# Patient Record
Sex: MALE | Race: WHITE | Employment: OTHER | URBAN - METROPOLITAN AREA
[De-identification: names, ages, dates, MRNs, and addresses within clinical notes are randomized per-mention and may not be internally consistent; named-entity substitution may affect disease eponyms.]

---

## 2018-08-21 RX ORDER — TRAMADOL HYDROCHLORIDE 50 MG/1
50 TABLET ORAL EVERY 6 HOURS PRN
COMMUNITY

## 2018-08-21 RX ORDER — LANOLIN ALCOHOL/MO/W.PET/CERES
3 CREAM (GRAM) TOPICAL NIGHTLY PRN
COMMUNITY
End: 2018-09-17 | Stop reason: DRUGHIGH

## 2018-08-21 RX ORDER — GABAPENTIN 300 MG/1
300 CAPSULE ORAL 3 TIMES DAILY
COMMUNITY
End: 2019-02-25

## 2018-08-21 RX ORDER — TAMSULOSIN HYDROCHLORIDE 0.4 MG/1
0.4 CAPSULE ORAL DAILY
COMMUNITY
End: 2019-03-20 | Stop reason: SDUPTHER

## 2018-08-21 RX ORDER — FAMOTIDINE 40 MG/1
40 TABLET, FILM COATED ORAL DAILY
COMMUNITY
End: 2018-09-05

## 2018-08-21 RX ORDER — ATORVASTATIN CALCIUM 10 MG/1
10 TABLET, FILM COATED ORAL DAILY
COMMUNITY
End: 2018-09-05 | Stop reason: SDUPTHER

## 2018-09-05 ENCOUNTER — OFFICE VISIT (OUTPATIENT)
Dept: PRIMARY CARE CLINIC | Age: 73
End: 2018-09-05
Payer: MEDICARE

## 2018-09-05 VITALS
WEIGHT: 221.6 LBS | TEMPERATURE: 98.7 F | DIASTOLIC BLOOD PRESSURE: 63 MMHG | SYSTOLIC BLOOD PRESSURE: 135 MMHG | HEART RATE: 70 BPM | RESPIRATION RATE: 20 BRPM

## 2018-09-05 DIAGNOSIS — E11.9 TYPE 2 DIABETES MELLITUS WITHOUT COMPLICATION, WITHOUT LONG-TERM CURRENT USE OF INSULIN (HCC): Primary | ICD-10-CM

## 2018-09-05 DIAGNOSIS — G89.29 OTHER CHRONIC PAIN: ICD-10-CM

## 2018-09-05 DIAGNOSIS — G47.33 OSA (OBSTRUCTIVE SLEEP APNEA): ICD-10-CM

## 2018-09-05 DIAGNOSIS — R39.15 URGENCY OF URINATION: ICD-10-CM

## 2018-09-05 DIAGNOSIS — Z13.220 LIPID SCREENING: ICD-10-CM

## 2018-09-05 DIAGNOSIS — I10 ESSENTIAL HYPERTENSION: ICD-10-CM

## 2018-09-05 DIAGNOSIS — I25.2 HISTORY OF MI (MYOCARDIAL INFARCTION): ICD-10-CM

## 2018-09-05 DIAGNOSIS — H91.93 BILATERAL HEARING LOSS, UNSPECIFIED HEARING LOSS TYPE: ICD-10-CM

## 2018-09-05 PROCEDURE — 2022F DILAT RTA XM EVC RTNOPTHY: CPT | Performed by: NURSE PRACTITIONER

## 2018-09-05 PROCEDURE — 3046F HEMOGLOBIN A1C LEVEL >9.0%: CPT | Performed by: NURSE PRACTITIONER

## 2018-09-05 PROCEDURE — G8421 BMI NOT CALCULATED: HCPCS | Performed by: NURSE PRACTITIONER

## 2018-09-05 PROCEDURE — 4040F PNEUMOC VAC/ADMIN/RCVD: CPT | Performed by: NURSE PRACTITIONER

## 2018-09-05 PROCEDURE — 1036F TOBACCO NON-USER: CPT | Performed by: NURSE PRACTITIONER

## 2018-09-05 PROCEDURE — 1101F PT FALLS ASSESS-DOCD LE1/YR: CPT | Performed by: NURSE PRACTITIONER

## 2018-09-05 PROCEDURE — G8427 DOCREV CUR MEDS BY ELIG CLIN: HCPCS | Performed by: NURSE PRACTITIONER

## 2018-09-05 PROCEDURE — 99203 OFFICE O/P NEW LOW 30 MIN: CPT | Performed by: NURSE PRACTITIONER

## 2018-09-05 PROCEDURE — 3017F COLORECTAL CA SCREEN DOC REV: CPT | Performed by: NURSE PRACTITIONER

## 2018-09-05 PROCEDURE — 1123F ACP DISCUSS/DSCN MKR DOCD: CPT | Performed by: NURSE PRACTITIONER

## 2018-09-05 RX ORDER — ATORVASTATIN CALCIUM 10 MG/1
10 TABLET, FILM COATED ORAL DAILY
Qty: 30 TABLET | Refills: 3 | Status: SHIPPED | OUTPATIENT
Start: 2018-09-05 | End: 2018-09-17 | Stop reason: DRUGHIGH

## 2018-09-05 ASSESSMENT — PATIENT HEALTH QUESTIONNAIRE - PHQ9
1. LITTLE INTEREST OR PLEASURE IN DOING THINGS: 0
SUM OF ALL RESPONSES TO PHQ9 QUESTIONS 1 & 2: 0
SUM OF ALL RESPONSES TO PHQ QUESTIONS 1-9: 0
2. FEELING DOWN, DEPRESSED OR HOPELESS: 0
SUM OF ALL RESPONSES TO PHQ QUESTIONS 1-9: 0

## 2018-09-05 ASSESSMENT — ENCOUNTER SYMPTOMS
TROUBLE SWALLOWING: 0
BLURRED VISION: 0
SHORTNESS OF BREATH: 0
COUGH: 0
EYES NEGATIVE: 1
WHEEZING: 0
RESPIRATORY NEGATIVE: 1
GASTROINTESTINAL NEGATIVE: 1

## 2018-09-05 NOTE — PROGRESS NOTES
[facility]   [address] [facility]    Name: Lashaun West  : 1945         Chief Complaint:     Chief Complaint   Patient presents with   [address]     moved here from [facility] Referral - General     to cardiologist, pulmonologist    Diabetes       History of Present Illness:      Lashaun West is a 74 y.o.  male who presents with Establish Care (moved here from [place]); Referral - General (to cardiologist, pulmonologist); and Diabetes    Diabetic visit information    BP Readings from Last 3 Encounters:   18 135/63       No results found for: LABA1C, [address], [address] [facility], [address] [place] [address]            Have you changed or started any medications since your last visit including any over-the-counter medicines, vitamins, or herbal medicines? New to practice   Have you stopped taking any of your medications? Is so, why? -  no  Are you having any side effects from any of your medications? - no    Have you seen any other physician or provider since your last visit? New to practice   Have you had any other diagnostic tests since your last visit?  no   Have you been seen in the emergency room and/or had an admission in a hospital since we last saw you?  no     Have you had your annual diabetic retinal (eye) exam? No   (ensure copy of exam is in the chart)    Have you had your routine dental cleaning in the past 6 months? no    Do you have an active MyChart account? If not, what are your barriers? No:     Patient Care Team:  HALEY Thakkar CNP as PCP - General (Certified Nurse Practitioner)    Medical history Review  Past Medical, Family, and Social History reviewed and does contribute to the patient presenting condition.     Health Maintenance   Topic Date Due    Potassium monitoring  1945    Creatinine monitoring  1945    DTaP/Tdap/Td vaccine (1 - Tdap) 1962    Lipid screen  1983    Shingles Vaccine (1 Allergies:      Patient has no known allergies. Social History:     Tobacco:    reports that he has never smoked. He has never used smokeless tobacco.  Alcohol:      reports that he drinks about 4.2 oz of alcohol per week . Drug Use:  reports that he does not use drugs. Family History:     Family History   Problem Relation Age of Onset    Adopted: Yes         Review of Systems:     Positive and Negative as described in HPI    Review of Systems   Constitutional: Negative. Negative for activity change, appetite change, chills and unexpected weight change. HENT: Positive for hearing loss. Negative for trouble swallowing. Eyes: Negative. Negative for blurred vision and visual disturbance. Respiratory: Negative. Negative for cough, shortness of breath and wheezing. Cardiovascular: Negative. Negative for chest pain, palpitations and leg swelling. Gastrointestinal: Negative. Genitourinary: Negative. Musculoskeletal: Positive for arthralgias. Chronic bilateral shoulder pain    Skin: Negative. Negative for rash. Neurological: Negative. Negative for dizziness, syncope, light-headedness and headaches. Physical Exam:   Vitals:  /63 (Site: Left Arm, Position: Sitting, Cuff Size: Medium Adult)   Pulse 70   Temp 98.7 °F (37.1 °C) (Temporal)   Resp 20   Wt 221 lb 9.6 oz (100.5 kg)     Physical Exam   Constitutional: He is oriented to person, place, and time. Vital signs are normal. He appears well-developed and well-nourished. He is cooperative. Non-toxic appearance. He does not appear ill. No distress. Appears well hydrated and non toxic. Sitting upright in chair without distress. Respirations are regular, non labored and quiet. HENT:   Head: Normocephalic and atraumatic. Right Ear: Tympanic membrane, external ear and ear canal normal. Decreased hearing is noted.    Left Ear: Tympanic membrane, external ear and ear canal normal. Decreased hearing is noted.   Nose: Nose normal.   Mouth/Throat: Uvula is midline, oropharynx is clear and moist and mucous membranes are normal.   Eyes: Pupils are equal, round, and reactive to light. EOM are normal.   Neck: Normal range of motion. Neck supple. Cardiovascular: Normal rate, regular rhythm, normal heart sounds and intact distal pulses. Exam reveals no gallop and no friction rub. No murmur heard. Pulses:       Radial pulses are 2+ on the left side. Dorsalis pedis pulses are 2+ on the right side, and 2+ on the left side. No peripheral edema    Pulmonary/Chest: Effort normal and breath sounds normal. No respiratory distress. He has no wheezes. He has no rales. Abdominal: Soft. Bowel sounds are normal. He exhibits no distension. There is no tenderness. Musculoskeletal: Normal range of motion. He exhibits no edema or tenderness. Lymphadenopathy:     He has no cervical adenopathy. Neurological: He is alert and oriented to person, place, and time. He exhibits normal muscle tone. Skin: Skin is warm and dry. No rash noted. No erythema. Psychiatric: He has a normal mood and affect. His speech is normal and behavior is normal. Judgment and thought content normal.   Nursing note and vitals reviewed. Date:     No results found for: NA, K, CL, CO2, BUN, CREATININE, GLUCOSE, PROT, LABALBU, BILITOT, ALKPHOS, AST, ALT  No results found for: WBC, RBC, HGB, HCT, MCV, MCH, MCHC, RDW, PLT, MPV  No results found for: TSH  No results found for: CHOL, HDL, PSA, LABA1C    Assessment/Plan:      Diagnosis Orders   1. Type 2 diabetes mellitus without complication, without long-term current use of insulin ([facility])  CBC Auto Differential    Comprehensive Metabolic Panel    Hemoglobin A1C    Microalbumin, Ur    Urinalysis    metFORMIN (GLUCOPHAGE) 500 MG tablet    atorvastatin (LIPITOR) 10 MG tablet   2.  History of MI (myocardial infarction)  Kristine Bernal MD, Cardiology [place]    metoprolol tartrate (LOPRESSOR) 25 MG tablet   3. FABIAN (obstructive sleep apnea)  Gerardo Moncada MD, Pulmonology [place]   4. Essential hypertension  TSH With Reflex Ft4   5. Lipid screening  Lipid Panel   6. Urgency of urination  Isabel Estrada MD, Urology [place]   7. Other chronic pain  External Referral To Pain Clinic   8. Bilateral hearing loss, unspecified hearing loss type     1. Type 2 diabetes: Will obtain labs including A1c. We'll continue metformin 500 mg twice daily. Refill sent. 2.  History of MI: Referral placed to Dr. Karla Mantilla cardiology for evaluation and treatment. We'll continue metoprolol. Denies needing refills of Eliquis. 3.  Obstructive sleep apnea: Patient requesting referral to pulmonary for evaluation. Referral placed. Instructed patient to continue CPAP as advised. Contact information given to patient and family member on local DME. 4.  Essential hypertension: Blood pressure controlled in office. Patient will continue metoprolol. Will obtain TSH. 5.  Lipid screening: Patient will continue Lipitor 10 mg daily. Will obtain lipid panel today. 6 urgency of urination: Referral placed to urology for evaluation and treatment. Patient currently taking Flomax. Will continue. Denies refill of medication today. 7.  Chronic pain bilateral shoulders: Patient currently taking gabapentin and Ultram.  Will place referral to pain management. Will need to obtain previous records for review. She'll verbalizes he has had recent x-rays patient denies needing refills of EpiPen or Ultram.    8.  Bilateral hearing loss: Patient declines referral to audiologist today. 1.  Cy Gusman received counseling on the following healthy behaviors: nutrition, exercise and medication adherence  2. Patient given educational materials - see patient instructions  3. Was a self-tracking handout given in paper form or via [employer]? No  If yes, see orders or list here.   4.  Discussed use, benefit, and side effects of

## 2018-09-06 ENCOUNTER — TELEPHONE (OUTPATIENT)
Dept: PRIMARY CARE CLINIC | Age: 73
End: 2018-09-06

## 2018-09-06 ENCOUNTER — HOSPITAL ENCOUNTER (OUTPATIENT)
Age: 73
Discharge: HOME | End: 2018-09-06
Payer: MEDICARE

## 2018-09-06 DIAGNOSIS — E11.9 TYPE 2 DIABETES MELLITUS WITHOUT COMPLICATION, WITHOUT LONG-TERM CURRENT USE OF INSULIN (HCC): ICD-10-CM

## 2018-09-06 DIAGNOSIS — Z13.220 LIPID SCREENING: ICD-10-CM

## 2018-09-06 DIAGNOSIS — I10 ESSENTIAL HYPERTENSION: ICD-10-CM

## 2018-09-06 LAB
ABSOLUTE EOS #: 0.28 K/UL (ref 0–0.44)
ABSOLUTE IMMATURE GRANULOCYTE: <0.03 K/UL (ref 0–0.3)
ABSOLUTE LYMPH #: 2.12 K/UL (ref 1.1–3.7)
ABSOLUTE MONO #: 0.6 K/UL (ref 0.1–1.2)
ALBUMIN SERPL-MCNC: 4.8 G/DL (ref 3.5–5.2)
ALBUMIN/GLOBULIN RATIO: 1.5 (ref 1–2.5)
ALP BLD-CCNC: 64 U/L (ref 40–129)
ALT SERPL-CCNC: 15 U/L (ref 5–41)
ANION GAP SERPL CALCULATED.3IONS-SCNC: 9 MMOL/L (ref 9–17)
AST SERPL-CCNC: 19 U/L
BASOPHILS # BLD: 1 % (ref 0–2)
BASOPHILS ABSOLUTE: 0.04 K/UL (ref 0–0.2)
BILIRUB SERPL-MCNC: 0.7 MG/DL (ref 0.3–1.2)
BILIRUBIN URINE: NEGATIVE
BUN BLDV-MCNC: 13 MG/DL (ref 8–23)
BUN/CREAT BLD: 15 (ref 9–20)
CALCIUM SERPL-MCNC: 10 MG/DL (ref 8.6–10.4)
CHLORIDE BLD-SCNC: 98 MMOL/L (ref 98–107)
CHOLESTEROL/HDL RATIO: 2.4
CHOLESTEROL: 122 MG/DL
CO2: 29 MMOL/L (ref 20–31)
COLOR: YELLOW
COMMENT UA: NORMAL
CREAT SERPL-MCNC: 0.84 MG/DL (ref 0.7–1.2)
CREATININE URINE: 57.2 MG/DL (ref 39–259)
DIFFERENTIAL TYPE: NORMAL
EOSINOPHILS RELATIVE PERCENT: 4 % (ref 1–4)
ESTIMATED AVERAGE GLUCOSE: 117 MG/DL
GFR AFRICAN AMERICAN: >60 ML/MIN
GFR NON-AFRICAN AMERICAN: >60 ML/MIN
GFR SERPL CREATININE-BSD FRML MDRD: ABNORMAL ML/MIN/{1.73_M2}
GFR SERPL CREATININE-BSD FRML MDRD: ABNORMAL ML/MIN/{1.73_M2}
GLUCOSE BLD-MCNC: 118 MG/DL (ref 70–99)
GLUCOSE URINE: NEGATIVE
HBA1C MFR BLD: 5.7 % (ref 4.8–5.9)
HCT VFR BLD CALC: 43 % (ref 40.7–50.3)
HDLC SERPL-MCNC: 50 MG/DL
HEMOGLOBIN: 14.1 G/DL (ref 13–17)
IMMATURE GRANULOCYTES: 0 %
KETONES, URINE: NEGATIVE
LDL CHOLESTEROL: 59 MG/DL (ref 0–130)
LEUKOCYTE ESTERASE, URINE: NEGATIVE
LYMPHOCYTES # BLD: 30 % (ref 24–43)
MCH RBC QN AUTO: 33.5 PG (ref 25.2–33.5)
MCHC RBC AUTO-ENTMCNC: 32.8 G/DL (ref 28.4–34.8)
MCV RBC AUTO: 102.1 FL (ref 82.6–102.9)
MICROALBUMIN/CREAT 24H UR: <12 MG/L
MICROALBUMIN/CREAT UR-RTO: NORMAL MCG/MG CREAT
MONOCYTES # BLD: 9 % (ref 3–12)
NITRITE, URINE: NEGATIVE
NRBC AUTOMATED: 0 PER 100 WBC
PDW BLD-RTO: 13.4 % (ref 11.8–14.4)
PH UA: 7.5 (ref 5–9)
PLATELET # BLD: 169 K/UL (ref 138–453)
PLATELET ESTIMATE: NORMAL
PMV BLD AUTO: 9.6 FL (ref 8.1–13.5)
POTASSIUM SERPL-SCNC: 4.7 MMOL/L (ref 3.7–5.3)
PROTEIN UA: NEGATIVE
RBC # BLD: 4.21 M/UL (ref 4.21–5.77)
RBC # BLD: NORMAL 10*6/UL
SEG NEUTROPHILS: 56 % (ref 36–65)
SEGMENTED NEUTROPHILS ABSOLUTE COUNT: 3.92 K/UL (ref 1.5–8.1)
SODIUM BLD-SCNC: 136 MMOL/L (ref 135–144)
SPECIFIC GRAVITY UA: 1.01 (ref 1.01–1.02)
TOTAL PROTEIN: 7.9 G/DL (ref 6.4–8.3)
TRIGL SERPL-MCNC: 66 MG/DL
TSH SERPL DL<=0.05 MIU/L-ACNC: 1.65 MIU/L (ref 0.3–5)
TURBIDITY: CLEAR
URINE HGB: NEGATIVE
UROBILINOGEN, URINE: NORMAL
VLDLC SERPL CALC-MCNC: NORMAL MG/DL (ref 1–30)
WBC # BLD: 7 K/UL (ref 3.5–11.3)
WBC # BLD: NORMAL 10*3/UL

## 2018-09-06 PROCEDURE — 36415 COLL VENOUS BLD VENIPUNCTURE: CPT

## 2018-09-06 PROCEDURE — 85025 COMPLETE CBC W/AUTO DIFF WBC: CPT

## 2018-09-06 PROCEDURE — 82570 ASSAY OF URINE CREATININE: CPT

## 2018-09-06 PROCEDURE — 81003 URINALYSIS AUTO W/O SCOPE: CPT

## 2018-09-06 PROCEDURE — 80061 LIPID PANEL: CPT

## 2018-09-06 PROCEDURE — 82043 UR ALBUMIN QUANTITATIVE: CPT

## 2018-09-06 PROCEDURE — 84443 ASSAY THYROID STIM HORMONE: CPT

## 2018-09-06 PROCEDURE — 83036 HEMOGLOBIN GLYCOSYLATED A1C: CPT

## 2018-09-06 PROCEDURE — 80053 COMPREHEN METABOLIC PANEL: CPT

## 2018-09-06 NOTE — TELEPHONE ENCOUNTER
----- Message from HALEY Cho CNP sent at 9/6/2018 11:16 AM EDT -----  Results of testing relatively normal. Keep scheduled appointment. Please relate to patient/parent. Thank you.    Po Pena

## 2018-09-17 ENCOUNTER — OFFICE VISIT (OUTPATIENT)
Dept: CARDIOLOGY | Age: 73
End: 2018-09-17
Payer: MEDICARE

## 2018-09-17 VITALS
BODY MASS INDEX: 29.88 KG/M2 | SYSTOLIC BLOOD PRESSURE: 131 MMHG | OXYGEN SATURATION: 96 % | HEART RATE: 79 BPM | HEIGHT: 72 IN | WEIGHT: 220.6 LBS | DIASTOLIC BLOOD PRESSURE: 70 MMHG | RESPIRATION RATE: 16 BRPM

## 2018-09-17 DIAGNOSIS — I50.32 CHRONIC DIASTOLIC CHF (CONGESTIVE HEART FAILURE), NYHA CLASS 3 (HCC): ICD-10-CM

## 2018-09-17 DIAGNOSIS — I25.810 CORONARY ARTERY DISEASE INVOLVING CORONARY BYPASS GRAFT OF NATIVE HEART WITHOUT ANGINA PECTORIS: Primary | ICD-10-CM

## 2018-09-17 DIAGNOSIS — I10 ESSENTIAL HYPERTENSION: ICD-10-CM

## 2018-09-17 DIAGNOSIS — E78.2 MIXED HYPERLIPIDEMIA: ICD-10-CM

## 2018-09-17 DIAGNOSIS — I25.2 HISTORY OF MI (MYOCARDIAL INFARCTION): ICD-10-CM

## 2018-09-17 DIAGNOSIS — I97.89 POSTOPERATIVE ATRIAL FIBRILLATION (HCC): ICD-10-CM

## 2018-09-17 DIAGNOSIS — I48.91 POSTOPERATIVE ATRIAL FIBRILLATION (HCC): ICD-10-CM

## 2018-09-17 DIAGNOSIS — I25.3 ANEURYSM OF LEFT VENTRICLE OF HEART: ICD-10-CM

## 2018-09-17 DIAGNOSIS — Z86.79 HISTORY OF AORTIC STENOSIS: ICD-10-CM

## 2018-09-17 DIAGNOSIS — Z95.2 H/O AORTIC VALVE REPLACEMENT: ICD-10-CM

## 2018-09-17 PROCEDURE — 1123F ACP DISCUSS/DSCN MKR DOCD: CPT | Performed by: FAMILY MEDICINE

## 2018-09-17 PROCEDURE — G8427 DOCREV CUR MEDS BY ELIG CLIN: HCPCS | Performed by: FAMILY MEDICINE

## 2018-09-17 PROCEDURE — 1101F PT FALLS ASSESS-DOCD LE1/YR: CPT | Performed by: FAMILY MEDICINE

## 2018-09-17 PROCEDURE — G8419 CALC BMI OUT NRM PARAM NOF/U: HCPCS | Performed by: FAMILY MEDICINE

## 2018-09-17 PROCEDURE — 93000 ELECTROCARDIOGRAM COMPLETE: CPT | Performed by: FAMILY MEDICINE

## 2018-09-17 PROCEDURE — 99205 OFFICE O/P NEW HI 60 MIN: CPT | Performed by: FAMILY MEDICINE

## 2018-09-17 PROCEDURE — 3017F COLORECTAL CA SCREEN DOC REV: CPT | Performed by: FAMILY MEDICINE

## 2018-09-17 PROCEDURE — 1036F TOBACCO NON-USER: CPT | Performed by: FAMILY MEDICINE

## 2018-09-17 PROCEDURE — 4040F PNEUMOC VAC/ADMIN/RCVD: CPT | Performed by: FAMILY MEDICINE

## 2018-09-17 PROCEDURE — G8598 ASA/ANTIPLAT THER USED: HCPCS | Performed by: FAMILY MEDICINE

## 2018-09-17 RX ORDER — METOPROLOL SUCCINATE 25 MG/1
25 TABLET, EXTENDED RELEASE ORAL DAILY
Qty: 90 TABLET | Refills: 3 | Status: SHIPPED | OUTPATIENT
Start: 2018-09-17 | End: 2019-03-11 | Stop reason: SDUPTHER

## 2018-09-17 RX ORDER — FAMOTIDINE 40 MG/1
40 TABLET, FILM COATED ORAL DAILY
COMMUNITY
End: 2020-01-06 | Stop reason: SDUPTHER

## 2018-09-17 RX ORDER — ATORVASTATIN CALCIUM 40 MG/1
40 TABLET, FILM COATED ORAL DAILY
Qty: 90 TABLET | Refills: 3 | Status: SHIPPED | OUTPATIENT
Start: 2018-09-17 | End: 2019-03-11 | Stop reason: SDUPTHER

## 2018-09-17 RX ORDER — CHOLECALCIFEROL (VITAMIN D3) 125 MCG
5 CAPSULE ORAL PRN
COMMUNITY

## 2018-09-17 NOTE — PROGRESS NOTES
valvular heart disease. Cardiac Rehab Referral: Also, given his diagnosis coronary bypass, I recommended that he consider going through Phase III cardiac rehabilitation program. After hearing the potential benefits, he was agreeable and therefore I wrote him a prescription for this. Chronic Diastolic Heart Failure:  Beta Blocker: Start metoprolol succinate (Toprol XL) 25 mg  once daily. I also discussed the potential side effects of this medication including lightheadedness and dizziness and told him to stop the medication of this occurs and call our office if this occurs. ACE Inibitor/ARB: Not indicated      Diuretics: Not indicated      Nonpharmacologic management of Heart Failure: I advised him to try and keep his legs up whenever possible and to limit salt in his diet. Laboratory testing: None   Additional Testing: I ordered an Echocardiogram to assess Mr. Rhina Reeves ejection fraction and to look for significant valvular heart disease as a source of Mr. Fara Hedrick symptoms      History of Severe Aortic Stenosis: Asymptomatic-S/P: Bioprosthetic Aortic Valve replacement in 12/2017  · Beta Blocker: Continue metoprolol tartrate (Lopressor)        Essential Hypertension: Controlled  · Beta Blocker: Stop lopressor and Start metoprolol succinate (Toprol XL) 25 mg once daily. Finally, I recommended that he continue his current medications and follow up with you as previously scheduled. FOLLOW UP:   I told Mr. Fara Hedrick to call my office if he had any problems, but otherwise I asked him to Return in about 3 months (around 12/17/2018). However, I would be happy to see him sooner should the need arise. Sincerely,  Antonieta Valenzuela. Car ZEE, MS, F.A.C.C. [facility] Specialist     Place  [address], [facility], [address] [place] [address]  Phone: [phone], Fax: [phone]     I believe that the risk of significant morbidity and mortality related to the patient's current medical conditions are: intermediate-high.

## 2018-09-17 NOTE — PATIENT INSTRUCTIONS
SURVEY:    You may be receiving a survey from [employer] regarding your visit today. Please complete the survey to enable us to provide the highest quality of care to you and your family. If you cannot score us a very good on any question, please call the office to discuss how we could have made your experience a very good one. Thank you.

## 2018-09-19 ENCOUNTER — HOSPITAL ENCOUNTER (OUTPATIENT)
Dept: NON INVASIVE DIAGNOSTICS | Age: 73
Discharge: HOME | End: 2018-09-19
Payer: MEDICARE

## 2018-09-19 DIAGNOSIS — E78.2 MIXED HYPERLIPIDEMIA: ICD-10-CM

## 2018-09-19 DIAGNOSIS — I25.2 HISTORY OF MI (MYOCARDIAL INFARCTION): ICD-10-CM

## 2018-09-19 DIAGNOSIS — Z95.2 H/O AORTIC VALVE REPLACEMENT: ICD-10-CM

## 2018-09-19 DIAGNOSIS — I48.91 POSTOPERATIVE ATRIAL FIBRILLATION (HCC): ICD-10-CM

## 2018-09-19 DIAGNOSIS — I25.810 CORONARY ARTERY DISEASE INVOLVING CORONARY BYPASS GRAFT OF NATIVE HEART WITHOUT ANGINA PECTORIS: ICD-10-CM

## 2018-09-19 DIAGNOSIS — I10 ESSENTIAL HYPERTENSION: ICD-10-CM

## 2018-09-19 DIAGNOSIS — Z86.79 HISTORY OF AORTIC STENOSIS: ICD-10-CM

## 2018-09-19 DIAGNOSIS — I97.89 POSTOPERATIVE ATRIAL FIBRILLATION (HCC): ICD-10-CM

## 2018-09-19 PROCEDURE — 93225 XTRNL ECG REC<48 HRS REC: CPT

## 2018-09-27 ENCOUNTER — TELEPHONE (OUTPATIENT)
Dept: CARDIOLOGY | Age: 73
End: 2018-09-27

## 2018-10-04 ENCOUNTER — TELEPHONE (OUTPATIENT)
Dept: CARDIOLOGY | Age: 73
End: 2018-10-04

## 2018-10-04 NOTE — TELEPHONE ENCOUNTER
Pt's wife called to inquire if blood thinner should be stopped since heart monitor result was ok. Please advise. Thank you!

## 2018-10-05 ENCOUNTER — TELEPHONE (OUTPATIENT)
Dept: CARDIOLOGY | Age: 73
End: 2018-10-05

## 2018-10-08 ENCOUNTER — TELEPHONE (OUTPATIENT)
Dept: CARDIOLOGY | Age: 73
End: 2018-10-08

## 2018-10-17 ENCOUNTER — TELEPHONE (OUTPATIENT)
Dept: PRIMARY CARE CLINIC | Age: 73
End: 2018-10-17

## 2018-10-17 NOTE — TELEPHONE ENCOUNTER
According to pain management notes patient complained of chest pain and blood in stool. Informed patient he needs to follow-up with cardiology. If he is having chest pain he needs go to the ED for evaluation. He is asked patient if he has had a colonoscopy and not referral will be placed for colonoscopy to gastroenterology for complaints of blood in stool.

## 2018-10-17 NOTE — TELEPHONE ENCOUNTER
Called and spoke with wife Nazario Hendricks and she states that patient is not having any chest pain and that the blood in his stool was in January when he had an ulcer but things are good now. Wife thinks he had a colonoscopy in January when he was having problems.

## 2018-10-25 ENCOUNTER — OFFICE VISIT (OUTPATIENT)
Dept: PULMONOLOGY | Age: 73
End: 2018-10-25
Payer: MEDICARE

## 2018-10-25 VITALS
HEART RATE: 78 BPM | RESPIRATION RATE: 16 BRPM | WEIGHT: 222 LBS | SYSTOLIC BLOOD PRESSURE: 134 MMHG | OXYGEN SATURATION: 96 % | BODY MASS INDEX: 30.07 KG/M2 | DIASTOLIC BLOOD PRESSURE: 66 MMHG | HEIGHT: 72 IN | TEMPERATURE: 99.2 F

## 2018-10-25 DIAGNOSIS — G47.33 OBSTRUCTIVE SLEEP APNEA: Primary | ICD-10-CM

## 2018-10-25 DIAGNOSIS — J98.6 ACQUIRED ELEVATED DIAPHRAGM: ICD-10-CM

## 2018-10-25 PROCEDURE — G8484 FLU IMMUNIZE NO ADMIN: HCPCS | Performed by: INTERNAL MEDICINE

## 2018-10-25 PROCEDURE — 1101F PT FALLS ASSESS-DOCD LE1/YR: CPT | Performed by: INTERNAL MEDICINE

## 2018-10-25 PROCEDURE — G8417 CALC BMI ABV UP PARAM F/U: HCPCS | Performed by: INTERNAL MEDICINE

## 2018-10-25 PROCEDURE — 1123F ACP DISCUSS/DSCN MKR DOCD: CPT | Performed by: INTERNAL MEDICINE

## 2018-10-25 PROCEDURE — G8427 DOCREV CUR MEDS BY ELIG CLIN: HCPCS | Performed by: INTERNAL MEDICINE

## 2018-10-25 PROCEDURE — 1036F TOBACCO NON-USER: CPT | Performed by: INTERNAL MEDICINE

## 2018-10-25 PROCEDURE — 99204 OFFICE O/P NEW MOD 45 MIN: CPT | Performed by: INTERNAL MEDICINE

## 2018-10-25 PROCEDURE — G8598 ASA/ANTIPLAT THER USED: HCPCS | Performed by: INTERNAL MEDICINE

## 2018-10-25 PROCEDURE — 3017F COLORECTAL CA SCREEN DOC REV: CPT | Performed by: INTERNAL MEDICINE

## 2018-10-25 PROCEDURE — 4040F PNEUMOC VAC/ADMIN/RCVD: CPT | Performed by: INTERNAL MEDICINE

## 2018-10-25 NOTE — PROGRESS NOTES
OUTPATIENT PULMONARY CONSULT NOTE      Patient:  Joel Costello  MRN: [id number]    Consulting Physician: HALEY Jade CNP  Reason for Consult: Obstructive sleep apnea/chronic left hemidiaphragm elevation. Primacy Care Physician: HALEY Jade CNP    HISTORY OF PRESENT ILLNESS:   The patient is a 74 y.o. male   He is referred here for evaluation and management of sleep apnea and history of chronic left hemidiaphragm elevation. According to patient and wife he had previous surgeries done for his knees in the past and after the surgery was noted to have low oxygen but had not had workup done but he was never started on oxygen as he improved. No history of COPD and asthma pulmonary embolism or DVT. In December 2017 he was in the hospital for left shoulder surgery it was complicated post surgery he had MI. Workup done and needed CABG. He has slightly prolonged course after the CABG he was requiring oxygenation after the procedure rehab was prolonged and he was on oxygen until March 2018 and at that time he had a sleep study done and he was started on CPAP and home oxygen was discontinued that time. He was seen and followed by pulmonologist in [facility] for sleep apnea, he was also told many years ago that he has left hemidiaphragm elevation and he has smaller lung on the left side because of the hemidiaphragm elevation, since he was started on CPAP he was doing better.   He was less sleepy during the daytime he is less tired and fatigued more energetic during the daytime his sleep is better and more refreshing and according to wife he did not have jerky movements and movement of his legs and upper extremities which she use to have before he was started on CPAP he still is still get tired during the daytime and he still takes a nap sometime is doing much better, when he was followed up by pulmonologist in [place] he was told that he will need a re-titration study in lab and he might need BiPAP his place of CPAP since then he moved here today for an and now establish care here  Post CABG he was on Eliquis while he was seen recently by cardiologist here his Eliquis was stopped. He denies shortness of breath, he denies chronic cough, he denies waking up at night with cough wheezing chest pain shortness of breath orthopnea or PND and denies pedal edema. He is not in any aerosol or inhalers. Sleep questionnaire On CPAP  Snores at night, Does not wakes himself snoring. Has no witnessed apnea. Does not wakes up with choking and gasping sensation. No dry mouth upon awakening. Occasional fatigue and tiredness during the day. Goes to sleep at 10 pm, wakes up 7 am. It takes 10 to 15 minutes to fall asleep. Wakes up 2 times at night to go to bathroom. Takes 1 nap during the day ( 60 minutes). No headache in am. No car wrecks or near wrecks because of the sleepiness. No nodding off while driving. No weight gain. No forgetfulness or decreased concentration. No nasal congestion or obstruction at night. No significant caffienated drinks or alcohol. Using CPAP 7-8 hr/night. No leg jerks during sleep. No restless feelings in legs at night. No numbness or burning in leg or feet. No leg aches or cramps . No loss of muscle strength when angry or laugh. No hallucination when dozing off or waking up from sleep. No paralysis upon awakening from sleep or going to sleep. No teeth grinding, nightmares, sleep walking. No night time panic attacks. No flowsheet data found.     ESS: 5  Sitting and reading 1  Watching TV 0  Sitting inactive in a public place (e.g a theater or a meeting)  0  As a passenger in a car for an hour without a break 0  Lying down to rest in the afternoon when circumstances permit 3  Sitting and talking to someone 0  Sitting quietly after a lunch without alcohol 1  In a car, while stopped for a few minutes in traffic 0    Past Medical History:        Diagnosis Date    Chronic back pain     index is 30.11 kg/m².     Physical Examination:   General appearance - alert, well appearing, and in no distress, overweight and acyanotic, in no respiratory distress  Mental status - alert, oriented to person, place, and time  Eyes - pupils equal and reactive, extraocular eye movements intact, sclera anicteric  Ears - right ear normal, left ear normal  Nose - normal and patent, no erythema, discharge or polyps  Mouth - mucous membranes moist, pharynx normal without lesions and large tongue, small oropharynx, Mallampati 1  Neck - supple, no significant adenopathy, short and thick neck  Chest - , no tachypnea, retractions or cyanosis, chest movement is symmetrical, there is dullness on percussion at the left base with diminished breath sound/absent breath sound at the left base, no crackles no wheezing good air entry on the right side  Heart - normal rate, regular rhythm, normal S1, S2, no murmurs, rubs, clicks or gallops  Abdomen - soft, nontender, nondistended, no masses or organomegaly  Neurological - alert, oriented, normal speech, no focal findings or movement disorder noted}  Extremities - peripheral pulses normal, no pedal edema, no clubbing or cyanosis  Skin - normal coloration and turgor, no rashes, no suspicious skin lesions noted       LABS:    CBC:   WBC   Date Value Ref Range Status   09/06/2018 7.0 3.5 - 11.3 k/uL Final     Hemoglobin   Date Value Ref Range Status   09/06/2018 14.1 13.0 - 17.0 g/dL Final     Platelets   Date Value Ref Range Status   09/06/2018 169 138 - 453 k/uL Final     BMP:   Sodium   Date Value Ref Range Status   09/06/2018 136 135 - 144 mmol/L Final     Potassium   Date Value Ref Range Status   09/06/2018 4.7 3.7 - 5.3 mmol/L Final     Chloride   Date Value Ref Range Status   09/06/2018 98 98 - 107 mmol/L Final     CO2   Date Value Ref Range Status   09/06/2018 29 20 - 31 mmol/L Final     BUN   Date Value Ref Range Status   09/06/2018 13 8 - 23 mg/dL Final     CREATININE   Date

## 2018-11-01 ENCOUNTER — HOSPITAL ENCOUNTER (OUTPATIENT)
Age: 73
Discharge: HOME | End: 2018-11-03
Payer: MEDICARE

## 2018-11-01 ENCOUNTER — HOSPITAL ENCOUNTER (OUTPATIENT)
Dept: GENERAL RADIOLOGY | Age: 73
Discharge: HOME | End: 2018-11-03
Payer: MEDICARE

## 2018-11-01 DIAGNOSIS — J98.6 ACQUIRED ELEVATED DIAPHRAGM: ICD-10-CM

## 2018-11-01 PROCEDURE — 71046 X-RAY EXAM CHEST 2 VIEWS: CPT

## 2018-11-12 ENCOUNTER — TELEPHONE (OUTPATIENT)
Dept: PULMONOLOGY | Age: 73
End: 2018-11-12

## 2018-11-12 DIAGNOSIS — Z99.89 OSA ON CPAP: Primary | ICD-10-CM

## 2018-11-12 DIAGNOSIS — G47.33 OSA ON CPAP: Primary | ICD-10-CM

## 2018-11-12 RX ORDER — LANCETS 30 GAUGE
1 EACH MISCELLANEOUS DAILY
Qty: 100 EACH | Refills: 3 | Status: SHIPPED | OUTPATIENT
Start: 2018-11-12 | End: 2019-06-12 | Stop reason: SDUPTHER

## 2018-11-12 RX ORDER — GLUCOSAMINE HCL/CHONDROITIN SU 500-400 MG
1 CAPSULE ORAL DAILY
Qty: 100 STRIP | Refills: 3 | Status: SHIPPED | OUTPATIENT
Start: 2018-11-12 | End: 2019-06-12 | Stop reason: SDUPTHER

## 2018-12-04 ENCOUNTER — APPOINTMENT (OUTPATIENT)
Dept: CT IMAGING | Age: 73
DRG: 287 | End: 2018-12-04
Payer: MEDICARE

## 2018-12-04 ENCOUNTER — HOSPITAL ENCOUNTER (INPATIENT)
Age: 73
LOS: 2 days | Discharge: ANOTHER HOSPITAL | DRG: 287 | End: 2018-12-06
Attending: EMERGENCY MEDICINE | Admitting: INTERNAL MEDICINE
Payer: MEDICARE

## 2018-12-04 ENCOUNTER — APPOINTMENT (OUTPATIENT)
Dept: GENERAL RADIOLOGY | Age: 73
DRG: 287 | End: 2018-12-04
Payer: MEDICARE

## 2018-12-04 DIAGNOSIS — Z86.79 HISTORY OF ATRIAL FIBRILLATION: ICD-10-CM

## 2018-12-04 DIAGNOSIS — R07.9 CHEST PAIN, UNSPECIFIED TYPE: Primary | ICD-10-CM

## 2018-12-04 DIAGNOSIS — Z95.1 H/O THREE VESSEL CORONARY ARTERY BYPASS: ICD-10-CM

## 2018-12-04 DIAGNOSIS — Z95.2 H/O AORTIC VALVE REPLACEMENT: ICD-10-CM

## 2018-12-04 PROBLEM — I50.32 CHRONIC DIASTOLIC CHF (CONGESTIVE HEART FAILURE), NYHA CLASS 3 (HCC): Chronic | Status: ACTIVE | Noted: 2018-09-17

## 2018-12-04 PROBLEM — I10 ESSENTIAL HYPERTENSION: Chronic | Status: ACTIVE | Noted: 2018-09-05

## 2018-12-04 PROBLEM — G47.33 OSA (OBSTRUCTIVE SLEEP APNEA): Chronic | Status: ACTIVE | Noted: 2018-09-05

## 2018-12-04 PROBLEM — E11.9 TYPE 2 DIABETES MELLITUS WITHOUT COMPLICATION, WITHOUT LONG-TERM CURRENT USE OF INSULIN (HCC): Chronic | Status: ACTIVE | Noted: 2018-09-05

## 2018-12-04 PROBLEM — I25.2 HISTORY OF MI (MYOCARDIAL INFARCTION): Chronic | Status: ACTIVE | Noted: 2018-09-05

## 2018-12-04 PROBLEM — I20.0 UNSTABLE ANGINA (HCC): Status: ACTIVE | Noted: 2018-12-04

## 2018-12-04 LAB
ABSOLUTE EOS #: 0.13 K/UL (ref 0–0.44)
ABSOLUTE IMMATURE GRANULOCYTE: <0.03 K/UL (ref 0–0.3)
ABSOLUTE LYMPH #: 1.58 K/UL (ref 1.1–3.7)
ABSOLUTE MONO #: 0.7 K/UL (ref 0.1–1.2)
ANION GAP SERPL CALCULATED.3IONS-SCNC: 12 MMOL/L (ref 9–17)
BASOPHILS # BLD: 1 % (ref 0–2)
BASOPHILS ABSOLUTE: 0.04 K/UL (ref 0–0.2)
BNP INTERPRETATION: NORMAL
BUN BLDV-MCNC: 18 MG/DL (ref 8–23)
BUN/CREAT BLD: 26 (ref 9–20)
CALCIUM SERPL-MCNC: 9.6 MG/DL (ref 8.6–10.4)
CHLORIDE BLD-SCNC: 99 MMOL/L (ref 98–107)
CO2: 27 MMOL/L (ref 20–31)
CREAT SERPL-MCNC: 0.68 MG/DL (ref 0.7–1.2)
DIFFERENTIAL TYPE: ABNORMAL
EOSINOPHILS RELATIVE PERCENT: 2 % (ref 1–4)
GFR AFRICAN AMERICAN: >60 ML/MIN
GFR NON-AFRICAN AMERICAN: >60 ML/MIN
GFR SERPL CREATININE-BSD FRML MDRD: ABNORMAL ML/MIN/{1.73_M2}
GFR SERPL CREATININE-BSD FRML MDRD: ABNORMAL ML/MIN/{1.73_M2}
GLUCOSE BLD-MCNC: 190 MG/DL (ref 70–99)
HCT VFR BLD CALC: 43.9 % (ref 40.7–50.3)
HEMOGLOBIN: 13.9 G/DL (ref 13–17)
IMMATURE GRANULOCYTES: 0 %
INR BLD: 1.1 (ref 0.9–1.2)
LYMPHOCYTES # BLD: 19 % (ref 24–43)
MCH RBC QN AUTO: 33.7 PG (ref 25.2–33.5)
MCHC RBC AUTO-ENTMCNC: 31.7 G/DL (ref 28.4–34.8)
MCV RBC AUTO: 106.3 FL (ref 82.6–102.9)
MONOCYTES # BLD: 8 % (ref 3–12)
NRBC AUTOMATED: 0 PER 100 WBC
PARTIAL THROMBOPLASTIN TIME: 25.7 SEC (ref 23.2–34.4)
PDW BLD-RTO: 13.4 % (ref 11.8–14.4)
PLATELET # BLD: 179 K/UL (ref 138–453)
PLATELET ESTIMATE: ABNORMAL
PMV BLD AUTO: 9.8 FL (ref 8.1–13.5)
POTASSIUM SERPL-SCNC: 4.4 MMOL/L (ref 3.7–5.3)
PRO-BNP: 79 PG/ML
PROTHROMBIN TIME: 11.5 SEC (ref 9.7–12.2)
RBC # BLD: 4.13 M/UL (ref 4.21–5.77)
RBC # BLD: ABNORMAL 10*6/UL
SEG NEUTROPHILS: 70 % (ref 36–65)
SEGMENTED NEUTROPHILS ABSOLUTE COUNT: 5.9 K/UL (ref 1.5–8.1)
SODIUM BLD-SCNC: 138 MMOL/L (ref 135–144)
TROPONIN INTERP: NORMAL
TROPONIN T: <0.03 NG/ML
WBC # BLD: 8.4 K/UL (ref 3.5–11.3)
WBC # BLD: ABNORMAL 10*3/UL

## 2018-12-04 PROCEDURE — 83880 ASSAY OF NATRIURETIC PEPTIDE: CPT

## 2018-12-04 PROCEDURE — 1200000000 HC SEMI PRIVATE

## 2018-12-04 PROCEDURE — 36415 COLL VENOUS BLD VENIPUNCTURE: CPT

## 2018-12-04 PROCEDURE — 71275 CT ANGIOGRAPHY CHEST: CPT

## 2018-12-04 PROCEDURE — 85610 PROTHROMBIN TIME: CPT

## 2018-12-04 PROCEDURE — 85025 COMPLETE CBC W/AUTO DIFF WBC: CPT

## 2018-12-04 PROCEDURE — 6370000000 HC RX 637 (ALT 250 FOR IP): Performed by: EMERGENCY MEDICINE

## 2018-12-04 PROCEDURE — 2580000003 HC RX 258: Performed by: INTERNAL MEDICINE

## 2018-12-04 PROCEDURE — 80048 BASIC METABOLIC PNL TOTAL CA: CPT

## 2018-12-04 PROCEDURE — 93005 ELECTROCARDIOGRAM TRACING: CPT

## 2018-12-04 PROCEDURE — 99285 EMERGENCY DEPT VISIT HI MDM: CPT

## 2018-12-04 PROCEDURE — 6360000004 HC RX CONTRAST MEDICATION: Performed by: EMERGENCY MEDICINE

## 2018-12-04 PROCEDURE — 71045 X-RAY EXAM CHEST 1 VIEW: CPT

## 2018-12-04 PROCEDURE — 84484 ASSAY OF TROPONIN QUANT: CPT

## 2018-12-04 PROCEDURE — 85730 THROMBOPLASTIN TIME PARTIAL: CPT

## 2018-12-04 RX ORDER — TAMSULOSIN HYDROCHLORIDE 0.4 MG/1
0.4 CAPSULE ORAL DAILY
Status: DISCONTINUED | OUTPATIENT
Start: 2018-12-05 | End: 2018-12-06 | Stop reason: HOSPADM

## 2018-12-04 RX ORDER — FAMOTIDINE 20 MG/1
20 TABLET, FILM COATED ORAL 2 TIMES DAILY
Status: DISCONTINUED | OUTPATIENT
Start: 2018-12-04 | End: 2018-12-05

## 2018-12-04 RX ORDER — ASPIRIN 81 MG/1
81 TABLET, CHEWABLE ORAL DAILY
Status: DISCONTINUED | OUTPATIENT
Start: 2018-12-05 | End: 2018-12-06 | Stop reason: HOSPADM

## 2018-12-04 RX ORDER — NITROGLYCERIN 0.4 MG/1
0.4 TABLET SUBLINGUAL EVERY 5 MIN PRN
Status: DISCONTINUED | OUTPATIENT
Start: 2018-12-04 | End: 2018-12-06

## 2018-12-04 RX ORDER — ASPIRIN 81 MG/1
324 TABLET, CHEWABLE ORAL ONCE
Status: COMPLETED | OUTPATIENT
Start: 2018-12-04 | End: 2018-12-04

## 2018-12-04 RX ORDER — FAMOTIDINE 20 MG/1
40 TABLET, FILM COATED ORAL DAILY
Status: DISCONTINUED | OUTPATIENT
Start: 2018-12-05 | End: 2018-12-06 | Stop reason: HOSPADM

## 2018-12-04 RX ORDER — SODIUM CHLORIDE 0.9 % (FLUSH) 0.9 %
10 SYRINGE (ML) INJECTION PRN
Status: DISCONTINUED | OUTPATIENT
Start: 2018-12-04 | End: 2018-12-06 | Stop reason: HOSPADM

## 2018-12-04 RX ORDER — SODIUM CHLORIDE 9 MG/ML
INJECTION, SOLUTION INTRAVENOUS CONTINUOUS
Status: DISCONTINUED | OUTPATIENT
Start: 2018-12-04 | End: 2018-12-06 | Stop reason: HOSPADM

## 2018-12-04 RX ORDER — SODIUM CHLORIDE 0.9 % (FLUSH) 0.9 %
10 SYRINGE (ML) INJECTION EVERY 12 HOURS SCHEDULED
Status: DISCONTINUED | OUTPATIENT
Start: 2018-12-04 | End: 2018-12-06 | Stop reason: HOSPADM

## 2018-12-04 RX ORDER — ATORVASTATIN CALCIUM 40 MG/1
40 TABLET, FILM COATED ORAL DAILY
Status: DISCONTINUED | OUTPATIENT
Start: 2018-12-05 | End: 2018-12-06 | Stop reason: HOSPADM

## 2018-12-04 RX ORDER — GABAPENTIN 300 MG/1
300 CAPSULE ORAL 3 TIMES DAILY
Status: DISCONTINUED | OUTPATIENT
Start: 2018-12-04 | End: 2018-12-06 | Stop reason: HOSPADM

## 2018-12-04 RX ORDER — ONDANSETRON 2 MG/ML
4 INJECTION INTRAMUSCULAR; INTRAVENOUS EVERY 6 HOURS PRN
Status: DISCONTINUED | OUTPATIENT
Start: 2018-12-04 | End: 2018-12-06 | Stop reason: HOSPADM

## 2018-12-04 RX ORDER — TRAMADOL HYDROCHLORIDE 50 MG/1
50 TABLET ORAL EVERY 6 HOURS PRN
Status: DISCONTINUED | OUTPATIENT
Start: 2018-12-04 | End: 2018-12-06 | Stop reason: HOSPADM

## 2018-12-04 RX ORDER — METOPROLOL SUCCINATE 25 MG/1
25 TABLET, EXTENDED RELEASE ORAL DAILY
Status: DISCONTINUED | OUTPATIENT
Start: 2018-12-05 | End: 2018-12-06 | Stop reason: HOSPADM

## 2018-12-04 RX ORDER — UREA 10 %
5 LOTION (ML) TOPICAL DAILY
Status: DISCONTINUED | OUTPATIENT
Start: 2018-12-05 | End: 2018-12-05

## 2018-12-04 RX ADMIN — Medication 10 ML: at 23:10

## 2018-12-04 RX ADMIN — ASPIRIN 81 MG CHEWABLE TABLET 324 MG: 81 TABLET CHEWABLE at 19:21

## 2018-12-04 RX ADMIN — SODIUM CHLORIDE: 9 INJECTION, SOLUTION INTRAVENOUS at 23:10

## 2018-12-04 RX ADMIN — IOPAMIDOL 100 ML: 755 INJECTION, SOLUTION INTRAVENOUS at 18:17

## 2018-12-04 ASSESSMENT — ENCOUNTER SYMPTOMS
BACK PAIN: 1
SHORTNESS OF BREATH: 1
VOMITING: 0
NAUSEA: 1
RHINORRHEA: 0
COUGH: 0
SORE THROAT: 0
CONSTIPATION: 0
WHEEZING: 0
DIARRHEA: 0
ABDOMINAL DISTENTION: 0

## 2018-12-05 ENCOUNTER — APPOINTMENT (OUTPATIENT)
Dept: NON INVASIVE DIAGNOSTICS | Age: 73
DRG: 287 | End: 2018-12-05
Payer: MEDICARE

## 2018-12-05 LAB
ALBUMIN SERPL-MCNC: 4.4 G/DL (ref 3.5–5.2)
ALBUMIN/GLOBULIN RATIO: 1.7 (ref 1–2.5)
ALP BLD-CCNC: 71 U/L (ref 40–129)
ALT SERPL-CCNC: 212 U/L (ref 5–41)
ANION GAP SERPL CALCULATED.3IONS-SCNC: 11 MMOL/L (ref 9–17)
AST SERPL-CCNC: 110 U/L
BILIRUB SERPL-MCNC: 0.66 MG/DL (ref 0.3–1.2)
BUN BLDV-MCNC: 13 MG/DL (ref 8–23)
BUN/CREAT BLD: 20 (ref 9–20)
CALCIUM SERPL-MCNC: 9.2 MG/DL (ref 8.6–10.4)
CHLORIDE BLD-SCNC: 106 MMOL/L (ref 98–107)
CHOLESTEROL/HDL RATIO: 2.1
CHOLESTEROL: 97 MG/DL
CO2: 24 MMOL/L (ref 20–31)
CREAT SERPL-MCNC: 0.66 MG/DL (ref 0.7–1.2)
EKG ATRIAL RATE: 69 BPM
EKG ATRIAL RATE: 69 BPM
EKG ATRIAL RATE: 80 BPM
EKG P AXIS: -19 DEGREES
EKG P AXIS: 1 DEGREES
EKG P AXIS: 4 DEGREES
EKG P-R INTERVAL: 226 MS
EKG P-R INTERVAL: 232 MS
EKG P-R INTERVAL: 244 MS
EKG Q-T INTERVAL: 392 MS
EKG Q-T INTERVAL: 400 MS
EKG Q-T INTERVAL: 416 MS
EKG QRS DURATION: 122 MS
EKG QRS DURATION: 134 MS
EKG QRS DURATION: 138 MS
EKG QTC CALCULATION (BAZETT): 428 MS
EKG QTC CALCULATION (BAZETT): 445 MS
EKG QTC CALCULATION (BAZETT): 452 MS
EKG R AXIS: -46 DEGREES
EKG R AXIS: -6 DEGREES
EKG R AXIS: 0 DEGREES
EKG T AXIS: 40 DEGREES
EKG T AXIS: 42 DEGREES
EKG T AXIS: 63 DEGREES
EKG VENTRICULAR RATE: 69 BPM
EKG VENTRICULAR RATE: 69 BPM
EKG VENTRICULAR RATE: 80 BPM
GFR AFRICAN AMERICAN: >60 ML/MIN
GFR NON-AFRICAN AMERICAN: >60 ML/MIN
GFR SERPL CREATININE-BSD FRML MDRD: ABNORMAL ML/MIN/{1.73_M2}
GFR SERPL CREATININE-BSD FRML MDRD: ABNORMAL ML/MIN/{1.73_M2}
GLUCOSE BLD-MCNC: 110 MG/DL (ref 70–99)
GLUCOSE BLD-MCNC: 111 MG/DL (ref 74–100)
GLUCOSE BLD-MCNC: 119 MG/DL (ref 74–100)
GLUCOSE BLD-MCNC: 124 MG/DL (ref 74–100)
GLUCOSE BLD-MCNC: 160 MG/DL (ref 74–100)
HCT VFR BLD CALC: 40.5 % (ref 40.7–50.3)
HDLC SERPL-MCNC: 46 MG/DL
HEMOGLOBIN: 13.3 G/DL (ref 13–17)
LDL CHOLESTEROL: 42 MG/DL (ref 0–130)
LV EF: 53 %
LVEF MODALITY: NORMAL
MCH RBC QN AUTO: 33.3 PG (ref 25.2–33.5)
MCHC RBC AUTO-ENTMCNC: 32.8 G/DL (ref 28.4–34.8)
MCV RBC AUTO: 101.5 FL (ref 82.6–102.9)
NRBC AUTOMATED: 0 PER 100 WBC
PDW BLD-RTO: 13.3 % (ref 11.8–14.4)
PLATELET # BLD: 176 K/UL (ref 138–453)
PMV BLD AUTO: 10 FL (ref 8.1–13.5)
POTASSIUM SERPL-SCNC: 4.2 MMOL/L (ref 3.7–5.3)
RBC # BLD: 3.99 M/UL (ref 4.21–5.77)
SODIUM BLD-SCNC: 141 MMOL/L (ref 135–144)
TOTAL PROTEIN: 7 G/DL (ref 6.4–8.3)
TRIGL SERPL-MCNC: 45 MG/DL
TROPONIN INTERP: NORMAL
TROPONIN T: <0.03 NG/ML
VLDLC SERPL CALC-MCNC: NORMAL MG/DL (ref 1–30)
WBC # BLD: 8 K/UL (ref 3.5–11.3)

## 2018-12-05 PROCEDURE — 93306 TTE W/DOPPLER COMPLETE: CPT

## 2018-12-05 PROCEDURE — 80053 COMPREHEN METABOLIC PANEL: CPT

## 2018-12-05 PROCEDURE — 84484 ASSAY OF TROPONIN QUANT: CPT

## 2018-12-05 PROCEDURE — 80061 LIPID PANEL: CPT

## 2018-12-05 PROCEDURE — 82947 ASSAY GLUCOSE BLOOD QUANT: CPT

## 2018-12-05 PROCEDURE — 85027 COMPLETE CBC AUTOMATED: CPT

## 2018-12-05 PROCEDURE — 36415 COLL VENOUS BLD VENIPUNCTURE: CPT

## 2018-12-05 PROCEDURE — 93005 ELECTROCARDIOGRAM TRACING: CPT

## 2018-12-05 PROCEDURE — 2580000003 HC RX 258: Performed by: INTERNAL MEDICINE

## 2018-12-05 PROCEDURE — 93017 CV STRESS TEST TRACING ONLY: CPT

## 2018-12-05 PROCEDURE — 78452 HT MUSCLE IMAGE SPECT MULT: CPT

## 2018-12-05 PROCEDURE — 6370000000 HC RX 637 (ALT 250 FOR IP): Performed by: INTERNAL MEDICINE

## 2018-12-05 PROCEDURE — 6370000000 HC RX 637 (ALT 250 FOR IP): Performed by: PHYSICIAN ASSISTANT

## 2018-12-05 PROCEDURE — 1200000000 HC SEMI PRIVATE

## 2018-12-05 PROCEDURE — 3430000000 HC RX DIAGNOSTIC RADIOPHARMACEUTICAL: Performed by: FAMILY MEDICINE

## 2018-12-05 PROCEDURE — 6360000002 HC RX W HCPCS: Performed by: INTERNAL MEDICINE

## 2018-12-05 PROCEDURE — A9500 TC99M SESTAMIBI: HCPCS | Performed by: FAMILY MEDICINE

## 2018-12-05 RX ORDER — DEXTROSE MONOHYDRATE 50 MG/ML
100 INJECTION, SOLUTION INTRAVENOUS PRN
Status: DISCONTINUED | OUTPATIENT
Start: 2018-12-05 | End: 2018-12-06 | Stop reason: HOSPADM

## 2018-12-05 RX ORDER — DEXTROSE MONOHYDRATE 25 G/50ML
12.5 INJECTION, SOLUTION INTRAVENOUS PRN
Status: DISCONTINUED | OUTPATIENT
Start: 2018-12-05 | End: 2018-12-06 | Stop reason: HOSPADM

## 2018-12-05 RX ORDER — NICOTINE POLACRILEX 4 MG
15 LOZENGE BUCCAL PRN
Status: DISCONTINUED | OUTPATIENT
Start: 2018-12-05 | End: 2018-12-06 | Stop reason: HOSPADM

## 2018-12-05 RX ORDER — UREA 10 %
5 LOTION (ML) TOPICAL DAILY
Status: DISCONTINUED | OUTPATIENT
Start: 2018-12-05 | End: 2018-12-06 | Stop reason: HOSPADM

## 2018-12-05 RX ADMIN — SODIUM CHLORIDE: 9 INJECTION, SOLUTION INTRAVENOUS at 13:41

## 2018-12-05 RX ADMIN — Medication 5 MG: at 20:04

## 2018-12-05 RX ADMIN — METOPROLOL SUCCINATE 25 MG: 25 TABLET, EXTENDED RELEASE ORAL at 13:42

## 2018-12-05 RX ADMIN — INSULIN LISPRO 1 UNITS: 100 INJECTION, SOLUTION INTRAVENOUS; SUBCUTANEOUS at 20:10

## 2018-12-05 RX ADMIN — ASPIRIN 81 MG CHEWABLE TABLET 81 MG: 81 TABLET CHEWABLE at 13:41

## 2018-12-05 RX ADMIN — Medication 30 MILLICURIE: at 12:10

## 2018-12-05 RX ADMIN — ENOXAPARIN SODIUM 40 MG: 40 INJECTION SUBCUTANEOUS at 13:42

## 2018-12-05 RX ADMIN — FAMOTIDINE 40 MG: 20 TABLET ORAL at 13:41

## 2018-12-05 RX ADMIN — Medication 10 MILLICURIE: at 10:10

## 2018-12-05 RX ADMIN — TAMSULOSIN HYDROCHLORIDE 0.4 MG: 0.4 CAPSULE ORAL at 13:42

## 2018-12-05 RX ADMIN — GABAPENTIN 300 MG: 300 CAPSULE ORAL at 20:05

## 2018-12-05 RX ADMIN — GABAPENTIN 300 MG: 300 CAPSULE ORAL at 13:42

## 2018-12-06 ENCOUNTER — HOSPITAL ENCOUNTER (INPATIENT)
Age: 73
LOS: 1 days | Discharge: HOME | DRG: 287 | End: 2018-12-07
Attending: INTERNAL MEDICINE | Admitting: INTERNAL MEDICINE
Payer: MEDICARE

## 2018-12-06 ENCOUNTER — APPOINTMENT (OUTPATIENT)
Dept: CARDIAC CATH/INVASIVE PROCEDURES | Age: 73
DRG: 287 | End: 2018-12-06
Attending: INTERNAL MEDICINE
Payer: MEDICARE

## 2018-12-06 VITALS
HEIGHT: 72 IN | TEMPERATURE: 98.7 F | HEART RATE: 82 BPM | OXYGEN SATURATION: 94 % | DIASTOLIC BLOOD PRESSURE: 76 MMHG | BODY MASS INDEX: 30.2 KG/M2 | WEIGHT: 223 LBS | SYSTOLIC BLOOD PRESSURE: 148 MMHG | RESPIRATION RATE: 16 BRPM

## 2018-12-06 DIAGNOSIS — E11.9 TYPE 2 DIABETES MELLITUS WITHOUT COMPLICATION, WITHOUT LONG-TERM CURRENT USE OF INSULIN (HCC): ICD-10-CM

## 2018-12-06 PROBLEM — Z03.89 CORONARY ARTERY DISEASE (CAD) EXCLUDED: Status: ACTIVE | Noted: 2018-12-06

## 2018-12-06 LAB
ABO: NORMAL
ACTIVATED CLOTTING TIME: 230 SECONDS (ref 1–150)
ACTIVATED CLOTTING TIME: 246 SECONDS (ref 1–150)
ALBUMIN SERPL-MCNC: 4.1 G/DL (ref 3.5–5.2)
ALBUMIN/GLOBULIN RATIO: 1.5 (ref 1–2.5)
ALP BLD-CCNC: 71 U/L (ref 40–129)
ALT SERPL-CCNC: 130 U/L (ref 5–41)
ANION GAP SERPL CALCULATED.3IONS-SCNC: 9 MMOL/L (ref 9–17)
ANTIBODY SCREEN: NORMAL
AST SERPL-CCNC: 43 U/L
BILIRUB SERPL-MCNC: 0.64 MG/DL (ref 0.3–1.2)
BUN BLDV-MCNC: 10 MG/DL (ref 8–23)
BUN/CREAT BLD: 14 (ref 9–20)
CALCIUM SERPL-MCNC: 9.1 MG/DL (ref 8.6–10.4)
CHLORIDE BLD-SCNC: 109 MMOL/L (ref 98–107)
CO2: 24 MMOL/L (ref 20–31)
CREAT SERPL-MCNC: 0.7 MG/DL (ref 0.7–1.2)
GFR AFRICAN AMERICAN: >60 ML/MIN
GFR NON-AFRICAN AMERICAN: >60 ML/MIN
GFR SERPL CREATININE-BSD FRML MDRD: ABNORMAL ML/MIN/{1.73_M2}
GFR SERPL CREATININE-BSD FRML MDRD: ABNORMAL ML/MIN/{1.73_M2}
GLUCOSE BLD-MCNC: 111 MG/DL (ref 70–108)
GLUCOSE BLD-MCNC: 132 MG/DL (ref 74–100)
GLUCOSE BLD-MCNC: 137 MG/DL (ref 70–99)
GLUCOSE BLD-MCNC: 159 MG/DL (ref 74–100)
HCT VFR BLD CALC: 40.9 % (ref 40.7–50.3)
HEMOGLOBIN: 13.5 G/DL (ref 13–17)
MCH RBC QN AUTO: 33.5 PG (ref 25.2–33.5)
MCHC RBC AUTO-ENTMCNC: 33 G/DL (ref 28.4–34.8)
MCV RBC AUTO: 101.5 FL (ref 82.6–102.9)
NRBC AUTOMATED: 0 PER 100 WBC
PDW BLD-RTO: 13.6 % (ref 11.8–14.4)
PLATELET # BLD: 165 K/UL (ref 138–453)
PMV BLD AUTO: 9.8 FL (ref 8.1–13.5)
POC ACTIVATED CLOTTING TIME KAOLIN: 169 SECONDS (ref 1–150)
POTASSIUM SERPL-SCNC: 4.2 MMOL/L (ref 3.7–5.3)
RBC # BLD: 4.03 M/UL (ref 4.21–5.77)
RH FACTOR: NORMAL
SODIUM BLD-SCNC: 142 MMOL/L (ref 135–144)
TOTAL PROTEIN: 6.8 G/DL (ref 6.4–8.3)
WBC # BLD: 6.6 K/UL (ref 3.5–11.3)

## 2018-12-06 PROCEDURE — 2500000003 HC RX 250 WO HCPCS

## 2018-12-06 PROCEDURE — 2580000003 HC RX 258: Performed by: FAMILY MEDICINE

## 2018-12-06 PROCEDURE — B2111ZZ FLUOROSCOPY OF MULTIPLE CORONARY ARTERIES USING LOW OSMOLAR CONTRAST: ICD-10-PCS | Performed by: INTERNAL MEDICINE

## 2018-12-06 PROCEDURE — 36415 COLL VENOUS BLD VENIPUNCTURE: CPT

## 2018-12-06 PROCEDURE — 2709999900 HC NON-CHARGEABLE SUPPLY

## 2018-12-06 PROCEDURE — 93455 CORONARY ART/GRFT ANGIO S&I: CPT | Performed by: FAMILY MEDICINE

## 2018-12-06 PROCEDURE — 99223 1ST HOSP IP/OBS HIGH 75: CPT | Performed by: INTERNAL MEDICINE

## 2018-12-06 PROCEDURE — 86900 BLOOD TYPING SEROLOGIC ABO: CPT

## 2018-12-06 PROCEDURE — 86850 RBC ANTIBODY SCREEN: CPT

## 2018-12-06 PROCEDURE — 6360000004 HC RX CONTRAST MEDICATION: Performed by: INTERNAL MEDICINE

## 2018-12-06 PROCEDURE — C1894 INTRO/SHEATH, NON-LASER: HCPCS

## 2018-12-06 PROCEDURE — 6360000002 HC RX W HCPCS

## 2018-12-06 PROCEDURE — B2101ZZ FLUOROSCOPY OF SINGLE CORONARY ARTERY USING LOW OSMOLAR CONTRAST: ICD-10-PCS | Performed by: INTERNAL MEDICINE

## 2018-12-06 PROCEDURE — B41F1ZZ FLUOROSCOPY OF RIGHT LOWER EXTREMITY ARTERIES USING LOW OSMOLAR CONTRAST: ICD-10-PCS | Performed by: INTERNAL MEDICINE

## 2018-12-06 PROCEDURE — 2140000000 HC CCU INTERMEDIATE R&B

## 2018-12-06 PROCEDURE — 93571 IV DOP VEL&/PRESS C FLO 1ST: CPT | Performed by: INTERNAL MEDICINE

## 2018-12-06 PROCEDURE — 85347 COAGULATION TIME ACTIVATED: CPT

## 2018-12-06 PROCEDURE — 85027 COMPLETE CBC AUTOMATED: CPT

## 2018-12-06 PROCEDURE — 4A023N7 MEASUREMENT OF CARDIAC SAMPLING AND PRESSURE, LEFT HEART, PERCUTANEOUS APPROACH: ICD-10-PCS | Performed by: INTERNAL MEDICINE

## 2018-12-06 PROCEDURE — 80053 COMPREHEN METABOLIC PANEL: CPT

## 2018-12-06 PROCEDURE — C1769 GUIDE WIRE: HCPCS

## 2018-12-06 PROCEDURE — C1887 CATHETER, GUIDING: HCPCS

## 2018-12-06 PROCEDURE — B2151ZZ FLUOROSCOPY OF LEFT HEART USING LOW OSMOLAR CONTRAST: ICD-10-PCS | Performed by: INTERNAL MEDICINE

## 2018-12-06 PROCEDURE — 6370000000 HC RX 637 (ALT 250 FOR IP)

## 2018-12-06 PROCEDURE — 86901 BLOOD TYPING SEROLOGIC RH(D): CPT

## 2018-12-06 PROCEDURE — 93454 CORONARY ARTERY ANGIO S&I: CPT | Performed by: INTERNAL MEDICINE

## 2018-12-06 PROCEDURE — 6370000000 HC RX 637 (ALT 250 FOR IP): Performed by: INTERNAL MEDICINE

## 2018-12-06 PROCEDURE — 4A033BC MEASUREMENT OF ARTERIAL PRESSURE, CORONARY, PERCUTANEOUS APPROACH: ICD-10-PCS | Performed by: INTERNAL MEDICINE

## 2018-12-06 PROCEDURE — 93459 L HRT ART/GRFT ANGIO: CPT | Performed by: FAMILY MEDICINE

## 2018-12-06 PROCEDURE — 2580000003 HC RX 258: Performed by: INTERNAL MEDICINE

## 2018-12-06 PROCEDURE — 82947 ASSAY GLUCOSE BLOOD QUANT: CPT

## 2018-12-06 PROCEDURE — 82948 REAGENT STRIP/BLOOD GLUCOSE: CPT

## 2018-12-06 RX ORDER — ATROPINE SULFATE 0.4 MG/ML
0.5 AMPUL (ML) INJECTION
Status: ACTIVE | OUTPATIENT
Start: 2018-12-06 | End: 2018-12-06

## 2018-12-06 RX ORDER — ONDANSETRON 2 MG/ML
4 INJECTION INTRAMUSCULAR; INTRAVENOUS EVERY 6 HOURS PRN
Status: DISCONTINUED | OUTPATIENT
Start: 2018-12-06 | End: 2018-12-07 | Stop reason: SDUPTHER

## 2018-12-06 RX ORDER — ONDANSETRON 2 MG/ML
4 INJECTION INTRAMUSCULAR; INTRAVENOUS EVERY 6 HOURS PRN
Status: DISCONTINUED | OUTPATIENT
Start: 2018-12-06 | End: 2018-12-07 | Stop reason: HOSPADM

## 2018-12-06 RX ORDER — ATROPINE SULFATE 0.1 MG/ML
INJECTION INTRAVENOUS
Status: DISPENSED
Start: 2018-12-06 | End: 2018-12-07

## 2018-12-06 RX ORDER — FAMOTIDINE 20 MG/1
40 TABLET, FILM COATED ORAL DAILY
Status: DISCONTINUED | OUTPATIENT
Start: 2018-12-06 | End: 2018-12-07 | Stop reason: HOSPADM

## 2018-12-06 RX ORDER — SODIUM CHLORIDE 0.9 % (FLUSH) 0.9 %
10 SYRINGE (ML) INJECTION PRN
Status: DISCONTINUED | OUTPATIENT
Start: 2018-12-06 | End: 2018-12-07 | Stop reason: HOSPADM

## 2018-12-06 RX ORDER — NITROGLYCERIN 0.4 MG/1
0.4 TABLET SUBLINGUAL EVERY 5 MIN PRN
Status: DISCONTINUED | OUTPATIENT
Start: 2018-12-06 | End: 2018-12-07 | Stop reason: HOSPADM

## 2018-12-06 RX ORDER — LANOLIN ALCOHOL/MO/W.PET/CERES
5 CREAM (GRAM) TOPICAL NIGHTLY PRN
Status: DISCONTINUED | OUTPATIENT
Start: 2018-12-06 | End: 2018-12-07 | Stop reason: HOSPADM

## 2018-12-06 RX ORDER — SODIUM CHLORIDE 0.9 % (FLUSH) 0.9 %
10 SYRINGE (ML) INJECTION EVERY 12 HOURS SCHEDULED
Status: DISCONTINUED | OUTPATIENT
Start: 2018-12-06 | End: 2018-12-06 | Stop reason: HOSPADM

## 2018-12-06 RX ORDER — TRAMADOL HYDROCHLORIDE 50 MG/1
50 TABLET ORAL EVERY 6 HOURS PRN
Status: DISCONTINUED | OUTPATIENT
Start: 2018-12-06 | End: 2018-12-07 | Stop reason: HOSPADM

## 2018-12-06 RX ORDER — SODIUM CHLORIDE 0.9 % (FLUSH) 0.9 %
10 SYRINGE (ML) INJECTION EVERY 12 HOURS SCHEDULED
Status: DISCONTINUED | OUTPATIENT
Start: 2018-12-06 | End: 2018-12-07 | Stop reason: HOSPADM

## 2018-12-06 RX ORDER — ATORVASTATIN CALCIUM 40 MG/1
40 TABLET, FILM COATED ORAL NIGHTLY
Status: DISCONTINUED | OUTPATIENT
Start: 2018-12-06 | End: 2018-12-07 | Stop reason: HOSPADM

## 2018-12-06 RX ORDER — DIPHENHYDRAMINE HCL 25 MG
50 CAPSULE ORAL ONCE
Status: DISCONTINUED | OUTPATIENT
Start: 2018-12-06 | End: 2018-12-06 | Stop reason: HOSPADM

## 2018-12-06 RX ORDER — SODIUM CHLORIDE 0.9 % (FLUSH) 0.9 %
10 SYRINGE (ML) INJECTION PRN
Status: DISCONTINUED | OUTPATIENT
Start: 2018-12-06 | End: 2018-12-06 | Stop reason: HOSPADM

## 2018-12-06 RX ORDER — METOPROLOL SUCCINATE 25 MG/1
25 TABLET, EXTENDED RELEASE ORAL DAILY
Status: DISCONTINUED | OUTPATIENT
Start: 2018-12-06 | End: 2018-12-07 | Stop reason: HOSPADM

## 2018-12-06 RX ORDER — MORPHINE SULFATE 2 MG/ML
2 INJECTION, SOLUTION INTRAMUSCULAR; INTRAVENOUS
Status: ACTIVE | OUTPATIENT
Start: 2018-12-06 | End: 2018-12-06

## 2018-12-06 RX ORDER — ACETAMINOPHEN 325 MG/1
650 TABLET ORAL EVERY 4 HOURS PRN
Status: DISCONTINUED | OUTPATIENT
Start: 2018-12-06 | End: 2018-12-06 | Stop reason: HOSPADM

## 2018-12-06 RX ORDER — HYDRALAZINE HYDROCHLORIDE 20 MG/ML
10 INJECTION INTRAMUSCULAR; INTRAVENOUS ONCE
Status: DISCONTINUED | OUTPATIENT
Start: 2018-12-06 | End: 2018-12-07 | Stop reason: HOSPADM

## 2018-12-06 RX ORDER — ACETAMINOPHEN 325 MG/1
650 TABLET ORAL EVERY 4 HOURS PRN
Status: DISCONTINUED | OUTPATIENT
Start: 2018-12-06 | End: 2018-12-07 | Stop reason: SDUPTHER

## 2018-12-06 RX ORDER — SODIUM CHLORIDE 9 MG/ML
75 INJECTION, SOLUTION INTRAVENOUS CONTINUOUS
Status: DISCONTINUED | OUTPATIENT
Start: 2018-12-06 | End: 2018-12-07 | Stop reason: HOSPADM

## 2018-12-06 RX ORDER — ACETAMINOPHEN 325 MG/1
650 TABLET ORAL EVERY 4 HOURS PRN
Status: DISCONTINUED | OUTPATIENT
Start: 2018-12-06 | End: 2018-12-07 | Stop reason: HOSPADM

## 2018-12-06 RX ORDER — GABAPENTIN 300 MG/1
300 CAPSULE ORAL 3 TIMES DAILY
Status: DISCONTINUED | OUTPATIENT
Start: 2018-12-06 | End: 2018-12-07 | Stop reason: HOSPADM

## 2018-12-06 RX ORDER — SODIUM CHLORIDE 9 MG/ML
INJECTION, SOLUTION INTRAVENOUS CONTINUOUS
Status: DISCONTINUED | OUTPATIENT
Start: 2018-12-06 | End: 2018-12-07 | Stop reason: HOSPADM

## 2018-12-06 RX ORDER — ASPIRIN 81 MG/1
81 TABLET, CHEWABLE ORAL DAILY
Status: DISCONTINUED | OUTPATIENT
Start: 2018-12-06 | End: 2018-12-07 | Stop reason: HOSPADM

## 2018-12-06 RX ORDER — TAMSULOSIN HYDROCHLORIDE 0.4 MG/1
0.4 CAPSULE ORAL DAILY
Status: DISCONTINUED | OUTPATIENT
Start: 2018-12-06 | End: 2018-12-07 | Stop reason: HOSPADM

## 2018-12-06 RX ORDER — SODIUM CHLORIDE 9 MG/ML
INJECTION, SOLUTION INTRAVENOUS CONTINUOUS
Status: DISCONTINUED | OUTPATIENT
Start: 2018-12-06 | End: 2018-12-06 | Stop reason: HOSPADM

## 2018-12-06 RX ORDER — NITROGLYCERIN 0.4 MG/1
0.4 TABLET SUBLINGUAL EVERY 5 MIN PRN
Status: DISCONTINUED | OUTPATIENT
Start: 2018-12-06 | End: 2018-12-06 | Stop reason: HOSPADM

## 2018-12-06 RX ADMIN — TAMSULOSIN HYDROCHLORIDE 0.4 MG: 0.4 CAPSULE ORAL at 09:21

## 2018-12-06 RX ADMIN — ATORVASTATIN CALCIUM 40 MG: 40 TABLET, FILM COATED ORAL at 22:46

## 2018-12-06 RX ADMIN — ASPIRIN 81 MG CHEWABLE TABLET 81 MG: 81 TABLET CHEWABLE at 09:21

## 2018-12-06 RX ADMIN — SODIUM CHLORIDE: 9 INJECTION, SOLUTION INTRAVENOUS at 15:02

## 2018-12-06 RX ADMIN — IOPAMIDOL 80 ML: 755 INJECTION, SOLUTION INTRAVENOUS at 20:02

## 2018-12-06 RX ADMIN — GABAPENTIN 300 MG: 300 CAPSULE ORAL at 22:47

## 2018-12-06 RX ADMIN — METOPROLOL SUCCINATE 25 MG: 25 TABLET, EXTENDED RELEASE ORAL at 09:21

## 2018-12-06 RX ADMIN — FAMOTIDINE 40 MG: 20 TABLET ORAL at 09:21

## 2018-12-06 RX ADMIN — GABAPENTIN 300 MG: 300 CAPSULE ORAL at 09:21

## 2018-12-06 RX ADMIN — ATORVASTATIN CALCIUM 40 MG: 40 TABLET, FILM COATED ORAL at 09:21

## 2018-12-06 RX ADMIN — SODIUM CHLORIDE: 9 INJECTION, SOLUTION INTRAVENOUS at 02:50

## 2018-12-06 NOTE — H&P
Tung Metrix test strip. E11.9 11/12/18   Jadyn Elvie Might, HALEY Kee CNP   Lancets MISC 1 each by Other route daily Tung Metrix lancets E11.9 11/12/18   Jadyn ChaseHALEY CNP   famotidine (PEPCID) 40 MG tablet Take 40 mg by mouth daily    Historical Provider, MD   melatonin 5 MG TABS tablet Take 5 mg by mouth daily    Historical Provider, MD   Multiple Vitamins-Minerals (CENTRUM SILVER PO) Take by mouth    Historical Provider, MD   atorvastatin (LIPITOR) 40 MG tablet Take 1 tablet by mouth daily 9/17/18   Raheem Mitchell MD   metoprolol succinate (TOPROL XL) 25 MG extended release tablet Take 1 tablet by mouth daily 9/17/18   Raheem Mitchell MD   metFORMIN (GLUCOPHAGE) 500 MG tablet Take 1 tablet by mouth 2 times daily (with meals) 9/5/18   HALEY Cunha CNP   tamsulosin (FLOMAX) 0.4 MG capsule Take 0.4 mg by mouth daily    Historical Provider, MD   traMADol (ULTRAM) 50 MG tablet Take 50 mg by mouth every 6 hours as needed for Pain. Delia Adkins Historical Provider, MD   gabapentin (NEURONTIN) 300 MG capsule Take 300 mg by mouth 3 times daily. Delia Adkins Historical Provider, MD   apixaban (ELIQUIS) 5 MG TABS tablet Take by mouth 2 times daily    Historical Provider, MD   NITROGLYCERIN PO Take by mouth    Historical Provider, MD       No current facility-administered medications for this encounter. Allergies:  Patient has no known allergies. Social History:    TOBACCO:   reports that he has never smoked. He has never used smokeless tobacco.  ETOH:   reports that he drinks about 4.2 oz of alcohol per week . Family History:    Family History   Problem Relation Age of Onset    Adopted: Yes         Review of Systems -   General ROS: negative  Psychological ROS: negative  Hematological and Lymphatic ROS: No history of blood clots or bleeding disorder.    Respiratory ROS: no cough, shortness of breath, or wheezing  Cardiovascular ROS:+chest pain or dyspnea on exertion  Gastrointestinal ROS: negative  Genito-Urinary ROS: 12/06/2018     Magnesium:  No results found for: MG  Warfarin PT/INR:  No components found for: PTPATWAR, PTINRWAR  HgBA1c:    Lab Results   Component Value Date    LABA1C 5.7 09/06/2018     FLP:    Lab Results   Component Value Date    TRIG 45 12/05/2018    HDL 46 12/05/2018     TSH:    Lab Results   Component Value Date    TSH 1.65 09/06/2018     BNP: No results found for: BNP    EKG: SR 1st degree AVB, RBBB, septal infarct    Echo:  12/5/2018:  Summary  Poor image quality, likely due to patient body habitus and/or lung disease. Global left ventricular function is difficult to assess but appears mildly  reduced with an estimated EF of 50-55%. Mild left ventricular hypertrophy and with normal left ventricular cavity  size. Unable to assess specific wall motion abnormalities due to image quality. No significant valvular abnormalities. Evidence of moderate (grade II) diastolic dysfunction is seen. Assessment/Plan:  Unstable Angina  Hx MI s/p CABG  S/p AVR  DM  HTN  HLD  FABIAN    Plan: PCI of RCA    The indication, risks and benefits of the procedure and possible therapeutic consequences and alternatives were discussed with the patient. The patient was given the opportunity to ask questions and to have them answered to his/her satisfaction. Risks of the procedure include but are not limited to the following: Bleeding, hematoma including retroperitoneal hematoma, infection, pain and discomfort, injury to the aorta and other blood vessels, rhythm disturbance, low blood pressure, myocardial infarction, stroke, kidney damage/failure, myocardial perforation, allergic reactions to sedatives/contrast material, loss of pulse/vascular compromise requiring surgery, aneurysm/pseudoaneurysm formation, possible loss of a limb/hand/leg, death. Alternatives to and omission of the suggested procedure were discussed. The patient had no further questions and wished to proceed; the consent form was signed.         Please do note

## 2018-12-06 NOTE — PROGRESS NOTES
Sedation/Analgesia History & Physical      Pt Name: Liliane Dee  MRN: [id number]  YOB: 1945  Provider Performing Procedure: Polo Cortes MD, [facility]  Primary Care Physician: HALEY Adams - MG      PRE-PROCEDURE   DNR-CCA/DNR-CC []Yes [x]No      PLANNED PROCEDURE     []Cath  [x]PCI                []Pacemaker/AICD  []JUNG             []Cardioversion []Peripheral angiography/PTA  []Other:        Consent:   The indication, risks and benefits of the procedure and possible therapeutic consequences and alternatives were discussed with the patient. The patient was given the opportunity to ask questions and to have them answered to his/her satisfaction. Risks of the procedure include but are not limited to the following: Bleeding, hematoma including retroperitoneal hematoma, infection, pain and discomfort, injury to the aorta and other blood vessels, rhythm disturbance, low blood pressure, myocardial infarction, stroke, kidney damage/failure, myocardial perforation, allergic reactions to sedatives/contrast material, loss of pulse/vascular compromise requiring surgery, aneurysm/pseudoaneurysm formation, possible loss of a limb/hand/leg, death. Alternatives to and omission of the suggested procedure were discussed. The patient had no further questions and wished to proceed; the consent form was signed.       Indications for the Procedure:     CAD Presentation:  New Onset Angina <= 2 months    Anginal Classification within 2 weeks:  CCS III - Symptoms with everyday living activities, i.e., moderate limitation    NYHA Heart Failure Class within 2 weeks: No symptoms      Anti- Anginal Meds within 2 weeks:   ANTI-ANGINAL MEDS: Yes: Beta Blockers, Aspirin and Statin (Any)    Stress or Imaging Studies Performed:  Abnormal MPI; done at Outside hosptial    CABG:yes    Transferred from [facility] for PCI of RCA            MEDICAL HISTORY        Past Medical History:   Diagnosis Date    Chronic back pain

## 2018-12-07 VITALS
OXYGEN SATURATION: 92 % | SYSTOLIC BLOOD PRESSURE: 116 MMHG | HEIGHT: 72 IN | WEIGHT: 223.38 LBS | DIASTOLIC BLOOD PRESSURE: 53 MMHG | TEMPERATURE: 97.9 F | BODY MASS INDEX: 30.25 KG/M2 | HEART RATE: 80 BPM | RESPIRATION RATE: 18 BRPM

## 2018-12-07 LAB
GLUCOSE BLD-MCNC: 124 MG/DL (ref 70–108)
GLUCOSE BLD-MCNC: 126 MG/DL (ref 70–108)

## 2018-12-07 PROCEDURE — 2709999900 HC NON-CHARGEABLE SUPPLY

## 2018-12-07 PROCEDURE — 2580000003 HC RX 258: Performed by: INTERNAL MEDICINE

## 2018-12-07 PROCEDURE — 82948 REAGENT STRIP/BLOOD GLUCOSE: CPT

## 2018-12-07 PROCEDURE — 6370000000 HC RX 637 (ALT 250 FOR IP): Performed by: INTERNAL MEDICINE

## 2018-12-07 RX ORDER — TAMSULOSIN HYDROCHLORIDE 0.4 MG/1
0.4 CAPSULE ORAL DAILY
Status: DISCONTINUED | OUTPATIENT
Start: 2018-12-07 | End: 2018-12-07 | Stop reason: SDUPTHER

## 2018-12-07 RX ORDER — FAMOTIDINE 20 MG/1
40 TABLET, FILM COATED ORAL DAILY
Status: DISCONTINUED | OUTPATIENT
Start: 2018-12-07 | End: 2018-12-07 | Stop reason: SDUPTHER

## 2018-12-07 RX ORDER — GABAPENTIN 300 MG/1
300 CAPSULE ORAL 3 TIMES DAILY
Status: DISCONTINUED | OUTPATIENT
Start: 2018-12-07 | End: 2018-12-07 | Stop reason: SDUPTHER

## 2018-12-07 RX ORDER — ATORVASTATIN CALCIUM 40 MG/1
40 TABLET, FILM COATED ORAL DAILY
Status: DISCONTINUED | OUTPATIENT
Start: 2018-12-07 | End: 2018-12-07 | Stop reason: SDUPTHER

## 2018-12-07 RX ORDER — ISOSORBIDE MONONITRATE 30 MG/1
30 TABLET, EXTENDED RELEASE ORAL DAILY
Qty: 30 TABLET | Refills: 3 | Status: SHIPPED | OUTPATIENT
Start: 2018-12-08 | End: 2019-02-20 | Stop reason: SDUPTHER

## 2018-12-07 RX ORDER — ISOSORBIDE MONONITRATE 30 MG/1
30 TABLET, EXTENDED RELEASE ORAL DAILY
Qty: 14 TABLET | Refills: 0 | Status: SHIPPED | OUTPATIENT
Start: 2018-12-07 | End: 2019-01-10 | Stop reason: ALTCHOICE

## 2018-12-07 RX ORDER — TRAMADOL HYDROCHLORIDE 50 MG/1
50 TABLET ORAL EVERY 6 HOURS PRN
Status: DISCONTINUED | OUTPATIENT
Start: 2018-12-07 | End: 2018-12-07 | Stop reason: SDUPTHER

## 2018-12-07 RX ORDER — LANOLIN ALCOHOL/MO/W.PET/CERES
4.5 CREAM (GRAM) TOPICAL DAILY
Status: DISCONTINUED | OUTPATIENT
Start: 2018-12-07 | End: 2018-12-07 | Stop reason: HOSPADM

## 2018-12-07 RX ORDER — NITROGLYCERIN 0.4 MG/1
0.4 TABLET SUBLINGUAL EVERY 5 MIN PRN
Qty: 25 TABLET | Refills: 3 | Status: SHIPPED | OUTPATIENT
Start: 2018-12-07 | End: 2019-12-11 | Stop reason: SDUPTHER

## 2018-12-07 RX ORDER — ISOSORBIDE MONONITRATE 30 MG/1
30 TABLET, EXTENDED RELEASE ORAL DAILY
Status: DISCONTINUED | OUTPATIENT
Start: 2018-12-07 | End: 2018-12-07 | Stop reason: HOSPADM

## 2018-12-07 RX ADMIN — TAMSULOSIN HYDROCHLORIDE 0.4 MG: 0.4 CAPSULE ORAL at 08:30

## 2018-12-07 RX ADMIN — METOPROLOL SUCCINATE 25 MG: 25 TABLET, FILM COATED, EXTENDED RELEASE ORAL at 08:30

## 2018-12-07 RX ADMIN — SODIUM CHLORIDE, PRESERVATIVE FREE 10 ML: 5 INJECTION INTRAVENOUS at 08:30

## 2018-12-07 RX ADMIN — FAMOTIDINE 40 MG: 20 TABLET ORAL at 08:25

## 2018-12-07 RX ADMIN — ISOSORBIDE MONONITRATE 30 MG: 30 TABLET ORAL at 08:30

## 2018-12-07 RX ADMIN — ASPIRIN 81 MG 81 MG: 81 TABLET ORAL at 08:30

## 2018-12-07 RX ADMIN — Medication 4.5 MG: at 00:37

## 2018-12-07 RX ADMIN — GABAPENTIN 300 MG: 300 CAPSULE ORAL at 15:31

## 2018-12-07 RX ADMIN — GABAPENTIN 300 MG: 300 CAPSULE ORAL at 08:30

## 2018-12-07 NOTE — PROGRESS NOTES
Patient discharge instructions discussed with spouse and patient. No concerns noted at this time. Patient discharged by wheelchair to front doors. Patient discharged in stable condition at 78.

## 2018-12-07 NOTE — OP NOTE
[facility]  Sedation/Analgesia Post Sedation Record        Pt Name: Antonio Del Real  MRN: [id number]  YOB: 1945  Procedure Performed By: Elsa Liu MD, [facility]  Primary Care Physician: HALEY Anne - MG        POST-PROCEDURE    Physicians/Assistants: Elsa Liu MD, [facility]    Procedure Performed:  Left heart cath                                  Sedation/Anesthesia:  Local Anesthesia and IV Conscious Sedation with continuous O2 monitoring    Estimated Blood Loss: 10 cc     Specimens Removed:  [x]None []Other:      Disposition of Specimen:  []Pathology []Other        Complications:   [x]None Immediate []Other:       Post Procedure Diagnosis/Findings:  Coronary Artery Disease   FFR of RCA was 0.94 (Non-obstructive)         Recommendations:  Medical treatment and review films.    Optimize medical therapy  Monitor groin access site  Follow up in clinic   Resume Eliquis  Follow up with Dr. Roney Givens within 2 weeks to evaluate symptoms           Elsa Liu MD, [facility]  Electronically signed 12/6/2018 at 7:57 PM

## 2018-12-10 ENCOUNTER — TELEPHONE (OUTPATIENT)
Dept: CARDIOLOGY | Age: 73
End: 2018-12-10

## 2018-12-10 NOTE — TELEPHONE ENCOUNTER
Received call from pt wife asking if pt should be seen sooner than 12/31/2018 and also stated that [facility] restarted anticoagulation but did not know why other than maybe because it was on his prior med list but wanted to ask Dr Sarah Almonte if they should stop this since it was stopped prior to this admission. Spoke with Dr Sarah Almonte and Dr Sarah Almonte stated from his standpoint the patient does not need to be on the anticoagulant and also would like patient to be seen tomm. Let pt wife know this above information and also gave appt for him to be seen tomorrow. Pt wife verbalized understanding.

## 2018-12-11 ENCOUNTER — HOSPITAL ENCOUNTER (OUTPATIENT)
Dept: SLEEP CENTER | Age: 73
Discharge: HOME | End: 2018-12-11
Payer: MEDICARE

## 2018-12-11 ENCOUNTER — OFFICE VISIT (OUTPATIENT)
Dept: CARDIOLOGY | Age: 73
End: 2018-12-11
Payer: COMMERCIAL

## 2018-12-11 VITALS
WEIGHT: 226 LBS | OXYGEN SATURATION: 93 % | HEART RATE: 81 BPM | BODY MASS INDEX: 30.61 KG/M2 | HEIGHT: 72 IN | SYSTOLIC BLOOD PRESSURE: 114 MMHG | DIASTOLIC BLOOD PRESSURE: 73 MMHG | RESPIRATION RATE: 16 BRPM

## 2018-12-11 DIAGNOSIS — I20.8 STABLE ANGINA PECTORIS (HCC): ICD-10-CM

## 2018-12-11 DIAGNOSIS — I25.10 ASHD (ARTERIOSCLEROTIC HEART DISEASE): Primary | ICD-10-CM

## 2018-12-11 DIAGNOSIS — G47.33 OBSTRUCTIVE SLEEP APNEA: ICD-10-CM

## 2018-12-11 DIAGNOSIS — I10 ESSENTIAL HYPERTENSION: ICD-10-CM

## 2018-12-11 DIAGNOSIS — Z98.890 HISTORY OF AORTIC VALVE REPAIR: ICD-10-CM

## 2018-12-11 DIAGNOSIS — Z86.79 HISTORY OF AORTIC VALVE REPAIR: ICD-10-CM

## 2018-12-11 DIAGNOSIS — I50.32 CHRONIC DIASTOLIC HEART FAILURE (HCC): ICD-10-CM

## 2018-12-11 PROBLEM — I20.89 STABLE ANGINA PECTORIS: Status: ACTIVE | Noted: 2018-12-11

## 2018-12-11 PROCEDURE — 95811 POLYSOM 6/>YRS CPAP 4/> PARM: CPT

## 2018-12-11 PROCEDURE — G8417 CALC BMI ABV UP PARAM F/U: HCPCS | Performed by: FAMILY MEDICINE

## 2018-12-11 PROCEDURE — 4040F PNEUMOC VAC/ADMIN/RCVD: CPT | Performed by: FAMILY MEDICINE

## 2018-12-11 PROCEDURE — 1123F ACP DISCUSS/DSCN MKR DOCD: CPT | Performed by: FAMILY MEDICINE

## 2018-12-11 PROCEDURE — 3017F COLORECTAL CA SCREEN DOC REV: CPT | Performed by: FAMILY MEDICINE

## 2018-12-11 PROCEDURE — 1101F PT FALLS ASSESS-DOCD LE1/YR: CPT | Performed by: FAMILY MEDICINE

## 2018-12-11 PROCEDURE — 95810 POLYSOM 6/> YRS 4/> PARAM: CPT

## 2018-12-11 PROCEDURE — 99214 OFFICE O/P EST MOD 30 MIN: CPT | Performed by: FAMILY MEDICINE

## 2018-12-11 PROCEDURE — G8484 FLU IMMUNIZE NO ADMIN: HCPCS | Performed by: FAMILY MEDICINE

## 2018-12-11 PROCEDURE — 1111F DSCHRG MED/CURRENT MED MERGE: CPT | Performed by: FAMILY MEDICINE

## 2018-12-11 PROCEDURE — G8598 ASA/ANTIPLAT THER USED: HCPCS | Performed by: FAMILY MEDICINE

## 2018-12-11 PROCEDURE — G8427 DOCREV CUR MEDS BY ELIG CLIN: HCPCS | Performed by: FAMILY MEDICINE

## 2018-12-11 PROCEDURE — 1036F TOBACCO NON-USER: CPT | Performed by: FAMILY MEDICINE

## 2018-12-11 ASSESSMENT — SLEEP AND FATIGUE QUESTIONNAIRES
HOW LIKELY ARE YOU TO NOD OFF OR FALL ASLEEP WHILE SITTING INACTIVE IN A PUBLIC PLACE: 2
NECK CIRCUMFERENCE (INCHES): 17.75
HOW LIKELY ARE YOU TO NOD OFF OR FALL ASLEEP WHILE SITTING AND TALKING TO SOMEONE: 0
HOW LIKELY ARE YOU TO NOD OFF OR FALL ASLEEP WHILE SITTING AND READING: 2
HOW LIKELY ARE YOU TO NOD OFF OR FALL ASLEEP WHILE LYING DOWN TO REST IN THE AFTERNOON WHEN CIRCUMSTANCES PERMIT: 3
HOW LIKELY ARE YOU TO NOD OFF OR FALL ASLEEP WHEN YOU ARE A PASSENGER IN A CAR FOR AN HOUR WITHOUT A BREAK: 3
HOW LIKELY ARE YOU TO NOD OFF OR FALL ASLEEP WHILE SITTING QUIETLY AFTER LUNCH WITHOUT ALCOHOL: 3
ESS TOTAL SCORE: 14
HOW LIKELY ARE YOU TO NOD OFF OR FALL ASLEEP WHILE WATCHING TV: 1
HOW LIKELY ARE YOU TO NOD OFF OR FALL ASLEEP IN A CAR, WHILE STOPPED FOR A FEW MINUTES IN TRAFFIC: 0

## 2018-12-11 NOTE — PROGRESS NOTES
I, Shayne Patterson am scribing for and in the presence of Maggie Monique MD.    Patient: Genesis Simon  : 1945  Date of Visit: 2018    REASON FOR VISIT / CONSULTATION: Follow Up After Procedure (Hx: AF, HTN, CAD. Heart cath done on 12/6/2018 x 2. (wrist and groin). Pt states that he does have chronic back pain and had alot more lately and kind of feels foggy. SOB but has stayed the same. Denies CP, )      Dear Anjana Posadas, APRN - CNP,      I had the pleasure of seeing Genesis Simon in my office today. Mr. Tip Mata is a 74 y.o. male with a history of atherosclerotic heart disease. In 2017 Mr. Tip Mata had went into surgery for his shoulder and postoperatively had a myocardial infarction which led to a triple vessel coronary artery bypass as well as a aortic valve replacement  due to severe aortic stenosis. He was found to have postoperative atrial fibrillation which led to him starting on Amiodarone but had been stopped six weeks after due to no reoccurrence of atrial fibrillation. He had a ulcer in his small intenstine which had caused internal bleeding but has since been resolved and is taking pepcid for this. He was continued on Eliquis but had recently been stopped since there has been no evidence of atrial fibrillation including on his recent holter monitor. His cardiac catheterization on 2018 showed a 75% stenosis in RCA as well as a 70% stenosis in his Circumflex, but when he was sent down to [facility] for possible stenting treatment, however Dr. Jadyn Salter performed an FFR of the lesion which showed that the stenosis was about 60 percent blockage and therefore not likely to benefit from stenting. Although there was also a severe stenosis in the junction of the LIMA-LAD, this was felt to be high risk stenting and therefore deferred for a trial of guideline directed trial of maximal medical management including cardiac rehab.  His echocardiogram on 2018 with an ejection fraction of discussed the potential benefits of phase II cardiac rehab to improve both his cardiac condition as well as improve his exercise tolerance and overall quality of life. He was very agreeable with this and therefore I made the referral.     Finally, I recommended that he continue his current medications and follow up with you as previously scheduled. FOLLOW UP:   I told Mr. Grant Nice to call my office if he had any problems, but otherwise I asked him to Return in about 6 months (around 6/11/2019). However, I would be happy to see him sooner should the need arise. Sincerely,  Gilford Russian. Bruhl MD, MS, F.A.C.C. [facility] Cardiology Specialist    [address] [place] [address], [address] [place] [address]  Phone: [phone], Fax: [phone]     I believe that the risk of significant morbidity and mortality related to the patient's current medical conditions are: Intermediate. The documentation recorded by the scribe, accurately and completely reflects the services I personally performed and the decisions made by me. David Yoon MD, MS, F.A.C.C.  December 11, 2018

## 2018-12-13 ENCOUNTER — HOSPITAL ENCOUNTER (OUTPATIENT)
Dept: CARDIAC REHAB | Age: 73
Setting detail: THERAPIES SERIES
Discharge: HOME | End: 2018-12-13
Payer: MEDICARE

## 2018-12-13 VITALS
RESPIRATION RATE: 16 BRPM | BODY MASS INDEX: 30.48 KG/M2 | SYSTOLIC BLOOD PRESSURE: 124 MMHG | DIASTOLIC BLOOD PRESSURE: 70 MMHG | HEIGHT: 72 IN | WEIGHT: 225 LBS | OXYGEN SATURATION: 93 % | HEART RATE: 85 BPM

## 2018-12-13 ASSESSMENT — PATIENT HEALTH QUESTIONNAIRE - PHQ9
SUM OF ALL RESPONSES TO PHQ QUESTIONS 1-9: 0
SUM OF ALL RESPONSES TO PHQ QUESTIONS 1-9: 0

## 2018-12-17 ENCOUNTER — HOSPITAL ENCOUNTER (OUTPATIENT)
Dept: CARDIAC REHAB | Age: 73
Setting detail: THERAPIES SERIES
Discharge: HOME | End: 2018-12-17
Payer: MEDICARE

## 2018-12-17 ENCOUNTER — TELEPHONE (OUTPATIENT)
Dept: PULMONOLOGY | Age: 73
End: 2018-12-17

## 2018-12-17 PROCEDURE — 93798 PHYS/QHP OP CAR RHAB W/ECG: CPT

## 2018-12-17 NOTE — TELEPHONE ENCOUNTER
Patients significant other came and stated he needs his mask and supplies due to recent heart episodes and hospitalized for it. Requestiong machine and supplies and posssible o2 also . Please advise.

## 2018-12-17 NOTE — PROGRESS NOTES
MEDICAL NUTRITION THERAPY - CARDIOPULMONARY: RATE YOUR PLATE / FOOD DIARY EVALUATION  Client data    Ht: 72\" Wt: 225# BMI: 30.5  (obese I)   Weight changes: unk CBW: 102.2 kg   BMR: 2005 calories Est. total calorie needs: 6426-9932 cals. Rate Your Plate Score (RYP): 51 - There are some ways that you can make your eating habits healthier    Food diary:  Reveals 3 meals consumed on day of record. There is a variety of foods consumed throughout the day, but lower use of whole grains (1 recorded). Fair use of fruits and vegetables, with some fruit being of a canned variety. Most food portions were not recorded. No water recorded during day. No activity or exercise recorded. Recommendations include: Increase whole grains (choose instead of refined wheat products)      Increase whole fruit and vegetable use (at least 5 a day combined)      Eat, instead of drinking fruit items. Incorporate daily activity (>30 minutes)      Read food labels for Saturated (<14 grams daily) and Trans fats (zero daily)      Read food labels for Sodium content (goal is <2000 mg daily)      Measure food portions and use MyPlate as guide for meal variety      Maintain consistent carbohydrate intakes at meals and snacks (60 grams at meals and 30 grams for a night snack)      Drink water throughout the day for hydration       Review nutrition information from restaurants      Work your way down to 1% or skim milk       Review highlighted food label handout for areas to focus on. Provided literature on Heart Healthy Eating with commentary from food diary reviewed (including recommendations to improve nutrition intakes). (attached below).     Electronically signed by Marybeth Marquez RD, GRISEL on 12/17/2018 at 1:19 PM    Cardiopulmonary [address] [place] [address] Road Diary Evaluation    Patient Name: Polly Wise  Registered Dietitian:  Marybeth Marquez RD, GRISEL  Date:  12/17/2018    Dear Sheila Jones,    Thank you for sharing your

## 2018-12-17 NOTE — PROGRESS NOTES
sleep apnea that responded well to  the use of BiPAP at a final pressure of 16 cm during inspiration and 12  during expiration. It is recommended that the patient should continue  the BiPAP at the present setting. BiPAP by relieving the apnea should  improve his daytime symptoms and also protect the patient from the  morbidity associated with the apnea. 2.  The patient should be encouraged to lose weight. 3.  The patient should be instructed on proper sleep hygiene techniques. 4.  The patient advised to use a heated humidifier along with the BiPAP  machine to prevent upper airway dryness. 5.  The patient was initially treated with a CPAP, but his apnea could  not be relieved, so the patient was changed to BiPAP. 6.  Treatment of apnea will improve the outcome of hypertension,  coronary artery disease, and diabetes.       Lopez Goldsmith    D:12/15/2018 10:13:33               T: 12/15/2018 21:04:21      [email or web address]  Job#: [id number]     Doc#: [id number]

## 2018-12-17 NOTE — PROGRESS NOTES
Cardiac Rehabilitation   Physician Order Form    Wallace Yadav  1945    [x] Phase 2 ECG Monitored Cardiac Rehabilitation    [] MI   [] PTCA with/without stents  [] CABG  [] Heart Valve Repair/Replaced   [] Stable Angina [x] Other:  CAD    Onset Date: 12/5/18                    Cardiac Education Goals: (see individualized treatment plan for specific goals, progression & compliance)    [x] Hypertension [x] Physical Inactivity  [x] A & P  [x] Smoking  [x] Coping/Depression  [x] Medications  [x] Diabetes  [x] Obesity   [x] Angina  [x] Hyperlipidemia [x] Stress   [x] Home Exercise                     Prescribed Exercise Plan:    Target HR:       Duration: 31-60 minutes  Frequency: 3 x week  Initial Met Level: 2.1-2.2 mets  Limitations: [place]    Modalities:  [x]Treadmill   [x] [place]. Stepper/bike  [] Elliptical  [x] Air Dyne  [x] Weights/therabands    · Aerobic exercise to total 31-60 minutes. Progressing by 1-2 minutes per week and/or 1-2 levels per week per patient tolerance using various modalities; according to Gisselle Scale 11-13 and THR  · Strength training starting with weights 1-3 # / 5-8 reps progressing to 5-10 # / 15-20 reps; therabands red-gray 5-20 reps. Per patient symptoms use:  · Appropriate ACLS Algorhythm for Cardiac Events. · Nitroglycerine 0.4mg SLq 5mins X 3 for angina pain. · 12 lead EKG for c/o chest pain or change in rhythm. · Nasal O2 for SaO2 <90% or symptoms warranted. · Blood sugar monitoring for Hyper/Hypoglycemia symptoms.

## 2018-12-19 ENCOUNTER — HOSPITAL ENCOUNTER (OUTPATIENT)
Dept: CARDIAC REHAB | Age: 73
Setting detail: THERAPIES SERIES
Discharge: HOME | End: 2018-12-19
Payer: MEDICARE

## 2018-12-19 PROCEDURE — 93798 PHYS/QHP OP CAR RHAB W/ECG: CPT

## 2018-12-20 ENCOUNTER — HOSPITAL ENCOUNTER (OUTPATIENT)
Dept: CARDIAC REHAB | Age: 73
Setting detail: THERAPIES SERIES
Discharge: HOME | End: 2018-12-20
Payer: MEDICARE

## 2018-12-20 PROCEDURE — 93798 PHYS/QHP OP CAR RHAB W/ECG: CPT

## 2018-12-26 ENCOUNTER — HOSPITAL ENCOUNTER (OUTPATIENT)
Dept: CARDIAC REHAB | Age: 73
Setting detail: THERAPIES SERIES
Discharge: HOME | End: 2018-12-26
Payer: MEDICARE

## 2018-12-26 PROCEDURE — 93798 PHYS/QHP OP CAR RHAB W/ECG: CPT

## 2018-12-27 ENCOUNTER — HOSPITAL ENCOUNTER (OUTPATIENT)
Dept: CARDIAC REHAB | Age: 73
Setting detail: THERAPIES SERIES
Discharge: HOME | End: 2018-12-27
Payer: MEDICARE

## 2018-12-27 PROCEDURE — 93798 PHYS/QHP OP CAR RHAB W/ECG: CPT

## 2018-12-31 ENCOUNTER — HOSPITAL ENCOUNTER (OUTPATIENT)
Dept: CARDIAC REHAB | Age: 73
Setting detail: THERAPIES SERIES
Discharge: HOME | End: 2018-12-31
Payer: MEDICARE

## 2018-12-31 PROCEDURE — 93798 PHYS/QHP OP CAR RHAB W/ECG: CPT

## 2019-01-02 ENCOUNTER — HOSPITAL ENCOUNTER (OUTPATIENT)
Dept: CARDIAC REHAB | Age: 74
Setting detail: THERAPIES SERIES
Discharge: HOME | End: 2019-01-02
Payer: MEDICARE

## 2019-01-02 PROCEDURE — 93798 PHYS/QHP OP CAR RHAB W/ECG: CPT

## 2019-01-09 ENCOUNTER — HOSPITAL ENCOUNTER (OUTPATIENT)
Dept: CARDIAC REHAB | Age: 74
Setting detail: THERAPIES SERIES
Discharge: HOME | End: 2019-01-09
Payer: MEDICARE

## 2019-01-09 PROCEDURE — 93798 PHYS/QHP OP CAR RHAB W/ECG: CPT

## 2019-01-10 ENCOUNTER — HOSPITAL ENCOUNTER (OUTPATIENT)
Dept: CARDIAC REHAB | Age: 74
Setting detail: THERAPIES SERIES
Discharge: HOME | End: 2019-01-10
Payer: MEDICARE

## 2019-01-10 ENCOUNTER — OFFICE VISIT (OUTPATIENT)
Dept: PRIMARY CARE CLINIC | Age: 74
End: 2019-01-10
Payer: COMMERCIAL

## 2019-01-10 VITALS
RESPIRATION RATE: 14 BRPM | SYSTOLIC BLOOD PRESSURE: 130 MMHG | HEIGHT: 72 IN | TEMPERATURE: 98.2 F | WEIGHT: 228.8 LBS | BODY MASS INDEX: 30.99 KG/M2 | DIASTOLIC BLOOD PRESSURE: 62 MMHG | HEART RATE: 73 BPM

## 2019-01-10 DIAGNOSIS — I20.8 STABLE ANGINA PECTORIS (HCC): ICD-10-CM

## 2019-01-10 DIAGNOSIS — Z23 NEED FOR PNEUMOCOCCAL VACCINATION: ICD-10-CM

## 2019-01-10 DIAGNOSIS — I50.32 CHRONIC DIASTOLIC CHF (CONGESTIVE HEART FAILURE), NYHA CLASS 3 (HCC): ICD-10-CM

## 2019-01-10 DIAGNOSIS — E11.9 TYPE 2 DIABETES MELLITUS WITHOUT COMPLICATION, WITHOUT LONG-TERM CURRENT USE OF INSULIN (HCC): Primary | Chronic | ICD-10-CM

## 2019-01-10 PROCEDURE — 90471 IMMUNIZATION ADMIN: CPT | Performed by: NURSE PRACTITIONER

## 2019-01-10 PROCEDURE — 93798 PHYS/QHP OP CAR RHAB W/ECG: CPT

## 2019-01-10 PROCEDURE — 90670 PCV13 VACCINE IM: CPT | Performed by: NURSE PRACTITIONER

## 2019-01-10 PROCEDURE — 99214 OFFICE O/P EST MOD 30 MIN: CPT | Performed by: NURSE PRACTITIONER

## 2019-01-10 ASSESSMENT — ENCOUNTER SYMPTOMS
WHEEZING: 0
BACK PAIN: 1
DIARRHEA: 0
SORE THROAT: 0
VOMITING: 0
COUGH: 0
SHORTNESS OF BREATH: 0
RHINORRHEA: 0
CONSTIPATION: 0
ABDOMINAL PAIN: 0
BLURRED VISION: 0
NAUSEA: 0
VISUAL CHANGE: 0

## 2019-01-14 ENCOUNTER — HOSPITAL ENCOUNTER (OUTPATIENT)
Dept: CARDIAC REHAB | Age: 74
Setting detail: THERAPIES SERIES
Discharge: HOME | End: 2019-01-14
Payer: MEDICARE

## 2019-01-14 PROCEDURE — 93798 PHYS/QHP OP CAR RHAB W/ECG: CPT

## 2019-01-14 NOTE — DISCHARGE SUMMARY
Patient was transferred from [facility] after diagnostic cath showing single vessel CAD. Patient underwent FFR of the RCA which showed to be non-obstructive. Patient is a to follow up with primary cardiologist Dr. Ron Deluca in clinic to evaluate symptoms further.   Continue aggressive risk factor modification   Lipid lowering therapy

## 2019-01-17 ENCOUNTER — HOSPITAL ENCOUNTER (OUTPATIENT)
Dept: CARDIAC REHAB | Age: 74
Setting detail: THERAPIES SERIES
Discharge: HOME | End: 2019-01-17
Payer: MEDICARE

## 2019-01-17 PROCEDURE — 93798 PHYS/QHP OP CAR RHAB W/ECG: CPT

## 2019-01-23 ENCOUNTER — HOSPITAL ENCOUNTER (OUTPATIENT)
Dept: CARDIAC REHAB | Age: 74
Setting detail: THERAPIES SERIES
Discharge: HOME | End: 2019-01-23
Payer: MEDICARE

## 2019-01-23 PROCEDURE — 93798 PHYS/QHP OP CAR RHAB W/ECG: CPT

## 2019-01-24 ENCOUNTER — HOSPITAL ENCOUNTER (OUTPATIENT)
Dept: CARDIAC REHAB | Age: 74
Setting detail: THERAPIES SERIES
Discharge: HOME | End: 2019-01-24
Payer: MEDICARE

## 2019-01-24 PROCEDURE — 93798 PHYS/QHP OP CAR RHAB W/ECG: CPT

## 2019-01-28 ENCOUNTER — HOSPITAL ENCOUNTER (OUTPATIENT)
Dept: CARDIAC REHAB | Age: 74
Setting detail: THERAPIES SERIES
Discharge: HOME | End: 2019-01-28
Payer: MEDICARE

## 2019-01-28 PROCEDURE — 93798 PHYS/QHP OP CAR RHAB W/ECG: CPT

## 2019-02-04 ENCOUNTER — HOSPITAL ENCOUNTER (OUTPATIENT)
Dept: CARDIAC REHAB | Age: 74
Setting detail: THERAPIES SERIES
Discharge: HOME | End: 2019-02-04
Payer: MEDICARE

## 2019-02-04 PROCEDURE — 93798 PHYS/QHP OP CAR RHAB W/ECG: CPT

## 2019-02-06 ENCOUNTER — HOSPITAL ENCOUNTER (OUTPATIENT)
Dept: CARDIAC REHAB | Age: 74
Setting detail: THERAPIES SERIES
Discharge: HOME | End: 2019-02-06
Payer: MEDICARE

## 2019-02-06 PROCEDURE — 93798 PHYS/QHP OP CAR RHAB W/ECG: CPT

## 2019-02-07 ENCOUNTER — HOSPITAL ENCOUNTER (OUTPATIENT)
Dept: CARDIAC REHAB | Age: 74
Setting detail: THERAPIES SERIES
Discharge: HOME | End: 2019-02-07
Payer: MEDICARE

## 2019-02-07 ENCOUNTER — OFFICE VISIT (OUTPATIENT)
Dept: PULMONOLOGY | Age: 74
End: 2019-02-07
Payer: MEDICARE

## 2019-02-07 VITALS
TEMPERATURE: 99.1 F | WEIGHT: 231 LBS | BODY MASS INDEX: 31.29 KG/M2 | SYSTOLIC BLOOD PRESSURE: 112 MMHG | RESPIRATION RATE: 16 BRPM | HEIGHT: 72 IN | HEART RATE: 76 BPM | DIASTOLIC BLOOD PRESSURE: 62 MMHG | OXYGEN SATURATION: 91 %

## 2019-02-07 DIAGNOSIS — E66.9 OBESITY (BMI 30.0-34.9): ICD-10-CM

## 2019-02-07 DIAGNOSIS — J98.6 ELEVATED DIAPHRAGM: ICD-10-CM

## 2019-02-07 DIAGNOSIS — G47.33 OBSTRUCTIVE SLEEP APNEA: Primary | ICD-10-CM

## 2019-02-07 PROCEDURE — 99214 OFFICE O/P EST MOD 30 MIN: CPT | Performed by: INTERNAL MEDICINE

## 2019-02-07 PROCEDURE — 93798 PHYS/QHP OP CAR RHAB W/ECG: CPT

## 2019-02-11 ENCOUNTER — HOSPITAL ENCOUNTER (OUTPATIENT)
Dept: CARDIAC REHAB | Age: 74
Setting detail: THERAPIES SERIES
Discharge: HOME | End: 2019-02-11
Payer: MEDICARE

## 2019-02-11 PROCEDURE — 93798 PHYS/QHP OP CAR RHAB W/ECG: CPT

## 2019-02-13 ENCOUNTER — HOSPITAL ENCOUNTER (OUTPATIENT)
Dept: CARDIAC REHAB | Age: 74
Setting detail: THERAPIES SERIES
Discharge: HOME | End: 2019-02-13
Payer: MEDICARE

## 2019-02-13 PROCEDURE — 93798 PHYS/QHP OP CAR RHAB W/ECG: CPT

## 2019-02-14 ENCOUNTER — HOSPITAL ENCOUNTER (OUTPATIENT)
Dept: CARDIAC REHAB | Age: 74
Setting detail: THERAPIES SERIES
Discharge: HOME | End: 2019-02-14
Payer: MEDICARE

## 2019-02-14 PROCEDURE — 93798 PHYS/QHP OP CAR RHAB W/ECG: CPT

## 2019-02-18 ENCOUNTER — HOSPITAL ENCOUNTER (OUTPATIENT)
Dept: CARDIAC REHAB | Age: 74
Setting detail: THERAPIES SERIES
Discharge: HOME | End: 2019-02-18
Payer: MEDICARE

## 2019-02-18 PROCEDURE — 93798 PHYS/QHP OP CAR RHAB W/ECG: CPT

## 2019-02-19 ENCOUNTER — TELEPHONE (OUTPATIENT)
Dept: PULMONOLOGY | Age: 74
End: 2019-02-19

## 2019-02-19 DIAGNOSIS — G47.33 OSA TREATED WITH BIPAP: Primary | ICD-10-CM

## 2019-02-20 ENCOUNTER — HOSPITAL ENCOUNTER (OUTPATIENT)
Dept: CARDIAC REHAB | Age: 74
Setting detail: THERAPIES SERIES
Discharge: HOME | End: 2019-02-20
Payer: MEDICARE

## 2019-02-20 PROCEDURE — 93798 PHYS/QHP OP CAR RHAB W/ECG: CPT

## 2019-02-20 RX ORDER — ISOSORBIDE MONONITRATE 30 MG/1
30 TABLET, EXTENDED RELEASE ORAL DAILY
Qty: 5 TABLET | Refills: 0 | Status: SHIPPED | OUTPATIENT
Start: 2019-02-20 | End: 2019-03-11 | Stop reason: SDUPTHER

## 2019-02-21 ENCOUNTER — HOSPITAL ENCOUNTER (OUTPATIENT)
Dept: CARDIAC REHAB | Age: 74
Setting detail: THERAPIES SERIES
Discharge: HOME | End: 2019-02-21
Payer: MEDICARE

## 2019-02-21 PROCEDURE — 93798 PHYS/QHP OP CAR RHAB W/ECG: CPT

## 2019-02-25 ENCOUNTER — HOSPITAL ENCOUNTER (OUTPATIENT)
Dept: CARDIAC REHAB | Age: 74
Setting detail: THERAPIES SERIES
Discharge: HOME | End: 2019-02-25
Payer: MEDICARE

## 2019-02-25 ENCOUNTER — TELEPHONE (OUTPATIENT)
Dept: PRIMARY CARE CLINIC | Age: 74
End: 2019-02-25

## 2019-02-25 PROCEDURE — 93798 PHYS/QHP OP CAR RHAB W/ECG: CPT

## 2019-02-25 RX ORDER — GABAPENTIN 400 MG/1
400 CAPSULE ORAL 3 TIMES DAILY
COMMUNITY
Start: 2019-02-14

## 2019-02-27 ENCOUNTER — HOSPITAL ENCOUNTER (OUTPATIENT)
Dept: CARDIAC REHAB | Age: 74
Setting detail: THERAPIES SERIES
Discharge: HOME | End: 2019-02-27
Payer: MEDICARE

## 2019-02-27 PROCEDURE — 93798 PHYS/QHP OP CAR RHAB W/ECG: CPT

## 2019-02-28 ENCOUNTER — HOSPITAL ENCOUNTER (OUTPATIENT)
Dept: CARDIAC REHAB | Age: 74
Setting detail: THERAPIES SERIES
Discharge: HOME | End: 2019-02-28
Payer: MEDICARE

## 2019-02-28 PROCEDURE — 93798 PHYS/QHP OP CAR RHAB W/ECG: CPT

## 2019-03-04 ENCOUNTER — HOSPITAL ENCOUNTER (OUTPATIENT)
Dept: CARDIAC REHAB | Age: 74
Setting detail: THERAPIES SERIES
Discharge: HOME | End: 2019-03-04
Payer: MEDICARE

## 2019-03-04 PROCEDURE — 93798 PHYS/QHP OP CAR RHAB W/ECG: CPT

## 2019-03-06 ENCOUNTER — HOSPITAL ENCOUNTER (OUTPATIENT)
Dept: CARDIAC REHAB | Age: 74
Setting detail: THERAPIES SERIES
Discharge: HOME | End: 2019-03-06
Payer: MEDICARE

## 2019-03-06 PROCEDURE — 93798 PHYS/QHP OP CAR RHAB W/ECG: CPT

## 2019-03-11 ENCOUNTER — OFFICE VISIT (OUTPATIENT)
Dept: CARDIOLOGY | Age: 74
End: 2019-03-11
Payer: MEDICARE

## 2019-03-11 ENCOUNTER — HOSPITAL ENCOUNTER (OUTPATIENT)
Dept: CARDIAC REHAB | Age: 74
Setting detail: THERAPIES SERIES
Discharge: HOME | End: 2019-03-11
Payer: MEDICARE

## 2019-03-11 VITALS
BODY MASS INDEX: 31.56 KG/M2 | HEART RATE: 72 BPM | OXYGEN SATURATION: 95 % | DIASTOLIC BLOOD PRESSURE: 68 MMHG | RESPIRATION RATE: 16 BRPM | HEIGHT: 72 IN | WEIGHT: 233 LBS | SYSTOLIC BLOOD PRESSURE: 127 MMHG

## 2019-03-11 DIAGNOSIS — Z95.3 HISTORY OF AORTIC VALVE REPLACEMENT WITH BIOPROSTHETIC VALVE: ICD-10-CM

## 2019-03-11 DIAGNOSIS — I25.708 CORONARY ARTERY DISEASE OF BYPASS GRAFT OF NATIVE HEART WITH STABLE ANGINA PECTORIS (HCC): Primary | ICD-10-CM

## 2019-03-11 DIAGNOSIS — I50.32 CHRONIC DIASTOLIC HEART FAILURE (HCC): ICD-10-CM

## 2019-03-11 DIAGNOSIS — I20.8 CHRONIC STABLE ANGINA (HCC): ICD-10-CM

## 2019-03-11 DIAGNOSIS — I10 ESSENTIAL HYPERTENSION: ICD-10-CM

## 2019-03-11 PROCEDURE — 93798 PHYS/QHP OP CAR RHAB W/ECG: CPT

## 2019-03-11 PROCEDURE — 99214 OFFICE O/P EST MOD 30 MIN: CPT | Performed by: FAMILY MEDICINE

## 2019-03-11 RX ORDER — METOPROLOL SUCCINATE 25 MG/1
25 TABLET, EXTENDED RELEASE ORAL DAILY
Qty: 90 TABLET | Refills: 3 | Status: ON HOLD | OUTPATIENT
Start: 2019-03-11 | End: 2019-12-16 | Stop reason: HOSPADM

## 2019-03-11 RX ORDER — ATORVASTATIN CALCIUM 40 MG/1
40 TABLET, FILM COATED ORAL DAILY
Qty: 90 TABLET | Refills: 3 | Status: SHIPPED | OUTPATIENT
Start: 2019-03-11 | End: 2020-01-06 | Stop reason: SDUPTHER

## 2019-03-11 RX ORDER — ISOSORBIDE MONONITRATE 30 MG/1
30 TABLET, EXTENDED RELEASE ORAL DAILY
Qty: 90 TABLET | Refills: 3 | Status: SHIPPED | OUTPATIENT
Start: 2019-03-11 | End: 2020-01-06 | Stop reason: SDUPTHER

## 2019-03-13 ENCOUNTER — HOSPITAL ENCOUNTER (OUTPATIENT)
Dept: CARDIAC REHAB | Age: 74
Setting detail: THERAPIES SERIES
Discharge: HOME | End: 2019-03-13
Payer: MEDICARE

## 2019-03-13 PROCEDURE — 93798 PHYS/QHP OP CAR RHAB W/ECG: CPT

## 2019-03-14 ENCOUNTER — HOSPITAL ENCOUNTER (OUTPATIENT)
Dept: CARDIAC REHAB | Age: 74
Setting detail: THERAPIES SERIES
Discharge: HOME | End: 2019-03-14
Payer: MEDICARE

## 2019-03-14 PROCEDURE — 93798 PHYS/QHP OP CAR RHAB W/ECG: CPT

## 2019-03-18 ENCOUNTER — HOSPITAL ENCOUNTER (OUTPATIENT)
Dept: CARDIAC REHAB | Age: 74
Setting detail: THERAPIES SERIES
Discharge: HOME | End: 2019-03-18
Payer: MEDICARE

## 2019-03-18 PROCEDURE — 93798 PHYS/QHP OP CAR RHAB W/ECG: CPT

## 2019-03-20 ENCOUNTER — HOSPITAL ENCOUNTER (OUTPATIENT)
Dept: CARDIAC REHAB | Age: 74
Setting detail: THERAPIES SERIES
Discharge: HOME | End: 2019-03-20
Payer: MEDICARE

## 2019-03-20 ENCOUNTER — NURSE ONLY (OUTPATIENT)
Dept: PRIMARY CARE CLINIC | Age: 74
End: 2019-03-20
Payer: MEDICARE

## 2019-03-20 ENCOUNTER — TELEPHONE (OUTPATIENT)
Dept: PRIMARY CARE CLINIC | Age: 74
End: 2019-03-20

## 2019-03-20 VITALS
HEART RATE: 67 BPM | DIASTOLIC BLOOD PRESSURE: 61 MMHG | WEIGHT: 233 LBS | BODY MASS INDEX: 31.6 KG/M2 | SYSTOLIC BLOOD PRESSURE: 113 MMHG | RESPIRATION RATE: 16 BRPM | TEMPERATURE: 98.8 F

## 2019-03-20 DIAGNOSIS — E11.9 TYPE 2 DIABETES MELLITUS WITHOUT COMPLICATION, WITHOUT LONG-TERM CURRENT USE OF INSULIN (HCC): Primary | ICD-10-CM

## 2019-03-20 LAB — HBA1C MFR BLD: 6.9 %

## 2019-03-20 PROCEDURE — 93798 PHYS/QHP OP CAR RHAB W/ECG: CPT

## 2019-03-20 PROCEDURE — 83036 HEMOGLOBIN GLYCOSYLATED A1C: CPT | Performed by: NURSE PRACTITIONER

## 2019-03-20 RX ORDER — TAMSULOSIN HYDROCHLORIDE 0.4 MG/1
0.4 CAPSULE ORAL DAILY
Qty: 90 CAPSULE | Refills: 1 | Status: SHIPPED | OUTPATIENT
Start: 2019-03-20 | End: 2019-08-29 | Stop reason: SDUPTHER

## 2019-03-21 ENCOUNTER — HOSPITAL ENCOUNTER (OUTPATIENT)
Dept: CARDIAC REHAB | Age: 74
Setting detail: THERAPIES SERIES
Discharge: HOME | End: 2019-03-21
Payer: MEDICARE

## 2019-03-21 PROCEDURE — 93798 PHYS/QHP OP CAR RHAB W/ECG: CPT

## 2019-03-25 ENCOUNTER — HOSPITAL ENCOUNTER (OUTPATIENT)
Dept: CARDIAC REHAB | Age: 74
Setting detail: THERAPIES SERIES
Discharge: HOME | End: 2019-03-25
Payer: MEDICARE

## 2019-03-25 PROCEDURE — 93798 PHYS/QHP OP CAR RHAB W/ECG: CPT

## 2019-03-27 ENCOUNTER — HOSPITAL ENCOUNTER (OUTPATIENT)
Dept: CARDIAC REHAB | Age: 74
Setting detail: THERAPIES SERIES
Discharge: HOME | End: 2019-03-27
Payer: MEDICARE

## 2019-03-27 ENCOUNTER — HOSPITAL ENCOUNTER (OUTPATIENT)
Age: 74
Discharge: HOME | End: 2019-03-27

## 2019-03-27 PROCEDURE — 93798 PHYS/QHP OP CAR RHAB W/ECG: CPT

## 2019-03-28 ENCOUNTER — HOSPITAL ENCOUNTER (OUTPATIENT)
Dept: CARDIAC REHAB | Age: 74
Setting detail: THERAPIES SERIES
End: 2019-03-28
Payer: MEDICARE

## 2019-04-02 ENCOUNTER — HOSPITAL ENCOUNTER (OUTPATIENT)
Dept: CARDIAC REHAB | Age: 74
Setting detail: THERAPIES SERIES
Discharge: HOME | End: 2019-04-02
Payer: MEDICARE

## 2019-04-02 PROCEDURE — 9900000065 HC CARDIAC REHAB PHASE 3 - 1 VISIT

## 2019-04-05 ENCOUNTER — HOSPITAL ENCOUNTER (OUTPATIENT)
Dept: CARDIAC REHAB | Age: 74
Setting detail: THERAPIES SERIES
Discharge: HOME | End: 2019-04-05
Payer: MEDICARE

## 2019-04-09 ENCOUNTER — HOSPITAL ENCOUNTER (OUTPATIENT)
Dept: CARDIAC REHAB | Age: 74
Setting detail: THERAPIES SERIES
Discharge: HOME | End: 2019-04-09
Payer: MEDICARE

## 2019-04-16 ENCOUNTER — HOSPITAL ENCOUNTER (OUTPATIENT)
Dept: CARDIAC REHAB | Age: 74
Setting detail: THERAPIES SERIES
Discharge: HOME | End: 2019-04-16
Payer: MEDICARE

## 2019-04-23 ENCOUNTER — HOSPITAL ENCOUNTER (OUTPATIENT)
Dept: CARDIAC REHAB | Age: 74
Setting detail: THERAPIES SERIES
Discharge: HOME | End: 2019-04-23
Payer: MEDICARE

## 2019-04-23 PROCEDURE — 9900000065 HC CARDIAC REHAB PHASE 3 - 1 VISIT

## 2019-04-24 ENCOUNTER — OFFICE VISIT (OUTPATIENT)
Dept: PRIMARY CARE CLINIC | Age: 74
End: 2019-04-24
Payer: MEDICARE

## 2019-04-24 VITALS
TEMPERATURE: 99 F | BODY MASS INDEX: 32.51 KG/M2 | SYSTOLIC BLOOD PRESSURE: 115 MMHG | HEIGHT: 72 IN | RESPIRATION RATE: 16 BRPM | WEIGHT: 240 LBS | HEART RATE: 67 BPM | DIASTOLIC BLOOD PRESSURE: 50 MMHG

## 2019-04-24 DIAGNOSIS — E11.9 TYPE 2 DIABETES MELLITUS WITHOUT COMPLICATION, WITHOUT LONG-TERM CURRENT USE OF INSULIN (HCC): Primary | Chronic | ICD-10-CM

## 2019-04-24 DIAGNOSIS — I50.32 CHRONIC DIASTOLIC CHF (CONGESTIVE HEART FAILURE), NYHA CLASS 3 (HCC): ICD-10-CM

## 2019-04-24 LAB — HBA1C MFR BLD: 7.3 %

## 2019-04-24 PROCEDURE — 99214 OFFICE O/P EST MOD 30 MIN: CPT | Performed by: NURSE PRACTITIONER

## 2019-04-24 PROCEDURE — 83036 HEMOGLOBIN GLYCOSYLATED A1C: CPT | Performed by: NURSE PRACTITIONER

## 2019-04-24 ASSESSMENT — ENCOUNTER SYMPTOMS
SORE THROAT: 0
VOMITING: 0
DIARRHEA: 0
BACK PAIN: 1
NAUSEA: 0
CONSTIPATION: 0
SHORTNESS OF BREATH: 0
COUGH: 0
RHINORRHEA: 0
WHEEZING: 0
ABDOMINAL PAIN: 0

## 2019-04-24 ASSESSMENT — PATIENT HEALTH QUESTIONNAIRE - PHQ9
1. LITTLE INTEREST OR PLEASURE IN DOING THINGS: 0
SUM OF ALL RESPONSES TO PHQ QUESTIONS 1-9: 0
2. FEELING DOWN, DEPRESSED OR HOPELESS: 0
SUM OF ALL RESPONSES TO PHQ9 QUESTIONS 1 & 2: 0
SUM OF ALL RESPONSES TO PHQ QUESTIONS 1-9: 0

## 2019-04-24 NOTE — PROGRESS NOTES
Name: Ivet Renee  : 1945         Chief Complaint:     Chief Complaint   Patient presents with    Hyperglycemia     States \"My blood sugars are running high 130's-170's. \"        History of Present Illness:      Ivet Renee is a 66 y.o.  male who presents with Hyperglycemia (States \"My blood sugars are running high 130's-170's. \" )      Yinka Edouard is here today for a routine office visit. CHF-stable, patient follows with cardiology, patient currently in cardiac rehabilitation. Diabetes-patient feels that his sugars are less controlled than when he was taking metformin twice daily. He states his sugars have been running about 1:30 with his fasting's. Patient's A1c is 7.2 which I explained to him is completely normal and a person his age. His kidney function is very good and he does have a history of congestive heart failure. I explained to him that his diabetes is well controlled but he thinks otherwise. Patient would like to begin taking metformin 2 times daily. I do think this is reasonable for him to do. We will recheck labs in 3 months. Diabetes   He presents for his follow-up diabetic visit. He has type 2 diabetes mellitus. His disease course has been stable. There are no hypoglycemic associated symptoms. Pertinent negatives for hypoglycemia include no dizziness, headaches, nervousness/anxiousness, seizures, sleepiness or tremors. There are no diabetic associated symptoms. Pertinent negatives for diabetes include no chest pain, no fatigue, no foot ulcerations, no polydipsia, no polyphagia and no polyuria. There are no hypoglycemic complications. Symptoms are stable. Diabetic complications include heart disease. Pertinent negatives for diabetic complications include no nephropathy or peripheral neuropathy. Risk factors for coronary artery disease include diabetes mellitus, dyslipidemia, male sex, obesity and sedentary lifestyle. Current diabetic treatment includes oral agent (monotherapy). medications    Medication Sig Start Date End Date Taking? Authorizing Provider   metFORMIN (GLUCOPHAGE) 500 MG tablet Take 1 tablet by mouth 2 times daily (with meals) 4/24/19  Yes HALEY Redman CNP   tamsulosin (FLOMAX) 0.4 MG capsule Take 1 capsule by mouth daily 3/20/19  Yes HALEY Redman CNP   atorvastatin (LIPITOR) 40 MG tablet Take 1 tablet by mouth daily 3/11/19  Yes Wan Charlton MD   isosorbide mononitrate (IMDUR) 30 MG extended release tablet Take 1 tablet by mouth daily 3/11/19  Yes Wan Charlton MD   metoprolol succinate (TOPROL XL) 25 MG extended release tablet Take 1 tablet by mouth daily 3/11/19  Yes Wan Charlton MD   gabapentin (NEURONTIN) 400 MG capsule Take 400 mg by mouth 3 times daily. 2/14/19  Yes Toi Yap MD   nitroGLYCERIN (NITROSTAT) 0.4 MG SL tablet Place 1 tablet under the tongue every 5 minutes as needed for Chest pain up to max of 3 total doses. If no relief after 1 dose, call 911. 12/7/18  Yes HALEY Charles CNP   aspirin 81 MG tablet Take 81 mg by mouth daily   Yes Historical Provider, MD   blood glucose monitor strips 1 strip by Other route daily Tung Metrix test strip. E11.9 11/12/18  Yes HALEY Redman CNP   Lancets MISC 1 each by Other route daily Tung Metrix lancets E11.9 11/12/18  Yes HALEY Redman CNP   famotidine (PEPCID) 40 MG tablet Take 40 mg by mouth daily   Yes Historical Provider, MD   melatonin 5 MG TABS tablet Take 5 mg by mouth daily   Yes Historical Provider, MD   Multiple Vitamins-Minerals (CENTRUM SILVER PO) Take 1 tablet by mouth daily    Yes Historical Provider, MD   traMADol (ULTRAM) 50 MG tablet Take 50 mg by mouth every 6 hours as needed for Pain. Taina Davis Historical Provider, MD        Allergies:       Patient has no known allergies. Social History:     Tobacco:    reports that he has never smoked. He has never used smokeless tobacco.  Alcohol:      reports that he drinks about 4.2 oz of alcohol per week.   Drug Use: reports that he does not use drugs. Family History:     Family History   Adopted: Yes       Review of Systems:     Positive and Negative as described in HPI    Review of Systems   Constitutional: Negative for chills, fatigue and fever. HENT: Negative for congestion, rhinorrhea and sore throat. Eyes: Negative for visual disturbance. Respiratory: Negative for cough, shortness of breath and wheezing. Cardiovascular: Positive for leg swelling (mild). Negative for chest pain and palpitations. Gastrointestinal: Negative for abdominal pain, constipation, diarrhea, nausea and vomiting. Endocrine: Negative for polydipsia, polyphagia and polyuria. Genitourinary: Negative for difficulty urinating and dysuria. Musculoskeletal: Positive for back pain (chronic). Negative for neck pain and neck stiffness. Skin: Negative for rash. Neurological: Negative for dizziness, tremors, seizures, syncope and headaches. Psychiatric/Behavioral: The patient is not nervous/anxious. Physical Exam:   Vitals:  BP (!) 115/50 (Site: Left Upper Arm, Position: Sitting, Cuff Size: Large Adult)   Pulse 67   Temp 99 °F (37.2 °C) (Temporal)   Resp 16   Ht 6' (1.829 m)   Wt 240 lb (108.9 kg)   BMI 32.55 kg/m²     Physical Exam   Constitutional: He is oriented to person, place, and time. He appears well-developed and well-nourished. No distress. HENT:   Mouth/Throat: Oropharynx is clear and moist.   Eyes: No scleral icterus. Neck: Normal range of motion. Neck supple. Cardiovascular: Normal rate and regular rhythm. Murmur heard. Pulmonary/Chest: Effort normal and breath sounds normal. He has no wheezes. He has no rales. Abdominal: Soft. Bowel sounds are normal. He exhibits no distension. There is no tenderness. Musculoskeletal: He exhibits edema (trace pitting bilat ankles). Neurological: He is alert and oriented to person, place, and time. Skin: Skin is warm and dry. No rash noted.    Nursing note and vitals reviewed. Data:     Lab Results   Component Value Date     12/06/2018    K 4.2 12/06/2018     12/06/2018    CO2 24 12/06/2018    BUN 10 12/06/2018    CREATININE 0.70 12/06/2018    GLUCOSE 137 12/06/2018    PROT 6.8 12/06/2018    LABALBU 4.1 12/06/2018    BILITOT 0.64 12/06/2018    ALKPHOS 71 12/06/2018    AST 43 12/06/2018     12/06/2018     Lab Results   Component Value Date    WBC 6.6 12/06/2018    RBC 4.03 12/06/2018    HGB 13.5 12/06/2018    HCT 40.9 12/06/2018    .5 12/06/2018    MCH 33.5 12/06/2018    MCHC 33.0 12/06/2018    RDW 13.6 12/06/2018     12/06/2018    MPV 9.8 12/06/2018     Lab Results   Component Value Date    TSH 1.65 09/06/2018     Lab Results   Component Value Date    CHOL 97 12/05/2018    HDL 46 12/05/2018    LABA1C 7.3 04/24/2019       Assessment/Plan:      Diagnosis Orders   1. Type 2 diabetes mellitus without complication, without long-term current use of insulin (HCC)  POCT glycosylated hemoglobin (Hb A1C)    metFORMIN (GLUCOPHAGE) 500 MG tablet    CBC Auto Differential    Comprehensive Metabolic Panel    Hemoglobin A1C    Microalbumin, Ur   2. Chronic diastolic CHF (congestive heart failure), NYHA class 3 ([facility] [address]         1. [facility] received counseling on the following healthy behaviors: nutrition, exercise and medication adherence  2. Patient given educational materials - see patient instructions  3. Was a self-tracking handout given in paper form or via [employer]? No  If yes, see orders or list here. 4.  Discussed use, benefit, and side effects of prescribed medications. Barriers to medication compliance addressed. All patient questions answered. Pt voiced understanding. 5.  Reviewed prior labs and health maintenance  6. Continue current medications, diet and exercise.     Completed Refills   Requested Prescriptions     Signed Prescriptions Disp Refills    metFORMIN (GLUCOPHAGE) 500 MG tablet 180 tablet 3     Sig: Take 1 tablet by mouth 2 times daily (with meals)         Return in about 3 months (around 7/24/2019) for check up-A1c.

## 2019-04-24 NOTE — PATIENT INSTRUCTIONS
SURVEY:    You may be receiving a survey from [employer] regarding your visit today. Please complete the survey to enable us to provide the highest quality of care to you and your family. If you cannot score us a very good on any question, please call the office to discuss how we could have made your experience a very good one. Thank you. Patient Education        Learning About High Blood Sugar  What is high blood sugar? Your body turns the food you eat into glucose (sugar), which it uses for energy. But if your body isn't able to use the sugar right away, it can build up in your blood and lead to high blood sugar. When the amount of sugar in your blood stays too high for too much of the time, you may have diabetes. Diabetes is a disease that can cause serious health problems. The good news is that lifestyle changes may help you get your blood sugar back to normal and avoid or delay diabetes. What causes high blood sugar? Sugar (glucose) can build up in your blood if you:  · Are overweight. · Have a family history of diabetes. · Take certain medicines, such as steroids. What are the symptoms? Having high blood sugar may not cause any symptoms at all. Or it may make you feel very thirsty or very hungry. You may also urinate more often than usual, have blurry vision, or lose weight without trying. How is high blood sugar treated? You can take steps to lower your blood sugar level if you understand what makes it get higher. Your doctor may want you to learn how to test your blood sugar level at home. Then you can see how illness, stress, or different kinds of food or medicine raise or lower your blood sugar level. Other tests may be needed to see if you have diabetes. How can you prevent high blood sugar? · Watch your weight. If you're overweight, losing just a small amount of weight may help. Reducing fat around your waist is most important.   · Limit the amount of calories, sweets, and unhealthy fat you eat. Ask your doctor if a dietitian can help you. A registered dietitian can help you create meal plans that fit your lifestyle. · Get at least 30 minutes of exercise on most days of the week. Exercise helps control your blood sugar. It also helps you maintain a healthy weight. Walking is a good choice. You also may want to do other activities, such as running, swimming, cycling, or playing tennis or team sports. · If your doctor prescribed medicines, take them exactly as prescribed. Call your doctor if you think you are having a problem with your medicine. You will get more details on the specific medicines your doctor prescribes. Follow-up care is a key part of your treatment and safety. Be sure to make and go to all appointments, and call your doctor if you are having problems. It's also a good idea to know your test results and keep a list of the medicines you take. Where can you learn more? Go to [email or web address] org and sign in to your [employer] account. Enter O108 in the mAPPn box to learn more about \"Learning About High Blood Sugar. \"     If you do not have an account, please click on the \"Sign Up Now\" link. Current as of: July 25, 2018  Content Version: 11.9  © 5933-0871 [employer]. Care instructions adapted under license by [facility] ([facility]). If you have questions about a medical condition or this instruction, always ask your healthcare professional. [place] [address] any warranty or liability for your use of this information.

## 2019-04-26 ENCOUNTER — HOSPITAL ENCOUNTER (OUTPATIENT)
Dept: CARDIAC REHAB | Age: 74
Setting detail: THERAPIES SERIES
Discharge: HOME | End: 2019-04-26
Payer: MEDICARE

## 2019-04-30 ENCOUNTER — HOSPITAL ENCOUNTER (OUTPATIENT)
Dept: CARDIAC REHAB | Age: 74
Setting detail: THERAPIES SERIES
Discharge: HOME | End: 2019-04-30
Payer: MEDICARE

## 2019-05-03 ENCOUNTER — HOSPITAL ENCOUNTER (OUTPATIENT)
Dept: CARDIAC REHAB | Age: 74
Discharge: HOME | End: 2019-05-03

## 2019-05-10 ENCOUNTER — HOSPITAL ENCOUNTER (OUTPATIENT)
Dept: CARDIAC REHAB | Age: 74
Setting detail: THERAPIES SERIES
Discharge: HOME | End: 2019-05-10

## 2019-05-17 ENCOUNTER — HOSPITAL ENCOUNTER (OUTPATIENT)
Dept: CARDIAC REHAB | Age: 74
Setting detail: THERAPIES SERIES
Discharge: HOME | End: 2019-05-17

## 2019-05-24 ENCOUNTER — HOSPITAL ENCOUNTER (OUTPATIENT)
Dept: CARDIAC REHAB | Age: 74
Setting detail: THERAPIES SERIES
Discharge: HOME | End: 2019-05-24

## 2019-05-28 ENCOUNTER — HOSPITAL ENCOUNTER (OUTPATIENT)
Dept: CARDIAC REHAB | Age: 74
Setting detail: THERAPIES SERIES
Discharge: HOME | End: 2019-05-28

## 2019-06-04 ENCOUNTER — HOSPITAL ENCOUNTER (OUTPATIENT)
Dept: CARDIAC REHAB | Age: 74
Setting detail: THERAPIES SERIES
Discharge: HOME | End: 2019-06-04

## 2019-06-07 ENCOUNTER — HOSPITAL ENCOUNTER (OUTPATIENT)
Dept: CARDIAC REHAB | Age: 74
Setting detail: THERAPIES SERIES
Discharge: HOME | End: 2019-06-07

## 2019-06-12 RX ORDER — GLUCOSAMINE HCL/CHONDROITIN SU 500-400 MG
1 CAPSULE ORAL DAILY
Qty: 100 STRIP | Refills: 3 | Status: SHIPPED | OUTPATIENT
Start: 2019-06-12

## 2019-06-12 RX ORDER — LANCETS 30 GAUGE
1 EACH MISCELLANEOUS DAILY
Qty: 100 EACH | Refills: 3 | Status: SHIPPED | OUTPATIENT
Start: 2019-06-12 | End: 2020-03-12 | Stop reason: SDUPTHER

## 2019-06-18 ENCOUNTER — HOSPITAL ENCOUNTER (OUTPATIENT)
Dept: CARDIAC REHAB | Age: 74
Setting detail: THERAPIES SERIES
Discharge: HOME | End: 2019-06-18

## 2019-06-25 ENCOUNTER — HOSPITAL ENCOUNTER (OUTPATIENT)
Dept: CARDIAC REHAB | Age: 74
Setting detail: THERAPIES SERIES
Discharge: HOME | End: 2019-06-25

## 2019-07-02 ENCOUNTER — HOSPITAL ENCOUNTER (OUTPATIENT)
Dept: CARDIAC REHAB | Age: 74
Discharge: HOME | End: 2019-07-02

## 2019-07-05 ENCOUNTER — HOSPITAL ENCOUNTER (OUTPATIENT)
Dept: CARDIAC REHAB | Age: 74
Setting detail: THERAPIES SERIES
Discharge: HOME | End: 2019-07-05

## 2019-07-05 PROCEDURE — 9900000065 HC CARDIAC REHAB PHASE 3 - 1 VISIT

## 2019-07-09 ENCOUNTER — HOSPITAL ENCOUNTER (OUTPATIENT)
Dept: CARDIAC REHAB | Age: 74
Setting detail: THERAPIES SERIES
Discharge: HOME | End: 2019-07-09

## 2019-07-15 ENCOUNTER — TELEPHONE (OUTPATIENT)
Dept: PRIMARY CARE CLINIC | Age: 74
End: 2019-07-15

## 2019-07-16 ENCOUNTER — HOSPITAL ENCOUNTER (OUTPATIENT)
Dept: CARDIAC REHAB | Age: 74
Setting detail: THERAPIES SERIES
Discharge: HOME | End: 2019-07-16

## 2019-07-16 ENCOUNTER — HOSPITAL ENCOUNTER (OUTPATIENT)
Age: 74
Discharge: HOME | End: 2019-07-16
Payer: MEDICARE

## 2019-07-16 DIAGNOSIS — E11.9 TYPE 2 DIABETES MELLITUS WITHOUT COMPLICATION, WITHOUT LONG-TERM CURRENT USE OF INSULIN (HCC): Chronic | ICD-10-CM

## 2019-07-16 LAB
ABSOLUTE EOS #: 0.21 K/UL (ref 0–0.44)
ABSOLUTE IMMATURE GRANULOCYTE: <0.03 K/UL (ref 0–0.3)
ABSOLUTE LYMPH #: 2.42 K/UL (ref 1.1–3.7)
ABSOLUTE MONO #: 0.56 K/UL (ref 0.1–1.2)
ALBUMIN SERPL-MCNC: 4.9 G/DL (ref 3.5–5.2)
ALBUMIN/GLOBULIN RATIO: 1.8 (ref 1–2.5)
ALP BLD-CCNC: 66 U/L (ref 40–129)
ALT SERPL-CCNC: 27 U/L (ref 5–41)
ANION GAP SERPL CALCULATED.3IONS-SCNC: 9 MMOL/L (ref 9–17)
AST SERPL-CCNC: 22 U/L
BASOPHILS # BLD: 1 % (ref 0–2)
BASOPHILS ABSOLUTE: 0.04 K/UL (ref 0–0.2)
BILIRUB SERPL-MCNC: 0.72 MG/DL (ref 0.3–1.2)
BUN BLDV-MCNC: 16 MG/DL (ref 8–23)
BUN/CREAT BLD: 20 (ref 9–20)
CALCIUM SERPL-MCNC: 9.9 MG/DL (ref 8.6–10.4)
CHLORIDE BLD-SCNC: 103 MMOL/L (ref 98–107)
CO2: 29 MMOL/L (ref 20–31)
CREAT SERPL-MCNC: 0.82 MG/DL (ref 0.7–1.2)
DIFFERENTIAL TYPE: ABNORMAL
EOSINOPHILS RELATIVE PERCENT: 4 % (ref 1–4)
ESTIMATED AVERAGE GLUCOSE: 160 MG/DL
GFR AFRICAN AMERICAN: >60 ML/MIN
GFR NON-AFRICAN AMERICAN: >60 ML/MIN
GFR SERPL CREATININE-BSD FRML MDRD: ABNORMAL ML/MIN/{1.73_M2}
GFR SERPL CREATININE-BSD FRML MDRD: ABNORMAL ML/MIN/{1.73_M2}
GLUCOSE BLD-MCNC: 122 MG/DL (ref 70–99)
HBA1C MFR BLD: 7.2 % (ref 4.8–5.9)
HCT VFR BLD CALC: 41.7 % (ref 40.7–50.3)
HEMOGLOBIN: 13.8 G/DL (ref 13–17)
IMMATURE GRANULOCYTES: 0 %
LYMPHOCYTES # BLD: 41 % (ref 24–43)
MCH RBC QN AUTO: 34.2 PG (ref 25.2–33.5)
MCHC RBC AUTO-ENTMCNC: 33.1 G/DL (ref 28.4–34.8)
MCV RBC AUTO: 103.2 FL (ref 82.6–102.9)
MONOCYTES # BLD: 10 % (ref 3–12)
NRBC AUTOMATED: 0 PER 100 WBC
PDW BLD-RTO: 13.4 % (ref 11.8–14.4)
PLATELET # BLD: 175 K/UL (ref 138–453)
PLATELET ESTIMATE: ABNORMAL
PMV BLD AUTO: 9.7 FL (ref 8.1–13.5)
POTASSIUM SERPL-SCNC: 4.4 MMOL/L (ref 3.7–5.3)
RBC # BLD: 4.04 M/UL (ref 4.21–5.77)
RBC # BLD: ABNORMAL 10*6/UL
SEG NEUTROPHILS: 44 % (ref 36–65)
SEGMENTED NEUTROPHILS ABSOLUTE COUNT: 2.68 K/UL (ref 1.5–8.1)
SODIUM BLD-SCNC: 141 MMOL/L (ref 135–144)
TOTAL PROTEIN: 7.7 G/DL (ref 6.4–8.3)
WBC # BLD: 5.9 K/UL (ref 3.5–11.3)
WBC # BLD: ABNORMAL 10*3/UL

## 2019-07-16 PROCEDURE — 83036 HEMOGLOBIN GLYCOSYLATED A1C: CPT

## 2019-07-16 PROCEDURE — 36415 COLL VENOUS BLD VENIPUNCTURE: CPT

## 2019-07-16 PROCEDURE — 85025 COMPLETE CBC W/AUTO DIFF WBC: CPT

## 2019-07-16 PROCEDURE — 80053 COMPREHEN METABOLIC PANEL: CPT

## 2019-07-19 ENCOUNTER — HOSPITAL ENCOUNTER (OUTPATIENT)
Dept: CARDIAC REHAB | Age: 74
Setting detail: THERAPIES SERIES
Discharge: HOME | End: 2019-07-19

## 2019-07-25 ENCOUNTER — OFFICE VISIT (OUTPATIENT)
Dept: PRIMARY CARE CLINIC | Age: 74
End: 2019-07-25
Payer: MEDICARE

## 2019-07-25 VITALS
SYSTOLIC BLOOD PRESSURE: 115 MMHG | TEMPERATURE: 99 F | HEART RATE: 66 BPM | RESPIRATION RATE: 18 BRPM | HEIGHT: 72 IN | WEIGHT: 238.4 LBS | BODY MASS INDEX: 32.29 KG/M2 | DIASTOLIC BLOOD PRESSURE: 53 MMHG

## 2019-07-25 DIAGNOSIS — E11.9 TYPE 2 DIABETES MELLITUS WITHOUT COMPLICATION, WITHOUT LONG-TERM CURRENT USE OF INSULIN (HCC): Primary | ICD-10-CM

## 2019-07-25 DIAGNOSIS — I50.32 CHRONIC DIASTOLIC CHF (CONGESTIVE HEART FAILURE), NYHA CLASS 3 (HCC): ICD-10-CM

## 2019-07-25 PROCEDURE — 99214 OFFICE O/P EST MOD 30 MIN: CPT | Performed by: NURSE PRACTITIONER

## 2019-07-25 ASSESSMENT — ENCOUNTER SYMPTOMS
SHORTNESS OF BREATH: 0
VOMITING: 0
ABDOMINAL PAIN: 0
WHEEZING: 0
DIARRHEA: 0
RHINORRHEA: 0
SORE THROAT: 0
NAUSEA: 0
COUGH: 0
CONSTIPATION: 0

## 2019-07-25 NOTE — PROGRESS NOTES
breath. The symptoms have been stable. Compliance with total regimen is 51-75%. Compliance problems include adherence to diet. Compliance with diet is 51-75%. Compliance with exercise is 51-75%. Compliance with medications is %. Past Medical History:     Past Medical History:   Diagnosis Date    Chronic back pain     Coronary artery disease     s/p MI and CABG in 12/2017    H/O aortic valve replacement 12/16/2017    Bioprosthetic valve     H/O cardiac catheterization 12/06/2018    Severe three vessel disease involving the LAD, circumflex and right coronary arteries  2 of 3 bypass grafts patent Normal LVEDP. Consult to interventional cardiology for likely angioplasty and/or  stenting of the patients severe stenosis with likely stenting of the unrevascularized RCA stenosis with plans to intervene on the higher risk LIMA-LAD stenosis only at a later date if the patient fails gu    H/O three vessel coronary artery bypass 12/2017    History of atrial fibrillation 12/2017    following CABG 12/2017    History of myocardial infarction 12/2017    Hyperlipidemia     Hypertension     Osteoarthritis     Sleep apnea     Type 2 diabetes mellitus without complication ([facility] [address]       Reviewed all health maintenance requirements and ordered appropriate tests  Health Maintenance Due   Topic Date Due    Annual Wellness Visit (AWV)  02/05/2006       Past Surgical History:     Past Surgical History:   Procedure Laterality Date    CARDIAC CATHETERIZATION Left 12/06/2018    Dr Kendy Deutsch radial-    CORONARY ARTERY BYPASS GRAFT      INTRACAPSULAR CATARACT EXTRACTION      KNEE ARTHROPLASTY      SHOULDER ARTHROPLASTY          Medications:       Prior to Admission medications    Medication Sig Start Date End Date Taking? Authorizing Provider   blood glucose monitor strips 1 strip by Other route daily Tung Metrix test strip.   E11.9 6/12/19  Yes Kristan Chase, APRN - CNP   Lancets MISC 1 each by

## 2019-07-30 ENCOUNTER — HOSPITAL ENCOUNTER (OUTPATIENT)
Dept: CARDIAC REHAB | Age: 74
Setting detail: THERAPIES SERIES
Discharge: HOME | End: 2019-07-30

## 2019-07-31 ENCOUNTER — TELEPHONE (OUTPATIENT)
Dept: PRIMARY CARE CLINIC | Age: 74
End: 2019-07-31

## 2019-08-06 ENCOUNTER — HOSPITAL ENCOUNTER (OUTPATIENT)
Dept: CARDIAC REHAB | Age: 74
Discharge: HOME | End: 2019-08-06

## 2019-08-06 ENCOUNTER — HOSPITAL ENCOUNTER (OUTPATIENT)
Age: 74
Discharge: HOME | End: 2019-08-06
Payer: MEDICARE

## 2019-08-06 DIAGNOSIS — E11.9 TYPE 2 DIABETES MELLITUS WITHOUT COMPLICATION, WITHOUT LONG-TERM CURRENT USE OF INSULIN (HCC): Chronic | ICD-10-CM

## 2019-08-06 LAB
CREATININE URINE: 94.9 MG/DL (ref 39–259)
MICROALBUMIN/CREAT 24H UR: <12 MG/L
MICROALBUMIN/CREAT UR-RTO: NORMAL MCG/MG CREAT

## 2019-08-06 PROCEDURE — 82043 UR ALBUMIN QUANTITATIVE: CPT

## 2019-08-06 PROCEDURE — 82570 ASSAY OF URINE CREATININE: CPT

## 2019-08-07 ENCOUNTER — TELEPHONE (OUTPATIENT)
Dept: PRIMARY CARE CLINIC | Age: 74
End: 2019-08-07

## 2019-08-07 NOTE — TELEPHONE ENCOUNTER
----- Message from [address] [place] [address], HALEY - CNP sent at 8/7/2019  7:52 AM EDT -----  Results are normal, please call patient and make them aware.

## 2019-08-08 ENCOUNTER — OFFICE VISIT (OUTPATIENT)
Dept: PULMONOLOGY | Age: 74
End: 2019-08-08
Payer: MEDICARE

## 2019-08-08 VITALS
RESPIRATION RATE: 16 BRPM | SYSTOLIC BLOOD PRESSURE: 114 MMHG | HEART RATE: 68 BPM | WEIGHT: 238 LBS | DIASTOLIC BLOOD PRESSURE: 63 MMHG | BODY MASS INDEX: 32.23 KG/M2 | OXYGEN SATURATION: 97 % | TEMPERATURE: 98.4 F | HEIGHT: 72 IN

## 2019-08-08 DIAGNOSIS — J98.6 ACQUIRED ELEVATED DIAPHRAGM: ICD-10-CM

## 2019-08-08 DIAGNOSIS — G47.33 OSA TREATED WITH BIPAP: Primary | ICD-10-CM

## 2019-08-08 PROCEDURE — 99214 OFFICE O/P EST MOD 30 MIN: CPT | Performed by: INTERNAL MEDICINE

## 2019-08-08 NOTE — PROGRESS NOTES
Using CPAP 8 hr/night. No leg jerks during sleep. No restless feelings in legs at night. No numbness or burning in leg or feet. No leg aches or cramps . No loss of muscle strength when angry or laugh. No hallucination when dozing off or waking up from sleep. No paralysis upon awakening from sleep or going to sleep. No teeth grinding, nightmares, sleep walking. No night time panic attacks. ESS: 5  Sitting and reading 0  Watching TV 0  Sitting inactive in a public place (e.g a theater or a meeting)  0  As a passenger in a car for an hour without a break 1  Lying down to rest in the afternoon when circumstances permit 3  Sitting and talking to someone 0  Sitting quietly after a lunch without alcohol 1  In a car, while stopped for a few minutes in traffic 0    Initial history and evaluation  He is referred here for evaluation and management of sleep apnea and history of chronic left hemidiaphragm elevation. According to patient and wife he had previous surgeries done for his knees in the past and after the surgery was noted to have low oxygen but had not had workup done but he was never started on oxygen as he improved. No history of COPD and asthma pulmonary embolism or DVT. In December 2017 he was in the hospital for left shoulder surgery it was complicated post surgery he had MI. Workup done and needed CABG. He has slightly prolonged course after the CABG he was requiring oxygenation after the procedure rehab was prolonged and he was on oxygen until March 2018 and at that time he had a sleep study done and he was started on CPAP and home oxygen was discontinued that time.   He was seen and followed by pulmonologist in [facility] for sleep apnea, he was also told many years ago that he has left hemidiaphragm elevation and he has smaller lung on the left side because of the hemidiaphragm elevation, since he was started on CPAP he was doing better, when he was followed up by pulmonologist in [place] he was told that he will need a re-titration study in lab and he might need BiPAP his place of CPAP since then he moved here. Past Medical History:        Diagnosis Date    Chronic back pain     Coronary artery disease     s/p MI and CABG in 12/2017    H/O aortic valve replacement 12/16/2017    Bioprosthetic valve     H/O cardiac catheterization 12/06/2018    Severe three vessel disease involving the LAD, circumflex and right coronary arteries  2 of 3 bypass grafts patent Normal LVEDP. Consult to interventional cardiology for likely angioplasty and/or  stenting of the patients severe stenosis with likely stenting of the unrevascularized RCA stenosis with plans to intervene on the higher risk LIMA-LAD stenosis only at a later date if the patient fails gu    H/O three vessel coronary artery bypass 12/2017    History of atrial fibrillation 12/2017    following CABG 12/2017    History of myocardial infarction 12/2017    Hyperlipidemia     Hypertension     Osteoarthritis     Sleep apnea     Type 2 diabetes mellitus without complication [facility])        Past Surgical History:        Procedure Laterality Date    CARDIAC CATHETERIZATION Left 12/06/2018    Dr Rebekah Morgan radial-    CORONARY ARTERY BYPASS GRAFT      INTRACAPSULAR CATARACT EXTRACTION      KNEE ARTHROPLASTY      SHOULDER ARTHROPLASTY         Allergies:    No Known Allergies      Home Meds:   Outpatient Encounter Medications as of 8/8/2019   Medication Sig Dispense Refill    blood glucose monitor strips 1 strip by Other route daily Tung Metrix test strip.   E11.9 100 strip 3    Lancets MISC 1 each by Other route daily Tung Metrix lancets E11.9 100 each 3    metFORMIN (GLUCOPHAGE) 500 MG tablet Take 1 tablet by mouth 2 times daily (with meals) 180 tablet 3    tamsulosin (FLOMAX) 0.4 MG capsule Take 1 capsule by mouth daily 90 capsule 1    atorvastatin (LIPITOR) 40 MG tablet Take 1 tablet by mouth daily 90 tablet 3    Left Diaphragm Elevation  3.      L basal atelactasis    Plan and recommendation    Continue Bipap at 15/9 cm pressure  BIPAP at least 4 hrs qhs  Wt loss is recommended and discussed  Follow good sleep hygeine instructions  Use humidifier   Questions answered pertaining to diagnosis and management explained importance of compliance with therapy   Need compliance data from bipap before next visit    CTA chest and CXR PA and Lateral view from December 2018 seen  Annual flu Vaccinations recommended   Up to date with vaccinations from pulm perspective, had both Pneumovax and prevnar 13  Maintain an active lifestyle     Questions answered to pt's/family's satisfaction    RTC 6 months  It was my pleasure to evaluate Bennett Hatch today. Please call with questions. Please note that this chart was generated using voice recognition Dragon dictation software. Although every effort was made to ensure the accuracy of this automated transcription, some errors in transcription may have occurred.     Stacey Christiansen MD             8/8/2019, 10:36 AM

## 2019-08-09 ENCOUNTER — HOSPITAL ENCOUNTER (OUTPATIENT)
Dept: CARDIAC REHAB | Age: 74
Discharge: HOME | End: 2019-08-09

## 2019-08-16 ENCOUNTER — HOSPITAL ENCOUNTER (OUTPATIENT)
Dept: CARDIAC REHAB | Age: 74
Discharge: HOME | End: 2019-08-16

## 2019-08-20 ENCOUNTER — HOSPITAL ENCOUNTER (OUTPATIENT)
Dept: CARDIAC REHAB | Age: 74
Discharge: HOME | End: 2019-08-20

## 2019-08-23 ENCOUNTER — HOSPITAL ENCOUNTER (OUTPATIENT)
Dept: CARDIAC REHAB | Age: 74
Discharge: HOME | End: 2019-08-23

## 2019-08-23 PROCEDURE — 9900000065 HC CARDIAC REHAB PHASE 3 - 1 VISIT

## 2019-08-27 ENCOUNTER — HOSPITAL ENCOUNTER (OUTPATIENT)
Dept: CARDIAC REHAB | Age: 74
Discharge: HOME | End: 2019-08-27

## 2019-08-29 RX ORDER — TAMSULOSIN HYDROCHLORIDE 0.4 MG/1
CAPSULE ORAL
Qty: 90 CAPSULE | Refills: 3 | Status: SHIPPED | OUTPATIENT
Start: 2019-08-29 | End: 2020-08-31

## 2019-08-30 ENCOUNTER — HOSPITAL ENCOUNTER (OUTPATIENT)
Dept: CARDIAC REHAB | Age: 74
Discharge: HOME | End: 2019-08-30

## 2019-09-03 ENCOUNTER — HOSPITAL ENCOUNTER (OUTPATIENT)
Dept: CARDIAC REHAB | Age: 74
Discharge: HOME | End: 2019-09-03

## 2019-09-06 ENCOUNTER — HOSPITAL ENCOUNTER (OUTPATIENT)
Dept: CARDIAC REHAB | Age: 74
Discharge: HOME | End: 2019-09-06

## 2019-09-10 ENCOUNTER — HOSPITAL ENCOUNTER (OUTPATIENT)
Dept: CARDIAC REHAB | Age: 74
Discharge: HOME | End: 2019-09-10

## 2019-09-11 ENCOUNTER — OFFICE VISIT (OUTPATIENT)
Dept: PRIMARY CARE CLINIC | Age: 74
End: 2019-09-11
Payer: MEDICARE

## 2019-09-11 VITALS
DIASTOLIC BLOOD PRESSURE: 55 MMHG | HEART RATE: 73 BPM | BODY MASS INDEX: 32.2 KG/M2 | RESPIRATION RATE: 18 BRPM | TEMPERATURE: 98.7 F | WEIGHT: 237.7 LBS | HEIGHT: 72 IN | SYSTOLIC BLOOD PRESSURE: 123 MMHG

## 2019-09-11 DIAGNOSIS — Z00.00 ROUTINE GENERAL MEDICAL EXAMINATION AT A HEALTH CARE FACILITY: Primary | ICD-10-CM

## 2019-09-11 DIAGNOSIS — E11.9 TYPE 2 DIABETES MELLITUS WITHOUT COMPLICATION, WITHOUT LONG-TERM CURRENT USE OF INSULIN (HCC): Chronic | ICD-10-CM

## 2019-09-11 DIAGNOSIS — Z23 NEED FOR INFLUENZA VACCINATION: ICD-10-CM

## 2019-09-11 PROCEDURE — 90686 IIV4 VACC NO PRSV 0.5 ML IM: CPT | Performed by: NURSE PRACTITIONER

## 2019-09-11 PROCEDURE — G0438 PPPS, INITIAL VISIT: HCPCS | Performed by: NURSE PRACTITIONER

## 2019-09-11 PROCEDURE — G0008 ADMIN INFLUENZA VIRUS VAC: HCPCS | Performed by: NURSE PRACTITIONER

## 2019-09-11 ASSESSMENT — LIFESTYLE VARIABLES
HOW OFTEN DURING THE LAST YEAR HAVE YOU BEEN UNABLE TO REMEMBER WHAT HAPPENED THE NIGHT BEFORE BECAUSE YOU HAD BEEN DRINKING: 0
HOW OFTEN DURING THE LAST YEAR HAVE YOU HAD A FEELING OF GUILT OR REMORSE AFTER DRINKING: 0
AUDIT TOTAL SCORE: 4
HOW MANY STANDARD DRINKS CONTAINING ALCOHOL DO YOU HAVE ON A TYPICAL DAY: 0
HOW OFTEN DURING THE LAST YEAR HAVE YOU NEEDED AN ALCOHOLIC DRINK FIRST THING IN THE MORNING TO GET YOURSELF GOING AFTER A NIGHT OF HEAVY DRINKING: 0
HOW OFTEN DURING THE LAST YEAR HAVE YOU FOUND THAT YOU WERE NOT ABLE TO STOP DRINKING ONCE YOU HAD STARTED: 0
HOW OFTEN DURING THE LAST YEAR HAVE YOU FAILED TO DO WHAT WAS NORMALLY EXPECTED FROM YOU BECAUSE OF DRINKING: 0
AUDIT-C TOTAL SCORE: 4
HOW OFTEN DO YOU HAVE SIX OR MORE DRINKS ON ONE OCCASION: 0
HAS A RELATIVE, FRIEND, DOCTOR, OR ANOTHER HEALTH PROFESSIONAL EXPRESSED CONCERN ABOUT YOUR DRINKING OR SUGGESTED YOU CUT DOWN: 0
HAVE YOU OR SOMEONE ELSE BEEN INJURED AS A RESULT OF YOUR DRINKING: 0
HOW OFTEN DO YOU HAVE A DRINK CONTAINING ALCOHOL: 4

## 2019-09-11 ASSESSMENT — PATIENT HEALTH QUESTIONNAIRE - PHQ9
SUM OF ALL RESPONSES TO PHQ QUESTIONS 1-9: 0
SUM OF ALL RESPONSES TO PHQ QUESTIONS 1-9: 0

## 2019-09-11 NOTE — PROGRESS NOTES
cardiac catheterization 12/06/2018    Severe three vessel disease involving the LAD, circumflex and right coronary arteries  2 of 3 bypass grafts patent Normal LVEDP. Consult to interventional cardiology for likely angioplasty and/or  stenting of the patients severe stenosis with likely stenting of the unrevascularized RCA stenosis with plans to intervene on the higher risk LIMA-LAD stenosis only at a later date if the patient fails gu    H/O three vessel coronary artery bypass 12/2017    History of atrial fibrillation 12/2017    following CABG 12/2017    History of myocardial infarction 12/2017    Hyperlipidemia     Hypertension     Osteoarthritis     Sleep apnea     Type 2 diabetes mellitus without complication ([facility] [address]      Past Surgical History:   Procedure Laterality Date    CARDIAC CATHETERIZATION Left 12/06/2018    Dr Benny Vallejo radial-    CORONARY ARTERY BYPASS GRAFT      INTRACAPSULAR CATARACT EXTRACTION      KNEE ARTHROPLASTY      SHOULDER ARTHROPLASTY         Family History   Adopted: Yes       CareTeam (Including outside providers/suppliers regularly involved in providing care):   Patient Care Team:  HALEY Ochoa CNP as PCP - General (Family Nurse Practitioner)  HALEY Munson CNP as PCP - [facility] Empaneled Provider    Wt Readings from Last 3 Encounters:   09/11/19 237 lb 11.2 oz (107.8 kg)   08/08/19 238 lb (108 kg)   07/25/19 238 lb 6.4 oz (108.1 kg)     Vitals:    09/11/19 1106   BP: (!) 123/55   Site: Right Upper Arm   Position: Sitting   Cuff Size: Medium Adult   Pulse: 73   Resp: 18   Temp: 98.7 °F (37.1 °C)   TempSrc: Temporal   Weight: 237 lb 11.2 oz (107.8 kg)   Height: 6' (1.829 m)     Body mass index is 32.24 kg/m². Based upon direct observation of the patient, evaluation of cognition reveals remote memory intact, recent memory impaired.     General Appearance: alert and oriented to person, place and time, well-developed and well-nourished, in no acute distress  Pulmonary/Chest: clear to auscultation bilaterally- no wheezes, rales or rhonchi, normal air movement, no respiratory distress  Cardiovascular: normal rate and regular rhythm  Extremities: no edema    Patient's complete Health Risk Assessment and screening values have been reviewed and are found in Flowsheets. The following problems were reviewed today and where indicated follow up appointments were made and/or referrals ordered. Positive Risk Factor Screenings with Interventions:     Cognitive: Words recalled: 1 Word Recalled  Clock Drawing Test (CDT) Score: (!) Abnormal  Total Score Interpretation: Positive Mini-Cog  Cognitive Impairment Interventions:  · Patient declines any further evaluation/treatment for cognitive impairment    General Health:  General  In general, how would you say your health is?: Very Good  In the past 7 days, have you experienced any of the following? New or Increased Pain, New or Increased Fatigue, Loneliness, Social Isolation, Stress or Anger?: None of These  Do you get the social and emotional support that you need?: (!) No  Do you have a Living Will?: (!) No  General Health Risk Interventions:  · Social isolation: referred to [place] on Aging  · No Living Will: patient declined    Health Habits/Nutrition:  Health Habits/Nutrition  Do you exercise for at least 20 minutes 2-3 times per week?: Yes  Have you lost any weight without trying in the past 3 months?: No  Do you eat fewer than 2 meals per day?: (!) Yes  Have you seen a dentist within the past year?: (!) No  Body mass index is 32.24 kg/m².   Health Habits/Nutrition Interventions:  · Nutritional issues:  educational materials for proper nutrition provided  · Dental exam overdue:  patient declines dental evaluation    Hearing/Vision:  No exam data present  Hearing/Vision  Do you or your family notice any trouble with your hearing?: (!) Yes  Do you have difficulty driving, watching TV, or doing any of your daily

## 2019-09-17 ENCOUNTER — HOSPITAL ENCOUNTER (OUTPATIENT)
Dept: CARDIAC REHAB | Age: 74
Discharge: HOME | End: 2019-09-17

## 2019-09-20 ENCOUNTER — HOSPITAL ENCOUNTER (OUTPATIENT)
Dept: CARDIAC REHAB | Age: 74
Discharge: HOME | End: 2019-09-20

## 2019-09-27 ENCOUNTER — HOSPITAL ENCOUNTER (OUTPATIENT)
Dept: CARDIAC REHAB | Age: 74
Discharge: HOME | End: 2019-09-27

## 2019-10-01 ENCOUNTER — HOSPITAL ENCOUNTER (OUTPATIENT)
Dept: CARDIAC REHAB | Age: 74
Discharge: HOME | End: 2019-10-01

## 2019-10-08 ENCOUNTER — HOSPITAL ENCOUNTER (OUTPATIENT)
Dept: CARDIAC REHAB | Age: 74
Discharge: HOME | End: 2019-10-08

## 2019-10-08 PROCEDURE — 9900000065 HC CARDIAC REHAB PHASE 3 - 1 VISIT

## 2019-10-11 ENCOUNTER — HOSPITAL ENCOUNTER (OUTPATIENT)
Dept: CARDIAC REHAB | Age: 74
Discharge: HOME | End: 2019-10-11

## 2019-10-18 ENCOUNTER — HOSPITAL ENCOUNTER (OUTPATIENT)
Dept: CARDIAC REHAB | Age: 74
Discharge: HOME | End: 2019-10-18

## 2019-10-22 ENCOUNTER — HOSPITAL ENCOUNTER (OUTPATIENT)
Dept: CARDIAC REHAB | Age: 74
Discharge: HOME | End: 2019-10-22

## 2019-10-25 ENCOUNTER — HOSPITAL ENCOUNTER (OUTPATIENT)
Dept: CARDIAC REHAB | Age: 74
Discharge: HOME | End: 2019-10-25

## 2019-10-29 ENCOUNTER — HOSPITAL ENCOUNTER (OUTPATIENT)
Dept: CARDIAC REHAB | Age: 74
Discharge: HOME | End: 2019-10-29

## 2019-11-01 ENCOUNTER — HOSPITAL ENCOUNTER (OUTPATIENT)
Dept: CARDIAC REHAB | Age: 74
Discharge: HOME | End: 2019-11-01

## 2019-11-05 ENCOUNTER — HOSPITAL ENCOUNTER (OUTPATIENT)
Dept: CARDIAC REHAB | Age: 74
Discharge: HOME | End: 2019-11-05

## 2019-11-08 ENCOUNTER — HOSPITAL ENCOUNTER (OUTPATIENT)
Dept: CARDIAC REHAB | Age: 74
Discharge: HOME | End: 2019-11-08

## 2019-11-15 ENCOUNTER — HOSPITAL ENCOUNTER (OUTPATIENT)
Dept: CARDIAC REHAB | Age: 74
Discharge: HOME | End: 2019-11-15

## 2019-11-19 ENCOUNTER — HOSPITAL ENCOUNTER (OUTPATIENT)
Dept: CARDIAC REHAB | Age: 74
Discharge: HOME | End: 2019-11-19

## 2019-11-22 DIAGNOSIS — E11.9 TYPE 2 DIABETES MELLITUS WITHOUT COMPLICATION, WITHOUT LONG-TERM CURRENT USE OF INSULIN (HCC): Chronic | ICD-10-CM

## 2019-11-26 ENCOUNTER — HOSPITAL ENCOUNTER (OUTPATIENT)
Dept: CARDIAC REHAB | Age: 74
Discharge: HOME | End: 2019-11-26

## 2019-11-26 PROCEDURE — 9900000065 HC CARDIAC REHAB PHASE 3 - 1 VISIT

## 2019-12-03 ENCOUNTER — HOSPITAL ENCOUNTER (OUTPATIENT)
Dept: CARDIAC REHAB | Age: 74
Discharge: HOME | End: 2019-12-03

## 2019-12-06 ENCOUNTER — HOSPITAL ENCOUNTER (OUTPATIENT)
Dept: CARDIAC REHAB | Age: 74
Discharge: HOME | End: 2019-12-06

## 2019-12-11 RX ORDER — NITROGLYCERIN 0.4 MG/1
0.4 TABLET SUBLINGUAL EVERY 5 MIN PRN
Qty: 25 TABLET | Refills: 3 | Status: SHIPPED | OUTPATIENT
Start: 2019-12-11 | End: 2020-07-10

## 2019-12-14 ENCOUNTER — APPOINTMENT (OUTPATIENT)
Dept: CT IMAGING | Age: 74
DRG: 190 | End: 2019-12-14
Payer: MEDICARE

## 2019-12-14 ENCOUNTER — HOSPITAL ENCOUNTER (INPATIENT)
Age: 74
LOS: 2 days | Discharge: HOME | DRG: 190 | End: 2019-12-16
Attending: INTERNAL MEDICINE | Admitting: INTERNAL MEDICINE
Payer: MEDICARE

## 2019-12-14 ENCOUNTER — APPOINTMENT (OUTPATIENT)
Dept: GENERAL RADIOLOGY | Age: 74
DRG: 190 | End: 2019-12-14
Payer: MEDICARE

## 2019-12-14 PROBLEM — J44.1 COPD EXACERBATION (HCC): Status: ACTIVE | Noted: 2019-12-14

## 2019-12-14 LAB
ANION GAP SERPL CALCULATED.3IONS-SCNC: 13 MMOL/L (ref 9–17)
BNP INTERPRETATION: NORMAL
BUN BLDV-MCNC: 13 MG/DL (ref 8–23)
BUN/CREAT BLD: 17 (ref 9–20)
CALCIUM SERPL-MCNC: 9.8 MG/DL (ref 8.6–10.4)
CHLORIDE BLD-SCNC: 98 MMOL/L (ref 98–107)
CO2: 24 MMOL/L (ref 20–31)
CREAT SERPL-MCNC: 0.75 MG/DL (ref 0.7–1.2)
EKG ATRIAL RATE: 116 BPM
EKG P-R INTERVAL: 192 MS
EKG Q-T INTERVAL: 346 MS
EKG QRS DURATION: 128 MS
EKG QTC CALCULATION (BAZETT): 480 MS
EKG R AXIS: -122 DEGREES
EKG T AXIS: 16 DEGREES
EKG VENTRICULAR RATE: 116 BPM
ESTIMATED AVERAGE GLUCOSE: 157 MG/DL
GFR AFRICAN AMERICAN: >60 ML/MIN
GFR NON-AFRICAN AMERICAN: >60 ML/MIN
GFR SERPL CREATININE-BSD FRML MDRD: ABNORMAL ML/MIN/{1.73_M2}
GFR SERPL CREATININE-BSD FRML MDRD: ABNORMAL ML/MIN/{1.73_M2}
GLUCOSE BLD-MCNC: 143 MG/DL (ref 70–99)
GLUCOSE BLD-MCNC: 278 MG/DL (ref 74–100)
HBA1C MFR BLD: 7.1 % (ref 4.8–5.9)
HCT VFR BLD CALC: 40.7 % (ref 40.7–50.3)
HEMOGLOBIN: 13.3 G/DL (ref 13–17)
LACTIC ACID, WHOLE BLOOD: NORMAL MMOL/L (ref 0.7–2.1)
LACTIC ACID: 1.8 MMOL/L (ref 0.5–2.2)
MCH RBC QN AUTO: 33.6 PG (ref 25.2–33.5)
MCHC RBC AUTO-ENTMCNC: 32.7 G/DL (ref 28.4–34.8)
MCV RBC AUTO: 102.8 FL (ref 82.6–102.9)
NRBC AUTOMATED: 0 PER 100 WBC
PDW BLD-RTO: 13.3 % (ref 11.8–14.4)
PLATELET # BLD: 192 K/UL (ref 138–453)
PMV BLD AUTO: 10.1 FL (ref 8.1–13.5)
POTASSIUM SERPL-SCNC: 4.4 MMOL/L (ref 3.7–5.3)
PRO-BNP: 105 PG/ML
RBC # BLD: 3.96 M/UL (ref 4.21–5.77)
SODIUM BLD-SCNC: 135 MMOL/L (ref 135–144)
WBC # BLD: 7.8 K/UL (ref 3.5–11.3)

## 2019-12-14 PROCEDURE — 83036 HEMOGLOBIN GLYCOSYLATED A1C: CPT

## 2019-12-14 PROCEDURE — 6370000000 HC RX 637 (ALT 250 FOR IP)

## 2019-12-14 PROCEDURE — 6360000002 HC RX W HCPCS: Performed by: PHYSICIAN ASSISTANT

## 2019-12-14 PROCEDURE — 85027 COMPLETE CBC AUTOMATED: CPT

## 2019-12-14 PROCEDURE — 36415 COLL VENOUS BLD VENIPUNCTURE: CPT

## 2019-12-14 PROCEDURE — 82947 ASSAY GLUCOSE BLOOD QUANT: CPT

## 2019-12-14 PROCEDURE — 83605 ASSAY OF LACTIC ACID: CPT

## 2019-12-14 PROCEDURE — 6360000002 HC RX W HCPCS: Performed by: INTERNAL MEDICINE

## 2019-12-14 PROCEDURE — 6360000004 HC RX CONTRAST MEDICATION: Performed by: PHYSICIAN ASSISTANT

## 2019-12-14 PROCEDURE — 6370000000 HC RX 637 (ALT 250 FOR IP): Performed by: INTERNAL MEDICINE

## 2019-12-14 PROCEDURE — 2580000003 HC RX 258: Performed by: PHYSICIAN ASSISTANT

## 2019-12-14 PROCEDURE — 94640 AIRWAY INHALATION TREATMENT: CPT

## 2019-12-14 PROCEDURE — 71046 X-RAY EXAM CHEST 2 VIEWS: CPT

## 2019-12-14 PROCEDURE — 6370000000 HC RX 637 (ALT 250 FOR IP): Performed by: PHYSICIAN ASSISTANT

## 2019-12-14 PROCEDURE — 2000000000 HC ICU R&B

## 2019-12-14 PROCEDURE — 80048 BASIC METABOLIC PNL TOTAL CA: CPT

## 2019-12-14 PROCEDURE — 87040 BLOOD CULTURE FOR BACTERIA: CPT

## 2019-12-14 PROCEDURE — 71260 CT THORAX DX C+: CPT

## 2019-12-14 PROCEDURE — 99285 EMERGENCY DEPT VISIT HI MDM: CPT

## 2019-12-14 PROCEDURE — 94664 DEMO&/EVAL PT USE INHALER: CPT

## 2019-12-14 PROCEDURE — 2580000003 HC RX 258: Performed by: INTERNAL MEDICINE

## 2019-12-14 PROCEDURE — 83880 ASSAY OF NATRIURETIC PEPTIDE: CPT

## 2019-12-14 PROCEDURE — 93010 ELECTROCARDIOGRAM REPORT: CPT | Performed by: INTERNAL MEDICINE

## 2019-12-14 PROCEDURE — 93005 ELECTROCARDIOGRAM TRACING: CPT | Performed by: PHYSICIAN ASSISTANT

## 2019-12-14 RX ORDER — FAMOTIDINE 20 MG/1
40 TABLET, FILM COATED ORAL DAILY
Status: DISCONTINUED | OUTPATIENT
Start: 2019-12-14 | End: 2019-12-16 | Stop reason: HOSPADM

## 2019-12-14 RX ORDER — SODIUM CHLORIDE 9 MG/ML
INJECTION, SOLUTION INTRAVENOUS CONTINUOUS
Status: DISCONTINUED | OUTPATIENT
Start: 2019-12-14 | End: 2019-12-16

## 2019-12-14 RX ORDER — BUDESONIDE 0.5 MG/2ML
0.5 INHALANT ORAL ONCE
Status: COMPLETED | OUTPATIENT
Start: 2019-12-14 | End: 2019-12-14

## 2019-12-14 RX ORDER — 0.9 % SODIUM CHLORIDE 0.9 %
500 INTRAVENOUS SOLUTION INTRAVENOUS ONCE
Status: COMPLETED | OUTPATIENT
Start: 2019-12-14 | End: 2019-12-14

## 2019-12-14 RX ORDER — ASPIRIN 81 MG/1
TABLET, CHEWABLE ORAL
Status: COMPLETED
Start: 2019-12-14 | End: 2019-12-14

## 2019-12-14 RX ORDER — SODIUM CHLORIDE 0.9 % (FLUSH) 0.9 %
10 SYRINGE (ML) INJECTION EVERY 12 HOURS SCHEDULED
Status: DISCONTINUED | OUTPATIENT
Start: 2019-12-14 | End: 2019-12-16 | Stop reason: HOSPADM

## 2019-12-14 RX ORDER — ASPIRIN 81 MG/1
81 TABLET, CHEWABLE ORAL DAILY
Status: DISCONTINUED | OUTPATIENT
Start: 2019-12-14 | End: 2019-12-16 | Stop reason: HOSPADM

## 2019-12-14 RX ORDER — DEXTROSE MONOHYDRATE 50 MG/ML
100 INJECTION, SOLUTION INTRAVENOUS PRN
Status: DISCONTINUED | OUTPATIENT
Start: 2019-12-14 | End: 2019-12-16 | Stop reason: HOSPADM

## 2019-12-14 RX ORDER — DEXTROSE MONOHYDRATE 25 G/50ML
12.5 INJECTION, SOLUTION INTRAVENOUS PRN
Status: DISCONTINUED | OUTPATIENT
Start: 2019-12-14 | End: 2019-12-16 | Stop reason: HOSPADM

## 2019-12-14 RX ORDER — UREA 10 %
5 LOTION (ML) TOPICAL NIGHTLY
Status: DISCONTINUED | OUTPATIENT
Start: 2019-12-14 | End: 2019-12-16 | Stop reason: HOSPADM

## 2019-12-14 RX ORDER — ONDANSETRON 2 MG/ML
4 INJECTION INTRAMUSCULAR; INTRAVENOUS EVERY 6 HOURS PRN
Status: DISCONTINUED | OUTPATIENT
Start: 2019-12-14 | End: 2019-12-16 | Stop reason: HOSPADM

## 2019-12-14 RX ORDER — NITROGLYCERIN 0.4 MG/1
0.4 TABLET SUBLINGUAL EVERY 5 MIN PRN
Status: DISCONTINUED | OUTPATIENT
Start: 2019-12-14 | End: 2019-12-16 | Stop reason: HOSPADM

## 2019-12-14 RX ORDER — TAMSULOSIN HYDROCHLORIDE 0.4 MG/1
0.4 CAPSULE ORAL DAILY
Status: DISCONTINUED | OUTPATIENT
Start: 2019-12-15 | End: 2019-12-16 | Stop reason: HOSPADM

## 2019-12-14 RX ORDER — IPRATROPIUM BROMIDE AND ALBUTEROL SULFATE 2.5; .5 MG/3ML; MG/3ML
1 SOLUTION RESPIRATORY (INHALATION) ONCE
Status: COMPLETED | OUTPATIENT
Start: 2019-12-14 | End: 2019-12-14

## 2019-12-14 RX ORDER — GABAPENTIN 400 MG/1
400 CAPSULE ORAL 3 TIMES DAILY
Status: DISCONTINUED | OUTPATIENT
Start: 2019-12-14 | End: 2019-12-16 | Stop reason: HOSPADM

## 2019-12-14 RX ORDER — METOPROLOL SUCCINATE 25 MG/1
25 TABLET, EXTENDED RELEASE ORAL DAILY
Status: DISCONTINUED | OUTPATIENT
Start: 2019-12-15 | End: 2019-12-15

## 2019-12-14 RX ORDER — NICOTINE POLACRILEX 4 MG
15 LOZENGE BUCCAL PRN
Status: DISCONTINUED | OUTPATIENT
Start: 2019-12-14 | End: 2019-12-16 | Stop reason: HOSPADM

## 2019-12-14 RX ORDER — INSULIN GLARGINE 100 [IU]/ML
0.25 INJECTION, SOLUTION SUBCUTANEOUS NIGHTLY
Status: DISCONTINUED | OUTPATIENT
Start: 2019-12-14 | End: 2019-12-16 | Stop reason: HOSPADM

## 2019-12-14 RX ORDER — ISOSORBIDE MONONITRATE 30 MG/1
30 TABLET, EXTENDED RELEASE ORAL DAILY
Status: DISCONTINUED | OUTPATIENT
Start: 2019-12-15 | End: 2019-12-16 | Stop reason: HOSPADM

## 2019-12-14 RX ORDER — TRAMADOL HYDROCHLORIDE 50 MG/1
50 TABLET ORAL EVERY 6 HOURS PRN
Status: DISCONTINUED | OUTPATIENT
Start: 2019-12-14 | End: 2019-12-16 | Stop reason: HOSPADM

## 2019-12-14 RX ORDER — ATORVASTATIN CALCIUM 40 MG/1
40 TABLET, FILM COATED ORAL DAILY
Status: DISCONTINUED | OUTPATIENT
Start: 2019-12-14 | End: 2019-12-16 | Stop reason: HOSPADM

## 2019-12-14 RX ORDER — SODIUM CHLORIDE 0.9 % (FLUSH) 0.9 %
10 SYRINGE (ML) INJECTION PRN
Status: DISCONTINUED | OUTPATIENT
Start: 2019-12-14 | End: 2019-12-16 | Stop reason: HOSPADM

## 2019-12-14 RX ADMIN — IOPAMIDOL 75 ML: 755 INJECTION, SOLUTION INTRAVENOUS at 16:37

## 2019-12-14 RX ADMIN — ASPIRIN 81 MG 81 MG: 81 TABLET ORAL at 20:51

## 2019-12-14 RX ADMIN — BUDESONIDE 500 MCG: 0.5 SUSPENSION RESPIRATORY (INHALATION) at 15:30

## 2019-12-14 RX ADMIN — IPRATROPIUM BROMIDE AND ALBUTEROL SULFATE 1 AMPULE: .5; 3 SOLUTION RESPIRATORY (INHALATION) at 15:30

## 2019-12-14 RX ADMIN — FAMOTIDINE 40 MG: 20 TABLET ORAL at 20:49

## 2019-12-14 RX ADMIN — AZITHROMYCIN MONOHYDRATE 500 MG: 500 INJECTION, POWDER, LYOPHILIZED, FOR SOLUTION INTRAVENOUS at 20:04

## 2019-12-14 RX ADMIN — ENOXAPARIN SODIUM 40 MG: 40 INJECTION SUBCUTANEOUS at 20:49

## 2019-12-14 RX ADMIN — SODIUM CHLORIDE: 9 INJECTION, SOLUTION INTRAVENOUS at 19:42

## 2019-12-14 RX ADMIN — Medication 5 MG: at 20:49

## 2019-12-14 RX ADMIN — TRAMADOL HYDROCHLORIDE 50 MG: 50 TABLET, FILM COATED ORAL at 20:49

## 2019-12-14 RX ADMIN — SODIUM CHLORIDE 500 ML: 9 INJECTION, SOLUTION INTRAVENOUS at 16:54

## 2019-12-14 RX ADMIN — CEFTRIAXONE 1 G: 1 INJECTION, POWDER, FOR SOLUTION INTRAMUSCULAR; INTRAVENOUS at 20:04

## 2019-12-14 RX ADMIN — INSULIN LISPRO 3 UNITS: 100 INJECTION, SOLUTION INTRAVENOUS; SUBCUTANEOUS at 21:16

## 2019-12-14 RX ADMIN — INSULIN GLARGINE 27 UNITS: 100 INJECTION, SOLUTION SUBCUTANEOUS at 21:17

## 2019-12-14 RX ADMIN — GABAPENTIN 400 MG: 400 CAPSULE ORAL at 20:49

## 2019-12-14 ASSESSMENT — ENCOUNTER SYMPTOMS
EYE DISCHARGE: 0
VOMITING: 0
EYE REDNESS: 0
BACK PAIN: 0
WHEEZING: 0
COUGH: 1
SORE THROAT: 0
NAUSEA: 0
SHORTNESS OF BREATH: 0
DIARRHEA: 0
CHEST TIGHTNESS: 0
RHINORRHEA: 0
BLOOD IN STOOL: 0
CONSTIPATION: 0
ABDOMINAL PAIN: 0

## 2019-12-14 ASSESSMENT — PAIN SCALES - GENERAL: PAINLEVEL_OUTOF10: 4

## 2019-12-14 NOTE — ED PROVIDER NOTES
[facility] AT THE [facility] MED SURG  eMERGENCY dEPARTMENT eNCOUnter      Pt Name: Dandy Lorenz  MRN: [id number]  [place] 1945  Date of evaluation: 12/14/2019  Provider: Nicholas Renteria Dr     Chief Complaint   Patient presents with    Nasal Congestion     Onset of congestion and productive cough Thursday evening.  Cough         HISTORY OF PRESENT ILLNESS   (Location/Symptom, Timing/Onset, Context/Setting,Quality, Duration, Modifying Factors, Severity)  Note limiting factors. Dandy Lorenz is a70 y.o. male who presents to the emergency department with complaints of productive cough over the past 3 days. Associated complaints include nasal congestion. Patient reports that he is tried some over-the-counter medication without relief of symptoms. States that he has a history of heart disease and does have a history of lung disease they were to make sure he did not have a pneumonia so they came to the ER for antibiotics. They deny any fever or chills at home he denies any chest pain or shortness of breath. No other complaints at this time. HPI    Nursing Notes werereviewed. REVIEW OF SYSTEMS    (2-9 systems for level 4, 10 or more for level 5)     Review of Systems   Constitutional: Negative for chills, diaphoresis and fever. HENT: Positive for congestion. Negative for ear pain, rhinorrhea and sore throat. Eyes: Negative for discharge, redness and visual disturbance. Respiratory: Positive for cough. Negative for chest tightness, shortness of breath and wheezing. Cardiovascular: Negative for chest pain and palpitations. Gastrointestinal: Negative for abdominal pain, blood in stool, constipation, diarrhea, nausea and vomiting. Endocrine: Negative for polydipsia, polyphagia and polyuria. Genitourinary: Negative for decreased urine volume, difficulty urinating, dysuria, frequency and hematuria. Musculoskeletal: Negative for arthralgias, back pain and myalgias.    Skin: Negative for pallor and rash. Allergic/Immunologic: Negative for food allergies and immunocompromised state. Neurological: Negative for dizziness, syncope, weakness and light-headedness. Hematological: Negative for adenopathy. Does not bruise/bleed easily. Psychiatric/Behavioral: Negative for behavioral problems and suicidal ideas. The patient is not nervous/anxious. Except as noted above the remainder of the review of systems was reviewed and negative. PAST MEDICAL HISTORY     Past Medical History:   Diagnosis Date    Chronic back pain     Coronary artery disease     s/p MI and CABG in 12/2017    H/O aortic valve replacement 12/16/2017    Bioprosthetic valve     H/O cardiac catheterization 12/06/2018    Severe three vessel disease involving the LAD, circumflex and right coronary arteries  2 of 3 bypass grafts patent Normal LVEDP. Consult to interventional cardiology for likely angioplasty and/or  stenting of the patients severe stenosis with likely stenting of the unrevascularized RCA stenosis with plans to intervene on the higher risk LIMA-LAD stenosis only at a later date if the patient fails gu    H/O three vessel coronary artery bypass 12/2017    History of atrial fibrillation 12/2017    following CABG 12/2017    History of myocardial infarction 12/2017    Hyperlipidemia     Hypertension     Osteoarthritis     Sleep apnea     Type 2 diabetes mellitus without complication ([facility] [address]          SURGICALHISTORY       Past Surgical History:   Procedure Laterality Date    CARDIAC CATHETERIZATION Left 12/06/2018    Dr Valeria Ribera radial-    CORONARY ARTERY BYPASS GRAFT      INTRACAPSULAR CATARACT EXTRACTION      KNEE ARTHROPLASTY      SHOULDER ARTHROPLASTY           CURRENT MEDICATIONS       Discharge Medication List as of 12/16/2019  2:07 PM      CONTINUE these medications which have NOT CHANGED    Details   metFORMIN (GLUCOPHAGE) 1000 MG tablet TAKE 1 TABLET TWICE A DAY WITH MEALS, Disp-180 tablet, R-4Normal      tamsulosin (FLOMAX) 0.4 MG capsule TAKE 1 CAPSULE DAILY, Disp-90 capsule, R-3Normal      Lancets MISC DAILY Starting Wed 6/12/2019, Disp-100 each, R-3, NormalTru Metrix lancets E11.9      atorvastatin (LIPITOR) 40 MG tablet Take 1 tablet by mouth daily, Disp-90 tablet, R-3Normal      isosorbide mononitrate (IMDUR) 30 MG extended release tablet Take 1 tablet by mouth daily, Disp-90 tablet, R-3Normal      gabapentin (NEURONTIN) 400 MG capsule Take 400 mg by mouth 3 times daily. Historical Med      aspirin 81 MG tablet Take 81 mg by mouth dailyHistorical Med      famotidine (PEPCID) 40 MG tablet Take 40 mg by mouth dailyHistorical Med      Multiple Vitamins-Minerals (CENTRUM SILVER PO) Take 1 tablet by mouth daily Historical Med      traMADol (ULTRAM) 50 MG tablet Take 50 mg by mouth every 6 hours as needed for Pain. Kim Gilliam Historical Med      nitroGLYCERIN (NITROSTAT) 0.4 MG SL tablet Place 1 tablet under the tongue every 5 minutes as needed for Chest pain up to max of 3 total doses. If no relief after 1 dose, call 911., Disp-25 tablet, R-3Normal      blood glucose monitor strips 1 strip by Other route daily Tung Metrix test strip. E11.9, Other, DAILY Starting Wed 6/12/2019, Disp-100 strip, R-3, Normal      melatonin 5 MG TABS tablet Take 5 mg by mouth dailyHistorical Med             ALLERGIES     Patient has no known allergies.     FAMILY HISTORY       Family History   Adopted: Yes          SOCIAL HISTORY       Social History     Socioeconomic History    Marital status:      Spouse name: Not on file    Number of children: Not on file    Years of education: Not on file    Highest education level: Not on file   Occupational History    Not on file   Social Needs    Financial resource strain: Not on file    Food insecurity:     Worry: Not on file     Inability: Not on file    Transportation needs:     Medical: Not on file     Non-medical: Not on file   Tobacco Use    Smoking discharge. Left eye: No discharge. Conjunctiva/sclera: Conjunctivae normal.      Pupils: Pupils are equal, round, and reactive to light. Neck:      Musculoskeletal: Normal range of motion and neck supple. Thyroid: No thyromegaly. Trachea: No tracheal deviation. Cardiovascular:      Rate and Rhythm: Regular rhythm. Tachycardia present. Heart sounds: No murmur. No friction rub. No gallop. Pulmonary:      Effort: Pulmonary effort is normal. No respiratory distress. Breath sounds: No stridor. Wheezing and rhonchi present. Abdominal:      General: Bowel sounds are normal. There is no distension. Palpations: Abdomen is soft. There is no mass. Tenderness: There is no tenderness. There is no guarding or rebound. Hernia: No hernia is present. Musculoskeletal: Normal range of motion. General: No tenderness or deformity. Lymphadenopathy:      Cervical: No cervical adenopathy. Skin:     General: Skin is warm and dry. Findings: No erythema or rash. Neurological:      Mental Status: He is alert and oriented to person, place, and time. Cranial Nerves: No cranial nerve deficit. Motor: No abnormal muscle tone. Deep Tendon Reflexes: Reflexes are normal and symmetric. Reflexes normal.   Psychiatric:         Behavior: Behavior normal.         DIAGNOSTIC RESULTS     EKG: All EKG's are interpreted by the Emergency Department Physician who either signs orCo-signs this chart in the absence of a cardiologist.      RADIOLOGY:   Non-plainfilm images such as CT, Ultrasound and MRI are read by the radiologist. Plain radiographic images are visualized and preliminarily interpreted by the emergency physician with the below findings:      Interpretationper the Radiologist below, if available at the time of this note:    CT CHEST PULMONARY EMBOLISM W CONTRAST   Final Result   1. No evidence for acute pulmonary embolism.   Note that streak artifact and Abnormal; Notable for the following components:    Glucose 119 (*)     All other components within normal limits   CBC WITH AUTO DIFFERENTIAL - Abnormal; Notable for the following components:    RBC 3.85 (*)     Hemoglobin 12.8 (*)     Hematocrit 40.1 (*)     .2 (*)     Monocytes 13 (*)     All other components within normal limits   GLUCOSE, WHOLE BLOOD - Abnormal; Notable for the following components:    POC Glucose 180 (*)     All other components within normal limits   GLUCOSE, WHOLE BLOOD - Abnormal; Notable for the following components:    POC Glucose 110 (*)     All other components within normal limits   GLUCOSE, WHOLE BLOOD - Abnormal; Notable for the following components:    POC Glucose 141 (*)     All other components within normal limits   CULTURE BLOOD #1   CULTURE BLOOD #2   GRAM STAIN   BRAIN NATRIURETIC PEPTIDE   LACTIC ACID, PLASMA   PROCALCITONIN   POCT GLUCOSE   POCT GLUCOSE   POCT GLUCOSE   POCT GLUCOSE   POCT GLUCOSE   POCT GLUCOSE   POCT GLUCOSE       All other labs were within normal range or not returned as of this dictation. EMERGENCY DEPARTMENT COURSE and DIFFERENTIAL DIAGNOSIS/MDM:   Vitals:    Vitals:    12/16/19 0200 12/16/19 0400 12/16/19 0500 12/16/19 0747   BP: 116/63 123/71 (!) 111/59 129/79   Pulse: 76 78 72 100   Resp: 16 16 16 20   Temp:  98.2 °F (36.8 °C)  98.1 °F (36.7 °C)   TempSrc:  Temporal  Temporal   SpO2:    94%   Weight:  235 lb 14.4 oz (107 kg)     Height:             MDM  49-year-old male with history of cardiac disease who presents secondary to productive cough for the past 4 days. Is on BiPAP at night but no oxygen during the day. Is found to be slightly tachycardic. Rhonchi noted on exam with wheezing. Will get chest x-ray and work-up to include relative acute pneumonia bronchitis infection nutritionally tight and balance. Given patient's hypoxia I am going to CT chest discussed with family who agrees.     Called and spoke with hospitalist on-call who

## 2019-12-15 LAB
ABSOLUTE EOS #: 0.16 K/UL (ref 0–0.44)
ABSOLUTE IMMATURE GRANULOCYTE: 0 K/UL (ref 0–0.3)
ABSOLUTE LYMPH #: 2.48 K/UL (ref 1.1–3.7)
ABSOLUTE MONO #: 0.88 K/UL (ref 0.1–1.2)
ALBUMIN SERPL-MCNC: 4 G/DL (ref 3.5–5.2)
ALBUMIN/GLOBULIN RATIO: 1.3 (ref 1–2.5)
ALP BLD-CCNC: 75 U/L (ref 40–129)
ALT SERPL-CCNC: 18 U/L (ref 5–41)
ANION GAP SERPL CALCULATED.3IONS-SCNC: 14 MMOL/L (ref 9–17)
AST SERPL-CCNC: 14 U/L
BASOPHILS # BLD: 0 % (ref 0–2)
BASOPHILS ABSOLUTE: 0 K/UL (ref 0–0.2)
BILIRUB SERPL-MCNC: 0.59 MG/DL (ref 0.3–1.2)
BUN BLDV-MCNC: 12 MG/DL (ref 8–23)
BUN/CREAT BLD: 14 (ref 9–20)
CALCIUM SERPL-MCNC: 9.3 MG/DL (ref 8.6–10.4)
CHLORIDE BLD-SCNC: 102 MMOL/L (ref 98–107)
CO2: 23 MMOL/L (ref 20–31)
CREAT SERPL-MCNC: 0.83 MG/DL (ref 0.7–1.2)
DIFFERENTIAL TYPE: ABNORMAL
EOSINOPHILS RELATIVE PERCENT: 2 % (ref 1–4)
GFR AFRICAN AMERICAN: >60 ML/MIN
GFR NON-AFRICAN AMERICAN: >60 ML/MIN
GFR SERPL CREATININE-BSD FRML MDRD: ABNORMAL ML/MIN/{1.73_M2}
GFR SERPL CREATININE-BSD FRML MDRD: ABNORMAL ML/MIN/{1.73_M2}
GLUCOSE BLD-MCNC: 119 MG/DL (ref 74–100)
GLUCOSE BLD-MCNC: 132 MG/DL (ref 70–99)
GLUCOSE BLD-MCNC: 137 MG/DL (ref 74–100)
GLUCOSE BLD-MCNC: 180 MG/DL (ref 74–100)
GLUCOSE BLD-MCNC: 190 MG/DL (ref 74–100)
HCT VFR BLD CALC: 40.6 % (ref 40.7–50.3)
HEMOGLOBIN: 12.9 G/DL (ref 13–17)
IMMATURE GRANULOCYTES: 0 %
LYMPHOCYTES # BLD: 31 % (ref 24–43)
MCH RBC QN AUTO: 33.5 PG (ref 25.2–33.5)
MCHC RBC AUTO-ENTMCNC: 31.8 G/DL (ref 28.4–34.8)
MCV RBC AUTO: 105.5 FL (ref 82.6–102.9)
MONOCYTES # BLD: 11 % (ref 3–12)
MORPHOLOGY: NORMAL
NRBC AUTOMATED: 0 PER 100 WBC
PDW BLD-RTO: 13.5 % (ref 11.8–14.4)
PLATELET # BLD: 194 K/UL (ref 138–453)
PLATELET ESTIMATE: ABNORMAL
PMV BLD AUTO: 10.2 FL (ref 8.1–13.5)
POTASSIUM SERPL-SCNC: 4 MMOL/L (ref 3.7–5.3)
PROCALCITONIN: 0.03 NG/ML
RBC # BLD: 3.85 M/UL (ref 4.21–5.77)
RBC # BLD: ABNORMAL 10*6/UL
SEG NEUTROPHILS: 56 % (ref 36–65)
SEGMENTED NEUTROPHILS ABSOLUTE COUNT: 4.48 K/UL (ref 1.5–8.1)
SODIUM BLD-SCNC: 139 MMOL/L (ref 135–144)
TOTAL PROTEIN: 7 G/DL (ref 6.4–8.3)
WBC # BLD: 8 K/UL (ref 3.5–11.3)
WBC # BLD: ABNORMAL 10*3/UL

## 2019-12-15 PROCEDURE — 85025 COMPLETE CBC W/AUTO DIFF WBC: CPT

## 2019-12-15 PROCEDURE — 2000000000 HC ICU R&B

## 2019-12-15 PROCEDURE — 6370000000 HC RX 637 (ALT 250 FOR IP): Performed by: INTERNAL MEDICINE

## 2019-12-15 PROCEDURE — 93005 ELECTROCARDIOGRAM TRACING: CPT | Performed by: INTERNAL MEDICINE

## 2019-12-15 PROCEDURE — 80053 COMPREHEN METABOLIC PANEL: CPT

## 2019-12-15 PROCEDURE — 82947 ASSAY GLUCOSE BLOOD QUANT: CPT

## 2019-12-15 PROCEDURE — 2580000003 HC RX 258: Performed by: INTERNAL MEDICINE

## 2019-12-15 PROCEDURE — 6360000002 HC RX W HCPCS: Performed by: INTERNAL MEDICINE

## 2019-12-15 PROCEDURE — 84145 PROCALCITONIN (PCT): CPT

## 2019-12-15 PROCEDURE — 36415 COLL VENOUS BLD VENIPUNCTURE: CPT

## 2019-12-15 RX ORDER — METOPROLOL SUCCINATE 25 MG/1
25 TABLET, EXTENDED RELEASE ORAL DAILY
Status: DISCONTINUED | OUTPATIENT
Start: 2019-12-16 | End: 2019-12-15

## 2019-12-15 RX ORDER — METOPROLOL SUCCINATE 50 MG/1
50 TABLET, EXTENDED RELEASE ORAL DAILY
Status: DISCONTINUED | OUTPATIENT
Start: 2019-12-16 | End: 2019-12-15

## 2019-12-15 RX ORDER — METOPROLOL SUCCINATE 25 MG/1
25 TABLET, EXTENDED RELEASE ORAL 2 TIMES DAILY
Status: DISCONTINUED | OUTPATIENT
Start: 2019-12-15 | End: 2019-12-16 | Stop reason: HOSPADM

## 2019-12-15 RX ADMIN — TRAMADOL HYDROCHLORIDE 50 MG: 50 TABLET, FILM COATED ORAL at 20:25

## 2019-12-15 RX ADMIN — Medication 10 ML: at 19:53

## 2019-12-15 RX ADMIN — ASPIRIN 81 MG 81 MG: 81 TABLET ORAL at 20:26

## 2019-12-15 RX ADMIN — INSULIN GLARGINE 27 UNITS: 100 INJECTION, SOLUTION SUBCUTANEOUS at 20:24

## 2019-12-15 RX ADMIN — CEFTRIAXONE 1 G: 1 INJECTION, POWDER, FOR SOLUTION INTRAMUSCULAR; INTRAVENOUS at 19:52

## 2019-12-15 RX ADMIN — METOPROLOL SUCCINATE 25 MG: 25 TABLET, EXTENDED RELEASE ORAL at 08:44

## 2019-12-15 RX ADMIN — GABAPENTIN 400 MG: 400 CAPSULE ORAL at 14:49

## 2019-12-15 RX ADMIN — INSULIN LISPRO 1 UNITS: 100 INJECTION, SOLUTION INTRAVENOUS; SUBCUTANEOUS at 20:23

## 2019-12-15 RX ADMIN — TAMSULOSIN HYDROCHLORIDE 0.4 MG: 0.4 CAPSULE ORAL at 08:44

## 2019-12-15 RX ADMIN — Medication 5 MG: at 20:26

## 2019-12-15 RX ADMIN — METOPROLOL SUCCINATE 25 MG: 25 TABLET, EXTENDED RELEASE ORAL at 20:25

## 2019-12-15 RX ADMIN — INSULIN LISPRO 2 UNITS: 100 INJECTION, SOLUTION INTRAVENOUS; SUBCUTANEOUS at 12:20

## 2019-12-15 RX ADMIN — GABAPENTIN 400 MG: 400 CAPSULE ORAL at 20:25

## 2019-12-15 RX ADMIN — GABAPENTIN 400 MG: 400 CAPSULE ORAL at 08:44

## 2019-12-15 RX ADMIN — AZITHROMYCIN MONOHYDRATE 500 MG: 500 INJECTION, POWDER, LYOPHILIZED, FOR SOLUTION INTRAVENOUS at 19:53

## 2019-12-15 RX ADMIN — FAMOTIDINE 40 MG: 20 TABLET ORAL at 08:44

## 2019-12-15 RX ADMIN — ISOSORBIDE MONONITRATE 30 MG: 30 TABLET, EXTENDED RELEASE ORAL at 08:44

## 2019-12-15 RX ADMIN — ENOXAPARIN SODIUM 40 MG: 40 INJECTION SUBCUTANEOUS at 08:43

## 2019-12-15 RX ADMIN — ATORVASTATIN CALCIUM 40 MG: 40 TABLET, FILM COATED ORAL at 08:44

## 2019-12-15 ASSESSMENT — PAIN SCALES - GENERAL
PAINLEVEL_OUTOF10: 0
PAINLEVEL_OUTOF10: 0
PAINLEVEL_OUTOF10: 2

## 2019-12-15 NOTE — H&P
Hospital Medicine  History and Physical    Patient:  Frandy Arechiga  MRN: [id number]    Chief Complaint:  Cough, SOB    History Obtained From:  patient, spouse  PCP: HALEY Nazario CNP    History of Present Illness: The patient is a 66 y.o. male who presents with a 3 day history of cough, yellow sputum production, SOB. He has also had URI Sx. He apparently has had on and off chronic respiratory failure and is on bipap at home for that and FABIAN. He presented to the ER and is admitted with acute exacerbation of COPD, acute on chronic respiratory failure with hypoxemia. Has a known paralyzed  Left hemidiaphragm contributing to his problem. Past Medical History:        Diagnosis Date    Chronic back pain     Coronary artery disease     s/p MI and CABG in 12/2017    H/O aortic valve replacement 12/16/2017    Bioprosthetic valve     H/O cardiac catheterization 12/06/2018    Severe three vessel disease involving the LAD, circumflex and right coronary arteries  2 of 3 bypass grafts patent Normal LVEDP. Consult to interventional cardiology for likely angioplasty and/or  stenting of the patients severe stenosis with likely stenting of the unrevascularized RCA stenosis with plans to intervene on the higher risk LIMA-LAD stenosis only at a later date if the patient fails gu    H/O three vessel coronary artery bypass 12/2017    History of atrial fibrillation 12/2017    following CABG 12/2017    History of myocardial infarction 12/2017    Hyperlipidemia     Hypertension     Osteoarthritis     Sleep apnea     Type 2 diabetes mellitus without complication [facility])        Past Surgical History:        Procedure Laterality Date    CARDIAC CATHETERIZATION Left 12/06/2018    Dr Alfred Ayers radial-    CORONARY ARTERY BYPASS GRAFT      INTRACAPSULAR CATARACT EXTRACTION      KNEE ARTHROPLASTY      SHOULDER ARTHROPLASTY         Medications Prior to Admission:    Prior to Admission medications (myocardial infarction)    FABIAN (obstructive sleep apnea)    Essential hypertension    Other chronic pain    Bilateral hearing loss    Aneurysm of left ventricle of heart    Chronic diastolic CHF (congestive heart failure), NYHA class 3 (HCC)    Unstable angina (HCC)    History of atrial fibrillation    Hyperlipidemia    Hypertension    Sleep apnea    H/O aortic valve replacement    Hx of CABG    Coronary artery disease    Abnormal cardiovascular stress test    Chest pain    Coronary artery disease (CAD) excluded    Stable angina pectoris (HCC)    COPD exacerbation ([facility])           Plan:       · This patient requires inpatient admission because of copd exacerbation, acute resp failure with hypoxemia   · Factors affecting the medical complexity of this patient include type 2 DM, CAD, dementia, chronic diastolic CHF, hypertension, FABIAN  · Estimated length of stay is 3 days  · IV antibiotics, BIpap  ·   High risk medications: none    Lu Rey MD  CORE MEASURES  · DVT prophylaxis: Lovenox  · Decubitus ulcer present on admission: No  · CODE STATUS: FULL CODE  · Nutrition Status: good   · Physical therapy: No   · Old Charts reviewed: Yes  · EKG Reviewed:  Yes  · Advance Directive Addressed: Yes    Lu Rey M.D.

## 2019-12-15 NOTE — PROGRESS NOTES
at patients bedside. Patient on nasal cannula 2.5L/min oxygen with SPO2 in the high 90's. Patient wore his home bipap through the night. Patient was on room air for a few moments and had his SPO2 drop down to 88% but then quickly came back up once oxygen was placed on. No distress noted. Patient does have a moist/productive cough occasionally.  Will continue to monitor

## 2019-12-15 NOTE — PROGRESS NOTES
Progress Note    SUBJECTIVE:  Patient seen for exacerbation of COPD. No complaints. Is still tachycardic. ROS:   Constitutional: negative  for fevers, and negative for chills. Respiratory: negative for shortness of breath, negative for cough, and negative for wheezing  Cardiovascular: negative for chest pain, and negative for palpitations  Gastrointestinal: negative for abdominal pain, negative for nausea,negative for vomiting, negative for diarrhea, and negative for constipation     All other systems were reviewed with the patient and are negative unless otherwise stated in HPI    OBJECTIVE:    EXAM:  Vitals:    12/15/19 0900   BP: 105/66   Pulse: 115   Resp: 16   Temp:    SpO2: 93%      Weight: 239 lb (108.4 kg)    Height: 6' (182.9 cm)     GEN:   A & O x3, no apparent distress  EYES: No gross abnormalities. NECK: normal, supple, no lymphadenopathy,   PULM: decreased breath sounds noted- , worse on left. Otherwise clear. COR: tachycardia (was present on adm,incorrectly recorded in H and P by me.)  ABD:  soft, non-tender, non-distended, normal bowel sounds, no masses or organomegaly  EXT:   no cyanosis, clubbing or edema present    NEURO: negative  SKIN:  no rashes or significant lesions                Xr Chest Standard (2 Vw)    Result Date: 12/14/2019  EXAMINATION: TWO XRAY VIEWS OF THE CHEST 12/14/2019 3:03 pm COMPARISON: December 4, 2018 HISTORY: ORDERING SYSTEM PROVIDED HISTORY: productive cough TECHNOLOGIST PROVIDED HISTORY: productive cough FINDINGS: Sternotomy wires noted. Mediastinal clips noted. Elevated left hemidiaphragm. Lungs are clear. Bony thorax intact. No acute disease     Ct Chest Pulmonary Embolism W Contrast    Result Date: 12/14/2019  EXAMINATION: CTA OF THE CHEST 12/14/2019 4:28 pm TECHNIQUE: CTA of the chest was performed after the administration of intravenous contrast.  Multiplanar reformatted images are provided for review. MIP images are provided for review.  Dose modulation, iterative reconstruction, and/or weight based adjustment of the mA/kV was utilized to reduce the radiation dose to as low as reasonably achievable. COMPARISON: None. HISTORY: ORDERING SYSTEM PROVIDED HISTORY: hypoxia, cough TECHNOLOGIST PROVIDED HISTORY: hypoxia, cough FINDINGS: Pulmonary Arteries: Streak artifacts and image noise decrease sensitivity for small emboli particularly in segmental and subsegmental branches. No convincing evidence for acute pulmonary embolism. Normal caliber pulmonary trunk however right and left pulmonary arteries are dilated. This can be seen in pulmonary hypertension. Mediastinum: Aortic valve repair. Coronary artery calcifications. Some mediastinal shift to the right. No significant lymphadenopathy. Lungs/pleura: There is marked left hemidiaphragm elevation with atelectatic lung at the lung bases and paramediastinal lingula. Minor subsegmental atelectasis posterior lung base. No change from prior. No significant new infiltrates. No pleural effusion or pneumothorax. Central airways unremarkable. Upper Abdomen: Hepatic cyst.  Left renal cyst.  2.9 cm right adrenal nodule with macroscopic fat favoring benign myelolipoma. Soft Tissues/Bones: Median sternotomy. Right shoulder prosthesis. No acute abnormality of the bones. The superficial soft tissues show no significant abnormalities. 1. No evidence for acute pulmonary embolism. Note that streak artifact and image noise decreases sensitivity for smaller emboli particularly in segmental and subsegmental branches. 2. Enlargement of right and left pulmonary arteries which can be seen in pulmonary hypertension. 3. Marked elevation left hemidiaphragm with some subsegmental atelectasis around the elevated diaphragm at the left lung base. Hospital Problems:  Active Problems:    COPD exacerbation ([address]  Resolved Problems:    * No resolved hospital problems.  *      All Problems:  Patient Active Problem List Diagnosis    Type 2 diabetes mellitus without complication, without long-term current use of insulin ([facility])    History of MI (myocardial infarction)    FABIAN (obstructive sleep apnea)    Essential hypertension    Other chronic pain    Bilateral hearing loss    Aneurysm of left ventricle of heart    Chronic diastolic CHF (congestive heart failure), NYHA class 3 ([facility])    Unstable angina ([facility])    History of atrial fibrillation    Hyperlipidemia    Hypertension    Sleep apnea    H/O aortic valve replacement    Hx of CABG    Coronary artery disease    Abnormal cardiovascular stress test    Chest pain    Coronary artery disease (CAD) excluded    Stable angina pectoris ([facility])    COPD exacerbation ([facility])       ASSESSMENT/PLAN:  ·  copd exacerbation---resolving  · Tachycardia---cardio consult,    HIGH RISK MEDS: none    Kee Martin M.D.

## 2019-12-15 NOTE — PLAN OF CARE
Problem: Falls - Risk of:  Goal: Will remain free from falls  Description  Will remain free from falls  12/15/2019 1110 by Nolan Giron RN  Outcome: Ongoing  Note:   Fall risk assessment done and patient is a high risk for falls. Alarms on as needed for patient safety. Patient being monitored on a regular basis. No falls noted at this time. 12/14/2019 2210 by Lois Justice RN  Note:   PT is alert and oriented, and is aware of his physical limitations. PT knows how to use the call system to call for assist. PT able to ambulate with just stand by assist.     Problem: Breathing Pattern - Ineffective:  Goal: Ability to achieve and maintain a regular respiratory rate will improve  Description  Ability to achieve and maintain a regular respiratory rate will improve  12/15/2019 1110 by Nolan Giron RN  Outcome: Ongoing  Note:   RR is WNL, patients breathing is unlabored and no KIM noted when ambulating to the bathroom. Will continue to monitor      Problem: Activity Intolerance:  Goal: Ability to tolerate increased activity will improve  Description  Ability to tolerate increased activity will improve  12/15/2019 1110 by Nolan Giron RN  Outcome: Ongoing  Note:   Patient tolerating ambulation so far without KIM, Will continue to monitor      Problem: Airway Clearance - Ineffective:  Goal: Ability to maintain a clear airway will improve  Description  Ability to maintain a clear airway will improve  12/15/2019 1110 by Nolan Giron RN  Outcome: Ongoing  Note:   Clear airway is maintained. Will continue to monitor      Problem: Gas Exchange - Impaired:  Goal: Levels of oxygenation will improve  Description  Levels of oxygenation will improve  12/15/2019 1110 by Nolan Giron RN  Outcome: Ongoing  Note:   Patient is on nasal cannula oxygen of 2L/min. SPO2 95% with this. Wears his home bipap machine at night. Will try to wean oxygen off.

## 2019-12-15 NOTE — PROGRESS NOTES
Speech Language Pathology  Facility/Department: [facility] AT THE [facility]   CLINICAL BEDSIDE SWALLOW EVALUATION    NAME: Madelyn Montgomery  : 1945  MRN: [id number]    ADMISSION DATE: 2019  ADMITTING DIAGNOSIS: has Type 2 diabetes mellitus without complication, without long-term current use of insulin ([address] History of MI (myocardial infarction); FABIAN (obstructive sleep apnea); Essential hypertension; Other chronic pain; Bilateral hearing loss; Aneurysm of left ventricle of heart; Chronic diastolic CHF (congestive heart failure), NYHA class 3 ([address] Unstable angina ([address] History of atrial fibrillation; Hyperlipidemia; Hypertension; Sleep apnea; H/O aortic valve replacement; Hx of CABG; Coronary artery disease; Abnormal cardiovascular stress test; Chest pain; Coronary artery disease (CAD) excluded; Stable angina pectoris ([address] and COPD exacerbation ([address] on their problem list.  ONSET DATE:  Pt with onset of productive cough beginning 19. Recent Chest Xray/CT of Chest: (19  Impression   No acute disease         Date of Eval: 12/15/2019  Evaluating Therapist: Nam Correa    Current Diet level:  Current Diet : Regular  Current Liquid Diet : Thin    Primary Complaint  Patient Complaint: Pt with no complaints regarding eating, drinking. Pt stating his throat has been sore and he has been coughing the last few days. Pain:  Pain Assessment  Pain Level: 0    Reason for Referral  Madelyn Montgomery was referred for a bedside swallow evaluation to assess the efficiency of his swallow function, identify signs and symptoms of aspiration and make recommendations regarding safe dietary consistencies, effective compensatory strategies, and safe eating environment.     Impression  Dysphagia Diagnosis: Swallow function appears grossly intact  Dysphagia Outcome Severity Scale: Level 6: Within functional limits/Modified independence     Treatment Plan  Requires SLP Intervention: No  Duration/Frequency of Treatment: NO SLP

## 2019-12-15 NOTE — PLAN OF CARE
Problem: Falls - Risk of:  Goal: Will remain free from falls  Description  Will remain free from falls  Note:   PT is alert and oriented, and is aware of his physical limitations. PT knows how to use the call system to call for assist. PT able to ambulate with just stand by assist.     Problem: Breathing Pattern - Ineffective:  Goal: Ability to achieve and maintain a regular respiratory rate will improve  Description  Ability to achieve and maintain a regular respiratory rate will improve  Note:   Base respiratory rate is 18 increasing with exertion. Problem: Activity Intolerance:  Goal: Ability to tolerate increased activity will improve  Description  Ability to tolerate increased activity will improve  Note:   Currently becomes SOB with exertion. Problem: Airway Clearance - Ineffective:  Goal: Ability to maintain a clear airway will improve  Description  Ability to maintain a clear airway will improve  Note:   Productive cough with yellow sputum. Problem: Gas Exchange - Impaired:  Goal: Levels of oxygenation will improve  Description  Levels of oxygenation will improve  Note:   Low o2 sat on room air in the 80's. Improving into the low 90's with o2 at 3lpnc.      Problem: Serum Glucose Level - Abnormal:  Goal: Ability to maintain appropriate glucose levels will improve  Description  Ability to maintain appropriate glucose levels will improve  Note:   FSBS this PM was 278

## 2019-12-16 VITALS
TEMPERATURE: 98.1 F | OXYGEN SATURATION: 94 % | WEIGHT: 235.9 LBS | SYSTOLIC BLOOD PRESSURE: 129 MMHG | HEART RATE: 100 BPM | HEIGHT: 72 IN | BODY MASS INDEX: 31.95 KG/M2 | RESPIRATION RATE: 20 BRPM | DIASTOLIC BLOOD PRESSURE: 79 MMHG

## 2019-12-16 LAB
ABSOLUTE EOS #: 0.32 K/UL (ref 0–0.44)
ABSOLUTE IMMATURE GRANULOCYTE: <0.03 K/UL (ref 0–0.3)
ABSOLUTE LYMPH #: 1.94 K/UL (ref 1.1–3.7)
ABSOLUTE MONO #: 1 K/UL (ref 0.1–1.2)
ALBUMIN SERPL-MCNC: 3.8 G/DL (ref 3.5–5.2)
ALBUMIN/GLOBULIN RATIO: 1.1 (ref 1–2.5)
ALP BLD-CCNC: 72 U/L (ref 40–129)
ALT SERPL-CCNC: 20 U/L (ref 5–41)
ANION GAP SERPL CALCULATED.3IONS-SCNC: 14 MMOL/L (ref 9–17)
AST SERPL-CCNC: 15 U/L
BASOPHILS # BLD: 0 % (ref 0–2)
BASOPHILS ABSOLUTE: 0.03 K/UL (ref 0–0.2)
BILIRUB SERPL-MCNC: 0.62 MG/DL (ref 0.3–1.2)
BUN BLDV-MCNC: 12 MG/DL (ref 8–23)
BUN/CREAT BLD: 15 (ref 9–20)
CALCIUM SERPL-MCNC: 9.5 MG/DL (ref 8.6–10.4)
CHLORIDE BLD-SCNC: 102 MMOL/L (ref 98–107)
CO2: 22 MMOL/L (ref 20–31)
CREAT SERPL-MCNC: 0.78 MG/DL (ref 0.7–1.2)
DIFFERENTIAL TYPE: ABNORMAL
EKG ATRIAL RATE: 100 BPM
EKG P AXIS: 0 DEGREES
EKG P-R INTERVAL: 200 MS
EKG Q-T INTERVAL: 352 MS
EKG QRS DURATION: 132 MS
EKG QTC CALCULATION (BAZETT): 454 MS
EKG R AXIS: -131 DEGREES
EKG T AXIS: 8 DEGREES
EKG VENTRICULAR RATE: 100 BPM
EOSINOPHILS RELATIVE PERCENT: 4 % (ref 1–4)
GFR AFRICAN AMERICAN: >60 ML/MIN
GFR NON-AFRICAN AMERICAN: >60 ML/MIN
GFR SERPL CREATININE-BSD FRML MDRD: ABNORMAL ML/MIN/{1.73_M2}
GFR SERPL CREATININE-BSD FRML MDRD: ABNORMAL ML/MIN/{1.73_M2}
GLUCOSE BLD-MCNC: 110 MG/DL (ref 74–100)
GLUCOSE BLD-MCNC: 119 MG/DL (ref 70–99)
GLUCOSE BLD-MCNC: 141 MG/DL (ref 74–100)
HCT VFR BLD CALC: 40.1 % (ref 40.7–50.3)
HEMOGLOBIN: 12.8 G/DL (ref 13–17)
IMMATURE GRANULOCYTES: 0 %
LYMPHOCYTES # BLD: 25 % (ref 24–43)
MCH RBC QN AUTO: 33.2 PG (ref 25.2–33.5)
MCHC RBC AUTO-ENTMCNC: 31.9 G/DL (ref 28.4–34.8)
MCV RBC AUTO: 104.2 FL (ref 82.6–102.9)
MONOCYTES # BLD: 13 % (ref 3–12)
NRBC AUTOMATED: 0 PER 100 WBC
PDW BLD-RTO: 13.4 % (ref 11.8–14.4)
PLATELET # BLD: 166 K/UL (ref 138–453)
PLATELET ESTIMATE: ABNORMAL
PMV BLD AUTO: 10 FL (ref 8.1–13.5)
POTASSIUM SERPL-SCNC: 4.4 MMOL/L (ref 3.7–5.3)
RBC # BLD: 3.85 M/UL (ref 4.21–5.77)
RBC # BLD: ABNORMAL 10*6/UL
SEG NEUTROPHILS: 58 % (ref 36–65)
SEGMENTED NEUTROPHILS ABSOLUTE COUNT: 4.33 K/UL (ref 1.5–8.1)
SODIUM BLD-SCNC: 138 MMOL/L (ref 135–144)
TOTAL PROTEIN: 7.2 G/DL (ref 6.4–8.3)
WBC # BLD: 7.6 K/UL (ref 3.5–11.3)
WBC # BLD: ABNORMAL 10*3/UL

## 2019-12-16 PROCEDURE — 2580000003 HC RX 258: Performed by: INTERNAL MEDICINE

## 2019-12-16 PROCEDURE — 6370000000 HC RX 637 (ALT 250 FOR IP): Performed by: INTERNAL MEDICINE

## 2019-12-16 PROCEDURE — 85025 COMPLETE CBC W/AUTO DIFF WBC: CPT

## 2019-12-16 PROCEDURE — 80053 COMPREHEN METABOLIC PANEL: CPT

## 2019-12-16 PROCEDURE — 6360000002 HC RX W HCPCS: Performed by: INTERNAL MEDICINE

## 2019-12-16 PROCEDURE — 82947 ASSAY GLUCOSE BLOOD QUANT: CPT

## 2019-12-16 PROCEDURE — 93010 ELECTROCARDIOGRAM REPORT: CPT | Performed by: INTERNAL MEDICINE

## 2019-12-16 PROCEDURE — 36415 COLL VENOUS BLD VENIPUNCTURE: CPT

## 2019-12-16 RX ORDER — METOPROLOL SUCCINATE 25 MG/1
25 TABLET, EXTENDED RELEASE ORAL 2 TIMES DAILY
Qty: 30 TABLET | Refills: 3 | Status: SHIPPED | OUTPATIENT
Start: 2019-12-16 | End: 2020-01-06 | Stop reason: DRUGHIGH

## 2019-12-16 RX ADMIN — METOPROLOL SUCCINATE 25 MG: 25 TABLET, EXTENDED RELEASE ORAL at 09:06

## 2019-12-16 RX ADMIN — ISOSORBIDE MONONITRATE 30 MG: 30 TABLET, EXTENDED RELEASE ORAL at 09:06

## 2019-12-16 RX ADMIN — FAMOTIDINE 40 MG: 20 TABLET ORAL at 09:05

## 2019-12-16 RX ADMIN — TAMSULOSIN HYDROCHLORIDE 0.4 MG: 0.4 CAPSULE ORAL at 09:06

## 2019-12-16 RX ADMIN — ENOXAPARIN SODIUM 40 MG: 40 INJECTION SUBCUTANEOUS at 09:05

## 2019-12-16 RX ADMIN — GABAPENTIN 400 MG: 400 CAPSULE ORAL at 09:06

## 2019-12-16 RX ADMIN — ATORVASTATIN CALCIUM 40 MG: 40 TABLET, FILM COATED ORAL at 09:05

## 2019-12-16 RX ADMIN — Medication 10 ML: at 09:06

## 2019-12-16 RX ADMIN — GABAPENTIN 400 MG: 400 CAPSULE ORAL at 14:22

## 2019-12-16 NOTE — PROGRESS NOTES
Met with Patient this a.m. He is a 66year old , white male, admitted with COPD exacerbation. Patient is alert and oriented, pleasant and cooperative with this assessment. Patient responding to questions asked of him appropriately but has memory issues that Patient relates to when he had his heart surgery. Patient wishes to return home with his wife upon discharge. Patient resides with his wife in [facility] but they will be moving to [place] in the very near future. Patient has a walker and a cane at home to use as needed. He has used home health care in the past.  Patient drives himself and can provide for his own transportation needs. Patient is a retired [employer] employee. He is an [employer] . PCP is Debora Mohs Might, CNP. Patient has insurance to assist with covering the cost of his medications. Denies having any difficulty with affording his prescriptions. Discharge plans discussed with Patient at this time. Patient states that he plans to return home with his wife and identifies no unmet needs or concerns related to his discharge. Patient is a 'Full Code.'  Patient does not have any healthcare directives currently on file. Patient's wife to provide transportation home when he is discharged. Medicare IM form completed at this visit.     Natalia. Janice [address]  12/16/2019

## 2019-12-16 NOTE — PROGRESS NOTES
Nutrition Assessment    Type and Reason for Visit: Initial    Nutrition Recommendations:   1. Modify diet to CC 4 carbs per meal.     Nutrition Assessment: Altered nutrition related lab r/t endocrine dysfunction aeb A1c 7.1 (5.7-1 year ago). Glucose has improved. SLP saw Pt, recommended regular with thin liquids. Pt denied any diet education needs. Discussed increase in A1c. States they have not been eating out as much. Good PO. FSBS 120-150 for the most part, > 150 before coming into the hospital.     Malnutrition Assessment:  · Malnutrition Status: No malnutrition  · Context: Acute illness or injury  · Findings of the 6 clinical characteristics of malnutrition (Minimum of 2 out of 6 clinical characteristics is required to make the diagnosis of moderate or severe Protein Calorie Malnutrition based on AND/ASPEN Guidelines):  1. Energy Intake-Greater than 75% of estimated energy requirement,      2. Weight Loss-No significant weight loss,    3. Fat Loss-No significant subcutaneous fat loss,    4. Muscle Loss-No significant muscle mass loss,    5. Fluid Accumulation-No significant fluid accumulation,    6.   Strength-Not measured    Nutrition Risk Level: Low, Moderate    Nutrition Diagnosis:   · Problem: Altered nutrition-related lab values  · Etiology: related to Endocrine dysfunction     Signs and symptoms:  as evidenced by Lab values(A1c 7.1)    Objective Information:  · Nutrition-Focused Physical Findings: Appears well nourished  · Wound Type: None  · Current Nutrition Therapies:  · Oral Diet Orders: Carb Control 4 Carbs/Meal   · Oral Diet intake: %  · Oral Nutrition Supplement (ONS) Orders: None  · Anthropometric Measures:  · Ht: 6' (182.9 cm)   · Current Body Wt: 235 lb 14.4 oz (107 kg)  · Admission Body Wt: 235 lb (106.6 kg)  · Usual Body Wt: 238 lb (108 kg)  · % Weight Change:  ,  stable  · Ideal Body Wt: 178 lb (80.7 kg), % Ideal Body 133%  · BMI Classification: BMI 30.0 - 34.9 Obese Class

## 2019-12-16 NOTE — PROGRESS NOTES
Pt discharged to private car via wheel chair, discharge instructions had been reviewed with pt and wife. Verbalize understanding of instructions.

## 2019-12-16 NOTE — PROGRESS NOTES
GLUCOSE 132 12/15/2019    PROT 7.0 12/15/2019    LABALBU 4.0 12/15/2019    CALCIUM 9.3 12/15/2019    BILITOT 0.59 12/15/2019    ALKPHOS 75 12/15/2019    AST 14 12/15/2019    ALT 18 12/15/2019     procalcitonin  normal        Xr Chest Standard (2 Vw)    Result Date: 12/14/2019  EXAMINATION: TWO XRAY VIEWS OF THE CHEST 12/14/2019 3:03 pm COMPARISON: December 4, 2018 HISTORY: ORDERING SYSTEM PROVIDED HISTORY: productive cough TECHNOLOGIST PROVIDED HISTORY: productive cough FINDINGS: Sternotomy wires noted. Mediastinal clips noted. Elevated left hemidiaphragm. Lungs are clear. Bony thorax intact. No acute disease     Ct Chest Pulmonary Embolism W Contrast    Result Date: 12/14/2019  EXAMINATION: CTA OF THE CHEST 12/14/2019 4:28 pm TECHNIQUE: CTA of the chest was performed after the administration of intravenous contrast.  Multiplanar reformatted images are provided for review. MIP images are provided for review. Dose modulation, iterative reconstruction, and/or weight based adjustment of the mA/kV was utilized to reduce the radiation dose to as low as reasonably achievable. COMPARISON: None. HISTORY: ORDERING SYSTEM PROVIDED HISTORY: hypoxia, cough TECHNOLOGIST PROVIDED HISTORY: hypoxia, cough FINDINGS: Pulmonary Arteries: Streak artifacts and image noise decrease sensitivity for small emboli particularly in segmental and subsegmental branches. No convincing evidence for acute pulmonary embolism. Normal caliber pulmonary trunk however right and left pulmonary arteries are dilated. This can be seen in pulmonary hypertension. Mediastinum: Aortic valve repair. Coronary artery calcifications. Some mediastinal shift to the right. No significant lymphadenopathy. Lungs/pleura: There is marked left hemidiaphragm elevation with atelectatic lung at the lung bases and paramediastinal lingula. Minor subsegmental atelectasis posterior lung base. No change from prior. No significant new infiltrates.   No pleural

## 2019-12-16 NOTE — DISCHARGE SUMMARY
Physician Discharge Summary       Patient ID:  Luis Carlos Quick  [id number]  1945    Admission date: 12/14/2019    Discharge date: 12/16/2019     Admitting Physician: Luz Maria Johnson MD     Primary Care Physician: HALEY Anders - MG     Primary Discharge Diagnoses:   Patient Active Problem List    Diagnosis Date Noted    Stable angina pectoris ([facility] [address] 12/11/2018     Priority: High    Abnormal cardiovascular stress test      Priority: High    Chest pain      Priority: High    Aneurysm of left ventricle of heart 09/17/2018     Priority: High    Chronic diastolic CHF (congestive heart failure), NYHA class 3 ([facility] [address] 09/17/2018     Priority: High    COPD exacerbation ([facility] [address] 12/14/2019    Coronary artery disease (CAD) excluded 12/06/2018    Unstable angina ([facility] [address] 12/04/2018    Hyperlipidemia     Hypertension     Sleep apnea     Coronary artery disease     Type 2 diabetes mellitus without complication, without long-term current use of insulin ([facility] [address] 09/05/2018    History of MI (myocardial infarction) 09/05/2018    FABIAN (obstructive sleep apnea) 09/05/2018    Essential hypertension 09/05/2018    Other chronic pain 09/05/2018    Bilateral hearing loss 09/05/2018    H/O aortic valve replacement 12/16/2017    History of atrial fibrillation 12/01/2017    Hx of CABG 12/01/2017       Additional Diagnoses:       Diagnosis Date    Chronic back pain     Coronary artery disease     s/p MI and CABG in 12/2017    H/O aortic valve replacement 12/16/2017    Bioprosthetic valve     H/O cardiac catheterization 12/06/2018    Severe three vessel disease involving the LAD, circumflex and right coronary arteries  2 of 3 bypass grafts patent Normal LVEDP. Consult to interventional cardiology for likely angioplasty and/or  stenting of the patients severe stenosis with likely stenting of the unrevascularized RCA stenosis with plans to intervene on the higher risk LIMA-LAD stenosis only at a later date if the patient fails gu performed after the administration of intravenous contrast.  Multiplanar reformatted images are provided for review. MIP images are provided for review. Dose modulation, iterative reconstruction, and/or weight based adjustment of the mA/kV was utilized to reduce the radiation dose to as low as reasonably achievable. COMPARISON: None. HISTORY: ORDERING SYSTEM PROVIDED HISTORY: hypoxia, cough TECHNOLOGIST PROVIDED HISTORY: hypoxia, cough FINDINGS: Pulmonary Arteries: Streak artifacts and image noise decrease sensitivity for small emboli particularly in segmental and subsegmental branches. No convincing evidence for acute pulmonary embolism. Normal caliber pulmonary trunk however right and left pulmonary arteries are dilated. This can be seen in pulmonary hypertension. Mediastinum: Aortic valve repair. Coronary artery calcifications. Some mediastinal shift to the right. No significant lymphadenopathy. Lungs/pleura: There is marked left hemidiaphragm elevation with atelectatic lung at the lung bases and paramediastinal lingula. Minor subsegmental atelectasis posterior lung base. No change from prior. No significant new infiltrates. No pleural effusion or pneumothorax. Central airways unremarkable. Upper Abdomen: Hepatic cyst.  Left renal cyst.  2.9 cm right adrenal nodule with macroscopic fat favoring benign myelolipoma. Soft Tissues/Bones: Median sternotomy. Right shoulder prosthesis. No acute abnormality of the bones. The superficial soft tissues show no significant abnormalities. 1. No evidence for acute pulmonary embolism. Note that streak artifact and image noise decreases sensitivity for smaller emboli particularly in segmental and subsegmental branches. 2. Enlargement of right and left pulmonary arteries which can be seen in pulmonary hypertension. 3. Marked elevation left hemidiaphragm with some subsegmental atelectasis around the elevated diaphragm at the left lung base.      Recent Results (from the past 96 hour(s))   CBC    Collection Time: 12/14/19  3:12 PM   Result Value Ref Range    WBC 7.8 3.5 - 11.3 k/uL    RBC 3.96 (L) 4.21 - 5.77 m/uL    Hemoglobin 13.3 13.0 - 17.0 g/dL    Hematocrit 40.7 40.7 - 50.3 %    .8 82.6 - 102.9 fL    MCH 33.6 (H) 25.2 - 33.5 pg    MCHC 32.7 28.4 - 34.8 g/dL    RDW 13.3 11.8 - 14.4 %    Platelets 625 687 - 204 k/uL    MPV 10.1 8.1 - 13.5 fL    NRBC Automated 0.0 0.0 per 100 WBC   Basic Metabolic Panel    Collection Time: 12/14/19  3:12 PM   Result Value Ref Range    Glucose 143 (H) 70 - 99 mg/dL    BUN 13 8 - 23 mg/dL    CREATININE 0.75 0.70 - 1.20 mg/dL    Bun/Cre Ratio 17 9 - 20    Calcium 9.8 8.6 - 10.4 mg/dL    Sodium 135 135 - 144 mmol/L    Potassium 4.4 3.7 - 5.3 mmol/L    Chloride 98 98 - 107 mmol/L    CO2 24 20 - 31 mmol/L    Anion Gap 13 9 - 17 mmol/L    GFR Non-African American >60 >60 mL/min    GFR African American >60 >60 mL/min    GFR Comment          GFR Staging         Brain Natriuretic Peptide    Collection Time: 12/14/19  3:12 PM   Result Value Ref Range    Pro- <300 pg/mL    BNP Interpretation Pro-BNP Reference Range:    Lactic Acid, Plasma    Collection Time: 12/14/19  3:12 PM   Result Value Ref Range    Lactic Acid 1.8 0.5 - 2.2 mmol/L    Lactic Acid, Whole Blood NOT REPORTED 0.7 - 2.1 mmol/L   Culture Blood #1    Collection Time: 12/14/19  3:12 PM   Result Value Ref Range    Specimen Description . BLOOD     Special Requests R [id number]     Culture NO GROWTH 2 DAYS    Hemoglobin A1c    Collection Time: 12/14/19  3:12 PM   Result Value Ref Range    Hemoglobin A1C 7.1 (H) 4.8 - 5.9 %    Estimated Avg Glucose 157 mg/dL   Culture Blood #2    Collection Time: 12/14/19  3:28 PM   Result Value Ref Range    Specimen Description . BLOOD     Special Requests l [id number]     Culture NO GROWTH 2 DAYS    EKG 12 Lead    Collection Time: 12/14/19  4:23 PM   Result Value Ref Range    Ventricular Rate 116 BPM    Atrial Rate 116 BPM    P-R Interval 192 ms QRS Duration 128 ms    Q-T Interval 346 ms    QTc Calculation (Bazett) 480 ms    R Axis -122 degrees    T Axis 16 degrees   Glucose, Whole Blood    Collection Time: 12/14/19  9:04 PM   Result Value Ref Range    POC Glucose 278 (H) 74 - 100 mg/dL   Comprehensive Metabolic Panel w/ Reflex to MG    Collection Time: 12/15/19  5:20 AM   Result Value Ref Range    Glucose 132 (H) 70 - 99 mg/dL    BUN 12 8 - 23 mg/dL    CREATININE 0.83 0.70 - 1.20 mg/dL    Bun/Cre Ratio 14 9 - 20    Calcium 9.3 8.6 - 10.4 mg/dL    Sodium 139 135 - 144 mmol/L    Potassium 4.0 3.7 - 5.3 mmol/L    Chloride 102 98 - 107 mmol/L    CO2 23 20 - 31 mmol/L    Anion Gap 14 9 - 17 mmol/L    Alkaline Phosphatase 75 40 - 129 U/L    ALT 18 5 - 41 U/L    AST 14 <40 U/L    Total Bilirubin 0.59 0.3 - 1.2 mg/dL    Total Protein 7.0 6.4 - 8.3 g/dL    Alb 4.0 3.5 - 5.2 g/dL    Albumin/Globulin Ratio 1.3 1.0 - 2.5    GFR Non-African American >60 >60 mL/min    GFR African American >60 >60 mL/min    GFR Comment          GFR Staging         CBC auto differential    Collection Time: 12/15/19  5:20 AM   Result Value Ref Range    WBC 8.0 3.5 - 11.3 k/uL    RBC 3.85 (L) 4.21 - 5.77 m/uL    Hemoglobin 12.9 (L) 13.0 - 17.0 g/dL    Hematocrit 40.6 (L) 40.7 - 50.3 %    .5 (H) 82.6 - 102.9 fL    MCH 33.5 25.2 - 33.5 pg    MCHC 31.8 28.4 - 34.8 g/dL    RDW 13.5 11.8 - 14.4 %    Platelets 767 412 - 752 k/uL    MPV 10.2 8.1 - 13.5 fL    NRBC Automated 0.0 0.0 per 100 WBC    Differential Type NOT REPORTED     WBC Morphology NOT REPORTED     RBC Morphology NOT REPORTED     Platelet Estimate NOT REPORTED     Seg Neutrophils 56 36 - 65 %    Lymphocytes 31 24 - 43 %    Monocytes 11 3 - 12 %    Eosinophils % 2 1 - 4 %    Immature Granulocytes 0 0 %    Basophils 0 0 - 2 %    Segs Absolute 4.48 1.50 - 8.10 k/uL    Absolute Lymph # 2.48 1.10 - 3.70 k/uL    Absolute Mono # 0.88 0.10 - 1.20 k/uL    Absolute Eos # 0.16 0.00 - 0.44 k/uL    Absolute Immature Granulocyte 0.00 0.00 - 40.7 - 50.3 %    .2 (H) 82.6 - 102.9 fL    MCH 33.2 25.2 - 33.5 pg    MCHC 31.9 28.4 - 34.8 g/dL    RDW 13.4 11.8 - 14.4 %    Platelets 897 949 - 512 k/uL    MPV 10.0 8.1 - 13.5 fL    NRBC Automated 0.0 0.0 per 100 WBC    Differential Type NOT REPORTED     Seg Neutrophils 58 36 - 65 %    Lymphocytes 25 24 - 43 %    Monocytes 13 (H) 3 - 12 %    Eosinophils % 4 1 - 4 %    Basophils 0 0 - 2 %    Immature Granulocytes 0 0 %    Segs Absolute 4.33 1.50 - 8.10 k/uL    Absolute Lymph # 1.94 1.10 - 3.70 k/uL    Absolute Mono # 1.00 0.10 - 1.20 k/uL    Absolute Eos # 0.32 0.00 - 0.44 k/uL    Basophils Absolute 0.03 0.00 - 0.20 k/uL    Absolute Immature Granulocyte <0.03 0.00 - 0.30 k/uL    WBC Morphology NOT REPORTED     RBC Morphology NOT REPORTED     Platelet Estimate NOT REPORTED    Glucose, Whole Blood    Collection Time: 12/16/19  7:17 AM   Result Value Ref Range    POC Glucose 110 (H) 74 - 100 mg/dL   Glucose, Whole Blood    Collection Time: 12/16/19 11:51 AM   Result Value Ref Range    POC Glucose 141 (H) 74 - 100 mg/dL     · none    Discharge Condition:   · good    Disposition:   · home    Discharge Medications:     Flaquita Ebbs   Home Medication Instructions UQA:[id number]    Printed on:12/16/19 3241   Medication Information                      aspirin 81 MG tablet  Take 81 mg by mouth daily             atorvastatin (LIPITOR) 40 MG tablet  Take 1 tablet by mouth daily             blood glucose monitor strips  1 strip by Other route daily Tung Metrix test strip. E11.9             famotidine (PEPCID) 40 MG tablet  Take 40 mg by mouth daily             gabapentin (NEURONTIN) 400 MG capsule  Take 400 mg by mouth 3 times daily.               isosorbide mononitrate (IMDUR) 30 MG extended release tablet  Take 1 tablet by mouth daily             Lancets MISC  1 each by Other route daily Tung Metrix lancets E11.9             melatonin 5 MG TABS tablet  Take 5 mg by mouth daily             metFORMIN (GLUCOPHAGE) 1000 MG tablet  TAKE 1 TABLET TWICE A DAY WITH MEALS             metoprolol succinate (TOPROL XL) 25 MG extended release tablet  Take 1 tablet by mouth 2 times daily             Multiple Vitamins-Minerals (CENTRUM SILVER PO)  Take 1 tablet by mouth daily              nitroGLYCERIN (NITROSTAT) 0.4 MG SL tablet  Place 1 tablet under the tongue every 5 minutes as needed for Chest pain up to max of 3 total doses. If no relief after 1 dose, call 911.             tamsulosin (FLOMAX) 0.4 MG capsule  TAKE 1 CAPSULE DAILY             traMADol (ULTRAM) 50 MG tablet  Take 50 mg by mouth every 6 hours as needed for Pain. .                 · Resume all home medications unless otherwise directed  · Add increase Toprol to 50mg Qd  · Stop taking     Patient Instructions:   · Activity: activity as tolerated  · Diet: cardiac diet and diabetic diet  · Wound Care: none needed  · Other:   · Follow up with Dr Colby Cavanaugh in 1 day as directed and Radhaonia Might in one week          Total time spent on discharge services: 35 minutes  Including the following activities:  · Evaluation and Management of patient  · Discussion with patient and/or surrogate about current care plan  · Coordination with Case Management and/or   · Coordination of care with Consultants (if applicable)   · Coordination of care with Receiving Facility Physician (if applicable)  · Completion of DME forms (if applicable)  · Preparation of Discharge Summary  · Preparation of Medication Reconciliation  · Preparation of Discharge Prescriptions        Signed:  Lu Rey M.D.  12/16/2019  12:07 PM

## 2019-12-16 NOTE — PROGRESS NOTES
Pt's wife called in for updated. Wanted to know about pt being discharged today, that they were under the impression that he would get to leave today. Explained to her that patients with COPD exacerbation are usually admitted for 3 days and that the cardiologist has not seen the pt yet today. Wife is concerned about pt being discharged in time for them to move tomorrow. Explained to wife that Jennifer Solorio will monitor pt today and see if the doctor may consider discharging him this afternoon. Wife verbalizes understanding and states that she will be in later to see pt.

## 2019-12-16 NOTE — CONSULTS
with current plan of care  Code status clarified: Full Code  Palliative Care Goals:  improve or maintain function/quality of life, remain at home and preserve independence/autonomy/control  Visit focus:  Routine meeting  Discuss goals of care  Listen to patient/family concerns  Interdiscplinary collaboration  Build trust  Elicit patient's goals and values, and use these to establish or modify goals of care     Amanda Rowland RN, [facility] Amaury Freeman Nurse Coordinator  12/16/2019 11:12 AM

## 2019-12-17 ENCOUNTER — CARE COORDINATION (OUTPATIENT)
Dept: CASE MANAGEMENT | Age: 74
End: 2019-12-17

## 2019-12-17 ENCOUNTER — TELEPHONE (OUTPATIENT)
Dept: PHARMACY | Facility: CLINIC | Age: 74
End: 2019-12-17

## 2019-12-17 DIAGNOSIS — J44.1 COPD EXACERBATION (HCC): Primary | ICD-10-CM

## 2019-12-17 PROCEDURE — 1111F DSCHRG MED/CURRENT MED MERGE: CPT | Performed by: PHARMACIST

## 2019-12-17 RX ORDER — CALCIUM CITRATE/VITAMIN D3 200MG-6.25
TABLET ORAL
Qty: 100 STRIP | Refills: 3 | Status: SHIPPED | OUTPATIENT
Start: 2019-12-17 | End: 2020-03-24 | Stop reason: SDUPTHER

## 2019-12-18 ENCOUNTER — CARE COORDINATION (OUTPATIENT)
Dept: CASE MANAGEMENT | Age: 74
End: 2019-12-18

## 2019-12-19 LAB
CULTURE: NORMAL
CULTURE: NORMAL
Lab: NORMAL
Lab: NORMAL
SPECIMEN DESCRIPTION: NORMAL
SPECIMEN DESCRIPTION: NORMAL

## 2019-12-23 ENCOUNTER — OFFICE VISIT (OUTPATIENT)
Dept: PRIMARY CARE CLINIC | Age: 74
End: 2019-12-23
Payer: MEDICARE

## 2019-12-23 VITALS
SYSTOLIC BLOOD PRESSURE: 104 MMHG | BODY MASS INDEX: 32.14 KG/M2 | RESPIRATION RATE: 16 BRPM | TEMPERATURE: 97.8 F | HEART RATE: 77 BPM | HEIGHT: 72 IN | DIASTOLIC BLOOD PRESSURE: 43 MMHG | WEIGHT: 237.3 LBS

## 2019-12-23 DIAGNOSIS — J44.1 COPD EXACERBATION (HCC): Primary | ICD-10-CM

## 2019-12-23 DIAGNOSIS — R00.0 TACHYCARDIA: ICD-10-CM

## 2019-12-23 PROCEDURE — 1111F DSCHRG MED/CURRENT MED MERGE: CPT | Performed by: NURSE PRACTITIONER

## 2019-12-23 PROCEDURE — 99495 TRANSJ CARE MGMT MOD F2F 14D: CPT | Performed by: NURSE PRACTITIONER

## 2019-12-23 RX ORDER — TAMSULOSIN HYDROCHLORIDE 0.4 MG/1
0.4 CAPSULE ORAL DAILY
Qty: 30 CAPSULE | Refills: 0 | Status: SHIPPED | OUTPATIENT
Start: 2019-12-23 | End: 2020-07-07 | Stop reason: SDUPTHER

## 2019-12-24 ENCOUNTER — CARE COORDINATION (OUTPATIENT)
Dept: CASE MANAGEMENT | Age: 74
End: 2019-12-24

## 2019-12-27 ENCOUNTER — CARE COORDINATION (OUTPATIENT)
Dept: CASE MANAGEMENT | Age: 74
End: 2019-12-27

## 2020-01-06 ENCOUNTER — OFFICE VISIT (OUTPATIENT)
Dept: CARDIOLOGY | Age: 75
End: 2020-01-06
Payer: MEDICARE

## 2020-01-06 VITALS
BODY MASS INDEX: 31.51 KG/M2 | HEIGHT: 72 IN | SYSTOLIC BLOOD PRESSURE: 127 MMHG | RESPIRATION RATE: 18 BRPM | HEART RATE: 71 BPM | DIASTOLIC BLOOD PRESSURE: 66 MMHG | WEIGHT: 232.6 LBS | OXYGEN SATURATION: 96 %

## 2020-01-06 PROCEDURE — 99214 OFFICE O/P EST MOD 30 MIN: CPT | Performed by: FAMILY MEDICINE

## 2020-01-06 RX ORDER — METOPROLOL SUCCINATE 25 MG/1
25 TABLET, EXTENDED RELEASE ORAL 2 TIMES DAILY
Qty: 180 TABLET | Refills: 3 | Status: CANCELLED | OUTPATIENT
Start: 2020-01-06

## 2020-01-06 RX ORDER — ISOSORBIDE MONONITRATE 30 MG/1
30 TABLET, EXTENDED RELEASE ORAL DAILY
Qty: 90 TABLET | Refills: 3 | Status: SHIPPED | OUTPATIENT
Start: 2020-01-06 | End: 2021-01-08 | Stop reason: SDUPTHER

## 2020-01-06 RX ORDER — FAMOTIDINE 40 MG/1
40 TABLET, FILM COATED ORAL DAILY
Qty: 90 TABLET | Refills: 3 | Status: SHIPPED | OUTPATIENT
Start: 2020-01-06 | End: 2020-12-28 | Stop reason: SDUPTHER

## 2020-01-06 RX ORDER — ATORVASTATIN CALCIUM 40 MG/1
40 TABLET, FILM COATED ORAL DAILY
Qty: 90 TABLET | Refills: 3 | Status: SHIPPED | OUTPATIENT
Start: 2020-01-06 | End: 2021-01-08 | Stop reason: SDUPTHER

## 2020-01-06 RX ORDER — METOPROLOL SUCCINATE 50 MG/1
50 TABLET, EXTENDED RELEASE ORAL DAILY
Qty: 90 TABLET | Refills: 3 | Status: SHIPPED | OUTPATIENT
Start: 2020-01-06 | End: 2020-12-23

## 2020-01-06 NOTE — PROGRESS NOTES
not on file. He was adopted. PHYSICAL EXAM:   /66 (Site: Right Upper Arm, Position: Sitting, Cuff Size: Medium Adult)   Pulse 71   Resp 18   Ht 6' 0.01\" (1.829 m)   Wt 232 lb 9.6 oz (105.5 kg)   SpO2 96%   BMI 31.54 kg/m²  Body mass index is 31.54 kg/m². Constitutional: He is oriented to person, place, and time. He appears well-developed and well-nourished. In no acute distress. HEENT: Normocephalic and atraumatic. No JVD present. Carotid bruit is not present. No mass and no thyromegaly present. No lymphadenopathy present. Cardiovascular: Normal rate, regular rhythm, normal heart sounds. Exam reveals no gallop and no friction rubs. 3/6 systolic murmur, 2nd intercostal space on the RIGHT just lateral to the sternum. Pulmonary/Chest: Effort normal and breath sounds normal. No respiratory distress. He has no wheezes, rhonchi or rales. Abdominal: Soft, non-tender. Bowel sounds and aorta are normal. He exhibits no organomegaly, mass or bruit. Extremities: Trace. No cyanosis or clubbing. 2+ radial and carotid pulses. Distal extremity pulses: 2+ bilaterally. Neurological: He is alert and oriented to person, place, and time. No evidence of gross cranial nerve deficit. Coordination appeared normal.   Skin: Skin is warm and dry. There is no rash or diaphoresis. Psychiatric: He has a normal mood and affect. His speech is normal and behavior is normal.      MOST RECENT LABS ON RECORD:   Lab Results   Component Value Date    WBC 7.6 12/16/2019    HGB 12.8 (L) 12/16/2019    HCT 40.1 (L) 12/16/2019     12/16/2019    CHOL 97 12/05/2018    TRIG 45 12/05/2018    HDL 46 12/05/2018    ALT 20 12/16/2019    AST 15 12/16/2019     12/16/2019    K 4.4 12/16/2019     12/16/2019    CREATININE 0.78 12/16/2019    BUN 12 12/16/2019    CO2 22 12/16/2019    TSH 1.65 09/06/2018    INR 1.1 12/04/2018    LABA1C 7.1 (H) 12/14/2019    LABMICR CANNOT BE CALCULATED 08/06/2019     ASSESSMENT:  1.  Coronary

## 2020-01-16 ENCOUNTER — HOSPITAL ENCOUNTER (OUTPATIENT)
Dept: NON INVASIVE DIAGNOSTICS | Age: 75
Discharge: HOME | End: 2020-01-16
Payer: MEDICARE

## 2020-01-16 LAB
LV EF: 55 %
LVEF MODALITY: NORMAL

## 2020-01-16 PROCEDURE — 93306 TTE W/DOPPLER COMPLETE: CPT

## 2020-02-10 ENCOUNTER — TELEPHONE (OUTPATIENT)
Dept: PRIMARY CARE CLINIC | Age: 75
End: 2020-02-10

## 2020-02-20 ENCOUNTER — OFFICE VISIT (OUTPATIENT)
Dept: PRIMARY CARE CLINIC | Age: 75
End: 2020-02-20
Payer: MEDICARE

## 2020-02-20 VITALS
DIASTOLIC BLOOD PRESSURE: 49 MMHG | BODY MASS INDEX: 31.64 KG/M2 | WEIGHT: 233.6 LBS | HEIGHT: 72 IN | SYSTOLIC BLOOD PRESSURE: 114 MMHG | TEMPERATURE: 98.2 F | RESPIRATION RATE: 18 BRPM | HEART RATE: 69 BPM

## 2020-02-20 LAB
BILIRUBIN, POC: NEGATIVE
BLOOD URINE, POC: NEGATIVE
CLARITY, POC: CLEAR
COLOR, POC: YELLOW
GLUCOSE URINE, POC: NEGATIVE
KETONES, POC: NEGATIVE
LEUKOCYTE EST, POC: NEGATIVE
NITRITE, POC: NEGATIVE
PH, POC: 6
PROTEIN, POC: NEGATIVE
SPECIFIC GRAVITY, POC: 1.02
UROBILINOGEN, POC: 0.2

## 2020-02-20 PROCEDURE — 99495 TRANSJ CARE MGMT MOD F2F 14D: CPT | Performed by: NURSE PRACTITIONER

## 2020-02-20 PROCEDURE — G0009 ADMIN PNEUMOCOCCAL VACCINE: HCPCS | Performed by: NURSE PRACTITIONER

## 2020-02-20 PROCEDURE — 1111F DSCHRG MED/CURRENT MED MERGE: CPT | Performed by: NURSE PRACTITIONER

## 2020-02-20 PROCEDURE — 81002 URINALYSIS NONAUTO W/O SCOPE: CPT | Performed by: NURSE PRACTITIONER

## 2020-02-20 PROCEDURE — 90732 PPSV23 VACC 2 YRS+ SUBQ/IM: CPT | Performed by: NURSE PRACTITIONER

## 2020-02-20 ASSESSMENT — PATIENT HEALTH QUESTIONNAIRE - PHQ9
SUM OF ALL RESPONSES TO PHQ9 QUESTIONS 1 & 2: 0
SUM OF ALL RESPONSES TO PHQ QUESTIONS 1-9: 0
2. FEELING DOWN, DEPRESSED OR HOPELESS: 0
SUM OF ALL RESPONSES TO PHQ QUESTIONS 1-9: 0
1. LITTLE INTEREST OR PLEASURE IN DOING THINGS: 0

## 2020-02-20 NOTE — PROGRESS NOTES
After obtaining consent, and per orders of Dr. Fern Chase, injection of Pneumovax 23 given in Left deltoid by Leonel Escobedo. Patient instructed to remain in clinic for 20 minutes afterwards, and to report any adverse reaction to me immediately.

## 2020-02-20 NOTE — PROGRESS NOTES
Post-Discharge Transitional Care Management Services or Hospital Follow Up      Dandy Lorenz   YOB: 1945    Date of Office Visit:  2/20/2020  Date of Hospital Admission: 12/14/19  Date of Hospital Discharge: 12/16/19  Risk of hospital readmission (high >=14%. Medium >=10%) :Readmission Risk Score: 13      Care management risk score Rising risk (score 2-5) and Complex Care (Scores >=6): 4     Non face to face  following discharge, date last encounter closed (first attempt may have been earlier): 2/10/2020 11:38 AM    Call initiated 2 business days of discharge: Yes    Patient Active Problem List   Diagnosis    Type 2 diabetes mellitus without complication, without long-term current use of insulin ([facility] [address]    History of MI (myocardial infarction)    FABIAN (obstructive sleep apnea)    Essential hypertension    Other chronic pain    Bilateral hearing loss    Aneurysm of left ventricle of heart    Chronic diastolic CHF (congestive heart failure), NYHA class 3 (HCC)    Unstable angina (HCC)    History of atrial fibrillation    Hyperlipidemia    Hypertension    Sleep apnea    H/O aortic valve replacement    Hx of CABG    Coronary artery disease    Abnormal cardiovascular stress test    Chest pain    Coronary artery disease (CAD) excluded    Stable angina pectoris ([address]    COPD exacerbation ([facility] [address]       No Known Allergies    Medications listed as ordered at the time of discharge from hospital   Akiko ELLIOTT   [place] Medication Instructions WIN:    Printed on:02/20/20 1812   Medication Information                      aspirin 81 MG tablet  Take 81 mg by mouth daily             atorvastatin (LIPITOR) 40 MG tablet  Take 1 tablet by mouth daily             blood glucose monitor strips  1 strip by Other route daily Tung Metrix test strip.   E11.9             famotidine (PEPCID) 40 MG tablet  Take 1 tablet by mouth daily             gabapentin (NEURONTIN) 400 MG capsule  Take 400 mg by mouth 3 times call 911. 25 tablet 3    metFORMIN (GLUCOPHAGE) 1000 MG tablet TAKE 1 TABLET TWICE A DAY WITH MEALS 180 tablet 4    tamsulosin (FLOMAX) 0.4 MG capsule TAKE 1 CAPSULE DAILY 90 capsule 3    blood glucose monitor strips 1 strip by Other route daily Tung Metrix test strip. E11.9 100 strip 3    Lancets MISC 1 each by Other route daily Tung Metrix lancets E11.9 100 each 3    gabapentin (NEURONTIN) 400 MG capsule Take 400 mg by mouth 3 times daily.  aspirin 81 MG tablet Take 81 mg by mouth daily      melatonin 5 MG TABS tablet Take 5 mg by mouth daily      Multiple Vitamins-Minerals (CENTRUM SILVER PO) Take 1 tablet by mouth daily       traMADol (ULTRAM) 50 MG tablet Take 50 mg by mouth every 6 hours as needed for Pain. .          Medications patient taking as of now reconciled against medications ordered at time of hospital discharge: Yes    Chief Complaint   Patient presents with    Follow-Up from Hospital     Transitional care visit, UTI. History of Present illness - Follow up of Hospital diagnosis(es):     Patient was taken by ambulance from his home to [facility] due to weakness and confusion. Patient was found to have UTI and was admitted for fluids and IV antibiotics. Patient recovered nicely throughout his stay but was weak. Patient had trouble with ambulation and ADLs. Inpatient course: Discharge summary reviewed- see chart. Interval history/Current status:    Patient is feeling much better. Still complains of some mild weakness but has no confusion or change in mentation. Patient is completing most of his ADLs on his own but needs some help from his wife. Patient also has somewhat of an unsteady gait. UA in the office is negative. A comprehensive review of systems was negative except for what was noted in the HPI.     Vitals:    02/20/20 1028   BP: (!) 114/49   Site: Left Upper Arm   Position: Sitting   Cuff Size: Large Adult   Pulse: 69   Resp: 18   Temp: 98.2 °F

## 2020-02-20 NOTE — PATIENT INSTRUCTIONS
This is called flank pain.     · There is new blood or pus in your urine.     · You are not able to take or keep down your antibiotics.    Watch closely for changes in your health, and be sure to contact your doctor if:    · You are not getting better after taking an antibiotic for 2 days.     · Your symptoms go away but then come back. Where can you learn more? Go to [email or web address] and sign in to your [employer] account. Enter O255 in the [facility] box to learn more about \"Urinary Tract Infections in Men: Care Instructions. \"     If you do not have an account, please click on the \"Sign Up Now\" link. Current as of: August 21, 2019  Content Version: 12.3  © 8787-4640 [employer]. Care instructions adapted under license by [facility] ([facility]). If you have questions about a medical condition or this instruction, always ask your healthcare professional. [place] [address] any warranty or liability for your use of this information. Patient Education        Pneumococcal Polysaccharide Vaccine: What You Need to Know  Why get vaccinated? Vaccination can protect older adults (and some children and younger adults) from pneumococcal disease. Pneumococcal disease is caused by bacteria that can spread from person to person through close contact. It can cause ear infections, and it can also lead to more serious infections of the:  · Lungs (pneumonia),  · Blood (bacteremia), and  · Covering of the brain and spinal cord (meningitis). Meningitis can cause deafness and brain damage, and it can be fatal.  Anyone can get pneumococcal disease, but children under 3years of age, people with certain medical conditions, adults over 72years of age, and cigarette smokers are at the highest risk. About 18,000 older adults die each year from pneumococcal disease in the [place].   Treatment of pneumococcal infections with penicillin and other drugs used to be more effective. But some strains of the disease have become resistant to these drugs. This makes prevention of the disease, through vaccination, even more important. Pneumococcal polysaccharide vaccine (PPSV23)  Pneumococcal polysaccharide vaccine (PPSV23) protects against 23 types of pneumococcal bacteria. It will not prevent all pneumococcal disease. PPSV23 is recommended for:  · All adults 72years of age and older,  · Anyone 2 through 59years of age with certain long-term health problems,  · Anyone 2 through 59years of age with a weakened immune system,  · Adults 23 through 59years of age who smoke cigarettes or have asthma. Most people need only one dose of PPSV. A second dose is recommended for certain high-risk groups. People 72 and older should get a dose even if they have gotten one or more doses of the vaccine before they turned 65. Your healthcare provider can give you more information about these recommendations. Most healthy adults develop protection within 2 to 3 weeks of getting the shot. Some people should not get this vaccine  · Anyone who has had a life-threatening allergic reaction to PPSV should not get another dose. · Anyone who has a severe allergy to any component of PPSV should not receive it. Tell your provider if you have any severe allergies. · Anyone who is moderately or severely ill when the shot is scheduled may be asked to wait until they recover before getting the vaccine. Someone with a mild illness can usually be vaccinated. · Children less than 3years of age should not receive this vaccine. · There is no evidence that PPSV is harmful to either a pregnant woman or to her fetus. However, as a precaution, women who need the vaccine should be vaccinated before becoming pregnant, if possible. Risks of a vaccine reaction  With any medicine, including vaccines, there is a chance of side effects.  These are usually mild and go away on their own, but serious reactions are also possible. About half of people who get PPSV have mild side effects, such as redness or pain where the shot is given, which go away within about two days. Less than 1 out of 100 people develop a fever, muscle aches, or more severe local reactions. Problems that could happen after any vaccine:  · People sometimes faint after a medical procedure, including vaccination. Sitting or lying down for about 15 minutes can help prevent fainting, and injuries caused by a fall. Tell your doctor if you feel dizzy, or have vision changes or ringing in the ears. · Some people get severe pain in the shoulder and have difficulty moving the arm where a shot was given. This happens very rarely. · Any medication can cause a severe allergic reaction. Such reactions from a vaccine are very rare, estimated at about 1 in a million doses, and would happen within a few minutes to a few hours after the vaccination. As with any medicine, there is a very remote chance of a vaccine causing a serious injury or death. The safety of vaccines is always being monitored. For more information, visit: www.cdc.gov/vaccinesafety/  What if there is a serious reaction? What should I look for? Look for anything that concerns you, such as signs of a severe allergic reaction, very high fever, or unusual behavior. Signs of a severe allergic reaction can include hives, swelling of the face and throat, difficulty breathing, a fast heartbeat, dizziness, and weakness. These would usually start a few minutes to a few hours after the vaccination. What should I do? If you think it is a severe allergic reaction or other emergency that can't wait, call 9-1-1 or get to the nearest hospital. Otherwise, call your doctor. Afterward, the reaction should be reported to the Vaccine Adverse Event Reporting System (VAERS). Your doctor might file this report, or you can do it yourself through the VAERS web site at www.vaers. hhs.gov, or by calling

## 2020-03-11 ENCOUNTER — TELEPHONE (OUTPATIENT)
Dept: PRIMARY CARE CLINIC | Age: 75
End: 2020-03-11

## 2020-03-11 NOTE — TELEPHONE ENCOUNTER
Attempted to call patient and message left regarding need to get fasting labs done and to schedule a regular check up. Instructed to call this office.

## 2020-03-12 RX ORDER — LANCETS 30 GAUGE
1 EACH MISCELLANEOUS DAILY
Qty: 100 EACH | Refills: 3 | Status: SHIPPED | OUTPATIENT
Start: 2020-03-12 | End: 2021-03-31

## 2020-03-12 NOTE — TELEPHONE ENCOUNTER
Health Maintenance   Topic Date Due    Lipid screen  12/05/2019    Hepatitis B vaccine (1 of 3 - Risk 3-dose series) 02/20/2021 (Originally 2/5/1964)    DTaP/Tdap/Td vaccine (1 - Tdap) 02/20/2021 (Originally 2/5/1964)    Shingles Vaccine (1 of 2) 02/20/2021 (Originally 2/5/1993)    Annual Wellness Visit (AWV)  09/11/2020    Potassium monitoring  12/16/2020    Creatinine monitoring  12/16/2020    Flu vaccine  Completed    Pneumococcal 65+ years Vaccine  Completed    Hepatitis A vaccine  Aged Out    Hib vaccine  Aged Out    Meningococcal (ACWY) vaccine  Aged Out             (applicable per patient's age: Cancer Screenings, Depression Screening, Fall Risk Screening, Immunizations)    Hemoglobin A1C (%)   Date Value   12/14/2019 7.1 (H)   07/16/2019 7.2 (H)   04/24/2019 7.3     Microalb/Crt.  Ratio (mcg/mg creat)   Date Value   08/06/2019 CANNOT BE CALCULATED     LDL Cholesterol (mg/dL)   Date Value   12/05/2018 42     AST (U/L)   Date Value   12/16/2019 15     ALT (U/L)   Date Value   12/16/2019 20     BUN (mg/dL)   Date Value   12/16/2019 12      (goal A1C is < 7)   (goal LDL is <100) need 30-50% reduction from baseline     BP Readings from Last 3 Encounters:   02/20/20 (!) 114/49   01/06/20 127/66   12/23/19 (!) 104/43    (goal /80)      All Future Testing planned in [facility]:  Lab Frequency Next Occurrence   Basic Metabolic Panel Once 18/99/5486   Hemoglobin A1C Once 03/18/2020   ALT Once 03/18/2020   AST Once 03/18/2020   Lipid Panel Once 03/18/2020       Next Visit Date:  Future Appointments   Date Time Provider Jerrod Meier   3/19/2020  4:00 PM Bal[facility]inder Chowdh[facility]   7/7/2020 11:00 AM Isamar Nicholas MD [facility]   9/16/2020 11:00 AM Ray Chase APRN - CNP [facility]            Patient Active Problem List:     Type 2 diabetes mellitus without complication, without long-term current use of insulin ([address]     History of MI (myocardial infarction)     FABIAN (obstructive

## 2020-03-24 RX ORDER — CALCIUM CITRATE/VITAMIN D3 200MG-6.25
TABLET ORAL
Qty: 100 STRIP | Refills: 3 | Status: SHIPPED | OUTPATIENT
Start: 2020-03-24 | End: 2021-01-18

## 2020-03-26 ENCOUNTER — TELEPHONE (OUTPATIENT)
Dept: PRIMARY CARE CLINIC | Age: 75
End: 2020-03-26

## 2020-04-17 ENCOUNTER — TELEPHONE (OUTPATIENT)
Dept: PRIMARY CARE CLINIC | Age: 75
End: 2020-04-17

## 2020-04-20 ENCOUNTER — TELEPHONE (OUTPATIENT)
Dept: PULMONOLOGY | Age: 75
End: 2020-04-20

## 2020-05-04 ENCOUNTER — TELEPHONE (OUTPATIENT)
Dept: PRIMARY CARE CLINIC | Age: 75
End: 2020-05-04

## 2020-05-15 ENCOUNTER — VIRTUAL VISIT (OUTPATIENT)
Dept: PULMONOLOGY | Age: 75
End: 2020-05-15
Payer: MEDICARE

## 2020-05-15 PROCEDURE — 99214 OFFICE O/P EST MOD 30 MIN: CPT | Performed by: INTERNAL MEDICINE

## 2020-05-15 NOTE — PROGRESS NOTES
sleep at 10 pm, wakes up 7 to 8 am. It takes 15 to 20 minutes to fall asleep. Wakes up 1 time at night to go to bathroom. Takes 1 nap during the day for 60 minutes. No  headache in am. No car wrecks or near wrecks because of the sleepiness. No nodding off while driving. No weight gain. No forgetfulness or decreased concentration. No nasal congestion or obstruction at night. No significant caffienated drinks or alcohol. Using CPAP 8 to 9 hr/night. No leg jerks during sleep. No restless feelings in legs at night. No numbness or burning in leg or feet. No leg aches or cramps . No loss of muscle strength when angry or laugh. No hallucination when dozing off or waking up from sleep. No paralysis upon awakening from sleep or going to sleep. No teeth grinding, nightmares, sleep walking. No night time panic attacks. ESS: 4  Sitting and reading 0  Watching TV 0  Sitting inactive in a public place (e.g a theater or a meeting)  0  As a passenger in a car for an hour without a break 1  Lying down to rest in the afternoon when circumstances permit 3  Sitting and talking to someone 0  Sitting quietly after a lunch without alcohol 0  In a car, while stopped for a few minutes in traffic 0    Initial history and evaluation  He is referred here for evaluation and management of sleep apnea and history of chronic left hemidiaphragm elevation. According to patient and wife he had previous surgeries done for his knees in the past and after the surgery was noted to have low oxygen but had not had workup done but he was never started on oxygen as he improved. No history of COPD and asthma pulmonary embolism or DVT. In December 2017 he was in the hospital for left shoulder surgery it was complicated post surgery he had MI. Workup done and needed CABG.   He has slightly prolonged course after the CABG he was requiring oxygenation after the procedure rehab was prolonged and he was on oxygen until March 2018 and at that time he had a vitals taken for this visit. Last 3 weights: Wt Readings from Last 3 Encounters:   02/20/20 233 lb 9.6 oz (106 kg)   01/06/20 232 lb 9.6 oz (105.5 kg)   12/23/19 237 lb 4.8 oz (107.6 kg)     There is no height or weight on file to calculate BMI. Physical Examination:   General appearance - alert, well appearing, and in no distress, overweight and acyanotic, in no respiratory distress  Mental status - alert, oriented to person, place, and time  Eyes - pupils equal and reactive, extraocular eye movements intact. Ears - right ear normal, left ear normal  Nose - normal and patent, no erythema, discharge or polyps  Mouth - mucous membranes moist, pharynx normal without lesions and large tongue, small oropharynx. Neck -Short and thick neck  Chest -  No tachypnea, retractions or cyanosis.   Heart -not examined  Abdomen -examined  Neurological - alert, oriented, normal speech, no focal findings or movement disorder noted  Extremities -not examined  Skin -not examined      LABS:    CBC:   WBC   Date Value Ref Range Status   12/16/2019 7.6 3.5 - 11.3 k/uL Final   12/15/2019 8.0 3.5 - 11.3 k/uL Final   12/14/2019 7.8 3.5 - 11.3 k/uL Final     Hemoglobin   Date Value Ref Range Status   12/16/2019 12.8 (L) 13.0 - 17.0 g/dL Final   12/15/2019 12.9 (L) 13.0 - 17.0 g/dL Final   12/14/2019 13.3 13.0 - 17.0 g/dL Final     Platelets   Date Value Ref Range Status   12/16/2019 166 138 - 453 k/uL Final   12/15/2019 194 138 - 453 k/uL Final   12/14/2019 192 138 - 453 k/uL Final     BMP:   Sodium   Date Value Ref Range Status   12/16/2019 138 135 - 144 mmol/L Final   12/15/2019 139 135 - 144 mmol/L Final   12/14/2019 135 135 - 144 mmol/L Final     Potassium   Date Value Ref Range Status   12/16/2019 4.4 3.7 - 5.3 mmol/L Final   12/15/2019 4.0 3.7 - 5.3 mmol/L Final   12/14/2019 4.4 3.7 - 5.3 mmol/L Final     Chloride   Date Value Ref Range Status   12/16/2019 102 98 - 107 mmol/L Final   12/15/2019 102 98 - 107 mmol/L Final

## 2020-06-04 ENCOUNTER — TELEPHONE (OUTPATIENT)
Dept: CARDIOLOGY | Age: 75
End: 2020-06-04

## 2020-07-07 ENCOUNTER — OFFICE VISIT (OUTPATIENT)
Dept: CARDIOLOGY | Age: 75
End: 2020-07-07
Payer: MEDICARE

## 2020-07-07 VITALS
WEIGHT: 235 LBS | BODY MASS INDEX: 31.83 KG/M2 | DIASTOLIC BLOOD PRESSURE: 69 MMHG | OXYGEN SATURATION: 94 % | HEART RATE: 64 BPM | SYSTOLIC BLOOD PRESSURE: 135 MMHG | HEIGHT: 72 IN | RESPIRATION RATE: 18 BRPM

## 2020-07-07 PROCEDURE — 99214 OFFICE O/P EST MOD 30 MIN: CPT | Performed by: FAMILY MEDICINE

## 2020-07-07 NOTE — PATIENT INSTRUCTIONS
SURVEY:    You may be receiving a survey from [employer] regarding your visit today. Please complete the survey to enable us to provide the highest quality of care to you and your family. If you cannot score us a very good on any question, please call the office to discuss how we could have made your experience a very good one. Thank you.

## 2020-07-07 NOTE — PROGRESS NOTES
Patient: Ita Paniagua  : 1945  Date of Visit: 2020    REASON FOR VISIT / CONSULTATION: 6 Month Follow-Up (HX: CAD S/p CABG, CHF, Aortic Vavle Replacment, HTN. Pt states he is doing ok. C/o: Has taken two nitro since last visit. CP, sharp, sec. Denies: Palpitaiotns, Lighateded/dizziness, SOB.)    Dear Renato Mayen, APRN - CNP,    I had the pleasure of seeing Ita Paniagua in my office today. Mr. Daniel Thornton is a 66 y.o. male with a history of atherosclerotic heart disease. In 2017 Mr. Daniel Thornton had went into surgery for his shoulder and postoperatively had a myocardial infarction which led to a triple vessel coronary artery bypass as well as a aortic valve replacement  due to severe aortic stenosis. He was found to have postoperative atrial fibrillation which led to him starting on Amiodarone but had been stopped six weeks after due to no reoccurrence of atrial fibrillation. He had a ulcer in his small intenstine which had caused internal bleeding but has since been resolved and is taking pepcid for this. He was continued on Eliquis but had recently been stopped since there has been no evidence of atrial fibrillation including on his recent holter monitor. His cardiac catheterization on 2018 showed a 75% stenosis in RCA as well as a 70% stenosis in his Circumflex, but when he was sent down to [facility] for possible stenting treatment, however Dr. Adilia Packer performed an FFR of the lesion which showed that the stenosis was about 60 percent blockage and therefore not likely to benefit from stenting. Although there was also a severe stenosis in the junction of the LIMA-LAD, this was felt to be high risk stenting and therefore deferred for a trial of guideline directed trial of maximal medical management including cardiac rehab. His echocardiogram on 2018 with an ejection fraction of 50-55% with a bioprosthetic aortic valve.       Since the last time I saw Mr. Daniel Thornton he has been doing fairly well since last visit with not any issues. They moved recently so he is not doing cardiac rehab anymore but did join a gym. Mr. Yousif Ozuna denies any chest pain now or in the recent past, increased shortness of breath, lightheaded/dizziness, or palpitations. He denies any abdominal pain, bleeding problems, problems with his medications or any other concerns at this time. Exercise Tolerance: He reports having a good exercise tolerance. Mr. Yousif Ozuna says that he can walk less than 1/2 a mile without developing shortness of breath and/or chest discomfort. Past Medical History:   Diagnosis Date    Chronic back pain     Coronary artery disease     s/p MI and CABG in 12/2017    H/O aortic valve replacement 12/16/2017    Bioprosthetic valve     H/O cardiac catheterization 12/06/2018    Severe three vessel disease involving the LAD, circumflex and right coronary arteries  2 of 3 bypass grafts patent Normal LVEDP. Consult to interventional cardiology for likely angioplasty and/or  stenting of the patients severe stenosis with likely stenting of the unrevascularized RCA stenosis with plans to intervene on the higher risk LIMA-LAD stenosis only at a later date if the patient fails gu    H/O three vessel coronary artery bypass 12/2017    History of atrial fibrillation 12/2017    following CABG 12/2017    History of myocardial infarction 12/2017    Hyperlipidemia     Hypertension     Osteoarthritis     Sleep apnea     Type 2 diabetes mellitus without complication ([facility] [address] [address]      CURRENT ALLERGIES: Patient has no known allergies. REVIEW OF SYSTEMS: 14 systems were reviewed. Pertinent positives and negatives as above, all else negative.      Past Surgical History:   Procedure Laterality Date    CARDIAC CATHETERIZATION Left 12/06/2018    Dr Violet Patel radial-    CORONARY ARTERY BYPASS GRAFT      INTRACAPSULAR CATARACT EXTRACTION      KNEE ARTHROPLASTY      SHOULDER ARTHROPLASTY      Social History:  Social History     Tobacco Use    Smoking status: Never Smoker    Smokeless tobacco: Never Used   Substance Use Topics    Alcohol use: Yes     Alcohol/week: 7.0 standard drinks     Types: 7 Glasses of wine per week     Comment: most days    Drug use: No        CURRENT MEDICATIONS:  Outpatient Medications Marked as Taking for the 7/7/20 encounter (Office Visit) with Lazaro Gentile MD   Medication Sig Dispense Refill    blood glucose test strips (TRUE METRIX BLOOD GLUCOSE TEST) strip 1 STRIP BY OTHER ROUTE DAILY RUBY METRIX TEST STRIP. E11.9 100 strip 3    Lancets MISC 1 each by Other route daily Ruby Metrix lancets E11.9 100 each 3    atorvastatin (LIPITOR) 40 MG tablet Take 1 tablet by mouth daily 90 tablet 3    isosorbide mononitrate (IMDUR) 30 MG extended release tablet Take 1 tablet by mouth daily 90 tablet 3    metoprolol succinate (TOPROL XL) 50 MG extended release tablet Take 1 tablet by mouth daily 90 tablet 3    famotidine (PEPCID) 40 MG tablet Take 1 tablet by mouth daily 90 tablet 3    nitroGLYCERIN (NITROSTAT) 0.4 MG SL tablet Place 1 tablet under the tongue every 5 minutes as needed for Chest pain up to max of 3 total doses. If no relief after 1 dose, call 911. 25 tablet 3    metFORMIN (GLUCOPHAGE) 1000 MG tablet TAKE 1 TABLET TWICE A DAY WITH MEALS 180 tablet 4    tamsulosin (FLOMAX) 0.4 MG capsule TAKE 1 CAPSULE DAILY 90 capsule 3    blood glucose monitor strips 1 strip by Other route daily Ruby Metrix test strip. E11.9 100 strip 3    gabapentin (NEURONTIN) 400 MG capsule Take 400 mg by mouth 3 times daily.  aspirin 81 MG tablet Take 81 mg by mouth daily      melatonin 5 MG TABS tablet Take 5 mg by mouth as needed       Multiple Vitamins-Minerals (CENTRUM SILVER PO) Take 1 tablet by mouth daily       traMADol (ULTRAM) 50 MG tablet Take 50 mg by mouth every 6 hours as needed for Pain. Nick Ramirez FAMILY HISTORY: family history is not on file. He was adopted.      PHYSICAL EXAM:   /69 artery disease of autologous vein bypass graft with stable angina pectoris ([facility] [address]    3. Essential hypertension    4. H/O aortic valve replacement    5. History of MI (myocardial infarction)       PLAN:  Atherosclerotic Heart Disease: Hx: MI with CABG x 3 in 12/2017. Severe disease in the circumflex coronary artery and LIMA-LAD graft but will plan to treat with guideline directed trial of maximal medical management. Still has occasional Class III angina but only 2 episodes in 6 months requiring nitro. Antiplatelet Agent: Continue aspirin 81 mg daily. I also reminded him to watch for signs of blood in his stool or black tarry stools and stop the medication immediately if this develops as this could be life threatening. Beta Blocker: Continue metoprolol succinate (Toprol XL) 50 mg once daily. Continue Imdur 30 mg daily. Statin Therapy: Continue atorvastatin (Lipitor) 40 mg nightly. Chronic Diastolic Heart Failure: VR:00-41% on 12/4/2018, 55% in 1/20. Currently well controlled  Beta Blocker: Continue metoprolol succinate (Toprol XL) 50 mg  once daily. Nonpharmacologic management of Heart Failure: I advised him to try and keep his legs up whenever possible and to limit salt in his diet. History of Severe Aortic Stenosis as well as history of LV Aneurysm: Asymptomatic-S/P: Bioprosthetic Aortic Valve replacement in 12/2017. Will likely re-assess at his next visit. · Beta Blocker: Continue metoprolol succinate (Toprol XL) 50 mg once daily. · Because of this, I ordered a echocardiogram to better assess for the etiology and severity of this problem. Essential Hypertension: Controlled  · Beta Blocker: Continue metoprolol succinate (Toprol XL) 50 mg once daily. · Chronic stable angina: Fairly well Controlled. Continue with guideline directed trial of maximal medical management. · Beta Blocker: Continue Metoprolol succinate (Toprol XL) 50 mg daily.   · Anti-anginal medications: Continue isosorbide mononitrate (Imdur) 30 mg once daily. Can increase to 60 mg daily if needed  · Continue nitro prn. Finally, I recommended that he continue his current medications and follow up with you as previously scheduled. FOLLOW UP:   I told Mr. Camron Bonoe to call my office if he had any problems, but otherwise I asked him to Return in about 6 months (around 1/7/2021). However, I would be happy to see him sooner should the need arise. Sincerely,  Lane Salvador. Car ZEE, MS, F.A.C.C. [facility] Cardiology Specialist    [address] [place] [address]  Phone: [phone], Fax: [phone]     I believe that the risk of significant morbidity and mortality related to the patient's current medical conditions are: Intermediate. The documentation recorded by the scribe, accurately and completely reflects the services I personally performed and the decisions made by me. Oswaldo Wray MD, MS, F.A.C.C.  July 7, 2020

## 2020-07-10 RX ORDER — NITROGLYCERIN 0.4 MG/1
0.4 TABLET SUBLINGUAL EVERY 5 MIN PRN
Qty: 75 TABLET | Refills: 1 | Status: SHIPPED | OUTPATIENT
Start: 2020-07-10

## 2020-08-15 ENCOUNTER — TELEPHONE (OUTPATIENT)
Dept: FAMILY MEDICINE CLINIC | Age: 75
End: 2020-08-15

## 2020-08-15 RX ORDER — CEPHALEXIN 500 MG/1
500 CAPSULE ORAL 4 TIMES DAILY
Qty: 28 CAPSULE | Refills: 0 | Status: SHIPPED | OUTPATIENT
Start: 2020-08-15 | End: 2020-12-10 | Stop reason: SDUPTHER

## 2020-08-18 NOTE — TELEPHONE ENCOUNTER
Called by wife stating that she believes Martín Lo is getting a UTI back  [facility] had given him Keflex to use at first sign of UTI  She had only a few doses left  I sent Rx for a refill at 500mg qid till she could contact [facility]

## 2020-08-31 RX ORDER — TAMSULOSIN HYDROCHLORIDE 0.4 MG/1
CAPSULE ORAL
Qty: 90 CAPSULE | Refills: 3 | Status: SHIPPED | OUTPATIENT
Start: 2020-08-31 | End: 2021-03-02 | Stop reason: SDUPTHER

## 2020-08-31 NOTE — TELEPHONE ENCOUNTER
History of atrial fibrillation     Hyperlipidemia     Hypertension     Sleep apnea     H/O aortic valve replacement     Hx of CABG     Coronary artery disease     Abnormal cardiovascular stress test     Chest pain     Coronary artery disease (CAD) excluded     Stable angina pectoris ([address]     COPD exacerbation ([address]

## 2020-09-23 ENCOUNTER — OFFICE VISIT (OUTPATIENT)
Dept: PRIMARY CARE CLINIC | Age: 75
End: 2020-09-23
Payer: MEDICARE

## 2020-09-23 VITALS
WEIGHT: 235.9 LBS | TEMPERATURE: 97.6 F | DIASTOLIC BLOOD PRESSURE: 55 MMHG | BODY MASS INDEX: 31.95 KG/M2 | RESPIRATION RATE: 16 BRPM | HEART RATE: 72 BPM | HEIGHT: 72 IN | SYSTOLIC BLOOD PRESSURE: 112 MMHG

## 2020-09-23 PROCEDURE — G0439 PPPS, SUBSEQ VISIT: HCPCS | Performed by: NURSE PRACTITIONER

## 2020-09-23 ASSESSMENT — LIFESTYLE VARIABLES
HOW OFTEN DO YOU HAVE A DRINK CONTAINING ALCOHOL: 4
HAS A RELATIVE, FRIEND, DOCTOR, OR ANOTHER HEALTH PROFESSIONAL EXPRESSED CONCERN ABOUT YOUR DRINKING OR SUGGESTED YOU CUT DOWN: 0
HAVE YOU OR SOMEONE ELSE BEEN INJURED AS A RESULT OF YOUR DRINKING: 0
AUDIT-C TOTAL SCORE: 4
HOW OFTEN DURING THE LAST YEAR HAVE YOU NEEDED AN ALCOHOLIC DRINK FIRST THING IN THE MORNING TO GET YOURSELF GOING AFTER A NIGHT OF HEAVY DRINKING: 0
HOW MANY STANDARD DRINKS CONTAINING ALCOHOL DO YOU HAVE ON A TYPICAL DAY: 0
HOW OFTEN DURING THE LAST YEAR HAVE YOU HAD A FEELING OF GUILT OR REMORSE AFTER DRINKING: 0
HOW OFTEN DURING THE LAST YEAR HAVE YOU FOUND THAT YOU WERE NOT ABLE TO STOP DRINKING ONCE YOU HAD STARTED: 0
HOW OFTEN DURING THE LAST YEAR HAVE YOU BEEN UNABLE TO REMEMBER WHAT HAPPENED THE NIGHT BEFORE BECAUSE YOU HAD BEEN DRINKING: 0
HOW OFTEN DO YOU HAVE SIX OR MORE DRINKS ON ONE OCCASION: 0
AUDIT TOTAL SCORE: 4
HOW OFTEN DURING THE LAST YEAR HAVE YOU FAILED TO DO WHAT WAS NORMALLY EXPECTED FROM YOU BECAUSE OF DRINKING: 0

## 2020-09-23 ASSESSMENT — PATIENT HEALTH QUESTIONNAIRE - PHQ9
SUM OF ALL RESPONSES TO PHQ QUESTIONS 1-9: 0
SUM OF ALL RESPONSES TO PHQ QUESTIONS 1-9: 0
1. LITTLE INTEREST OR PLEASURE IN DOING THINGS: 0
SUM OF ALL RESPONSES TO PHQ9 QUESTIONS 1 & 2: 0
2. FEELING DOWN, DEPRESSED OR HOPELESS: 0

## 2020-09-23 NOTE — PATIENT INSTRUCTIONS
SURVEY:    You may be receiving a survey from [employer] regarding your visit today. Please complete the survey to enable us to provide the highest quality of care to you and your family. If you cannot score us a very good on any question, please call the office to discuss how we could have made your experience a very good one. Thank you. Patient Education        Well Visit, Over 72: Care Instructions  Your Care Instructions     Physical exams can help you stay healthy. Your doctor has checked your overall health and may have suggested ways to take good care of yourself. He or she also may have recommended tests. At home, you can help prevent illness with healthy eating, regular exercise, and other steps. Follow-up care is a key part of your treatment and safety. Be sure to make and go to all appointments, and call your doctor if you are having problems. It's also a good idea to know your test results and keep a list of the medicines you take. How can you care for yourself at home? · Reach and stay at a healthy weight. This will lower your risk for many problems, such as obesity, diabetes, heart disease, and high blood pressure. · Get at least 30 minutes of exercise on most days of the week. Walking is a good choice. You also may want to do other activities, such as running, swimming, cycling, or playing tennis or team sports. · Do not smoke. Smoking can make health problems worse. If you need help quitting, talk to your doctor about stop-smoking programs and medicines. These can increase your chances of quitting for good. · Protect your skin from too much sun. When you're outdoors from 10 a.m. to 4 p.m., stay in the shade or cover up with clothing and a hat with a wide brim. Wear sunglasses that block UV rays. Even when it's cloudy, put broad-spectrum sunscreen (SPF 30 or higher) on any exposed skin. · See a dentist one or two times a year for checkups and to have your teeth cleaned.   · Wear a seat belt in the car. Follow your doctor's advice about when to have certain tests. These tests can spot problems early. For men and women  · Cholesterol. Your doctor will tell you how often to have this done based on your overall health and other things that can increase your risk for heart attack and stroke. · Blood pressure. Have your blood pressure checked during a routine doctor visit. Your doctor will tell you how often to check your blood pressure based on your age, your blood pressure results, and other factors. · Diabetes. Ask your doctor whether you should have tests for diabetes. · Vision. Experts recommend that you have yearly exams for glaucoma and other age-related eye problems. · Hearing. Tell your doctor if you notice any change in your hearing. You can have tests to find out how well you hear. · Colon cancer tests. Keep having colon cancer tests as your doctor recommends. You can have one of several types of tests. · Heart attack and stroke risk. At least every 4 to 6 years, you should have your risk for heart attack and stroke assessed. Your doctor uses factors such as your age, blood pressure, cholesterol, and whether you smoke or have diabetes to show what your risk for a heart attack or stroke is over the next 10 years. · Osteoporosis. Talk to your doctor about whether you should have a bone density test to find out whether you have thinning bones. Ask your doctor if you need to take a calcium plus vitamin D supplement. You may be able to get enough calcium and vitamin D through your diet. For women  · Pap test and pelvic exam. You may no longer need a Pap test. Talk with your doctor about whether to stop or continue to have Pap tests. · Breast exam and mammogram. Ask how often you should have a mammogram, which is an X-ray of your breasts. A mammogram can spot breast cancer before it can be felt and when it is easiest to treat. · Thyroid disease.  Talk to your doctor about whether to have your thyroid checked as part of a regular physical exam. Women have an increased chance of a thyroid problem. For men  · Prostate exam. Talk to your doctor about whether you should have a blood test (called a PSA test) for prostate cancer. Experts recommend that you discuss the benefits and risks of the test with your doctor before you decide whether to have this test. Some experts say that men ages 79 and older no longer need testing. · Abdominal aortic aneurysm. Ask your doctor whether you should have a test to check for an aneurysm. You may need a test if you ever smoked or if your parent, brother, sister, or child has had an aneurysm. When should you call for help? Watch closely for changes in your health, and be sure to contact your doctor if you have any problems or symptoms that concern you. Where can you learn more? Go to https://Hoverinkhemalathaeb.[email or web address]. org and sign in to your "[employer] account. Enter Z949 in the [employer] box to learn more about \"Well Visit, Over 65: Care Instructions. \"     If you do not have an account, please click on the \"Sign Up Now\" link. Current as of: August 22, 2019               Content Version: 12.5  © 4187-1454 Healthwise, Incorporated. Care instructions adapted under license by [facility] ([facility]). If you have questions about a medical condition or this instruction, always ask your healthcare professional. [address] any warranty or liability for your use of this information. Personalized Preventive Plan for Cy Mcdermott - 9/23/2020  Medicare offers a range of preventive health benefits. Some of the tests and screenings are paid in full while other may be subject to a deductible, co-insurance, and/or copay. Some of these benefits include a comprehensive review of your medical history including lifestyle, illnesses that may run in your family, and various assessments and screenings as appropriate.     After reviewing your medical record and screening and assessments performed today your provider may have ordered immunizations, labs, imaging, and/or referrals for you. A list of these orders (if applicable) as well as your Preventive Care list are included within your After Visit Summary for your review. Other Preventive Recommendations:    · A preventive eye exam performed by an eye specialist is recommended every 1-2 years to screen for glaucoma; cataracts, macular degeneration, and other eye disorders. · A preventive dental visit is recommended every 6 months. · Try to get at least 150 minutes of exercise per week or 10,000 steps per day on a pedometer . · Order or download the FREE \"Exercise & Physical Activity: Your Everyday Guide\" from [employer]. Call 9-940.586.8126 or search [employer] online. · You need 6354-4798 mg of calcium and 1632-2390 IU of vitamin D per day. It is possible to meet your calcium requirement with diet alone, but a vitamin D supplement is usually necessary to meet this goal.  · When exposed to the sun, use a sunscreen that protects against both UVA and UVB radiation with an SPF of 30 or greater. Reapply every 2 to 3 hours or after sweating, drying off with a towel, or swimming. · Always wear a seat belt when traveling in a car. Always wear a helmet when riding a bicycle or motorcycle.

## 2020-09-23 NOTE — PROGRESS NOTES
Medicare Annual Wellness Visit  Name: Chrystine Merlin Date: 2020   MRN: [id number] Sex: Male   Age: 66 y.o. Ethnicity: Non-/Non    : 1945 Race: Luis Miguel Booth is here for Medicare AWV    Screenings for behavioral, psychosocial and functional/safety risks, and cognitive dysfunction are all negative except as indicated below. These results, as well as other patient data from the [address] form, are documented in Flowsheets linked to this Encounter. No Known Allergies      Prior to Visit Medications    Medication Sig Taking? Authorizing Provider   tamsulosin (FLOMAX) 0.4 MG capsule TAKE 1 CAPSULE DAILY Yes HALEY Rodríguez CNP   nitroGLYCERIN (NITROSTAT) 0.4 MG SL tablet PLACE 1 TABLET UNDER THE TONGUE EVERY 5 MINUTES AS NEEDED FOR CHEST PAIN UP TO MAX OF 3 TOTAL DOSES. IF NO RELIEF AFTER 1 DOSE, CALL 911. Yes Vance Garber MD   blood glucose test strips (TRUE METRIX BLOOD GLUCOSE TEST) strip 1 STRIP BY OTHER ROUTE DAILY RUBY METRIX TEST STRIP. E11.9 Yes HALEY Rodríguez CNP   Lancets MISC 1 each by Other route daily [employer] lancets E11.9 Yes HALEY Rodríguez CNP   atorvastatin (LIPITOR) 40 MG tablet Take 1 tablet by mouth daily Yes Vance Garber MD   isosorbide mononitrate (IMDUR) 30 MG extended release tablet Take 1 tablet by mouth daily Yes Vance Garber MD   metoprolol succinate (TOPROL XL) 50 MG extended release tablet Take 1 tablet by mouth daily Yes Vance Garber MD   famotidine (PEPCID) 40 MG tablet Take 1 tablet by mouth daily Yes HALEY Rodríguez CNP   metFORMIN (GLUCOPHAGE) 1000 MG tablet TAKE 1 TABLET TWICE A DAY WITH MEALS Yes 20 Martine HALEY Rowland CNP   blood glucose monitor strips 1 strip by Other route daily Ruby [employer] test strip. E11.9 Yes HALEY Rodríguez CNP   gabapentin (NEURONTIN) 400 MG capsule Take 400 mg by mouth 3 times daily.   Yes Ramon Castillo MD   aspirin 81 MG tablet Take 81 mg by mouth daily Yes Historical Provider, MD   melatonin 5 MG TABS tablet Take 5 mg by mouth as needed  Yes Historical Provider, MD   Multiple Vitamins-Minerals (CENTRUM SILVER PO) Take 1 tablet by mouth daily  Yes Historical Provider, MD   traMADol (ULTRAM) 50 MG tablet Take 50 mg by mouth every 6 hours as needed for Pain. Panda Castrejon Historical Provider, MD         Past Medical History:   Diagnosis Date    Chronic back pain     Coronary artery disease     s/p MI and CABG in 12/2017    H/O aortic valve replacement 12/16/2017    Bioprosthetic valve     H/O cardiac catheterization 12/06/2018    Severe three vessel disease involving the LAD, circumflex and right coronary arteries  2 of 3 bypass grafts patent Normal LVEDP. Consult to interventional cardiology for likely angioplasty and/or  stenting of the patients severe stenosis with likely stenting of the unrevascularized RCA stenosis with plans to intervene on the higher risk LIMA-LAD stenosis only at a later date if the patient fails gu    H/O three vessel coronary artery bypass 12/2017    History of atrial fibrillation 12/2017    following CABG 12/2017    History of myocardial infarction 12/2017    Hyperlipidemia     Hypertension     Osteoarthritis     Sleep apnea     Type 2 diabetes mellitus without complication [facility])        Past Surgical History:   Procedure Laterality Date    CARDIAC CATHETERIZATION Left 12/06/2018    Dr Nellie Linda radial-    CORONARY ARTERY BYPASS GRAFT      INTRACAPSULAR CATARACT EXTRACTION      KNEE ARTHROPLASTY      SHOULDER ARTHROPLASTY           Family History   Adopted: Yes       CareTeam (Including outside providers/suppliers regularly involved in providing care):   Patient Care Team:  HALEY Lynn CNP as PCP - General (Family Nurse Practitioner)  [address] [place] [address], APRN - CNP as PCP - [facility] Empaneled Provider    Wt Readings from Last 3 Encounters:   09/23/20 235 lb 14.4 oz (107 kg)   07/07/20 235 lb (106.6 kg)   02/20/20 233 lb 9.6 oz (106 kg)     Vitals:    09/23/20 1101   BP: (!) 112/55   Site: Left Upper Arm   Position: Sitting   Cuff Size: Large Adult   Pulse: 72   Resp: 16   Temp: 97.6 °F (36.4 °C)   TempSrc: Temporal   Weight: 235 lb 14.4 oz (107 kg)   Height: 6' (1.829 m)     Body mass index is 31.99 kg/m². Based upon direct observation of the patient, evaluation of cognition reveals remote memory intact, recent memory impaired. General Appearance: well-developed and well nourished  Skin: warm and dry  Pulmonary/Chest: clear to auscultation bilaterally- no wheezes, rales or rhonchi, normal air movement, no respiratory distress  Cardiovascular: normal rate, normal S1 and S2 and systolic murmur present- 2/6 at 2nd left intercostal space  Abdomen: soft, non-tender, non-distended, normal bowel sounds, no masses or organomegaly  Extremities: no edema    Patient's complete Health Risk Assessment and screening values have been reviewed and are found in Flowsheets. The following problems were reviewed today and where indicated follow up appointments were made and/or referrals ordered. Positive Risk Factor Screenings with Interventions:     Cognitive: Words recalled: 1 Word Recalled  Clock Drawing Test (CDT) Score: (!) Abnormal  Total Score Interpretation: Positive Mini-Cog  Cognitive Impairment Interventions:  · Patient declines any further evaluation/treatment for cognitive impairment    Health Habits/Nutrition:  Health Habits/Nutrition  Do you exercise for at least 20 minutes 2-3 times per week?: Yes  Have you lost any weight without trying in the past 3 months?: No  Do you eat fewer than 2 meals per day?: No  Have you seen a dentist within the past year?: (!) No  Body mass index is 31.99 kg/m².   Health Habits/Nutrition Interventions:  · Dental exam overdue:  patient declines dental evaluation    Hearing/Vision:  No exam data present  Hearing/Vision  Do you or your family notice any trouble with your hearing?: (!) Yes  Do you have difficulty driving, watching TV, or doing any of your daily activities because of your eyesight?: No  Have you had an eye exam within the past year?: Yes  Hearing/Vision Interventions:  · Hearing concerns:  patient declines any further evaluation/treatment for hearing issues    Personalized Preventive Plan   Current Health Maintenance Status  Immunization History   Administered Date(s) Administered    Influenza Virus Vaccine 10/23/2018    Influenza, High Dose (Fluzone 65 yrs and older) 10/22/2018    Influenza, Quadv, IM, PF (6 mo and older Fluzone, Flulaval, Fluarix, and 3 yrs and older Afluria) 09/11/2019    Pneumococcal Conjugate 13-valent ([id number]) 01/10/2019    Pneumococcal Conjugate Vaccine 08/25/2017    Pneumococcal Polysaccharide ([id number]) 02/20/2020        Health Maintenance   Topic Date Due    Annual Wellness Visit (AWV)  05/29/2019    Lipid screen  12/05/2019    Flu vaccine (1) 09/01/2020    DTaP/Tdap/Td vaccine (1 - Tdap) 02/20/2021 (Originally 2/5/1962)    Shingles Vaccine (1 of 2) 02/20/2021 (Originally 2/5/1993)    Potassium monitoring  12/16/2020    Creatinine monitoring  12/16/2020    Statin Therapy  01/06/2021    Pneumococcal 65+ years Vaccine  Completed    Hepatitis A vaccine  Aged Out    Hib vaccine  Aged Out    Meningococcal (ACWY) vaccine  Aged Out     Recommendations for Grid20/20 Due: see orders and patient instructions/AVS.  . Recommended screening schedule for the next 5-10 years is provided to the patient in written form: see Patient Instructions/AVS.    Teri Dejesus was seen today for medicare aw.     Diagnoses and all orders for this visit:    Routine general medical examination at a health care facility

## 2020-11-03 PROBLEM — I10 ESSENTIAL HYPERTENSION: Status: RESOLVED | Noted: 2018-09-05 | Resolved: 2020-11-03

## 2020-12-10 RX ORDER — CEPHALEXIN 500 MG/1
500 CAPSULE ORAL 2 TIMES DAILY
Qty: 14 CAPSULE | Refills: 0 | Status: SHIPPED | OUTPATIENT
Start: 2020-12-10 | End: 2021-01-19 | Stop reason: SDUPTHER

## 2020-12-10 NOTE — TELEPHONE ENCOUNTER
Pt is experiencing UTI symptoms and was requesting refill on Keflex. Health Maintenance   Topic Date Due    Lipid screen  12/05/2019    Flu vaccine (1) 09/01/2020    DTaP/Tdap/Td vaccine (1 - Tdap) 02/20/2021 (Originally 2/5/1962)    Shingles Vaccine (1 of 2) 02/20/2021 (Originally 2/5/1993)    Annual Wellness Visit (AWV)  09/24/2021    Pneumococcal 65+ years Vaccine  Completed    Hepatitis A vaccine  Aged Out    Hib vaccine  Aged Out    Meningococcal (ACWY) vaccine  Aged Out             (applicable per patient's age: Cancer Screenings, Depression Screening, Fall Risk Screening, Immunizations)    Hemoglobin A1C (%)   Date Value   12/14/2019 7.1 (H)   07/16/2019 7.2 (H)   04/24/2019 7.3     Microalb/Crt.  Ratio (mcg/mg creat)   Date Value   08/06/2019 CANNOT BE CALCULATED     LDL Cholesterol (mg/dL)   Date Value   12/05/2018 42     AST (U/L)   Date Value   12/16/2019 15     ALT (U/L)   Date Value   12/16/2019 20     BUN (mg/dL)   Date Value   12/16/2019 12      (goal A1C is < 7)   (goal LDL is <100) need 30-50% reduction from baseline     BP Readings from Last 3 Encounters:   09/23/20 (!) 112/55   07/07/20 135/69   02/20/20 (!) 114/49    (goal /80)      All Future Testing planned in [facility]:  Lab Frequency Next Occurrence       Next Visit Date:  Future Appointments   Date Time Provider Jerrod Meier   1/8/2021 10:00 AM [facility]   5/20/2021 10:00 AM Hunter Lee MD [facility] P[facility]   9/27/2021 11:20 AM HALEY Villegas CNP [facility]            Patient Active Problem List:     Type 2 diabetes mellitus without complication, without long-term current use of insulin ([facility] [address]     History of MI (myocardial infarction)     FABIAN (obstructive sleep apnea)     Other chronic pain     Bilateral hearing loss     Aneurysm of left ventricle of heart     Chronic diastolic CHF (congestive heart failure), NYHA class 3 (HCC)     Unstable angina ([address]     History of atrial fibrillation     Hyperlipidemia     Hypertension     Sleep apnea     H/O aortic valve replacement     Hx of CABG     Coronary artery disease     Abnormal cardiovascular stress test     Chest pain     Coronary artery disease (CAD) excluded     Stable angina pectoris ([address]     COPD exacerbation ([address]

## 2020-12-18 ENCOUNTER — TELEPHONE (OUTPATIENT)
Dept: CARDIOLOGY | Age: 75
End: 2020-12-18

## 2020-12-18 NOTE — TELEPHONE ENCOUNTER
Please let patient know that I would advise that he get that vaccine as soon as it is available to him. Thanks.

## 2020-12-18 NOTE — TELEPHONE ENCOUNTER
Wife called and would like to know if it is safe for patient to get COVID vaccine. She was concerned that with his heart history he should not get it.

## 2020-12-23 RX ORDER — METOPROLOL SUCCINATE 50 MG/1
TABLET, EXTENDED RELEASE ORAL
Qty: 90 TABLET | Refills: 3 | Status: SHIPPED | OUTPATIENT
Start: 2020-12-23 | End: 2021-01-08 | Stop reason: SDUPTHER

## 2020-12-28 RX ORDER — FAMOTIDINE 40 MG/1
40 TABLET, FILM COATED ORAL DAILY
Qty: 90 TABLET | Refills: 3 | Status: SHIPPED | OUTPATIENT
Start: 2020-12-28 | End: 2021-12-23

## 2020-12-28 NOTE — TELEPHONE ENCOUNTER
Health Maintenance   Topic Date Due    Hepatitis C screen  1945    Lipid screen  12/05/2019    Flu vaccine (1) 09/01/2020    DTaP/Tdap/Td vaccine (1 - Tdap) 02/20/2021 (Originally 2/5/1962)    Shingles Vaccine (1 of 2) 02/20/2021 (Originally 2/5/1993)    Annual Wellness Visit (AWV)  09/24/2021    Pneumococcal 65+ years Vaccine  Completed    Hepatitis A vaccine  Aged Out    Hib vaccine  Aged Out    Meningococcal (ACWY) vaccine  Aged Out             (applicable per patient's age: Cancer Screenings, Depression Screening, Fall Risk Screening, Immunizations)    Hemoglobin A1C (%)   Date Value   12/14/2019 7.1 (H)   07/16/2019 7.2 (H)   04/24/2019 7.3     Microalb/Crt.  Ratio (mcg/mg creat)   Date Value   08/06/2019 CANNOT BE CALCULATED     LDL Cholesterol (mg/dL)   Date Value   12/05/2018 42     AST (U/L)   Date Value   12/16/2019 15     ALT (U/L)   Date Value   12/16/2019 20     BUN (mg/dL)   Date Value   12/16/2019 12      (goal A1C is < 7)   (goal LDL is <100) need 30-50% reduction from baseline     BP Readings from Last 3 Encounters:   09/23/20 (!) 112/55   07/07/20 135/69   02/20/20 (!) 114/49    (goal /80)      All Future Testing planned in [facility]:  Lab Frequency Next Occurrence       Next Visit Date:  Future Appointments   Date Time Provider hospitals   1/8/2021 10:00 AM Angela Marcos MD TIFF CARD [facility]   5/20/2021 10:00 AM Lillie Mason MD TIFF PULM [facility]   9/27/2021 11:20 AM HALEY Arias CNP [facility]            Patient Active Problem List:     Type 2 diabetes mellitus without complication, without long-term current use of insulin ([facility] [address]     History of MI (myocardial infarction)     FABIAN (obstructive sleep apnea)     Other chronic pain     Bilateral hearing loss     Aneurysm of left ventricle of heart     Chronic diastolic CHF (congestive heart failure), NYHA class 3 (HCC)     Unstable angina ([facility] [address]     History of atrial fibrillation     Hyperlipidemia Hypertension     Sleep apnea     H/O aortic valve replacement     Hx of CABG     Coronary artery disease     Abnormal cardiovascular stress test     Chest pain     Coronary artery disease (CAD) excluded     Stable angina pectoris ([address]     COPD exacerbation ([address]

## 2021-01-08 ENCOUNTER — OFFICE VISIT (OUTPATIENT)
Dept: CARDIOLOGY | Age: 76
End: 2021-01-08
Payer: MEDICARE

## 2021-01-08 VITALS
OXYGEN SATURATION: 98 % | WEIGHT: 239.4 LBS | HEART RATE: 60 BPM | SYSTOLIC BLOOD PRESSURE: 103 MMHG | HEIGHT: 72 IN | RESPIRATION RATE: 18 BRPM | DIASTOLIC BLOOD PRESSURE: 58 MMHG | BODY MASS INDEX: 32.43 KG/M2

## 2021-01-08 DIAGNOSIS — Z95.820 S/P ANGIOPLASTY WITH STENT: ICD-10-CM

## 2021-01-08 DIAGNOSIS — I50.32 CHRONIC DIASTOLIC CHF (CONGESTIVE HEART FAILURE), NYHA CLASS 3 (HCC): ICD-10-CM

## 2021-01-08 DIAGNOSIS — I10 ESSENTIAL HYPERTENSION: ICD-10-CM

## 2021-01-08 DIAGNOSIS — I20.8 CHRONIC STABLE ANGINA (HCC): ICD-10-CM

## 2021-01-08 DIAGNOSIS — I25.708 CORONARY ARTERY DISEASE OF BYPASS GRAFT OF NATIVE HEART WITH STABLE ANGINA PECTORIS (HCC): ICD-10-CM

## 2021-01-08 DIAGNOSIS — I25.718 CORONARY ARTERY DISEASE OF AUTOLOGOUS VEIN BYPASS GRAFT WITH STABLE ANGINA PECTORIS (HCC): Primary | ICD-10-CM

## 2021-01-08 DIAGNOSIS — Z95.2 H/O AORTIC VALVE REPLACEMENT: ICD-10-CM

## 2021-01-08 PROCEDURE — 99213 OFFICE O/P EST LOW 20 MIN: CPT | Performed by: FAMILY MEDICINE

## 2021-01-08 PROCEDURE — 93000 ELECTROCARDIOGRAM COMPLETE: CPT | Performed by: FAMILY MEDICINE

## 2021-01-08 RX ORDER — ATORVASTATIN CALCIUM 40 MG/1
40 TABLET, FILM COATED ORAL DAILY
Qty: 90 TABLET | Refills: 3 | Status: SHIPPED | OUTPATIENT
Start: 2021-01-08 | End: 2022-02-14

## 2021-01-08 RX ORDER — METOPROLOL SUCCINATE 50 MG/1
50 TABLET, EXTENDED RELEASE ORAL DAILY
Qty: 90 TABLET | Refills: 3 | Status: SHIPPED | OUTPATIENT
Start: 2021-01-08 | End: 2021-08-26

## 2021-01-08 RX ORDER — ISOSORBIDE MONONITRATE 30 MG/1
30 TABLET, EXTENDED RELEASE ORAL DAILY
Qty: 90 TABLET | Refills: 3 | Status: SHIPPED | OUTPATIENT
Start: 2021-01-08 | End: 2021-07-13

## 2021-01-08 NOTE — PROGRESS NOTES
Emily Hamilton am scribing for and in the presence of Priya Jackson. Car ZEE, MS, F.A.C.C. Patient: Alex Kramer  : 1945  Date of Visit: 2021    REASON FOR VISIT / CONSULTATION: 1 Year Follow Up (EKG done today. HX:CHF, CAD, HTN, Aortic valve replacement<MI Pt is here for 6 month follow up he states he is doing well Denies:CP,SOB,lightheaded/dizziness, palpitaitons)    Dear [address] [place] [address], [facility],    I had the pleasure of seeing Alex Kramer in my office today. Mr. Nito Marquez is a 66 y.o. male with a history of atherosclerotic heart disease. In 2017 Mr. Nito Marquez had went into surgery for his shoulder and postoperatively had a myocardial infarction which led to a triple vessel coronary artery bypass as well as a aortic valve replacement  due to severe aortic stenosis. He was found to have postoperative atrial fibrillation which led to him starting on Amiodarone but had been stopped six weeks after due to no reoccurrence of atrial fibrillation. He had a ulcer in his small intenstine which had caused internal bleeding but has since been resolved and is taking pepcid for this. He was continued on Eliquis but had recently been stopped since there has been no evidence of atrial fibrillation including on his recent holter monitor. His cardiac catheterization on 2018 showed a 75% stenosis in RCA as well as a 70% stenosis in his Circumflex, but when he was sent down to [facility] for possible stenting treatment, however Dr. Kian Nair performed an FFR of the lesion which showed that the stenosis was about 60 percent blockage and therefore not likely to benefit from stenting. Although there was also a severe stenosis in the junction of the LIMA-LAD, this was felt to be high risk stenting and therefore deferred for a trial of guideline directed trial of maximal medical management including cardiac rehab.  His echocardiogram on 2018 with an ejection fraction of 50-55% with a bioprosthetic aortic valve.  Mr. Vincent Patton had echo done 1/16/2020 EF 55% Compared to the previous study of 12/5/18, no significant change was seen. Since the last time I saw Mr. Vincent Patton he has been doing fairly well since last visit with not any issues. He is still doing home exercises to help his strength. Mr. Vincent Patton denies any chest pain now or in the recent past, increased shortness of breath, lightheaded/dizziness, or palpitations. He denies any abdominal pain, bleeding problems, problems with his medications or any other concerns at this time. No blood in stools or black tarry stools. Exercise Tolerance: He reports having a good exercise tolerance. Mr. Vincent Patton says that he can walk about a block without developing shortness of breath and/or chest discomfort. He states weakness is what limits him from going further. Past Medical History:   Diagnosis Date    Chronic back pain     Coronary artery disease     s/p MI and CABG in 12/2017    H/O aortic valve replacement 12/16/2017    Bioprosthetic valve     H/O cardiac catheterization 12/06/2018    Severe three vessel disease involving the LAD, circumflex and right coronary arteries  2 of 3 bypass grafts patent Normal LVEDP. Consult to interventional cardiology for likely angioplasty and/or  stenting of the patients severe stenosis with likely stenting of the unrevascularized RCA stenosis with plans to intervene on the higher risk LIMA-LAD stenosis only at a later date if the patient fails gu    H/O three vessel coronary artery bypass 12/2017    History of atrial fibrillation 12/2017    following CABG 12/2017    History of myocardial infarction 12/2017    Hyperlipidemia     Hypertension     Osteoarthritis     Sleep apnea     Type 2 diabetes mellitus without complication ([facility]      CURRENT ALLERGIES: Patient has no known allergies. REVIEW OF SYSTEMS: 14 systems were reviewed. Pertinent positives and negatives as above, all else negative.      Past Surgical History:   Procedure Laterality Date    CARDIAC CATHETERIZATION Left 12/06/2018    Dr Denny Hand radial-    CORONARY ARTERY BYPASS GRAFT      INTRACAPSULAR CATARACT EXTRACTION      KNEE ARTHROPLASTY      SHOULDER ARTHROPLASTY      Social History:  Social History     Tobacco Use    Smoking status: Never Smoker    Smokeless tobacco: Never Used   Substance Use Topics    Alcohol use: Yes     Alcohol/week: 7.0 standard drinks     Types: 7 Glasses of wine per week     Comment: most days    Drug use: No        CURRENT MEDICATIONS:  Outpatient Medications Marked as Taking for the 1/8/21 encounter (Office Visit) with Yazmin Patel MD   Medication Sig Dispense Refill    famotidine (PEPCID) 40 MG tablet Take 1 tablet by mouth daily 90 tablet 3    metFORMIN (GLUCOPHAGE) 1000 MG tablet TAKE 1 TABLET TWICE A DAY WITH MEALS 180 tablet 3    metoprolol succinate (TOPROL XL) 50 MG extended release tablet TAKE 1 TABLET DAILY 90 tablet 3    tamsulosin (FLOMAX) 0.4 MG capsule TAKE 1 CAPSULE DAILY 90 capsule 3    nitroGLYCERIN (NITROSTAT) 0.4 MG SL tablet PLACE 1 TABLET UNDER THE TONGUE EVERY 5 MINUTES AS NEEDED FOR CHEST PAIN UP TO MAX OF 3 TOTAL DOSES. IF NO RELIEF AFTER 1 DOSE, CALL 911. 75 tablet 1    blood glucose test strips (TRUE METRIX BLOOD GLUCOSE TEST) strip 1 STRIP BY OTHER ROUTE DAILY RUBY METRIX TEST STRIP. E11.9 100 strip 3    Lancets MISC 1 each by Other route daily Ruby Metrix lancets E11.9 100 each 3    atorvastatin (LIPITOR) 40 MG tablet Take 1 tablet by mouth daily 90 tablet 3    isosorbide mononitrate (IMDUR) 30 MG extended release tablet Take 1 tablet by mouth daily 90 tablet 3    blood glucose monitor strips 1 strip by Other route daily Ruby Metrix test strip. E11.9 100 strip 3    gabapentin (NEURONTIN) 400 MG capsule Take 400 mg by mouth 3 times daily.        aspirin 81 MG tablet Take 81 mg by mouth daily      melatonin 5 MG TABS tablet Take 5 mg by mouth as needed       Multiple 12/16/2019    BUN 12 12/16/2019    CO2 22 12/16/2019    TSH 1.65 09/06/2018    INR 1.1 12/04/2018    LABA1C 7.1 (H) 12/14/2019    LABMICR CANNOT BE CALCULATED 08/06/2019     ASSESSMENT:  1. Coronary artery disease of autologous vein bypass graft with stable angina pectoris ([address]    2. S/P angioplasty with stent    3. Chronic diastolic CHF (congestive heart failure), NYHA class 3 ([address]    4. H/O aortic valve replacement    5. Essential hypertension    6. Chronic stable angina (HCC)    7. Coronary artery disease of bypass graft of native heart with stable angina pectoris ([address]       PLAN:  Atherosclerotic Heart Disease: Hx: MI with CABG x 3 in 12/2017. Severe disease in the circumflex coronary artery and LIMA-LAD graft but will plan to treat with guideline directed trial of maximal medical management. Antiplatelet Agent: Continue aspirin 81 mg daily. I also reminded him to watch for signs of blood in his stool or black tarry stools and stop the medication immediately if this develops as this could be life threatening. Beta Blocker: Continue metoprolol succinate (Toprol XL) 50 mg once daily. Continue Imdur 30 mg daily. Statin Therapy: Continue atorvastatin (Lipitor) 40 mg nightly. Chronic Diastolic Heart Failure: WS:55-46% on 12/4/2018, 55% in 1/16/20. Currently well controlled  Beta Blocker: Continue metoprolol succinate (Toprol XL) 50 mg  once daily. Nonpharmacologic management of Heart Failure: I advised him to try and keep his legs up whenever possible and to limit salt in his diet. History of Severe Aortic Stenosis as well as history of LV Aneurysm: Asymptomatic-S/P: Bioprosthetic Aortic Valve replacement in 12/2017. · Beta Blocker: Continue metoprolol succinate (Toprol XL) 50 mg once daily. Essential Hypertension: Controlled  · Beta Blocker: Continue metoprolol succinate (Toprol XL) 50 mg once daily. · Chronic stable angina: Fairly well Controlled.  Continue with guideline directed trial of maximal medical management. · Beta Blocker: Continue Metoprolol succinate (Toprol XL) 50 mg daily. · Anti-anginal medications: Continue isosorbide mononitrate (Imdur) 30 mg once daily. Can increase to 60 mg daily if needed  · Continue nitro prn. Finally, I recommended that he continue his current medications and follow up with you as previously scheduled. FOLLOW UP:   I told Mr. Madalynn Boxer to call my office if he had any problems, but otherwise I asked him to Return in about 1 year (around 1/8/2022). However, I would be happy to see him sooner should the need arise. Sincerely,  Simone Yoon MD, MS, F.A.C.C. [facility] Cardiology Specialist    [address] [place] [address]  Phone: [phone], Fax: [phone]     I believe that the risk of significant morbidity and mortality related to the patient's current medical conditions are: low-intermediate. The documentation recorded by the scribe, accurately and completely reflects the services I personally performed and the decisions made by me. Jose Luis Sanchez MD, MS, F.A.C.C.  January 8, 2021

## 2021-01-08 NOTE — PATIENT INSTRUCTIONS
SURVEY:    You may be receiving a survey from [employer] regarding your visit today. Please complete the survey to enable us to provide the highest quality of care to you and your family. If you cannot score us a very good on any question, please call the office to discuss how we could have made your experience a very good one. Thank you.     Your MA today was [employer]

## 2021-01-17 DIAGNOSIS — E11.9 TYPE 2 DIABETES MELLITUS WITHOUT COMPLICATION, WITHOUT LONG-TERM CURRENT USE OF INSULIN (HCC): Primary | Chronic | ICD-10-CM

## 2021-01-18 RX ORDER — CALCIUM CITRATE/VITAMIN D3 200MG-6.25
TABLET ORAL
Qty: 100 STRIP | Refills: 3 | Status: SHIPPED | OUTPATIENT
Start: 2021-01-18 | End: 2021-10-26 | Stop reason: SDUPTHER

## 2021-01-18 NOTE — TELEPHONE ENCOUNTER
Health Maintenance   Topic Date Due    Hepatitis C screen  1945    Lipid screen  12/05/2019    DTaP/Tdap/Td vaccine (1 - Tdap) 02/20/2021 (Originally 2/5/1962)    Shingles Vaccine (1 of 2) 02/20/2021 (Originally 2/5/1993)    Annual Wellness Visit (AWV)  09/24/2021    Flu vaccine  Completed    Pneumococcal 65+ years Vaccine  Completed    Hepatitis A vaccine  Aged Out    Hib vaccine  Aged Out    Meningococcal (ACWY) vaccine  Aged Out             (applicable per patient's age: Cancer Screenings, Depression Screening, Fall Risk Screening, Immunizations)    Hemoglobin A1C (%)   Date Value   12/14/2019 7.1 (H)   07/16/2019 7.2 (H)   04/24/2019 7.3     Microalb/Crt.  Ratio (mcg/mg creat)   Date Value   08/06/2019 CANNOT BE CALCULATED     LDL Cholesterol (mg/dL)   Date Value   12/05/2018 42     AST (U/L)   Date Value   12/16/2019 15     ALT (U/L)   Date Value   12/16/2019 20     BUN (mg/dL)   Date Value   12/16/2019 12      (goal A1C is < 7)   (goal LDL is <100) need 30-50% reduction from baseline     BP Readings from Last 3 Encounters:   01/08/21 (!) 103/58   09/23/20 (!) 112/55   07/07/20 135/69    (goal /80)      All Future Testing planned in [facility]:  Lab Frequency Next Occurrence       Next Visit Date:  Future Appointments   Date Time Provider Jerrod Meier   5/20/2021 10:00 AM [facility] Phillips [facility]   9/27/2021 11:20 AM HALEY Melendrez - CNP [facility]   1/4/2022 10:00 AM Vikram Roth MD TIFF C[facility]            Patient Active Problem List:     Type 2 diabetes mellitus without complication, without long-term current use of insulin ([facility] [address]     History of MI (myocardial infarction)     FABIAN (obstructive sleep apnea)     Other chronic pain     Bilateral hearing loss     Aneurysm of left ventricle of heart     Chronic diastolic CHF (congestive heart failure), NYHA class 3 (HCC)     Unstable angina ([facility] [address]     History of atrial fibrillation     Hyperlipidemia     Hypertension Sleep apnea     H/O aortic valve replacement     Hx of CABG     Coronary artery disease     Abnormal cardiovascular stress test     Chest pain     Coronary artery disease (CAD) excluded     Stable angina pectoris ([address]     COPD exacerbation ([address]

## 2021-01-19 RX ORDER — CEPHALEXIN 500 MG/1
500 CAPSULE ORAL 2 TIMES DAILY
Qty: 14 CAPSULE | Refills: 0 | Status: SHIPPED | OUTPATIENT
Start: 2021-01-19 | End: 2021-01-26

## 2021-01-19 NOTE — TELEPHONE ENCOUNTER
Pt called requesting refill of Keflex. Patient is having UTI like symptoms. Frequent urination, pressure. Health Maintenance   Topic Date Due    Hepatitis C screen  1945    Lipid screen  12/05/2019    DTaP/Tdap/Td vaccine (1 - Tdap) 02/20/2021 (Originally 2/5/1962)    Shingles Vaccine (1 of 2) 02/20/2021 (Originally 2/5/1993)    Annual Wellness Visit (AWV)  09/24/2021    Flu vaccine  Completed    Pneumococcal 65+ years Vaccine  Completed    Hepatitis A vaccine  Aged Out    Hib vaccine  Aged Out    Meningococcal (ACWY) vaccine  Aged Out             (applicable per patient's age: Cancer Screenings, Depression Screening, Fall Risk Screening, Immunizations)    Hemoglobin A1C (%)   Date Value   12/14/2019 7.1 (H)   07/16/2019 7.2 (H)   04/24/2019 7.3     Microalb/Crt.  Ratio (mcg/mg creat)   Date Value   08/06/2019 CANNOT BE CALCULATED     LDL Cholesterol (mg/dL)   Date Value   12/05/2018 42     AST (U/L)   Date Value   12/16/2019 15     ALT (U/L)   Date Value   12/16/2019 20     BUN (mg/dL)   Date Value   12/16/2019 12      (goal A1C is < 7)   (goal LDL is <100) need 30-50% reduction from baseline     BP Readings from Last 3 Encounters:   01/08/21 (!) 103/58   09/23/20 (!) 112/55   07/07/20 135/69    (goal /80)      All Future Testing planned in [facility]:  Lab Frequency Next Occurrence       Next Visit Date:  Future Appointments   Date Time Provider Jerrod Meier   5/20/2021 10:00 AM [facility]   9/27/2021 11:20 AM Curt Chase APRN - CNP [facility]   1/4/2022 10:00 AM [facility]el [facility]            Patient Active Problem List:     Type 2 diabetes mellitus without complication, without long-term current use of insulin ([address]     History of MI (myocardial infarction)     FABIAN (obstructive sleep apnea)     Other chronic pain     Bilateral hearing loss     Aneurysm of left ventricle of heart     Chronic diastolic CHF (congestive heart failure), NYHA class 3 (HCC)     Unstable angina (HCC)     History of atrial fibrillation     Hyperlipidemia     Hypertension     Sleep apnea     H/O aortic valve replacement     Hx of CABG     Coronary artery disease     Abnormal cardiovascular stress test     Chest pain     Coronary artery disease (CAD) excluded     Stable angina pectoris ([address]     COPD exacerbation ([facility] [address]

## 2021-02-17 ENCOUNTER — TELEPHONE (OUTPATIENT)
Dept: PRIMARY CARE CLINIC | Age: 76
End: 2021-02-17

## 2021-02-17 DIAGNOSIS — N39.0 FREQUENT URINARY TRACT INFECTIONS: Primary | ICD-10-CM

## 2021-02-17 RX ORDER — CEPHALEXIN 500 MG/1
500 CAPSULE ORAL 2 TIMES DAILY
Qty: 14 CAPSULE | Refills: 0 | Status: SHIPPED | OUTPATIENT
Start: 2021-02-17 | End: 2021-03-02

## 2021-02-17 NOTE — TELEPHONE ENCOUNTER
Message left with [facility] regarding antibiotic ordered and referral to Dr. Kaley Hagan.

## 2021-02-17 NOTE — TELEPHONE ENCOUNTER
Funmi Morales requests a refill of Keflex for frequent urination. She would also like a referral for Matthias Long to see a urologist for this issue.

## 2021-03-02 ENCOUNTER — HOSPITAL ENCOUNTER (OUTPATIENT)
Age: 76
Setting detail: SPECIMEN
Discharge: HOME | End: 2021-03-02
Payer: MEDICARE

## 2021-03-02 ENCOUNTER — HOSPITAL ENCOUNTER (OUTPATIENT)
Age: 76
Discharge: HOME | End: 2021-03-02
Payer: MEDICARE

## 2021-03-02 ENCOUNTER — OFFICE VISIT (OUTPATIENT)
Dept: UROLOGY | Age: 76
End: 2021-03-02
Payer: MEDICARE

## 2021-03-02 VITALS
BODY MASS INDEX: 32.37 KG/M2 | DIASTOLIC BLOOD PRESSURE: 62 MMHG | WEIGHT: 239 LBS | SYSTOLIC BLOOD PRESSURE: 142 MMHG | HEIGHT: 72 IN | TEMPERATURE: 97.7 F

## 2021-03-02 DIAGNOSIS — R35.0 FREQUENCY OF URINATION: ICD-10-CM

## 2021-03-02 DIAGNOSIS — R39.15 URGENCY OF URINATION: ICD-10-CM

## 2021-03-02 DIAGNOSIS — R33.9 INCOMPLETE BLADDER EMPTYING: ICD-10-CM

## 2021-03-02 DIAGNOSIS — N13.8 BPH WITH OBSTRUCTION/LOWER URINARY TRACT SYMPTOMS: Primary | ICD-10-CM

## 2021-03-02 DIAGNOSIS — N13.8 BPH WITH OBSTRUCTION/LOWER URINARY TRACT SYMPTOMS: ICD-10-CM

## 2021-03-02 DIAGNOSIS — N40.1 BPH WITH OBSTRUCTION/LOWER URINARY TRACT SYMPTOMS: ICD-10-CM

## 2021-03-02 DIAGNOSIS — N40.1 BPH WITH OBSTRUCTION/LOWER URINARY TRACT SYMPTOMS: Primary | ICD-10-CM

## 2021-03-02 LAB
-: NORMAL
AMORPHOUS: NORMAL
ANION GAP SERPL CALCULATED.3IONS-SCNC: 9 MMOL/L (ref 9–17)
BACTERIA: NORMAL
BILIRUBIN URINE: NEGATIVE
BUN BLDV-MCNC: 15 MG/DL (ref 8–23)
BUN/CREAT BLD: 20 (ref 9–20)
CALCIUM SERPL-MCNC: 9.9 MG/DL (ref 8.6–10.4)
CASTS UA: NORMAL /LPF
CHLORIDE BLD-SCNC: 99 MMOL/L (ref 98–107)
CO2: 28 MMOL/L (ref 20–31)
COLOR: YELLOW
COMMENT UA: NORMAL
CREAT SERPL-MCNC: 0.74 MG/DL (ref 0.7–1.2)
CRYSTALS, UA: NORMAL /HPF
EPITHELIAL CELLS UA: NORMAL /HPF (ref 0–5)
GFR AFRICAN AMERICAN: >60 ML/MIN
GFR NON-AFRICAN AMERICAN: >60 ML/MIN
GFR SERPL CREATININE-BSD FRML MDRD: ABNORMAL ML/MIN/{1.73_M2}
GFR SERPL CREATININE-BSD FRML MDRD: ABNORMAL ML/MIN/{1.73_M2}
GLUCOSE BLD-MCNC: 139 MG/DL (ref 70–99)
GLUCOSE URINE: NEGATIVE
KETONES, URINE: NEGATIVE
LEUKOCYTE ESTERASE, URINE: NEGATIVE
MUCUS: NORMAL
NITRITE, URINE: NEGATIVE
OTHER OBSERVATIONS UA: NORMAL
PH UA: 6 (ref 5–9)
POTASSIUM SERPL-SCNC: 4.5 MMOL/L (ref 3.7–5.3)
PROTEIN UA: NEGATIVE
RBC UA: NORMAL /HPF (ref 0–2)
RENAL EPITHELIAL, UA: NORMAL /HPF
SODIUM BLD-SCNC: 136 MMOL/L (ref 135–144)
SPECIFIC GRAVITY UA: 1.01 (ref 1.01–1.02)
TRICHOMONAS: NORMAL
TURBIDITY: CLEAR
URINE HGB: NEGATIVE
UROBILINOGEN, URINE: NORMAL
WBC UA: NORMAL /HPF (ref 0–5)
YEAST: NORMAL

## 2021-03-02 PROCEDURE — 80048 BASIC METABOLIC PNL TOTAL CA: CPT

## 2021-03-02 PROCEDURE — 36415 COLL VENOUS BLD VENIPUNCTURE: CPT

## 2021-03-02 PROCEDURE — 99204 OFFICE O/P NEW MOD 45 MIN: CPT | Performed by: NURSE PRACTITIONER

## 2021-03-02 PROCEDURE — 81001 URINALYSIS AUTO W/SCOPE: CPT

## 2021-03-02 PROCEDURE — 51798 US URINE CAPACITY MEASURE: CPT | Performed by: NURSE PRACTITIONER

## 2021-03-02 PROCEDURE — 87086 URINE CULTURE/COLONY COUNT: CPT

## 2021-03-02 RX ORDER — TAMSULOSIN HYDROCHLORIDE 0.4 MG/1
0.4 CAPSULE ORAL 2 TIMES DAILY
Qty: 90 CAPSULE | Refills: 3 | Status: SHIPPED | OUTPATIENT
Start: 2021-03-02 | End: 2021-05-03

## 2021-03-02 NOTE — PATIENT INSTRUCTIONS
SURVEY:    You may be receiving a survey from [employer] regarding your visit today. Please complete the survey to enable us to provide the highest quality of care to you and your family. If you cannot score us a very good on any question, please call the office to discuss how we could of made your experience a very good one. Thank you. Schedule a Vaccine  When you qualify to receive the vaccine per the [address] guidelines, call the [facility]) COVID-19 Vaccination Hotline to schedule your appointment or to get additional information about the [facility]) locations which are offering the COVID-19 vaccine. To be most effective, it's important that you receive two doses of one of the COVID-19 vaccines. -If you are receiving the [place] vaccine, your second shot will be scheduled as close to 21 days after the first shot as possible. -If you are receiving the Moderna vaccine, your second shot will be scheduled as close to 28 days after the first shot as possible. [address] Vaccination Hotline: [phone]    In partnership with [facility] and [facility] HEALTH Departments, patients can call [phone], Monday-Friday 8:00am-4:00pm for scheduling at our Hospitals. Or visit the [facility] websites for additional information of vaccine administration locations. Links to [facility]) website and Health Department websites:    Luis [email or web address]/mercy-[facility]-[email or web address]    [email or web address]    [email or web address]

## 2021-03-02 NOTE — PROGRESS NOTES
HPI:          Patient is a 64 y.o. male in no acute distress. He is alert and oriented to person, place, and time. New patient referral from [facility] for frequent UTI. He is here with his daughter today. There is some baseline confusion and he is hard of hearing. His daughter reports he has been treated multiple times with antibiotics for suspected urinary tract infections. Upon further review of the chart I only see one positive culture from 2/2020 that shows E. coli. He did previously see a urologist in [place] before they moved to [place]. He has been on Flomax for the last 3 to 5 years. He does complain of frequency, urgency, and urge incontinence. He does not wear pads. The urge incontinence does result in him needing to change his underwear approximately 4 times per week. PVR is elevated in the office today, 223ml. He denies constipation. He denies any dysuria or gross hematuria. He denies history of stones. Past Medical History:   Diagnosis Date    Chronic back pain     Coronary artery disease     s/p MI and CABG in 12/2017    H/O aortic valve replacement 12/16/2017    Bioprosthetic valve     H/O cardiac catheterization 12/06/2018    Severe three vessel disease involving the LAD, circumflex and right coronary arteries  2 of 3 bypass grafts patent Normal LVEDP. Consult to interventional cardiology for likely angioplasty and/or  stenting of the patients severe stenosis with likely stenting of the unrevascularized RCA stenosis with plans to intervene on the higher risk LIMA-LAD stenosis only at a later date if the patient fails gu    H/O three vessel coronary artery bypass 12/2017    History of atrial fibrillation 12/2017    following CABG 12/2017    History of myocardial infarction 12/2017    Hyperlipidemia     Hypertension     Osteoarthritis     Sleep apnea     Type 2 diabetes mellitus without complication [facility])      Past Surgical History:   Procedure Laterality Date    CARDIAC CATHETERIZATION Left 12/06/2018    Dr Rafal Ortiz radial-    CORONARY ARTERY BYPASS GRAFT      INTRACAPSULAR CATARACT EXTRACTION      KNEE ARTHROPLASTY      SHOULDER ARTHROPLASTY       Outpatient Encounter Medications as of 3/2/2021   Medication Sig Dispense Refill    tamsulosin (FLOMAX) 0.4 MG capsule Take 1 capsule by mouth 2 times daily 90 capsule 3    blood glucose test strips (TRUE METRIX BLOOD GLUCOSE TEST) strip USE ONCE DAILY 100 strip 3    atorvastatin (LIPITOR) 40 MG tablet Take 1 tablet by mouth daily 90 tablet 3    isosorbide mononitrate (IMDUR) 30 MG extended release tablet Take 1 tablet by mouth daily 90 tablet 3    metoprolol succinate (TOPROL XL) 50 MG extended release tablet Take 1 tablet by mouth daily 90 tablet 3    famotidine (PEPCID) 40 MG tablet Take 1 tablet by mouth daily 90 tablet 3    metFORMIN (GLUCOPHAGE) 1000 MG tablet TAKE 1 TABLET TWICE A DAY WITH MEALS 180 tablet 3    nitroGLYCERIN (NITROSTAT) 0.4 MG SL tablet PLACE 1 TABLET UNDER THE TONGUE EVERY 5 MINUTES AS NEEDED FOR CHEST PAIN UP TO MAX OF 3 TOTAL DOSES. IF NO RELIEF AFTER 1 DOSE, CALL 911. 75 tablet 1    Lancets MISC 1 each by Other route daily Tung Metrix lancets E11.9 100 each 3    blood glucose monitor strips 1 strip by Other route daily Tung Metrix test strip. E11.9 100 strip 3    gabapentin (NEURONTIN) 400 MG capsule Take 400 mg by mouth 3 times daily.  aspirin 81 MG tablet Take 81 mg by mouth daily      melatonin 5 MG TABS tablet Take 5 mg by mouth as needed       Multiple Vitamins-Minerals (CENTRUM SILVER PO) Take 1 tablet by mouth daily       traMADol (ULTRAM) 50 MG tablet Take 50 mg by mouth every 6 hours as needed for Pain. Ledora Hand [DISCONTINUED] cephALEXin (KEFLEX) 500 MG capsule Take 1 capsule by mouth 2 times daily 14 capsule 0    [DISCONTINUED] tamsulosin (FLOMAX) 0.4 MG capsule TAKE 1 CAPSULE DAILY 90 capsule 3     No facility-administered encounter medications on file as of 3/2/2021. Current Outpatient Medications on File Prior to Visit   Medication Sig Dispense Refill    blood glucose test strips (TRUE METRIX BLOOD GLUCOSE TEST) strip USE ONCE DAILY 100 strip 3    atorvastatin (LIPITOR) 40 MG tablet Take 1 tablet by mouth daily 90 tablet 3    isosorbide mononitrate (IMDUR) 30 MG extended release tablet Take 1 tablet by mouth daily 90 tablet 3    metoprolol succinate (TOPROL XL) 50 MG extended release tablet Take 1 tablet by mouth daily 90 tablet 3    famotidine (PEPCID) 40 MG tablet Take 1 tablet by mouth daily 90 tablet 3    metFORMIN (GLUCOPHAGE) 1000 MG tablet TAKE 1 TABLET TWICE A DAY WITH MEALS 180 tablet 3    nitroGLYCERIN (NITROSTAT) 0.4 MG SL tablet PLACE 1 TABLET UNDER THE TONGUE EVERY 5 MINUTES AS NEEDED FOR CHEST PAIN UP TO MAX OF 3 TOTAL DOSES. IF NO RELIEF AFTER 1 DOSE, CALL 911. 75 tablet 1    Lancets MISC 1 each by Other route daily Tung Metrix lancets E11.9 100 each 3    blood glucose monitor strips 1 strip by Other route daily Tung Metrix test strip. E11.9 100 strip 3    gabapentin (NEURONTIN) 400 MG capsule Take 400 mg by mouth 3 times daily.  aspirin 81 MG tablet Take 81 mg by mouth daily      melatonin 5 MG TABS tablet Take 5 mg by mouth as needed       Multiple Vitamins-Minerals (CENTRUM SILVER PO) Take 1 tablet by mouth daily       traMADol (ULTRAM) 50 MG tablet Take 50 mg by mouth every 6 hours as needed for Pain. .       No current facility-administered medications on file prior to visit. Patient has no known allergies. Family History   Adopted: Yes     Social History     Tobacco Use   Smoking Status Never Smoker   Smokeless Tobacco Never Used       Social History     Substance and Sexual Activity   Alcohol Use Yes    Alcohol/week: 7.0 standard drinks    Types: 7 Glasses of wine per week    Comment: most days       Review of Systems   Constitutional: Negative for appetite change, chills and fever.    Eyes: Negative for redness and visual disturbance. Respiratory: Negative for cough, shortness of breath and wheezing. Cardiovascular: Negative for chest pain and leg swelling. Gastrointestinal: Negative for abdominal pain, constipation, nausea and vomiting. Genitourinary: Positive for difficulty urinating, enuresis, frequency and urgency. Negative for decreased urine volume, discharge, dysuria, flank pain, hematuria, penile pain, scrotal swelling and testicular pain. Musculoskeletal: Negative for back pain, joint swelling and myalgias. Skin: Negative for color change, rash and wound. Neurological: Negative for dizziness, tremors and numbness. Hematological: Negative for adenopathy. Does not bruise/bleed easily. BP (!) 142/62 (Site: Right Upper Arm, Position: Sitting, Cuff Size: Large Adult)   Temp 97.7 °F (36.5 °C) (Temporal)   Ht 6' (1.829 m)   Wt 239 lb (108.4 kg)   BMI 32.41 kg/m²       PHYSICAL EXAM:  Constitutional: Patient in no acute distress; Neuro: alert and oriented to person place and time. Psych: Mood and affect normal.  Skin: Normal  Lungs: Respiratory effort normal  Cardiovascular:  Normal peripheral pulses  Abdomen: Soft, non-tender, non-distended with no CVA, flank pain  Bladder non-tender and not distended. Lymphatics: no palpable lymphadenopathy  Penis normal  Urethral meatus normal  Scrotal exam normal  Testicles normal bilaterally      Lab Results   Component Value Date    BUN 15 03/02/2021     Lab Results   Component Value Date    CREATININE 0.74 03/02/2021     No results found for: PSA    ASSESSMENT:   Diagnosis Orders   1. BPH with obstruction/lower urinary tract symptoms  MT MEASUREMENT,POST-VOID RESIDUAL VOLUME BY US,NON-IMAGING    Urinalysis with Microscopic    Culture, Urine    Basic Metabolic Panel   2.  Incomplete bladder emptying  MT MEASUREMENT,POST-VOID RESIDUAL VOLUME BY US,NON-IMAGING    Urinalysis with Microscopic    Culture, Urine    Basic Metabolic Panel 3. Frequency of urination  AZ MEASUREMENT,POST-VOID RESIDUAL VOLUME BY US,NON-IMAGING    Urinalysis with Microscopic    Culture, Urine    Basic Metabolic Panel   4.  Urgency of urination  AZ MEASUREMENT,POST-VOID RESIDUAL VOLUME BY US,NON-IMAGING    Urinalysis with Microscopic    Culture, Urine    Basic Metabolic Panel         PLAN:  We will check a UA C&S    We will increase Flomax to twice per day    He will return for cystoscopy and repeat PVR

## 2021-03-03 LAB
CULTURE: NO GROWTH
Lab: NORMAL
SPECIMEN DESCRIPTION: NORMAL

## 2021-03-05 ENCOUNTER — TELEPHONE (OUTPATIENT)
Dept: UROLOGY | Age: 76
End: 2021-03-05

## 2021-03-05 NOTE — TELEPHONE ENCOUNTER
----- Message from HALEY Shukla - CNP sent at 3/5/2021  7:18 AM EST -----  Call pt - urine cx reviewed and negative for UTI & for significant microhematuria

## 2021-03-08 PROBLEM — N13.8 BPH WITH OBSTRUCTION/LOWER URINARY TRACT SYMPTOMS: Status: ACTIVE | Noted: 2021-03-08

## 2021-03-08 PROBLEM — N40.1 BPH WITH OBSTRUCTION/LOWER URINARY TRACT SYMPTOMS: Status: ACTIVE | Noted: 2021-03-08

## 2021-03-08 PROBLEM — R33.9 INCOMPLETE BLADDER EMPTYING: Status: ACTIVE | Noted: 2021-03-08

## 2021-03-08 ASSESSMENT — ENCOUNTER SYMPTOMS
BACK PAIN: 0
NAUSEA: 0
SHORTNESS OF BREATH: 0
COLOR CHANGE: 0
COUGH: 0
WHEEZING: 0
ABDOMINAL PAIN: 0
CONSTIPATION: 0
VOMITING: 0
EYE REDNESS: 0

## 2021-03-08 NOTE — TELEPHONE ENCOUNTER
Patient called back and was informed of urine culture results. Advised to keep follow up as scheduled.

## 2021-03-24 ENCOUNTER — PROCEDURE VISIT (OUTPATIENT)
Dept: UROLOGY | Age: 76
End: 2021-03-24
Payer: MEDICARE

## 2021-03-24 VITALS
DIASTOLIC BLOOD PRESSURE: 62 MMHG | WEIGHT: 240 LBS | SYSTOLIC BLOOD PRESSURE: 110 MMHG | BODY MASS INDEX: 32.51 KG/M2 | TEMPERATURE: 97.8 F | HEIGHT: 72 IN

## 2021-03-24 DIAGNOSIS — N40.1 BPH WITH OBSTRUCTION/LOWER URINARY TRACT SYMPTOMS: ICD-10-CM

## 2021-03-24 DIAGNOSIS — N13.8 BPH WITH OBSTRUCTION/LOWER URINARY TRACT SYMPTOMS: ICD-10-CM

## 2021-03-24 DIAGNOSIS — R35.0 FREQUENCY OF URINATION: ICD-10-CM

## 2021-03-24 DIAGNOSIS — R39.15 URGENCY OF URINATION: ICD-10-CM

## 2021-03-24 DIAGNOSIS — R33.9 INCOMPLETE BLADDER EMPTYING: Primary | ICD-10-CM

## 2021-03-24 PROCEDURE — 52000 CYSTOURETHROSCOPY: CPT | Performed by: UROLOGY

## 2021-03-24 PROCEDURE — 51798 US URINE CAPACITY MEASURE: CPT | Performed by: UROLOGY

## 2021-03-24 PROCEDURE — 99213 OFFICE O/P EST LOW 20 MIN: CPT | Performed by: UROLOGY

## 2021-03-24 NOTE — PROGRESS NOTES
HPI:          Patient is a 64 y.o. male in no acute distress. He is alert and oriented to person, place, and time. History  New patient referral from [facility] for frequent UTI. He is here with his daughter today. There is some baseline confusion and he is hard of hearing. His daughter reports he has been treated multiple times with antibiotics for suspected urinary tract infections. Upon further review of the chart I only see one positive culture from 2/2020 that shows E. coli. He did previously see a urologist in [place] before they moved to [place]. He has been on Flomax for the last 3 to 5 years. He does complain of frequency, urgency, and urge incontinence. He does not wear pads. The urge incontinence does result in him needing to change his underwear approximately 4 times per week. PVR is elevated in the office today, 223ml. He denies constipation. He denies any dysuria or gross hematuria. He denies history of stones. Currently  Patient is here today for lower tract visualization. This is secondary to significant lower urinary tract symptoms. At the last visit we did increase his Flomax to twice daily. Cystoscopy Procedure Note    Pre-operative Diagnosis: BPH LUTs    Post-operative Diagnosis: Same     Surgeon: Ron Welch    Assistants: None    Anesthesia : Local    Procedure Details   The risks, benefits, complications, treatment options, and expected outcomes were discussed with the patient. The patient concurred with the proposed plan, giving informed consent. Cystoscopy was performed today under local anesthesia, using sterile technique. The patient was placed in the dorsal lithotomy position, prepped with CHG, and draped in the usual sterile fashion. A 14 Azeri flexible cystoscope was used to systematically inspect both the urethra and bladder in their entirety.     Findings:  Anterior urethra: normal without strictures  Hyperplasia: bilobar  Bladder: Normal mucosa, without lesions. Ureteral orifice(s) was/were seen in the normal position and effluxing clear urine  Trabeculations No  Diverticulum No  Description: Bilobar hyperplasia of the prostate with normal bladder anatomy. Specimens: Cytology/urine culture No                 Complications:  None; patient tolerated the procedure well. Disposition: home           Condition: stable        Past Medical History:   Diagnosis Date    Chronic back pain     Coronary artery disease     s/p MI and CABG in 12/2017    H/O aortic valve replacement 12/16/2017    Bioprosthetic valve     H/O cardiac catheterization 12/06/2018    Severe three vessel disease involving the LAD, circumflex and right coronary arteries  2 of 3 bypass grafts patent Normal LVEDP. Consult to interventional cardiology for likely angioplasty and/or  stenting of the patients severe stenosis with likely stenting of the unrevascularized RCA stenosis with plans to intervene on the higher risk LIMA-LAD stenosis only at a later date if the patient fails gu    H/O three vessel coronary artery bypass 12/2017    History of atrial fibrillation 12/2017    following CABG 12/2017    History of myocardial infarction 12/2017    Hyperlipidemia     Hypertension     Osteoarthritis     Sleep apnea     Type 2 diabetes mellitus without complication [facility])      Past Surgical History:   Procedure Laterality Date    CARDIAC CATHETERIZATION Left 12/06/2018    Dr Evert Zarate radial-    CORONARY ARTERY BYPASS GRAFT      INTRACAPSULAR CATARACT EXTRACTION      KNEE ARTHROPLASTY      SHOULDER ARTHROPLASTY       Outpatient Encounter Medications as of 3/24/2021   Medication Sig Dispense Refill    tamsulosin (FLOMAX) 0.4 MG capsule Take 1 capsule by mouth 2 times daily 90 capsule 3    blood glucose test strips (TRUE METRIX BLOOD GLUCOSE TEST) strip USE ONCE DAILY 100 strip 3    atorvastatin (LIPITOR) 40 MG tablet Take 1 tablet by mouth daily 90 tablet 3    isosorbide mononitrate (IMDUR) 30 MG extended release tablet Take 1 tablet by mouth daily 90 tablet 3    metoprolol succinate (TOPROL XL) 50 MG extended release tablet Take 1 tablet by mouth daily 90 tablet 3    famotidine (PEPCID) 40 MG tablet Take 1 tablet by mouth daily 90 tablet 3    metFORMIN (GLUCOPHAGE) 1000 MG tablet TAKE 1 TABLET TWICE A DAY WITH MEALS 180 tablet 3    nitroGLYCERIN (NITROSTAT) 0.4 MG SL tablet PLACE 1 TABLET UNDER THE TONGUE EVERY 5 MINUTES AS NEEDED FOR CHEST PAIN UP TO MAX OF 3 TOTAL DOSES. IF NO RELIEF AFTER 1 DOSE, CALL 911. 75 tablet 1    Lancets MISC 1 each by Other route daily Tung Metrix lancets E11.9 100 each 3    blood glucose monitor strips 1 strip by Other route daily Tung Metrix test strip. E11.9 100 strip 3    gabapentin (NEURONTIN) 400 MG capsule Take 400 mg by mouth 3 times daily.  aspirin 81 MG tablet Take 81 mg by mouth daily      melatonin 5 MG TABS tablet Take 5 mg by mouth as needed       Multiple Vitamins-Minerals (CENTRUM SILVER PO) Take 1 tablet by mouth daily       traMADol (ULTRAM) 50 MG tablet Take 50 mg by mouth every 6 hours as needed for Pain. [facility] No facility-administered encounter medications on file as of 3/24/2021.        Current Outpatient Medications on File Prior to Visit   Medication Sig Dispense Refill    tamsulosin (FLOMAX) 0.4 MG capsule Take 1 capsule by mouth 2 times daily 90 capsule 3    blood glucose test strips (TRUE METRIX BLOOD GLUCOSE TEST) strip USE ONCE DAILY 100 strip 3    atorvastatin (LIPITOR) 40 MG tablet Take 1 tablet by mouth daily 90 tablet 3    isosorbide mononitrate (IMDUR) 30 MG extended release tablet Take 1 tablet by mouth daily 90 tablet 3    metoprolol succinate (TOPROL XL) 50 MG extended release tablet Take 1 tablet by mouth daily 90 tablet 3    famotidine (PEPCID) 40 MG tablet Take 1 tablet by mouth daily 90 tablet 3    metFORMIN (GLUCOPHAGE) 1000 MG tablet TAKE 1 TABLET TWICE A DAY WITH MEALS 180 tablet 3    nitroGLYCERIN (NITROSTAT) 0.4 MG SL tablet PLACE 1 TABLET UNDER THE TONGUE EVERY 5 MINUTES AS NEEDED FOR CHEST PAIN UP TO MAX OF 3 TOTAL DOSES. IF NO RELIEF AFTER 1 DOSE, CALL 911. 75 tablet 1    Lancets MISC 1 each by Other route daily Tung Metrix lancets E11.9 100 each 3    blood glucose monitor strips 1 strip by Other route daily Tung Metrix test strip. E11.9 100 strip 3    gabapentin (NEURONTIN) 400 MG capsule Take 400 mg by mouth 3 times daily.  aspirin 81 MG tablet Take 81 mg by mouth daily      melatonin 5 MG TABS tablet Take 5 mg by mouth as needed       Multiple Vitamins-Minerals (CENTRUM SILVER PO) Take 1 tablet by mouth daily       traMADol (ULTRAM) 50 MG tablet Take 50 mg by mouth every 6 hours as needed for Pain. .       No current facility-administered medications on file prior to visit. Patient has no known allergies. Family History   Adopted: Yes     Social History     Tobacco Use   Smoking Status Never Smoker   Smokeless Tobacco Never Used       Social History     Substance and Sexual Activity   Alcohol Use Yes    Alcohol/week: 7.0 standard drinks    Types: 7 Glasses of wine per week    Comment: most days       Review of Systems    /62 (Site: Left Upper Arm, Position: Sitting, Cuff Size: Large Adult)   Temp 97.8 °F (36.6 °C) (Temporal)   Ht 6' (1.829 m)   Wt 240 lb (108.9 kg)   BMI 32.55 kg/m²       PHYSICAL EXAM:  Constitutional: Patient in no acute distress; Neuro: alert and oriented to person place and time. Psych: Mood and affect normal.  Skin: Normal  Lungs: Respiratory effort normal  Cardiovascular:  Normal peripheral pulses  Abdomen: Soft, non-tender, non-distended with no CVA, flank pain, hepatosplenomegaly or hernia. Kidneys normal.  Bladder non-tender and not distended.   Lymphatics: no palpable lymphadenopathy  Penis normal  Urethral meatus normal  Scrotal exam normal  Testicles normal bilaterally  Epididymis normal bilaterally  No evidence of inguinal hernia      Lab Results   Component Value Date    BUN 15 03/02/2021     Lab Results   Component Value Date    CREATININE 0.74 03/02/2021     No results found for: PSA    ASSESSMENT:  This is a 64 y.o. male with the following diagnoses:   Diagnosis Orders   1. Incomplete bladder emptying  AZ CYSTOURETHROSCOPY   2. Frequency of urination  AZ CYSTOURETHROSCOPY   3. BPH with obstruction/lower urinary tract symptoms  AZ CYSTOURETHROSCOPY   4. Urgency of urination  AZ CYSTOURETHROSCOPY       PLAN:  Patient does appear to be doing better on twice daily Flomax. He would be a candidate for PVP greenlight. This point time we will see him back in 3 months. We did give him literature on PVP greenlight. He will need clearance from primary care and cardiology if we decide to move forward with PVP greenlight.

## 2021-03-24 NOTE — PATIENT INSTRUCTIONS
SURVEY:    You may be receiving a survey from [employer] regarding your visit today. Please complete the survey to enable us to provide the highest quality of care to you and your family. If you cannot score us a very good on any question, please call the office to discuss how we could of made your experience a very good one. Thank you. Schedule a Vaccine  When you qualify to receive the vaccine per the [address] guidelines, call the [facility]) COVID-19 Vaccination Hotline to schedule your appointment or to get additional information about the [facility]) locations which are offering the COVID-19 vaccine. To be most effective, it's important that you receive two doses of one of the COVID-19 vaccines. -If you are receiving the [place] vaccine, your second shot will be scheduled as close to 21 days after the first shot as possible. -If you are receiving the Moderna vaccine, your second shot will be scheduled as close to 28 days after the first shot as possible. [address] Vaccination Hotline: [phone]    In partnership with [facility] and Hospitals in [place] HEALTH Departments, patients can call [phone], Monday-Friday 8:00am-4:00pm for scheduling at our Hospitals. Or visit the [facility] websites for additional information of vaccine administration locations. Links to [facility]) website and Health Department websites:    [employer] [email or web address]/[email or web address]    [email or web address]    [email or web address]

## 2021-03-24 NOTE — PROGRESS NOTES
During cystoscopy the following was utilized on patient with no adverse affects:    45% SODIUM CHLORIDE 500 ML BAG  Lot number: [id number]  Expiration date: 2/2022      LIDOCAINE HYDROCHLORIDE JELLY 2%   Lot number: [id number]  Expiration date: 9/2022        Bladderscan performed in office today:  Patient voided - 60 mL, PVR - 81 mL

## 2021-05-10 RX ORDER — TAMSULOSIN HYDROCHLORIDE 0.4 MG/1
0.4 CAPSULE ORAL 2 TIMES DAILY
Qty: 180 CAPSULE | Refills: 0 | Status: SHIPPED | OUTPATIENT
Start: 2021-05-10 | End: 2021-08-09

## 2021-05-20 ENCOUNTER — OFFICE VISIT (OUTPATIENT)
Dept: PULMONOLOGY | Age: 76
End: 2021-05-20
Payer: MEDICARE

## 2021-05-20 VITALS
DIASTOLIC BLOOD PRESSURE: 59 MMHG | HEART RATE: 64 BPM | HEIGHT: 72 IN | BODY MASS INDEX: 31.63 KG/M2 | SYSTOLIC BLOOD PRESSURE: 125 MMHG | OXYGEN SATURATION: 98 % | WEIGHT: 233.5 LBS | TEMPERATURE: 98.5 F | RESPIRATION RATE: 16 BRPM

## 2021-05-20 DIAGNOSIS — J98.6 ELEVATED DIAPHRAGM: ICD-10-CM

## 2021-05-20 DIAGNOSIS — G47.33 OSA TREATED WITH BIPAP: Primary | ICD-10-CM

## 2021-05-20 DIAGNOSIS — J98.11 ATELECTASIS, LEFT: ICD-10-CM

## 2021-05-20 PROCEDURE — 99213 OFFICE O/P EST LOW 20 MIN: CPT | Performed by: INTERNAL MEDICINE

## 2021-05-20 NOTE — PATIENT INSTRUCTIONS
SURVEY:    You may be receiving a survey from [employer] regarding your visit today. Please complete the survey to enable us to provide the highest quality of care to you and your family. If you cannot score us a very good on any question, please call the office to discuss how we could have made your experience a very good one. Thank you.

## 2021-05-20 NOTE — PROGRESS NOTES
OUTPATIENT PULMONARY PROGRESS NOTE    TELEHEALTH VISIT    Patient:  Ricci Diaz  MRN: [id number]    Consulting Physician: HALEY Merritt CNP  Reason for Consult: Obstructive sleep apnea/chronic left hemidiaphragm elevation. Primacy Care Physician: HALEY Merritt CNP    HISTORY OF PRESENT ILLNESS:   The patient is a 64 y.o. male   Follow-up obstructive sleep apnea/history of left chronic hemidiaphragm elevation. Since he was seen last time since he was seen last time he is doing much better on the BiPAP. Since he was seen last time he denies any change in his use of BiPAP. He is very compliant with BiPAP use it every night and according to patient he use it almost 8 to 9 hours at night. His compliance data in 2020 showed good compliance today there is no compliance data available but he had call [employer] and we will get the data in next few days. He denies any problem using the BiPAP denies any problem with the mask. Any staying compliant with the BiPAP. Usually when he wake up in the morning he feels fresh does not feel groggy except once in a while when he does not have good night sleep. He usually feel energetic denies tiredness and fatigue during the daytime and usually does not doze off but he take nap for 1 hour to 1-1/2-hour every day. He usually go to sleep around 930 and wake up around 7:30 in the morning denies any change in sleeping in awakening time. He denies shortness of breath he is able to do regular activities he gets short of breath when he has to walk outside and do exertional activity. He denies orthopnea PND or pedal edema. He denies nocturnal awakening with cough wheezing chest tightness or shortness of breath. His compliance data available from 04/14/2020  To 05/13/2020 shows compliance is 100%, average usage is 8 hours and 57 minutes an AHI is 4.8 on BiPAP of 15/11. Sleep questionnaire On BIPAP  Does not wakes himself snoring.  Has no witnessed apnea. Does not wakes up with choking and gasping sensation. No dry mouth upon awakening. Negativel fatigue and tiredness during the day. Goes to sleep at 9 30 pm, wakes up 7 to  7 30 am. It takes 15 minutes to fall asleep. Wakes up 1 time at night to go to bathroom. Takes 1 nap during the day for 60 to 90 minutes. No  headache in am. No car wrecks or near wrecks because of the sleepiness. No nodding off while driving. No weight gain. No forgetfulness or decreased concentration. No nasal congestion or obstruction at night. No significant caffienated drinks or alcohol. Using CPAP 8 to 9 hr/night. No leg jerks during sleep. No restless feelings in legs at night. No numbness or burning in leg or feet. No leg aches or cramps . No loss of muscle strength when angry or laugh. No hallucination when dozing off or waking up from sleep. No paralysis upon awakening from sleep or going to sleep. No teeth grinding, nightmares, sleep walking. No night time panic attacks. ESS: 3  Sitting and reading 0  Watching TV 0  Sitting inactive in a public place (e.g a theater or a meeting)  0  As a passenger in a car for an hour without a break 0  Lying down to rest in the afternoon when circumstances permit 3  Sitting and talking to someone 0  Sitting quietly after a lunch without alcohol 0  In a car, while stopped for a few minutes in traffic 0    Initial history and evaluation  He is referred here for evaluation and management of sleep apnea and history of chronic left hemidiaphragm elevation. According to patient and wife he had previous surgeries done for his knees in the past and after the surgery was noted to have low oxygen but had not had workup done but he was never started on oxygen as he improved. No history of COPD and asthma pulmonary embolism or DVT. In December 2017 he was in the hospital for left shoulder surgery it was complicated post surgery he had MI. Workup done and needed CABG.   He has slightly prolonged course after the CABG he was requiring oxygenation after the procedure rehab was prolonged and he was on oxygen until March 2018 and at that time he had a sleep study done and he was started on CPAP and home oxygen was discontinued that time. He was seen and followed by pulmonologist in [facility] for sleep apnea, he was also told many years ago that he has left hemidiaphragm elevation and he has smaller lung on the left side because of the hemidiaphragm elevation, since he was started on CPAP he was doing better, when he was followed up by pulmonologist in [place] he was told that he will need a re-titration study in lab and he might need BiPAP his place of CPAP since then he moved here. Past Medical History:        Diagnosis Date    Chronic back pain     Coronary artery disease     s/p MI and CABG in 12/2017    H/O aortic valve replacement 12/16/2017    Bioprosthetic valve     H/O cardiac catheterization 12/06/2018    Severe three vessel disease involving the LAD, circumflex and right coronary arteries  2 of 3 bypass grafts patent Normal LVEDP. Consult to interventional cardiology for likely angioplasty and/or  stenting of the patients severe stenosis with likely stenting of the unrevascularized RCA stenosis with plans to intervene on the higher risk LIMA-LAD stenosis only at a later date if the patient fails gu    H/O three vessel coronary artery bypass 12/2017    History of atrial fibrillation 12/2017    following CABG 12/2017    History of myocardial infarction 12/2017    Hyperlipidemia     Hypertension     Osteoarthritis     Sleep apnea     Type 2 diabetes mellitus without complication [facility])        Past Surgical History:        Procedure Laterality Date    CARDIAC CATHETERIZATION Left 12/06/2018    Dr Berenice Stubbs radial-    CORONARY ARTERY BYPASS GRAFT      INTRACAPSULAR CATARACT EXTRACTION      KNEE ARTHROPLASTY      SHOULDER ARTHROPLASTY Allergies:    No Known Allergies      Home Meds:   Outpatient Encounter Medications as of 5/20/2021   Medication Sig Dispense Refill    tamsulosin (FLOMAX) 0.4 MG capsule Take 1 capsule by mouth 2 times daily 180 capsule 0    [employer] Lancets Micro Thin 33G MISC USE AS DIRECTED DAILY 100 each 3    blood glucose test strips (TRUE METRIX BLOOD GLUCOSE TEST) strip USE ONCE DAILY 100 strip 3    atorvastatin (LIPITOR) 40 MG tablet Take 1 tablet by mouth daily 90 tablet 3    isosorbide mononitrate (IMDUR) 30 MG extended release tablet Take 1 tablet by mouth daily 90 tablet 3    metoprolol succinate (TOPROL XL) 50 MG extended release tablet Take 1 tablet by mouth daily 90 tablet 3    famotidine (PEPCID) 40 MG tablet Take 1 tablet by mouth daily 90 tablet 3    metFORMIN (GLUCOPHAGE) 1000 MG tablet TAKE 1 TABLET TWICE A DAY WITH MEALS 180 tablet 3    nitroGLYCERIN (NITROSTAT) 0.4 MG SL tablet PLACE 1 TABLET UNDER THE TONGUE EVERY 5 MINUTES AS NEEDED FOR CHEST PAIN UP TO MAX OF 3 TOTAL DOSES. IF NO RELIEF AFTER 1 DOSE, CALL 911. 75 tablet 1    blood glucose monitor strips 1 strip by Other route daily Tung Metrix test strip. E11.9 100 strip 3    gabapentin (NEURONTIN) 400 MG capsule Take 400 mg by mouth 3 times daily.  aspirin 81 MG tablet Take 81 mg by mouth daily      melatonin 5 MG TABS tablet Take 5 mg by mouth as needed       Multiple Vitamins-Minerals (CENTRUM SILVER PO) Take 1 tablet by mouth daily       traMADol (ULTRAM) 50 MG tablet Take 50 mg by mouth every 6 hours as needed for Pain. Jo Ann Jarvis No facility-administered encounter medications on file as of 5/20/2021. Social History:   TOBACCO:   reports that he has never smoked. He has never used smokeless tobacco.  ETOH:   reports current alcohol use of about 7.0 standard drinks of alcohol per week.   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    Family History:       Adopted: Yes       Immunizations:    Immunization History Administered Date(s) Administered    COVID-19, Moderna, PF, 100mcg/0.5mL 01/27/2021, 02/24/2021    Influenza Virus Vaccine 10/23/2018    Influenza, High Dose (Fluzone 65 yrs and older) 10/22/2018    Influenza, Quadv, IM, PF (6 mo and older Fluzone, Flulaval, Fluarix, and 3 yrs and older Afluria) 09/11/2019    Pneumococcal Conjugate 13-valent ([id number]) 01/10/2019    Pneumococcal Conjugate Vaccine 08/25/2017    Pneumococcal Polysaccharide ([id number]) 02/20/2020         REVIEW OF SYSTEMS:  CONSTITUTIONAL: Negative for  fatigue, fevers, chills, sweats, malaise, anorexia and weight loss  EYES:  Negative for  double vision, blurred vision, dry eyes, eye discharge, visual disturbance, irritation, redness and icterus  HEENT:  Negative for  nasal congestion, hearing loss, tinnitus, epistaxis, sore mouth, sore throat, hoarseness and voice change  RESPIRATORY:  Negative for  dry cough, cough with sputum, dyspnea, wheezing, hemoptysis, chest pain, pleuritic pain and cyanosis  CARDIOVASCULAR:  Negative for  chest pain, dyspnea, palpitations, orthopnea, PND, exertional chest pressure/discomfort, early saiety, edema, syncope  GASTROINTESTINAL:  Negative for nausea, vomiting, change in bowel habits, diarrhea, constipation, abdominal pain, abdominal distention, jaundice, dysphagia, reflux, hematemesis and hemtochezia  GENITOURINARY:  positive for nocturia and hesitancy, negative for frequency, dysuria, urinary incontinence and hematuria  HEMATOLOGIC/LYMPHATIC:  negative for easy bruising, bleeding, lymphadenopathy and petechiae  ALLERGIC/IMMUNOLOGIC:  negative for recurrent infections, urticaria, hay fever, angioedema, anaphylaxis and drug reactions  ENDOCRINE:  negative for heat intolerance, cold intolerance, tremor and weight changes  MUSCULOSKELETAL:  negative for  myalgias, joint swelling and stiff joints  NEUROLOGICAL:  negative for headaches, dizziness, seizures, memory problems, coordination problems, gait problems, dysphagia, numbness, syncope and tingling  BEHAVIOR/PSYCH:  negative        \\    Physical Exam:    Vitals: BP (!) 125/59   Pulse 64   Temp 98.5 °F (36.9 °C)   Resp 16   Ht 6' (1.829 m)   Wt 233 lb 8 oz (105.9 kg)   SpO2 98%   BMI 31.67 kg/m²   Last 3 weights: Wt Readings from Last 3 Encounters:   05/20/21 233 lb 8 oz (105.9 kg)   03/24/21 240 lb (108.9 kg)   03/02/21 239 lb (108.4 kg)     Body mass index is 31.67 kg/m².     Physical Examination:     General appearance - alert, well appearing, and in no distress, overweight and acyanotic, in no respiratory distress  Mental status - alert, oriented to person, place, and time  Eyes - pupils equal and reactive, extraocular eye movements intact, sclera anicteric  Ears - right ear normal, left ear normal  Nose - normal and patent, no erythema, discharge or polyps  Mouth - mucous membranes moist, pharynx normal without lesions and large tongue, small oropharynx, Mallampati 2  Neck - supple, no significant adenopathy, short and thick neck  Chest - , no tachypnea, retractions or cyanosis, chest movement is symmetrical, there is dullness on percussion at the left base with diminished breath sound/absent breath sound at the left base, no crackles no wheezing good air entry on the right side  Heart - normal rate, regular rhythm, normal S1, S2, positive Systolic ejection aortic murmurs at AA, rubs, clicks or gallops  Abdomen - soft, nontender, nondistended, no masses or organomegaly  Neurological - alert, oriented, normal speech, no focal findings or movement disorder noted}  Extremities - peripheral pulses normal, no pedal edema, no clubbing or cyanosis  Skin - normal coloration and turgor, no rashes, no suspicious skin lesions noted         LABS:    CBC:   WBC   Date Value Ref Range Status   12/16/2019 7.6 3.5 - 11.3 k/uL Final   12/15/2019 8.0 3.5 - 11.3 k/uL Final   12/14/2019 7.8 3.5 - 11.3 k/uL Final     Hemoglobin   Date Value Ref Range Status 12/16/2019 12.8 (L) 13.0 - 17.0 g/dL Final   12/15/2019 12.9 (L) 13.0 - 17.0 g/dL Final   12/14/2019 13.3 13.0 - 17.0 g/dL Final     Platelets   Date Value Ref Range Status   12/16/2019 166 138 - 453 k/uL Final   12/15/2019 194 138 - 453 k/uL Final   12/14/2019 192 138 - 453 k/uL Final     BMP:   Sodium   Date Value Ref Range Status   03/02/2021 136 135 - 144 mmol/L Final   12/16/2019 138 135 - 144 mmol/L Final   12/15/2019 139 135 - 144 mmol/L Final     Potassium   Date Value Ref Range Status   03/02/2021 4.5 3.7 - 5.3 mmol/L Final   12/16/2019 4.4 3.7 - 5.3 mmol/L Final   12/15/2019 4.0 3.7 - 5.3 mmol/L Final     Chloride   Date Value Ref Range Status   03/02/2021 99 98 - 107 mmol/L Final   12/16/2019 102 98 - 107 mmol/L Final   12/15/2019 102 98 - 107 mmol/L Final     CO2   Date Value Ref Range Status   03/02/2021 28 20 - 31 mmol/L Final   12/16/2019 22 20 - 31 mmol/L Final   12/15/2019 23 20 - 31 mmol/L Final     BUN   Date Value Ref Range Status   03/02/2021 15 8 - 23 mg/dL Final   12/16/2019 12 8 - 23 mg/dL Final   12/15/2019 12 8 - 23 mg/dL Final     CREATININE   Date Value Ref Range Status   03/02/2021 0.74 0.70 - 1.20 mg/dL Final   12/16/2019 0.78 0.70 - 1.20 mg/dL Final   12/15/2019 0.83 0.70 - 1.20 mg/dL Final     Glucose   Date Value Ref Range Status   03/02/2021 139 (H) 70 - 99 mg/dL Final   12/16/2019 119 (H) 70 - 99 mg/dL Final   12/15/2019 132 (H) 70 - 99 mg/dL Final     Hepatic:     Amylase: No results found for: AMYLASE  Lipase: No results found for: LIPASE  CARDIAC ENZYMES: No results found for: CKTOTAL, CKMB, CKMBINDEX, TROPONINI  BNP: No results found for: BNP  Lipids:   Cholesterol   Date Value Ref Range Status   12/05/2018 97 <200 mg/dL Final     Comment:        Cholesterol Guidelines:      <200  Desirable   200-240  Borderline      >240  Undesirable          HDL   Date Value Ref Range Status   12/05/2018 46 >40 mg/dL Final     Comment:        HDL Guidelines:    <40     Undesirable   40-59 Borderline    >59     Desirable            INR:   INR   Date Value Ref Range Status   12/04/2018 1.1 0.9 - 1.2 Final     Thyroid:   TSH   Date Value Ref Range Status   09/06/2018 1.65 0.30 - 5.00 mIU/L Final     Urinalysis:     Cultures:-  -----------------------------------------------------------------    ABGs: No results found for: PHART, PO2ART, ZMC4RYP    Pulmonary Functions Testing Results:    No results found for: FEV1, FVC, AVZ0CVB, TLC, DLCO    CXR      12/04/2018 and 12/14/2019   Chronic left hemidiaphragm elevation    CTA chest 12/14/2019  1. No evidence for acute pulmonary embolism.  Note that streak artifact and   image noise decreases sensitivity for smaller emboli particularly in   segmental and subsegmental branches. 2. Enlargement of right and left pulmonary arteries which can be seen in   pulmonary hypertension. 3. Marked elevation left hemidiaphragm with some subsegmental atelectasis   around the elevated diaphragm at the left lung base       CTA Scans 12/04/18  1.  No evidence of aortic aneurysm or intimal dissection.  Moderate   atherosclerosis noted.  Aortic valve repair noted.       2.  Elevation of the left hemidiaphragm with atelectasis in the left lung. Detailed assessment of the lungs is limited by respiratory motion, but there   is no significant evidence to indicate pneumonia or pulmonary edema.       3.  No acute findings in the abdomen or pelvis. Assessment and Plan       ICD-10-CM    1. FABIAN treated with BiPAP G47.33    2. Chronic Left Diaphragm Elevation  3. Left basal atelactasis secondary to elevated left diaphragm    Plan and recommendation  Compliance data reviewed from 04/14/2020 to 05/13/2020 for 30 days and he has good compliance with BiPAP and he is benefiting with the use of BiPAP.   Continue Bipap at 15/11 cm pressure  BIPAP at least 4 hrs qhs  Wt loss is recommended and discussed  Follow good sleep hygeine instructions  Use humidifier  Prescription electronic signature was used to authenticate this note.

## 2021-06-23 ENCOUNTER — OFFICE VISIT (OUTPATIENT)
Dept: UROLOGY | Age: 76
End: 2021-06-23
Payer: MEDICARE

## 2021-06-23 VITALS
BODY MASS INDEX: 31.87 KG/M2 | DIASTOLIC BLOOD PRESSURE: 78 MMHG | WEIGHT: 235 LBS | SYSTOLIC BLOOD PRESSURE: 118 MMHG | TEMPERATURE: 98 F

## 2021-06-23 DIAGNOSIS — R33.9 INCOMPLETE BLADDER EMPTYING: Primary | ICD-10-CM

## 2021-06-23 DIAGNOSIS — R39.15 URGENCY OF URINATION: ICD-10-CM

## 2021-06-23 DIAGNOSIS — N13.8 BPH WITH OBSTRUCTION/LOWER URINARY TRACT SYMPTOMS: ICD-10-CM

## 2021-06-23 DIAGNOSIS — N40.1 BPH WITH OBSTRUCTION/LOWER URINARY TRACT SYMPTOMS: ICD-10-CM

## 2021-06-23 DIAGNOSIS — R35.0 FREQUENCY OF URINATION: ICD-10-CM

## 2021-06-23 PROCEDURE — 51798 US URINE CAPACITY MEASURE: CPT | Performed by: PHYSICIAN ASSISTANT

## 2021-06-23 PROCEDURE — 99213 OFFICE O/P EST LOW 20 MIN: CPT | Performed by: PHYSICIAN ASSISTANT

## 2021-06-23 ASSESSMENT — ENCOUNTER SYMPTOMS
ABDOMINAL PAIN: 0
CONSTIPATION: 0
VOMITING: 0
WHEEZING: 0
COUGH: 0
BACK PAIN: 0
SHORTNESS OF BREATH: 0
EYE REDNESS: 0
NAUSEA: 0
COLOR CHANGE: 0

## 2021-06-23 NOTE — PATIENT INSTRUCTIONS
Schedule a Vaccine  When you qualify to receive the vaccine per the [address] guidelines, call the [facility]) COVID-19 Vaccination Hotline to schedule your appointment or to get additional information about the [facility]) locations which are offering the COVID-19 vaccine. To be most effective, it's important that you receive two doses of one of the COVID-19 vaccines. -If you are receiving the [place] vaccine, your second shot will be scheduled as close to 21 days after the first shot as possible. -If you are receiving the Moderna vaccine, your second shot will be scheduled as close to 28 days after the first shot as possible. [address] Vaccination Hotline: [phone]    In partnership with [facility] and [facility] HEALTH Departments, patients can call [phone], Monday-Friday 8:00am-4:00pm for scheduling at our Hospitals. Or visit the [facility] websites for additional information of vaccine administration locations. Links to [facility]) website and Health Department websites:    [employer]/[facility]-monitoring-coronavirus-covid-19/covid-19-vaccine/[place]-vaccine    [email or web address]    [email or web address]    SURVEY:    You may be receiving a survey from [employer] regarding your visit today. Please complete the survey to enable us to provide the highest quality of care to you and your family. If you cannot score us a very good on any question, please call the office to discuss how we could have made your experience a very good one. Thank you.     Your MA today: Corona Hartman

## 2021-06-23 NOTE — PROGRESS NOTES
HPI:      Patient is a 64 y.o. male in no acute distress. He is alert and oriented to person, place, and time. History  B. Might APRN for frequent UTI. He is here with his daughter today. There is some baseline confusion and he is hard of hearing. His daughter reports he has been treated multiple times with antibiotics for suspected urinary tract infections. Upon further review of the chart I only see one positive culture from 2/2020 that shows E. coli. He did previously see a urologist in [place] before they moved to [place]. He has been on Flomax for the last 3 to 5 years. He does complain of frequency, urgency, and urge incontinence. He does not wear pads. The urge incontinence does result in him needing to change his underwear approximately 4 times per week. PVR is elevated in the office today, 223ml. He denies constipation. He denies any dysuria or gross hematuria. He denies history of stones. Flomax increased to twice a day    3/2021 Cysto: Bilobar hyperplasia of the prostate with normal bladder anatomy    Today:  Patient presents today for follow-up incomplete bladder emptying, BPH, urgency and frequency. Patient is on Flomax twice a day. Patient denies any new or worsening urinary tract symptoms. He denies any adverse reactions from Flomax. He denies any fever, chills, gross hematuria, flank pain, dysuria. Patient last voided 15 mins ago, scan = 207 mL. This bladder scan is consistent with prior PVRs. Patient did have lab work 3/2021 which showed a BUN of 15 and a creatinine of 0.74. Patient's GFR was greater than 60. He does have a daily bowel movement.     Past Medical History:   Diagnosis Date    Chronic back pain     Coronary artery disease     s/p MI and CABG in 12/2017    H/O aortic valve replacement 12/16/2017    Bioprosthetic valve     H/O cardiac catheterization 12/06/2018    Severe three vessel disease involving the LAD, circumflex and right coronary arteries  2 of 3 bypass grafts patent Normal LVEDP. Consult to interventional cardiology for likely angioplasty and/or  stenting of the patients severe stenosis with likely stenting of the unrevascularized RCA stenosis with plans to intervene on the higher risk LIMA-LAD stenosis only at a later date if the patient fails gu    H/O three vessel coronary artery bypass 12/2017    History of atrial fibrillation 12/2017    following CABG 12/2017    History of myocardial infarction 12/2017    Hyperlipidemia     Hypertension     Osteoarthritis     Sleep apnea     Type 2 diabetes mellitus without complication [facility])      Past Surgical History:   Procedure Laterality Date    CARDIAC CATHETERIZATION Left 12/06/2018    Dr Babar Tucker radial-    CORONARY ARTERY BYPASS GRAFT      INTRACAPSULAR CATARACT EXTRACTION      KNEE ARTHROPLASTY      SHOULDER ARTHROPLASTY       Outpatient Encounter Medications as of 6/23/2021   Medication Sig Dispense Refill    tamsulosin (FLOMAX) 0.4 MG capsule Take 1 capsule by mouth 2 times daily 180 capsule 0    [employer]S Lancets Micro Thin 33G MISC USE AS DIRECTED DAILY 100 each 3    blood glucose test strips (TRUE METRIX BLOOD GLUCOSE TEST) strip USE ONCE DAILY 100 strip 3    atorvastatin (LIPITOR) 40 MG tablet Take 1 tablet by mouth daily 90 tablet 3    isosorbide mononitrate (IMDUR) 30 MG extended release tablet Take 1 tablet by mouth daily 90 tablet 3    metoprolol succinate (TOPROL XL) 50 MG extended release tablet Take 1 tablet by mouth daily 90 tablet 3    famotidine (PEPCID) 40 MG tablet Take 1 tablet by mouth daily 90 tablet 3    metFORMIN (GLUCOPHAGE) 1000 MG tablet TAKE 1 TABLET TWICE A DAY WITH MEALS 180 tablet 3    nitroGLYCERIN (NITROSTAT) 0.4 MG SL tablet PLACE 1 TABLET UNDER THE TONGUE EVERY 5 MINUTES AS NEEDED FOR CHEST PAIN UP TO MAX OF 3 TOTAL DOSES.  IF NO RELIEF AFTER 1 DOSE, CALL 911. 75 tablet 1    blood glucose monitor strips 1 strip by Other route daily Tung Metrix test strip. E11.9 100 strip 3    gabapentin (NEURONTIN) 400 MG capsule Take 400 mg by mouth 3 times daily.  aspirin 81 MG tablet Take 81 mg by mouth daily      melatonin 5 MG TABS tablet Take 5 mg by mouth as needed       Multiple Vitamins-Minerals (CENTRUM SILVER PO) Take 1 tablet by mouth daily       traMADol (ULTRAM) 50 MG tablet Take 50 mg by mouth every 6 hours as needed for Pain. Amairani Patel No facility-administered encounter medications on file as of 6/23/2021. Current Outpatient Medications on File Prior to Visit   Medication Sig Dispense Refill    tamsulosin (FLOMAX) 0.4 MG capsule Take 1 capsule by mouth 2 times daily 180 capsule 0    CVS Lancets Micro Thin 33G MISC USE AS DIRECTED DAILY 100 each 3    blood glucose test strips (TRUE METRIX BLOOD GLUCOSE TEST) strip USE ONCE DAILY 100 strip 3    atorvastatin (LIPITOR) 40 MG tablet Take 1 tablet by mouth daily 90 tablet 3    isosorbide mononitrate (IMDUR) 30 MG extended release tablet Take 1 tablet by mouth daily 90 tablet 3    metoprolol succinate (TOPROL XL) 50 MG extended release tablet Take 1 tablet by mouth daily 90 tablet 3    famotidine (PEPCID) 40 MG tablet Take 1 tablet by mouth daily 90 tablet 3    metFORMIN (GLUCOPHAGE) 1000 MG tablet TAKE 1 TABLET TWICE A DAY WITH MEALS 180 tablet 3    nitroGLYCERIN (NITROSTAT) 0.4 MG SL tablet PLACE 1 TABLET UNDER THE TONGUE EVERY 5 MINUTES AS NEEDED FOR CHEST PAIN UP TO MAX OF 3 TOTAL DOSES. IF NO RELIEF AFTER 1 DOSE, CALL 911. 75 tablet 1    blood glucose monitor strips 1 strip by Other route daily Tung Metrix test strip. E11.9 100 strip 3    gabapentin (NEURONTIN) 400 MG capsule Take 400 mg by mouth 3 times daily.        aspirin 81 MG tablet Take 81 mg by mouth daily      melatonin 5 MG TABS tablet Take 5 mg by mouth as needed       Multiple Vitamins-Minerals (CENTRUM SILVER PO) Take 1 tablet by mouth daily       traMADol (ULTRAM) 50 MG tablet Take 50 mg by mouth every 6 hours Incomplete bladder emptying     2. BPH with obstruction/lower urinary tract symptoms  RI MEASUREMENT,POST-VOID RESIDUAL VOLUME BY US,NON-IMAGING   3. Frequency of urination  RI MEASUREMENT,POST-VOID RESIDUAL VOLUME BY US,NON-IMAGING   4. Urgency of urination         PLAN:  Continue Flomax twice a day    Patient is doing well and is not having any bothersome symptoms for himself.     He not interested in having any procedures at this time    Follow-up in 6 months with PVR

## 2021-07-13 ENCOUNTER — HOSPITAL ENCOUNTER (OUTPATIENT)
Age: 76
Discharge: HOME | End: 2021-07-13
Payer: MEDICARE

## 2021-07-13 ENCOUNTER — OFFICE VISIT (OUTPATIENT)
Dept: CARDIOLOGY | Age: 76
End: 2021-07-13
Payer: MEDICARE

## 2021-07-13 VITALS
HEIGHT: 72 IN | HEART RATE: 58 BPM | WEIGHT: 233.8 LBS | SYSTOLIC BLOOD PRESSURE: 124 MMHG | BODY MASS INDEX: 31.67 KG/M2 | DIASTOLIC BLOOD PRESSURE: 64 MMHG | RESPIRATION RATE: 17 BRPM | OXYGEN SATURATION: 97 %

## 2021-07-13 DIAGNOSIS — Z95.1 S/P CABG X 3: ICD-10-CM

## 2021-07-13 DIAGNOSIS — I20.8 STABLE ANGINA (HCC): ICD-10-CM

## 2021-07-13 DIAGNOSIS — R07.89 ATYPICAL CHEST PAIN: ICD-10-CM

## 2021-07-13 DIAGNOSIS — I10 ESSENTIAL HYPERTENSION: ICD-10-CM

## 2021-07-13 DIAGNOSIS — Z95.820 S/P ANGIOPLASTY WITH STENT: ICD-10-CM

## 2021-07-13 DIAGNOSIS — I35.0 SEVERE AORTIC STENOSIS: ICD-10-CM

## 2021-07-13 DIAGNOSIS — I25.10 ASHD (ARTERIOSCLEROTIC HEART DISEASE): ICD-10-CM

## 2021-07-13 DIAGNOSIS — I50.32 CHRONIC DIASTOLIC CONGESTIVE HEART FAILURE (HCC): ICD-10-CM

## 2021-07-13 DIAGNOSIS — I20.9 ANGINA, CLASS III (HCC): Primary | ICD-10-CM

## 2021-07-13 LAB
ANION GAP SERPL CALCULATED.3IONS-SCNC: 13 MMOL/L (ref 9–17)
BUN BLDV-MCNC: 13 MG/DL (ref 8–23)
BUN/CREAT BLD: 18 (ref 9–20)
CALCIUM SERPL-MCNC: 10 MG/DL (ref 8.6–10.4)
CHLORIDE BLD-SCNC: 100 MMOL/L (ref 98–107)
CO2: 24 MMOL/L (ref 20–31)
CREAT SERPL-MCNC: 0.72 MG/DL (ref 0.7–1.2)
GFR AFRICAN AMERICAN: >60 ML/MIN
GFR NON-AFRICAN AMERICAN: >60 ML/MIN
GFR SERPL CREATININE-BSD FRML MDRD: ABNORMAL ML/MIN/{1.73_M2}
GFR SERPL CREATININE-BSD FRML MDRD: ABNORMAL ML/MIN/{1.73_M2}
GLUCOSE BLD-MCNC: 146 MG/DL (ref 70–99)
HCT VFR BLD CALC: 40.7 % (ref 40.7–50.3)
HEMOGLOBIN: 13.5 G/DL (ref 13–17)
MCH RBC QN AUTO: 34.5 PG (ref 25.2–33.5)
MCHC RBC AUTO-ENTMCNC: 33.2 G/DL (ref 28.4–34.8)
MCV RBC AUTO: 104.1 FL (ref 82.6–102.9)
NRBC AUTOMATED: 0 PER 100 WBC
PDW BLD-RTO: 13.7 % (ref 11.8–14.4)
PLATELET # BLD: 171 K/UL (ref 138–453)
PMV BLD AUTO: 10.4 FL (ref 8.1–13.5)
POTASSIUM SERPL-SCNC: 4.9 MMOL/L (ref 3.7–5.3)
RBC # BLD: 3.91 M/UL (ref 4.21–5.77)
SODIUM BLD-SCNC: 137 MMOL/L (ref 135–144)
WBC # BLD: 5.9 K/UL (ref 3.5–11.3)

## 2021-07-13 PROCEDURE — 99215 OFFICE O/P EST HI 40 MIN: CPT | Performed by: FAMILY MEDICINE

## 2021-07-13 PROCEDURE — 93000 ELECTROCARDIOGRAM COMPLETE: CPT | Performed by: FAMILY MEDICINE

## 2021-07-13 PROCEDURE — 36415 COLL VENOUS BLD VENIPUNCTURE: CPT

## 2021-07-13 PROCEDURE — 80048 BASIC METABOLIC PNL TOTAL CA: CPT

## 2021-07-13 PROCEDURE — 85027 COMPLETE CBC AUTOMATED: CPT

## 2021-07-13 RX ORDER — ISOSORBIDE MONONITRATE 60 MG/1
60 TABLET, EXTENDED RELEASE ORAL DAILY
Qty: 90 TABLET | Refills: 3 | Status: SHIPPED | OUTPATIENT
Start: 2021-07-13 | End: 2022-04-25

## 2021-07-13 NOTE — PROGRESS NOTES
Joette Blizzard am scribing for and in the presence of Jesse Yoon MD, MS, F.A.C.C. Patient: John Reynolds  : 1945  Date of Visit: 2021    REASON FOR VISIT / CONSULTATION: Follow-up (Hx:CAD S/P Stent,CHF,Aortic Valve Replacement,Angina. Has been having chest pain, roughly every 3 weeks he needed nitro and then it happened every day last week. On right side of chest. Achy in nature. Is doing PT and thought he pulled a muscle. Nitro works. Denies: SOB, Lightheaded/dizziness, Palpitations. )    Dear Davonte Rodriguez, APRN - CNP,    I had the pleasure of seeing John Reynolds in my office today. Mr. Angelito Rowley is a 64 y.o. male with a history of atherosclerotic heart disease. In 2017 Mr. Angelito Rowley had went into surgery for his shoulder and postoperatively had a myocardial infarction which led to a triple vessel coronary artery bypass as well as a aortic valve replacement  due to severe aortic stenosis. He was found to have postoperative atrial fibrillation which led to him starting on Amiodarone but had been stopped six weeks after due to no reoccurrence of atrial fibrillation. He had a ulcer in his small intenstine which had caused internal bleeding but has since been resolved and is taking pepcid for this. He was continued on Eliquis but had recently been stopped since there has been no evidence of atrial fibrillation including on his recent holter monitor. His cardiac catheterization on 2018 showed a 75% stenosis in RCA as well as a 70% stenosis in his Circumflex, but when he was sent down to [facility] for possible stenting treatment, however Dr. Malika Raza performed an FFR of the lesion which showed that the stenosis was about 60 percent blockage and therefore not likely to benefit from stenting.  Although there was also a severe stenosis in the junction of the LIMA-LAD, this was felt to be high risk stenting and therefore deferred for a trial of guideline directed trial of maximal medical management including cardiac rehab. His echocardiogram on 12/5/2018 with an ejection fraction of 50-55% with a bioprosthetic aortic valve. Mr. Ashia Levin had echo done 1/16/2020 EF 55% Compared to the previous study of 12/5/18, no significant change was seen. Since the last time I saw Mr. Ashia Levin he has been having chest pain on and off. Last week he had it on and off every day, Wednesday - Saturday and had to take [facility] which he did say resolved the pain. It is achy in nature and on the right side of his chest. Says it typically occurred with movement including mowing the lawn and typically resolved within minutes after taking Nitro. He thought/hoped that maybe he had pulled a muscle at physical therapy. His wife reports some swelling in his legs at times. He denies any shortness of breath, lightheaded/dizziness or palpitations. He denies any abdominal pain, bleeding problems, bowel issues, problems with his medications or any other concerns at this time. No cough, fever or chills. No nausea or vomiting. Exercise Tolerance: He reports having a good exercise tolerance. Mr. Ashia Levin says that he can walk about a block without developing shortness of breath and/or chest discomfort. He states weakness is what limits him from going further. Past Medical History:   Diagnosis Date    Chronic back pain     Coronary artery disease     s/p MI and CABG in 12/2017    H/O aortic valve replacement 12/16/2017    Bioprosthetic valve     H/O cardiac catheterization 12/06/2018    Severe three vessel disease involving the LAD, circumflex and right coronary arteries  2 of 3 bypass grafts patent Normal LVEDP. Consult to interventional cardiology for likely angioplasty and/or  stenting of the patients severe stenosis with likely stenting of the unrevascularized RCA stenosis with plans to intervene on the higher risk LIMA-LAD stenosis only at a later date if the patient fails gu    H/O three vessel coronary artery bypass 12/2017  History of atrial fibrillation 12/2017    following CABG 12/2017    History of myocardial infarction 12/2017    Hyperlipidemia     Hypertension     Osteoarthritis     Sleep apnea     Type 2 diabetes mellitus without complication ([address]      CURRENT ALLERGIES: Patient has no known allergies. REVIEW OF SYSTEMS: 14 systems were reviewed. Pertinent positives and negatives as above, all else negative. Past Surgical History:   Procedure Laterality Date    CARDIAC CATHETERIZATION Left 12/06/2018    Dr Nash [facility]/left radial-    CORONARY ARTERY BYPASS GRAFT      INTRACAPSULAR CATARACT EXTRACTION      KNEE ARTHROPLASTY      SHOULDER ARTHROPLASTY      Social History:  Social History     Tobacco Use    Smoking status: Never Smoker    Smokeless tobacco: Never Used   Vaping Use    Vaping Use: Never used   Substance Use Topics    Alcohol use:  Yes     Alcohol/week: 7.0 standard drinks     Types: 7 Glasses of wine per week     Comment: most days    Drug use: No        CURRENT MEDICATIONS:  Outpatient Medications Marked as Taking for the 7/13/21 encounter (Office Visit) with Hilario Naqvi MD   Medication Sig Dispense Refill    tamsulosin (FLOMAX) 0.4 MG capsule Take 1 capsule by mouth 2 times daily 180 capsule 0    [employer] Lancets Micro Thin 33G MISC USE AS DIRECTED DAILY 100 each 3    blood glucose test strips (TRUE METRIX BLOOD GLUCOSE TEST) strip USE ONCE DAILY 100 strip 3    atorvastatin (LIPITOR) 40 MG tablet Take 1 tablet by mouth daily 90 tablet 3    isosorbide mononitrate (IMDUR) 30 MG extended release tablet Take 1 tablet by mouth daily 90 tablet 3    metoprolol succinate (TOPROL XL) 50 MG extended release tablet Take 1 tablet by mouth daily 90 tablet 3    famotidine (PEPCID) 40 MG tablet Take 1 tablet by mouth daily 90 tablet 3    metFORMIN (GLUCOPHAGE) 1000 MG tablet TAKE 1 TABLET TWICE A DAY WITH MEALS 180 tablet 3    nitroGLYCERIN (NITROSTAT) 0.4 MG SL tablet PLACE 1 TABLET UNDER THE TONGUE EVERY 5 MINUTES AS NEEDED FOR CHEST PAIN UP TO MAX OF 3 TOTAL DOSES. IF NO RELIEF AFTER 1 DOSE, CALL 911. 75 tablet 1    blood glucose monitor strips 1 strip by Other route daily Tung Metrix test strip. E11.9 100 strip 3    gabapentin (NEURONTIN) 400 MG capsule Take 400 mg by mouth 3 times daily.  aspirin 81 MG tablet Take 81 mg by mouth daily      melatonin 5 MG TABS tablet Take 5 mg by mouth as needed       Multiple Vitamins-Minerals (CENTRUM SILVER PO) Take 1 tablet by mouth daily       traMADol (ULTRAM) 50 MG tablet Take 50 mg by mouth every 6 hours as needed for Pain. Asya Travis FAMILY HISTORY: family history is not on file. He was adopted. PHYSICAL EXAM:   /64 (Site: Left Upper Arm, Position: Sitting, Cuff Size: Medium Adult)   Pulse 58   Resp 17   Ht 6' (1.829 m)   Wt 233 lb 12.8 oz (106.1 kg)   SpO2 97%   BMI 31.71 kg/m²  Body mass index is 31.71 kg/m². Constitutional: He is oriented to person, place, and time. He appears well-developed and well-nourished. In no acute distress. HEENT: Normocephalic and atraumatic. No JVD present. Carotid bruit is not present. No mass and no thyromegaly present. No lymphadenopathy present. Cardiovascular: Normal rate, regular rhythm, normal heart sounds. Exam reveals no gallop and no friction rubs. Harsh 2/6 systolic murmur, 2nd intercostal space on the RIGHT just lateral to the sternum. Pulmonary/Chest: Effort normal and breath sounds normal. No respiratory distress. He has no wheezes, rhonchi or rales. Abdominal: Soft, non-tender. Bowel sounds and aorta are normal. He exhibits no organomegaly, mass or bruit. Extremities: Trace. No cyanosis or clubbing. 2+ radial and carotid pulses. Distal extremity pulses: 2+ bilaterally. Neurological: He is alert and oriented to person, place, and time. No evidence of gross cranial nerve deficit. Coordination appeared normal.   Skin: Skin is warm and dry. There is no rash or diaphoresis. attack, death, or the need for further procedures. I also discussed the fact that although treatment with simple medical management is a potential treatment option in place of cardiac catheterization, I expressed my opinion that cardiac catheterization in order to define their coronary anatomy and rule out severe 3 vessel or left main coronary artery disease would significant help guide the most appropriate treatment strategy ranging from no treatment, to medications, stents, to even coronary bypass surgery. The patient verbalized understanding of the risks benefits and alternatives and stated that they would like to undergo the procedure. We will plan to schedule the procedure here at [facility] on 7/15/2021. Atherosclerotic Heart Disease: Hx of MI with CABG x 3 in 12/2017. Severe disease in the circumflex coronary artery and LIMA-LAD graft but will plan to treat with guideline directed trial of maximal medical management. Antiplatelet Agent: Continue aspirin 81 mg daily. I also reminded him to watch for signs of blood in his stool or black tarry stools and stop the medication immediately if this develops as this could be life threatening. Beta Blocker: Continue metoprolol succinate (Toprol XL) 50 mg once daily. Anti-anginal medications: INCREASE to isosorbide mononitrate (Imdur) 60 mg once daily. Anti-anginal medications: Continue nitroglycerin 0.4 mg tablets as needed for chest pain. Cholesterol Reduction Therapy: Continue Atorvastatin (Lipitor) 40 mg daily. Chronic Diastolic Heart Failure: EF of 50-55% on 12/4/2018 - 55% in 1/16/20. Currently well controlled. Beta Blocker: Continue metoprolol succinate (Toprol XL) 50 mg  once daily. Nonpharmacologic management of Heart Failure: I advised him to try and keep his legs up whenever possible and to limit salt in his diet.        History of Severe Aortic Stenosis: Asymptomatic-S/P: Bioprosthetic Aortic Valve replacement in 12/2017. · Beta Blocker: Continue metoprolol succinate (Toprol XL) 50 mg once daily. Essential Hypertension: Controlled  · Beta Blocker: Continue metoprolol succinate (Toprol XL) 50 mg once daily. · Chronic Stable Angina: Continue with guideline directed trial of maximal medical management. · Beta Blocker: Continue Metoprolol succinate (Toprol XL) 50 mg daily. · Anti-anginal medications: INCREASE to isosorbide mononitrate (Imdur) 60 mg once daily. · Anti-anginal medications: Continue nitroglycerin 0.4 mg tablets as needed for chest pain. Finally, I recommended that he continue his current medications and follow up with you as previously scheduled. FOLLOW UP:   I told Mr. Shirin Joseph to call my office if he had any problems, but otherwise I asked him to No follow-ups on file. However, I would be happy to see him sooner should the need arise. Sincerely,  Elva Coulter. Car ZEE, MS, F.A.C.C. [facility]     [address] [facility], [address] [facility]  Phone: [phone], Fax: [phone]     I believe that the risk of significant morbidity and mortality related to the patient's current medical conditions are: intermediate-high. The documentation recorded by the scribe, accurately and completely reflects the services I personally performed and the decisions made by me. Amira Retana MD, MS, F.A.C.C.  July 13, 2021

## 2021-07-15 ENCOUNTER — HOSPITAL ENCOUNTER (OUTPATIENT)
Dept: CARDIAC CATH/INVASIVE PROCEDURES | Age: 76
Discharge: HOME | End: 2021-07-15
Attending: FAMILY MEDICINE | Admitting: FAMILY MEDICINE
Payer: MEDICARE

## 2021-07-15 VITALS
WEIGHT: 233 LBS | SYSTOLIC BLOOD PRESSURE: 139 MMHG | HEART RATE: 71 BPM | RESPIRATION RATE: 25 BRPM | HEIGHT: 72 IN | TEMPERATURE: 97.7 F | BODY MASS INDEX: 31.56 KG/M2 | OXYGEN SATURATION: 94 % | DIASTOLIC BLOOD PRESSURE: 59 MMHG

## 2021-07-15 PROCEDURE — 2500000003 HC RX 250 WO HCPCS

## 2021-07-15 PROCEDURE — 6360000004 HC RX CONTRAST MEDICATION: Performed by: FAMILY MEDICINE

## 2021-07-15 PROCEDURE — 93459 L HRT ART/GRFT ANGIO: CPT | Performed by: FAMILY MEDICINE

## 2021-07-15 PROCEDURE — C1894 INTRO/SHEATH, NON-LASER: HCPCS

## 2021-07-15 PROCEDURE — 6360000002 HC RX W HCPCS

## 2021-07-15 PROCEDURE — C1769 GUIDE WIRE: HCPCS

## 2021-07-15 PROCEDURE — 2709999900 HC NON-CHARGEABLE SUPPLY

## 2021-07-15 PROCEDURE — 82947 ASSAY GLUCOSE BLOOD QUANT: CPT

## 2021-07-15 PROCEDURE — 2580000003 HC RX 258: Performed by: FAMILY MEDICINE

## 2021-07-15 RX ORDER — DIPHENHYDRAMINE HCL 25 MG
50 CAPSULE ORAL ONCE
Status: DISCONTINUED | OUTPATIENT
Start: 2021-07-15 | End: 2021-07-15 | Stop reason: HOSPADM

## 2021-07-15 RX ORDER — SODIUM CHLORIDE 9 MG/ML
25 INJECTION, SOLUTION INTRAVENOUS PRN
Status: DISCONTINUED | OUTPATIENT
Start: 2021-07-15 | End: 2021-07-15 | Stop reason: HOSPADM

## 2021-07-15 RX ORDER — SODIUM CHLORIDE 0.9 % (FLUSH) 0.9 %
5-40 SYRINGE (ML) INJECTION EVERY 12 HOURS SCHEDULED
Status: DISCONTINUED | OUTPATIENT
Start: 2021-07-15 | End: 2021-07-15 | Stop reason: HOSPADM

## 2021-07-15 RX ORDER — SODIUM CHLORIDE 9 MG/ML
INJECTION, SOLUTION INTRAVENOUS CONTINUOUS
Status: DISCONTINUED | OUTPATIENT
Start: 2021-07-15 | End: 2021-07-15 | Stop reason: HOSPADM

## 2021-07-15 RX ORDER — SODIUM CHLORIDE 0.9 % (FLUSH) 0.9 %
5-40 SYRINGE (ML) INJECTION PRN
Status: DISCONTINUED | OUTPATIENT
Start: 2021-07-15 | End: 2021-07-15 | Stop reason: HOSPADM

## 2021-07-15 RX ORDER — NITROGLYCERIN 0.4 MG/1
0.4 TABLET SUBLINGUAL EVERY 5 MIN PRN
Status: DISCONTINUED | OUTPATIENT
Start: 2021-07-15 | End: 2021-07-15 | Stop reason: HOSPADM

## 2021-07-15 RX ORDER — ACETAMINOPHEN 325 MG/1
650 TABLET ORAL EVERY 4 HOURS PRN
Status: DISCONTINUED | OUTPATIENT
Start: 2021-07-15 | End: 2021-07-15 | Stop reason: HOSPADM

## 2021-07-15 RX ADMIN — IOPAMIDOL 80 ML: 755 INJECTION, SOLUTION INTRAVENOUS at 10:31

## 2021-07-15 RX ADMIN — SODIUM CHLORIDE: 9 INJECTION, SOLUTION INTRAVENOUS at 08:47

## 2021-07-15 NOTE — PROGRESS NOTES
Inpatients must meet criteria 1 through 7.   1. Minimum 30 minutes after last dose of sedative medication, minimum 120 minutes after last dose of reversal agent. Yes   2. Systolic BP stable within 20 mmHg for 30 minutes & systolic BP between 90 & 899 or within 10 mmHg of baseline. Yes   3. Pulse between 60 and 100 or within 10 bpm of baseline. Yes   4. Spontaneous respiratory rate >/= 10 per minute. Yes   5. SaO2 >/= 95 or >/= baseline. Yes   6. Able to cough and swallow or return to baseline function. Yes   7. Alert and oriented or return to baseline mental status. Yes   8. Demonstrates controlled, coordinated movements, ambulates with steady gait, or return to baseline activity function. Yes   9. Minimal or no pain or nausea, or at a level tolerable and acceptable to patient. Yes   10. Takes and retains oral fluids as allowed. Yes   11. Procedural / perioperative site stable. Minimal or no bleeding. Yes   12. If GI endoscopy procedure, minimal or no abdominal distention or passing flatus. N/A   13. Written discharge instructions and emergency telephone number provided. Yes   14. Accompanied by a responsible adult. Yes   Adult patient discharged from facility without responsible person meets above criteria plus the following:   a) remains awake without stimulus for 30 minutes   b) oriented appropriate for age   c) all vital signs stable   d) no significant risk of losing protective reflexes   e) able to maintain pre-procedure mobility without assistance   f) no nausea or dizziness   g) transportation arrangements that do not require patient to operate motor Vehicle.    N/A

## 2021-07-15 NOTE — PROGRESS NOTES
Discharge instructions reviewed with patient and spouse, voice understanding. Dressed for home.,  Ambulates off department with wife and staff unaided, gait steady.

## 2021-07-15 NOTE — PROGRESS NOTES
Patient returned to pre/post area. Alert and oriented, denies pain. Left radial strong and palpable distal to puncture site. Pressure dressing in place no redness/bleeding/swelling noted. Will continue to monitor.

## 2021-07-16 ENCOUNTER — HOSPITAL ENCOUNTER (OUTPATIENT)
Dept: CARDIAC CATH/INVASIVE PROCEDURES | Age: 76
Discharge: HOME | End: 2021-07-17
Attending: INTERNAL MEDICINE | Admitting: INTERNAL MEDICINE
Payer: MEDICARE

## 2021-07-16 DIAGNOSIS — Z95.820 S/P ANGIOPLASTY WITH STENT: ICD-10-CM

## 2021-07-16 LAB
ACTIVATED CLOTTING TIME: 235 SEC (ref 79–149)
GLUCOSE BLD-MCNC: 128 MG/DL (ref 75–110)
GLUCOSE BLD-MCNC: 147 MG/DL (ref 75–110)
TROPONIN INTERP: NORMAL
TROPONIN T: NORMAL NG/ML
TROPONIN, HIGH SENSITIVITY: 9 NG/L (ref 0–22)

## 2021-07-16 PROCEDURE — 6360000002 HC RX W HCPCS

## 2021-07-16 PROCEDURE — C1760 CLOSURE DEV, VASC: HCPCS

## 2021-07-16 PROCEDURE — C1887 CATHETER, GUIDING: HCPCS

## 2021-07-16 PROCEDURE — 2700000000 HC OXYGEN THERAPY PER DAY

## 2021-07-16 PROCEDURE — C1874 STENT, COATED/COV W/DEL SYS: HCPCS

## 2021-07-16 PROCEDURE — 2580000003 HC RX 258: Performed by: INTERNAL MEDICINE

## 2021-07-16 PROCEDURE — 84484 ASSAY OF TROPONIN QUANT: CPT

## 2021-07-16 PROCEDURE — C1894 INTRO/SHEATH, NON-LASER: HCPCS

## 2021-07-16 PROCEDURE — 2500000003 HC RX 250 WO HCPCS

## 2021-07-16 PROCEDURE — C1725 CATH, TRANSLUMIN NON-LASER: HCPCS

## 2021-07-16 PROCEDURE — 6360000004 HC RX CONTRAST MEDICATION

## 2021-07-16 PROCEDURE — 6370000000 HC RX 637 (ALT 250 FOR IP): Performed by: INTERNAL MEDICINE

## 2021-07-16 PROCEDURE — 94761 N-INVAS EAR/PLS OXIMETRY MLT: CPT

## 2021-07-16 PROCEDURE — 85347 COAGULATION TIME ACTIVATED: CPT

## 2021-07-16 PROCEDURE — 36415 COLL VENOUS BLD VENIPUNCTURE: CPT

## 2021-07-16 PROCEDURE — C9600 PERC DRUG-EL COR STENT SING: HCPCS | Performed by: INTERNAL MEDICINE

## 2021-07-16 PROCEDURE — C1769 GUIDE WIRE: HCPCS

## 2021-07-16 PROCEDURE — 6370000000 HC RX 637 (ALT 250 FOR IP)

## 2021-07-16 PROCEDURE — 82947 ASSAY GLUCOSE BLOOD QUANT: CPT

## 2021-07-16 PROCEDURE — 2709999900 HC NON-CHARGEABLE SUPPLY

## 2021-07-16 RX ORDER — ACETAMINOPHEN 325 MG/1
650 TABLET ORAL EVERY 4 HOURS PRN
Status: DISCONTINUED | OUTPATIENT
Start: 2021-07-16 | End: 2021-07-17 | Stop reason: HOSPADM

## 2021-07-16 RX ORDER — SODIUM CHLORIDE 9 MG/ML
INJECTION, SOLUTION INTRAVENOUS CONTINUOUS
Status: DISCONTINUED | OUTPATIENT
Start: 2021-07-16 | End: 2021-07-17 | Stop reason: HOSPADM

## 2021-07-16 RX ORDER — SODIUM CHLORIDE 0.9 % (FLUSH) 0.9 %
5-40 SYRINGE (ML) INJECTION PRN
Status: DISCONTINUED | OUTPATIENT
Start: 2021-07-16 | End: 2021-07-17 | Stop reason: HOSPADM

## 2021-07-16 RX ORDER — ONDANSETRON 2 MG/ML
4 INJECTION INTRAMUSCULAR; INTRAVENOUS EVERY 6 HOURS PRN
Status: DISCONTINUED | OUTPATIENT
Start: 2021-07-16 | End: 2021-07-17 | Stop reason: HOSPADM

## 2021-07-16 RX ORDER — ATORVASTATIN CALCIUM 40 MG/1
40 TABLET, FILM COATED ORAL NIGHTLY
Status: DISCONTINUED | OUTPATIENT
Start: 2021-07-16 | End: 2021-07-17 | Stop reason: HOSPADM

## 2021-07-16 RX ORDER — SODIUM CHLORIDE 0.9 % (FLUSH) 0.9 %
5-40 SYRINGE (ML) INJECTION EVERY 12 HOURS SCHEDULED
Status: DISCONTINUED | OUTPATIENT
Start: 2021-07-16 | End: 2021-07-17 | Stop reason: HOSPADM

## 2021-07-16 RX ORDER — CLOPIDOGREL BISULFATE 75 MG/1
75 TABLET ORAL DAILY
Qty: 30 TABLET | Refills: 3 | Status: SHIPPED | OUTPATIENT
Start: 2021-07-17 | End: 2021-09-13 | Stop reason: SDUPTHER

## 2021-07-16 RX ORDER — LANOLIN ALCOHOL/MO/W.PET/CERES
5 CREAM (GRAM) TOPICAL NIGHTLY
Status: DISCONTINUED | OUTPATIENT
Start: 2021-07-16 | End: 2021-07-17 | Stop reason: HOSPADM

## 2021-07-16 RX ORDER — FAMOTIDINE 20 MG/1
40 TABLET, FILM COATED ORAL NIGHTLY
Status: DISCONTINUED | OUTPATIENT
Start: 2021-07-16 | End: 2021-07-17 | Stop reason: HOSPADM

## 2021-07-16 RX ORDER — METOPROLOL SUCCINATE 50 MG/1
50 TABLET, EXTENDED RELEASE ORAL DAILY
Status: DISCONTINUED | OUTPATIENT
Start: 2021-07-16 | End: 2021-07-17 | Stop reason: HOSPADM

## 2021-07-16 RX ORDER — ISOSORBIDE MONONITRATE 60 MG/1
60 TABLET, EXTENDED RELEASE ORAL DAILY
Status: DISCONTINUED | OUTPATIENT
Start: 2021-07-16 | End: 2021-07-17 | Stop reason: HOSPADM

## 2021-07-16 RX ORDER — ASPIRIN 81 MG/1
81 TABLET ORAL
Status: DISCONTINUED | OUTPATIENT
Start: 2021-07-16 | End: 2021-07-17 | Stop reason: HOSPADM

## 2021-07-16 RX ORDER — CLOPIDOGREL BISULFATE 75 MG/1
75 TABLET ORAL DAILY
Status: DISCONTINUED | OUTPATIENT
Start: 2021-07-17 | End: 2021-07-17 | Stop reason: HOSPADM

## 2021-07-16 RX ORDER — SODIUM CHLORIDE 9 MG/ML
25 INJECTION, SOLUTION INTRAVENOUS PRN
Status: DISCONTINUED | OUTPATIENT
Start: 2021-07-16 | End: 2021-07-17 | Stop reason: HOSPADM

## 2021-07-16 RX ORDER — TRAMADOL HYDROCHLORIDE 50 MG/1
50 TABLET ORAL EVERY 6 HOURS PRN
Status: DISCONTINUED | OUTPATIENT
Start: 2021-07-16 | End: 2021-07-17 | Stop reason: HOSPADM

## 2021-07-16 RX ORDER — LABETALOL HYDROCHLORIDE 5 MG/ML
10 INJECTION, SOLUTION INTRAVENOUS EVERY 30 MIN PRN
Status: DISCONTINUED | OUTPATIENT
Start: 2021-07-16 | End: 2021-07-17 | Stop reason: HOSPADM

## 2021-07-16 RX ORDER — GABAPENTIN 400 MG/1
400 CAPSULE ORAL 3 TIMES DAILY
Status: DISCONTINUED | OUTPATIENT
Start: 2021-07-16 | End: 2021-07-17 | Stop reason: HOSPADM

## 2021-07-16 RX ADMIN — GABAPENTIN 400 MG: 400 CAPSULE ORAL at 20:13

## 2021-07-16 RX ADMIN — DESMOPRESSIN ACETATE 40 MG: 0.2 TABLET ORAL at 20:13

## 2021-07-16 RX ADMIN — Medication 4.5 MG: at 20:13

## 2021-07-16 RX ADMIN — FAMOTIDINE 40 MG: 20 TABLET, FILM COATED ORAL at 20:13

## 2021-07-16 RX ADMIN — SODIUM CHLORIDE, PRESERVATIVE FREE 10 ML: 5 INJECTION INTRAVENOUS at 20:14

## 2021-07-16 RX ADMIN — SODIUM CHLORIDE: 9 INJECTION, SOLUTION INTRAVENOUS at 09:04

## 2021-07-16 RX ADMIN — SODIUM CHLORIDE: 9 INJECTION, SOLUTION INTRAVENOUS at 19:40

## 2021-07-16 NOTE — H&P
Bruhl/Left Radial     History of myocardial infarction 12/2017    Hyperlipidemia     Hypertension     MI (myocardial infarction) ([address] 12/2017    POST-OP    Osteoarthritis     Sleep apnea     Type 2 diabetes mellitus without complication [facility])        Past Surgical History:   Procedure Laterality Date    CARDIAC CATHETERIZATION Left 12/06/2018    Dr Benoit Mention radial-Severe three vessel disease involving the LAD and circumflex coronary arteries with moderate to severe disease in the proximal right coronary arteries. 2 of 3 bypass grafts patent with a known to be occluded SVG-RCA and a 80-90% stenosis in the touchdown of the LIMA-LAD.  CARDIAC CATHETERIZATION  12/06/2018    cardiac catheterization on 12/6/2018 showed a 75% stenosis in RCA as well as a 70% stenosis in his Circumflex    CORONARY ARTERY BYPASS GRAFT  12/2017    INTRACAPSULAR CATARACT EXTRACTION      KNEE ARTHROPLASTY      SHOULDER ARTHROPLASTY         Family History   Adopted: Yes       No Known Allergies    Prior to Admission medications    Medication Sig Start Date End Date Taking?  Authorizing Provider   isosorbide mononitrate (IMDUR) 60 MG extended release tablet Take 1 tablet by mouth daily 7/13/21   Liz Gutierrez MD   [facility]) 0.4 MG capsule Take 1 capsule by mouth 2 times daily 5/10/21   HALEY Saleh CNP   [employer] Lancets Micro Thin 33G MISC USE AS DIRECTED DAILY 3/31/21   Esta Brazil HALEY Chase CNP   blood glucose test strips (TRUE METRIX BLOOD GLUCOSE TEST) strip USE ONCE DAILY 1/18/21   Esta Brazil HALEY Chase - MG   atorvastatin (LIPITOR) 40 MG tablet Take 1 tablet by mouth daily 1/8/21   Liz Gutierrez MD   metoprolol succinate (TOPROL XL) 50 MG extended release tablet Take 1 tablet by mouth daily 1/8/21   Liz Gutierrez MD   famotidine (PEPCID) 40 MG tablet Take 1 tablet by mouth daily 12/28/20   Esta Brazil HALEY Chase CNP   metFORMIN (GLUCOPHAGE) 1000 MG tablet TAKE 1 TABLET TWICE A DAY WITH MEALS 12/28/20   Helen Hammans Might, APRN - CNP   nitroGLYCERIN (NITROSTAT) 0.4 MG SL tablet PLACE 1 TABLET UNDER THE TONGUE EVERY 5 MINUTES AS NEEDED FOR CHEST PAIN UP TO MAX OF 3 TOTAL DOSES. IF NO RELIEF AFTER 1 DOSE, CALL 911. 7/10/20   Uma Nogueira MD   blood glucose monitor strips 1 strip by Other route daily Tung Metrix test strip. E11.9 6/12/19   Helen Hammans Might, APRN - CNP   gabapentin (NEURONTIN) 400 MG capsule Take 400 mg by mouth 3 times daily. 2/14/19   Ever Hammonds MD   aspirin 81 MG tablet Take 81 mg by mouth daily    Historical Provider, MD   melatonin 5 MG TABS tablet Take 5 mg by mouth as needed     Historical Provider, MD   Multiple Vitamins-Minerals (CENTRUM SILVER PO) Take 1 tablet by mouth daily     Historical Provider, MD   traMADol (ULTRAM) 50 MG tablet Take 50 mg by mouth every 6 hours as needed for Pain. Kristopher Mims Historical Provider, MD         There were no vitals filed for this visit. Constitutional and General Appearance:   alert, cooperative, no distress and appears stated age  [de-identified]:  · PERRL, EOMI  Respiratory:  · Normal excursion and expansion without use of accessory muscles  · Resp Auscultation:  Good respiratory effort. No for increased work of breathing. On auscultation: clear to auscultation bilaterally  Cardiovascular:  · Regular rate and rhythm. · S1/S2  · No murmurs. · The apical impulse is not displaced  Abdomen:  · Soft  · Bowel sounds present  · Non-tender to palpation  Extremities:  · No cyanosis or clubbing  · Lower extremity edema: No.  Skin:  · Warm and dry  Neurological:  · Alert and oriented. DATA:  Cath by Dr. Catarino Mckeon 7/15/21  Severe three vessel coronary artery disease involving a 100% ostial LAD stenosis, 80% ostial Cx stenosis and proximal 60-70% stenosis in the right coronary artery. Normal left ventricular end diastolic pressure. 2 of 3 bypass grafts patent with a 100% previously known occluded SVG-right with a 80-90 stenosis in the touchdown of the LIMA-LAD.

## 2021-07-16 NOTE — PLAN OF CARE
Problem: Anxiety:  Goal: Level of anxiety will decrease  Description: Level of anxiety will decrease  Outcome: Met This Shift   Pre-procedural education completed

## 2021-07-16 NOTE — OP NOTE
Operative Note  [facility]    CARDIAC CATHETERIZATION    Date:   7/16/2021  Patient name: Antonia Grover  Date of admission:  No admission date for patient encounter. MRN:   [id number]  YOB: 1945    Operators:  Primary:     CV Fellow:    Pre Procedure Conscious Sedation Data:    ASA Class:    [] I [x] II [] III [] IV    Mallampati Class:  [] I [x] II [] III [] IV     Indication:  [] STEMI      [x] + Stress test  [] ACS      [x] + EKG Changes  [] Non Q MI       [] Significant Risk Factors  [x] Recurrent Angina             [] Diabetes Mellitus    [] New LBBB      [] Uncontrolled HTN. [x] CHF / Low EF changes     [] Abnormal CTA / Ca Score    Procedure:  Access:  [x] Femoral  [] Radial  artery       [x] Right  [] Left    Procedure: After informed consent was obtained with explanation of the risks and benefits, patient was brought to the cath lab. The access area was prepped and draped in sterile fashion. 1% lidocaine was used for local block. The artery was cannulated with 6  Fr sheath with brisk arterial blood return. The side port was frequently flushed and aspirated with normal saline. Findings:  CA mid stenosis 80%, ulcerated plaque. Successful PTCA -ELIAN 4.0 /15 ELIAN, reducing stenosis to 0% with JONNY III flow    Film was reviewed and concern about LIMA anastomosis to LAD is present and possibly FU stress test is ideal for possible anterior ischemia. Conclusions:  1. Successful PTCA -ELIAN mid RCA    Recommendation:  1.  Post stent protocol    Estimated Blood Loss: [x] <10   [] 10-25 [] 25-50   []  [] >100        Electronically signed by Michelle Flanagan MD on 7/16/2021 at [facility]  [phone]

## 2021-07-16 NOTE — PLAN OF CARE
Problem: Anxiety:  Goal: Level of anxiety will decrease  Description: Level of anxiety will decrease  7/16/2021 1907 by Luis Mckeon RN  Outcome: Ongoing  7/16/2021 0844 by Indy Hudson RN  Outcome: Met This Shift     Problem: Falls - Risk of:  Goal: Will remain free from falls  Description: Will remain free from falls  Outcome: Ongoing  Goal: Absence of physical injury  Description: Absence of physical injury  Outcome: Ongoing     Problem: Cardiac:  Goal: Ability to maintain an adequate cardiac output will improve  Description: Ability to maintain an adequate cardiac output will improve  7/16/2021 1907 by Luis Mckeon RN  Outcome: Ongoing  7/16/2021 1907 by Luis Mckeon RN  Outcome: Ongoing  Goal: Hemodynamic stability will improve  Description: Hemodynamic stability will improve  Outcome: Ongoing     Problem: Fluid Volume:  Goal: Ability to achieve and maintain adequate urine output will improve  Description: Ability to achieve and maintain adequate urine output will improve  Outcome: Ongoing     Problem: Respiratory:  Goal: Respiratory status will improve  Description: Respiratory status will improve  Outcome: Ongoing

## 2021-07-17 VITALS
RESPIRATION RATE: 15 BRPM | DIASTOLIC BLOOD PRESSURE: 75 MMHG | SYSTOLIC BLOOD PRESSURE: 138 MMHG | WEIGHT: 233 LBS | OXYGEN SATURATION: 90 % | TEMPERATURE: 98.6 F | HEIGHT: 72 IN | BODY MASS INDEX: 31.56 KG/M2 | HEART RATE: 77 BPM

## 2021-07-17 LAB
GLUCOSE BLD-MCNC: 152 MG/DL (ref 75–110)
TROPONIN INTERP: NORMAL
TROPONIN T: NORMAL NG/ML
TROPONIN, HIGH SENSITIVITY: 9 NG/L (ref 0–22)

## 2021-07-17 PROCEDURE — 82947 ASSAY GLUCOSE BLOOD QUANT: CPT

## 2021-07-17 PROCEDURE — 6370000000 HC RX 637 (ALT 250 FOR IP): Performed by: INTERNAL MEDICINE

## 2021-07-17 PROCEDURE — 2580000003 HC RX 258: Performed by: INTERNAL MEDICINE

## 2021-07-17 RX ORDER — NITROGLYCERIN 0.4 MG/1
0.4 TABLET SUBLINGUAL EVERY 5 MIN PRN
Qty: 25 TABLET | Refills: 3 | Status: SHIPPED | OUTPATIENT
Start: 2021-07-17 | End: 2021-10-25 | Stop reason: SDUPTHER

## 2021-07-17 RX ORDER — LISINOPRIL 5 MG/1
5 TABLET ORAL DAILY
Qty: 30 TABLET | Refills: 5 | Status: SHIPPED | OUTPATIENT
Start: 2021-07-17 | End: 2021-09-13 | Stop reason: SDUPTHER

## 2021-07-17 RX ADMIN — SODIUM CHLORIDE, PRESERVATIVE FREE 10 ML: 5 INJECTION INTRAVENOUS at 08:09

## 2021-07-17 RX ADMIN — GABAPENTIN 400 MG: 400 CAPSULE ORAL at 08:09

## 2021-07-17 RX ADMIN — ISOSORBIDE MONONITRATE 60 MG: 60 TABLET ORAL at 08:09

## 2021-07-17 RX ADMIN — CLOPIDOGREL 75 MG: 75 TABLET, FILM COATED ORAL at 08:09

## 2021-07-17 RX ADMIN — METOPROLOL SUCCINATE 50 MG: 50 TABLET, EXTENDED RELEASE ORAL at 08:09

## 2021-07-17 NOTE — DISCHARGE SUMMARY
[facility]  Discharge Note                 Name:  Ayala Brito  YOB: 1945  Social Security Number:  [id number]  Medical Record Number:  [id number]    Date of Admission:  7/16/2021  Date of Discharge:  7/17/2021    Admitting physician: Shanika Ramirez MD    Discharge Attending: HALEY Pedroza CNP, CNP  Primary Care Physician: HALEY Vela CNP  Consultants: Cardiology  Discharge to [address] [place] [address] LIST:  Patient Active Problem List   Diagnosis    Type 2 diabetes mellitus without complication, without long-term current use of insulin ([address]    History of MI (myocardial infarction)    FABIAN (obstructive sleep apnea)    Other chronic pain    Bilateral hearing loss    Aneurysm of left ventricle of heart    Chronic diastolic CHF (congestive heart failure), NYHA class 3 ([address]    Unstable angina ([address]    History of atrial fibrillation    Hyperlipidemia    Hypertension    Sleep apnea    H/O aortic valve replacement    Hx of CABG    Coronary artery disease    Abnormal cardiovascular stress test    Chest pain    Coronary artery disease (CAD) excluded    Stable angina pectoris ([address]    COPD exacerbation ([address]    BPH with obstruction/lower urinary tract symptoms    Incomplete bladder emptying    Frequency of urination    Urgency of urination    Angina, class III ([address]    S/P angioplasty with stent         Procedures:cardiac catheterization    HOSPITAL COURSE :           The patient was admitted for: Cardiac cath   Hospital Procedures if any:  Cardiac cath   Medications changes recommendation:  See list   Follow Up Plan:  2 weeks     Cardiac cath 7/16/21  CA mid stenosis 80%, ulcerated plaque.   Successful PTCA -ELIAN 4.0 /15 ELIAN, reducing stenosis to 0% with JONNY III flow     Film was reviewed and concern about LIMA anastomosis to LAD is present and possibly FU stress test is ideal for possible anterior ischemia.     Conclusions:  1. Successful PTCA -ELIAN mid RCA     Recommendation:  1. Post stent protocol  Discharge exam:   Vitals:    07/17/21 0919   BP: 138/75   Pulse: 77   Resp: 15   Temp: 98.6 °F (37 °C)   SpO2: 90%     Neuro: normal  Chest: Clear to ausculation. No wheezing. Cardiac: Regular rate. s1 and s2 auscultated. No murmur noted. Abdomen/groin: soft, non-tender, without masses or organomegaly  Lower extremity edema: none     Follow up with primary care provider 1 week  Follow up with cardiology 4 weeks  Follow up with other consultant physicians at their directions. Discharge Medications:   Latoya Hinojosa   Home Medication Instructions [id number]    Printed on:07/17/21 1016   Medication Information                      aspirin 81 MG tablet  Take 81 mg by mouth Daily with lunch              atorvastatin (LIPITOR) 40 MG tablet  Take 1 tablet by mouth daily             blood glucose monitor strips  1 strip by Other route daily Tung Metrix test strip. E11.9             blood glucose test strips (TRUE METRIX BLOOD GLUCOSE TEST) strip  USE ONCE DAILY             clopidogrel (PLAVIX) 75 MG tablet  Take 1 tablet by mouth daily             [employer] Lancets Micro Thin 33G MISC  USE AS DIRECTED DAILY             famotidine (PEPCID) 40 MG tablet  Take 1 tablet by mouth daily             gabapentin (NEURONTIN) 400 MG capsule  Take 400 mg by mouth 3 times daily.               isosorbide mononitrate (IMDUR) 60 MG extended release tablet  Take 1 tablet by mouth daily             lisinopril (PRINIVIL;ZESTRIL) 5 MG tablet  Take 1 tablet by mouth daily             melatonin 5 MG TABS tablet  Take 5 mg by mouth as needed              metFORMIN (GLUCOPHAGE) 1000 MG tablet  TAKE 1 TABLET TWICE A DAY WITH MEALS             metoprolol succinate (TOPROL XL) 50 MG extended release tablet  Take 1 tablet by mouth daily             Multiple Vitamins-Minerals (CENTRUM SILVER PO)  Take 1 tablet by mouth daily nitroGLYCERIN (NITROSTAT) 0.4 MG SL tablet  PLACE 1 TABLET UNDER THE TONGUE EVERY 5 MINUTES AS NEEDED FOR CHEST PAIN UP TO MAX OF 3 TOTAL DOSES. IF NO RELIEF AFTER 1 DOSE, CALL 911.             nitroGLYCERIN (NITROSTAT) 0.4 MG SL tablet  Place 1 tablet under the tongue every 5 minutes as needed for Chest pain up to max of 3 total doses. If no relief after 1 dose, call 911.             tamsulosin (FLOMAX) 0.4 MG capsule  Take 1 capsule by mouth 2 times daily             traMADol (ULTRAM) 50 MG tablet  Take 50 mg by mouth every 6 hours as needed for Pain. .                    Coronary Discharge Core Measure: Please indicate the medication given by X, and if not the reasons not given:    Not Given Reason  Given      Beta Blockers X      ACE-I X      Statins X      ASA X       OAP (Plavix/Effient/Brilinta) X    SL Nitro   X       Pt seen and examined. Pt resting in bed. He denies any chest pain or shortness of breath. Radial site soft. Discussed with patient and nursing. Medications and discharge instructions reviewed with patient and nursing. Pt verbalizes understanding regarding medications and activity restrictions. Script for plavix given. Will follow up in 2 week with Dr. Miguel Sanabria in [facility]. Will consider stress test as outpt per Dr. Lyndon Elam.      Electronically signed by Danita Gitelman, APRN - CNP on 7/17/2021 at 10:16 AM  [facility]      [phone]

## 2021-07-17 NOTE — PROGRESS NOTES
Pt discharged off unit via wheelchair. RN went over discharge instructions and medication regimen with pt and wife. Questions and concerns answered. All belongings with pt.

## 2021-07-19 LAB — GLUCOSE BLD-MCNC: 143 MG/DL (ref 75–110)

## 2021-07-21 LAB — GLUCOSE BLD-MCNC: 150 MG/DL (ref 74–100)

## 2021-08-26 ENCOUNTER — OFFICE VISIT (OUTPATIENT)
Dept: CARDIOLOGY | Age: 76
End: 2021-08-26
Payer: MEDICARE

## 2021-08-26 VITALS
RESPIRATION RATE: 18 BRPM | DIASTOLIC BLOOD PRESSURE: 70 MMHG | WEIGHT: 233.6 LBS | SYSTOLIC BLOOD PRESSURE: 114 MMHG | OXYGEN SATURATION: 93 % | HEIGHT: 72 IN | BODY MASS INDEX: 31.64 KG/M2 | HEART RATE: 71 BPM

## 2021-08-26 DIAGNOSIS — Z95.1 S/P CABG X 3: ICD-10-CM

## 2021-08-26 DIAGNOSIS — Z95.820 S/P ANGIOPLASTY WITH STENT: ICD-10-CM

## 2021-08-26 DIAGNOSIS — I50.32 CHRONIC DIASTOLIC CONGESTIVE HEART FAILURE (HCC): ICD-10-CM

## 2021-08-26 DIAGNOSIS — I20.8 STABLE ANGINA (HCC): Primary | ICD-10-CM

## 2021-08-26 DIAGNOSIS — I35.0 SEVERE AORTIC STENOSIS: ICD-10-CM

## 2021-08-26 DIAGNOSIS — I10 ESSENTIAL HYPERTENSION: ICD-10-CM

## 2021-08-26 DIAGNOSIS — I25.10 ASHD (ARTERIOSCLEROTIC HEART DISEASE): ICD-10-CM

## 2021-08-26 PROCEDURE — 99214 OFFICE O/P EST MOD 30 MIN: CPT | Performed by: FAMILY MEDICINE

## 2021-08-26 RX ORDER — METOPROLOL SUCCINATE 100 MG/1
100 TABLET, EXTENDED RELEASE ORAL DAILY
Qty: 90 TABLET | Refills: 3 | Status: SHIPPED | OUTPATIENT
Start: 2021-08-26 | End: 2022-04-25 | Stop reason: SDUPTHER

## 2021-08-26 NOTE — PROGRESS NOTES
LVEDP. Consult to interventional cardiology for likely angioplasty and/or  stenting of the patients severe stenosis with likely stenting of the unrevascularized RCA stenosis with plans to intervene on the higher risk LIMA-LAD stenosis only at a later date if the patient fails gu    H/O three vessel coronary artery bypass 12/2017    History of atrial fibrillation 12/2017    following CABG 12/2017    History of blood transfusion     History of cardiac cath 07/15/2021    [facility]) Deb/Dr. Yoon/Left Radial     History of myocardial infarction 12/2017    Hyperlipidemia     Hypertension     [facility] ([facility] infarction) ([facility] 12/2017    POST-OP    Osteoarthritis     Sleep apnea     Type 2 diabetes mellitus without complication ([facility]      CURRENT ALLERGIES: Patient has no known allergies. REVIEW OF SYSTEMS: 14 systems were reviewed. Pertinent positives and negatives as above, all else negative. Past Surgical History:   Procedure Laterality Date    CARDIAC CATHETERIZATION Left 12/06/2018    Dr Sharma Erps radial-Severe three vessel disease involving the LAD and circumflex coronary arteries with moderate to severe disease in the proximal right coronary arteries. 2 of 3 bypass grafts patent with a known to be occluded SVG-RCA and a 80-90% stenosis in the touchdown of the LIMA-LAD.  CARDIAC CATHETERIZATION  12/06/2018    cardiac catheterization on 12/6/2018 showed a 75% stenosis in RCA as well as a 70% stenosis in his Circumflex    COLONOSCOPY      CORONARY ANGIOPLASTY WITH STENT PLACEMENT  07/16/2021    CORONARY ARTERY BYPASS GRAFT  12/2017    EYE SURGERY Bilateral     cataracts    INTRACAPSULAR CATARACT EXTRACTION      KNEE ARTHROPLASTY      SHOULDER ARTHROPLASTY      Social History:  Social History     Tobacco Use    Smoking status: Never Smoker    Smokeless tobacco: Never Used   Vaping Use    Vaping Use: Never used   Substance Use Topics    Alcohol use:  Yes Alcohol/week: 1.0 standard drinks     Types: 1 Glasses of wine per week     Comment: most days    Drug use: No        CURRENT MEDICATIONS:  Outpatient Medications Marked as Taking for the 8/26/21 encounter (Office Visit) with April Livingston MD   Medication Sig Dispense Refill    tamsulosin (FLOMAX) 0.4 MG capsule TAKE 1 CAPSULE BY MOUTH TWICE A  capsule 3    nitroGLYCERIN (NITROSTAT) 0.4 MG SL tablet Place 1 tablet under the tongue every 5 minutes as needed for Chest pain up to max of 3 total doses. If no relief after 1 dose, call 911. 25 tablet 3    lisinopril (PRINIVIL;ZESTRIL) 5 MG tablet Take 1 tablet by mouth daily 30 tablet 5    clopidogrel (PLAVIX) 75 MG tablet Take 1 tablet by mouth daily 30 tablet 3    isosorbide mononitrate (IMDUR) 60 MG extended release tablet Take 1 tablet by mouth daily 90 tablet 3    CVS Lancets Micro Thin 33G MISC USE AS DIRECTED DAILY 100 each 3    blood glucose test strips (TRUE METRIX BLOOD GLUCOSE TEST) strip USE ONCE DAILY 100 strip 3    atorvastatin (LIPITOR) 40 MG tablet Take 1 tablet by mouth daily (Patient taking differently: Take 40 mg by mouth nightly ) 90 tablet 3    metoprolol succinate (TOPROL XL) 50 MG extended release tablet Take 1 tablet by mouth daily 90 tablet 3    famotidine (PEPCID) 40 MG tablet Take 1 tablet by mouth daily (Patient taking differently: Take 40 mg by mouth nightly ) 90 tablet 3    metFORMIN (GLUCOPHAGE) 1000 MG tablet TAKE 1 TABLET TWICE A DAY WITH MEALS 180 tablet 3    nitroGLYCERIN (NITROSTAT) 0.4 MG SL tablet PLACE 1 TABLET UNDER THE TONGUE EVERY 5 MINUTES AS NEEDED FOR CHEST PAIN UP TO MAX OF 3 TOTAL DOSES. IF NO RELIEF AFTER 1 DOSE, CALL 911. 75 tablet 1    blood glucose monitor strips 1 strip by Other route daily Tung Metrix test strip. E11.9 100 strip 3    gabapentin (NEURONTIN) 400 MG capsule Take 400 mg by mouth 3 times daily.        aspirin 81 MG tablet Take 81 mg by mouth Daily with lunch       melatonin 5 MG TABS tablet Take 5 mg by mouth as needed       Multiple Vitamins-Minerals (CENTRUM SILVER PO) Take 1 tablet by mouth daily       traMADol (ULTRAM) 50 MG tablet Take 50 mg by mouth every 6 hours as needed for Pain. Sandra Scott FAMILY HISTORY: family history is not on file. He was adopted. PHYSICAL EXAM:   /70 (Site: Left Upper Arm, Position: Sitting, Cuff Size: Large Adult)   Pulse 71   Resp 18   Ht 6' 0.01\" (1.829 m)   Wt 233 lb 9.6 oz (106 kg)   SpO2 93%   BMI 31.67 kg/m²  Body mass index is 31.67 kg/m². Constitutional: He is oriented to person, place, and time. He appears well-developed and well-nourished. In no acute distress. HEENT: Normocephalic and atraumatic. No JVD present. Carotid bruit is not present. No mass and no thyromegaly present. No lymphadenopathy present. Cardiovascular: Normal rate, regular rhythm, normal heart sounds. Exam reveals no gallop and no friction rubs 2/6 systolic murmur, 2nd intercostal space on the RIGHT just lateral to the sternum. Pulmonary/Chest: Effort normal and breath sounds normal. No respiratory distress. He has no wheezes, rhonchi or rales. Abdominal: Soft, non-tender. Bowel sounds and aorta are normal. He exhibits no organomegaly, mass or bruit. Extremities: Trace. No cyanosis or clubbing. 2+ radial and carotid pulses. Distal extremity pulses: 2+ bilaterally. Neurological: He is alert and oriented to person, place, and time. No evidence of gross cranial nerve deficit. Coordination appeared normal.   Skin: Skin is warm and dry. There is no rash or diaphoresis. Psychiatric: He has a normal mood and affect.  His speech is normal and behavior is normal.      MOST RECENT LABS ON RECORD:   Lab Results   Component Value Date    WBC 5.9 07/13/2021    HGB 13.5 07/13/2021    HCT 40.7 07/13/2021     07/13/2021    CHOL 97 12/05/2018    TRIG 45 12/05/2018    HDL 46 12/05/2018    ALT 20 12/16/2019    AST 15 12/16/2019     07/13/2021    K 4.9 07/13/2021 succinate (Toprol XL) 100 mg  once daily. Nonpharmacologic management of Heart Failure: I advised him to try and keep his legs up whenever possible and to limit salt in his diet. History of Severe Aortic Stenosis: Asymptomatic-S/P: Bioprosthetic Aortic Valve replacement in 12/2017. · Beta Blocker: Increase metoprolol succinate (Toprol XL) 100 mg once daily. Essential Hypertension: Controlled  · Beta Blocker: Increase metoprolol succinate (Toprol XL) 100 mg once daily. · Chronic Stable Angina: Continue with guideline directed trial of maximal medical management. · Beta Blocker: INCREASE to Metoprolol succinate (Toprol XL) 100 mg daily. · Anti-anginal medications: Continue isosorbide mononitrate (Imdur) 60 mg once daily. · Anti-anginal medications: Continue nitroglycerin 0.4 mg tablets as needed for chest pain. Finally, I recommended that he continue his current medications and follow up with you as previously scheduled. FOLLOW UP:   I told Mr. Cele Jhaveri to call my office if he had any problems, but otherwise I asked him to Return in about 8 weeks (around 10/21/2021). However, I would be happy to see him sooner should the need arise. Sincerely,  Alin Christy. Car ZEE, MS, F.A.C.C. [facility]    [address] [place] [address]  Phone: [phone], Fax: [phone]     I believe that the risk of significant morbidity and mortality related to the patient's current medical conditions are: Intermediate. >30 minutes were spent during prep work, discussion and exam of the patient, and follow up documentation and all of their questions were answered. The documentation recorded by the scribe, accurately and completely reflects the services I personally performed and the decisions made by me. Olive Barnett MD, MS, F.A.C.C.  August 26, 2021

## 2021-08-26 NOTE — PATIENT INSTRUCTIONS
SURVEY:    You may be receiving a survey from [employer] regarding your visit today. Please complete the survey to enable us to provide the highest quality of care to you and your family. If you cannot score us a very good on any question, please call the office to discuss how we could have made your experience a very good one. Thank you.

## 2021-09-13 RX ORDER — CLOPIDOGREL BISULFATE 75 MG/1
75 TABLET ORAL DAILY
Qty: 90 TABLET | Refills: 3 | Status: SHIPPED | OUTPATIENT
Start: 2021-09-13 | End: 2022-04-25 | Stop reason: SDUPTHER

## 2021-09-13 RX ORDER — LISINOPRIL 5 MG/1
5 TABLET ORAL DAILY
Qty: 90 TABLET | Refills: 3 | Status: SHIPPED | OUTPATIENT
Start: 2021-09-13 | End: 2022-04-25 | Stop reason: SDUPTHER

## 2021-10-25 ENCOUNTER — OFFICE VISIT (OUTPATIENT)
Dept: PRIMARY CARE CLINIC | Age: 76
End: 2021-10-25
Payer: MEDICARE

## 2021-10-25 ENCOUNTER — HOSPITAL ENCOUNTER (OUTPATIENT)
Age: 76
Discharge: HOME | End: 2021-10-25
Payer: MEDICARE

## 2021-10-25 ENCOUNTER — OFFICE VISIT (OUTPATIENT)
Dept: CARDIOLOGY | Age: 76
End: 2021-10-25
Payer: MEDICARE

## 2021-10-25 VITALS
OXYGEN SATURATION: 98 % | BODY MASS INDEX: 31.56 KG/M2 | HEART RATE: 61 BPM | SYSTOLIC BLOOD PRESSURE: 100 MMHG | WEIGHT: 233 LBS | RESPIRATION RATE: 16 BRPM | HEIGHT: 72 IN | DIASTOLIC BLOOD PRESSURE: 57 MMHG

## 2021-10-25 VITALS
WEIGHT: 234.7 LBS | HEART RATE: 66 BPM | HEIGHT: 72 IN | BODY MASS INDEX: 31.79 KG/M2 | SYSTOLIC BLOOD PRESSURE: 122 MMHG | RESPIRATION RATE: 18 BRPM | DIASTOLIC BLOOD PRESSURE: 70 MMHG | OXYGEN SATURATION: 95 % | TEMPERATURE: 97.8 F

## 2021-10-25 DIAGNOSIS — I50.32 CHRONIC DIASTOLIC CHF (CONGESTIVE HEART FAILURE), NYHA CLASS 3 (HCC): ICD-10-CM

## 2021-10-25 DIAGNOSIS — I25.10 ASHD (ARTERIOSCLEROTIC HEART DISEASE): Primary | ICD-10-CM

## 2021-10-25 DIAGNOSIS — Z95.1 S/P CABG X 3: ICD-10-CM

## 2021-10-25 DIAGNOSIS — I25.2 HISTORY OF MYOCARDIAL INFARCTION: ICD-10-CM

## 2021-10-25 DIAGNOSIS — Z95.3 S/P AORTIC VALVE REPLACEMENT WITH BIOPROSTHETIC VALVE: ICD-10-CM

## 2021-10-25 DIAGNOSIS — I20.8 CHRONIC STABLE ANGINA (HCC): ICD-10-CM

## 2021-10-25 DIAGNOSIS — Z95.820 S/P ANGIOPLASTY WITH STENT: ICD-10-CM

## 2021-10-25 DIAGNOSIS — I35.0 SEVERE AORTIC STENOSIS: ICD-10-CM

## 2021-10-25 DIAGNOSIS — E11.9 TYPE 2 DIABETES MELLITUS WITHOUT COMPLICATION, WITHOUT LONG-TERM CURRENT USE OF INSULIN (HCC): ICD-10-CM

## 2021-10-25 DIAGNOSIS — Z00.00 ROUTINE GENERAL MEDICAL EXAMINATION AT A HEALTH CARE FACILITY: Primary | ICD-10-CM

## 2021-10-25 DIAGNOSIS — I50.32 CHRONIC DIASTOLIC CONGESTIVE HEART FAILURE (HCC): ICD-10-CM

## 2021-10-25 PROBLEM — J44.1 COPD EXACERBATION (HCC): Status: RESOLVED | Noted: 2019-12-14 | Resolved: 2021-10-25

## 2021-10-25 LAB
ANION GAP SERPL CALCULATED.3IONS-SCNC: 11 MMOL/L (ref 9–17)
BUN BLDV-MCNC: 17 MG/DL (ref 8–23)
BUN/CREAT BLD: 21 (ref 9–20)
CALCIUM SERPL-MCNC: 10 MG/DL (ref 8.6–10.4)
CHLORIDE BLD-SCNC: 101 MMOL/L (ref 98–107)
CHOLESTEROL/HDL RATIO: 2.2
CHOLESTEROL: 110 MG/DL
CO2: 25 MMOL/L (ref 20–31)
CREAT SERPL-MCNC: 0.82 MG/DL (ref 0.7–1.2)
GFR AFRICAN AMERICAN: >60 ML/MIN
GFR NON-AFRICAN AMERICAN: >60 ML/MIN
GFR SERPL CREATININE-BSD FRML MDRD: ABNORMAL ML/MIN/{1.73_M2}
GFR SERPL CREATININE-BSD FRML MDRD: ABNORMAL ML/MIN/{1.73_M2}
GLUCOSE BLD-MCNC: 97 MG/DL (ref 70–99)
HDLC SERPL-MCNC: 51 MG/DL
LDL CHOLESTEROL: 50 MG/DL (ref 0–130)
POTASSIUM SERPL-SCNC: 4.6 MMOL/L (ref 3.7–5.3)
SODIUM BLD-SCNC: 137 MMOL/L (ref 135–144)
TRIGL SERPL-MCNC: 46 MG/DL
VLDLC SERPL CALC-MCNC: NORMAL MG/DL (ref 1–30)

## 2021-10-25 PROCEDURE — 80061 LIPID PANEL: CPT

## 2021-10-25 PROCEDURE — 99213 OFFICE O/P EST LOW 20 MIN: CPT | Performed by: FAMILY MEDICINE

## 2021-10-25 PROCEDURE — 83036 HEMOGLOBIN GLYCOSYLATED A1C: CPT

## 2021-10-25 PROCEDURE — G0439 PPPS, SUBSEQ VISIT: HCPCS | Performed by: NURSE PRACTITIONER

## 2021-10-25 PROCEDURE — 80048 BASIC METABOLIC PNL TOTAL CA: CPT

## 2021-10-25 PROCEDURE — 36415 COLL VENOUS BLD VENIPUNCTURE: CPT

## 2021-10-25 SDOH — ECONOMIC STABILITY: FOOD INSECURITY: WITHIN THE PAST 12 MONTHS, THE FOOD YOU BOUGHT JUST DIDN'T LAST AND YOU DIDN'T HAVE MONEY TO GET MORE.: PATIENT DECLINED

## 2021-10-25 SDOH — ECONOMIC STABILITY: FOOD INSECURITY: WITHIN THE PAST 12 MONTHS, YOU WORRIED THAT YOUR FOOD WOULD RUN OUT BEFORE YOU GOT MONEY TO BUY MORE.: PATIENT DECLINED

## 2021-10-25 ASSESSMENT — LIFESTYLE VARIABLES
HAS A RELATIVE, FRIEND, DOCTOR, OR ANOTHER HEALTH PROFESSIONAL EXPRESSED CONCERN ABOUT YOUR DRINKING OR SUGGESTED YOU CUT DOWN: 0
HOW OFTEN DURING THE LAST YEAR HAVE YOU FAILED TO DO WHAT WAS NORMALLY EXPECTED FROM YOU BECAUSE OF DRINKING: 0
HOW OFTEN DURING THE LAST YEAR HAVE YOU FOUND THAT YOU WERE NOT ABLE TO STOP DRINKING ONCE YOU HAD STARTED: 0
HOW OFTEN DO YOU HAVE A DRINK CONTAINING ALCOHOL: 4
AUDIT-C TOTAL SCORE: 4
HOW OFTEN DO YOU HAVE SIX OR MORE DRINKS ON ONE OCCASION: 0
HOW OFTEN DURING THE LAST YEAR HAVE YOU NEEDED AN ALCOHOLIC DRINK FIRST THING IN THE MORNING TO GET YOURSELF GOING AFTER A NIGHT OF HEAVY DRINKING: 0
HOW MANY STANDARD DRINKS CONTAINING ALCOHOL DO YOU HAVE ON A TYPICAL DAY: 0
HOW OFTEN DURING THE LAST YEAR HAVE YOU BEEN UNABLE TO REMEMBER WHAT HAPPENED THE NIGHT BEFORE BECAUSE YOU HAD BEEN DRINKING: 0
AUDIT TOTAL SCORE: 4
HAVE YOU OR SOMEONE ELSE BEEN INJURED AS A RESULT OF YOUR DRINKING: 0
HOW OFTEN DURING THE LAST YEAR HAVE YOU HAD A FEELING OF GUILT OR REMORSE AFTER DRINKING: 0

## 2021-10-25 ASSESSMENT — PATIENT HEALTH QUESTIONNAIRE - PHQ9
2. FEELING DOWN, DEPRESSED OR HOPELESS: 0
SUM OF ALL RESPONSES TO PHQ QUESTIONS 1-9: 0
SUM OF ALL RESPONSES TO PHQ9 QUESTIONS 1 & 2: 0
1. LITTLE INTEREST OR PLEASURE IN DOING THINGS: 0
SUM OF ALL RESPONSES TO PHQ QUESTIONS 1-9: 0
SUM OF ALL RESPONSES TO PHQ QUESTIONS 1-9: 0

## 2021-10-25 ASSESSMENT — ENCOUNTER SYMPTOMS
NAUSEA: 0
CONSTIPATION: 0
ABDOMINAL PAIN: 0
SHORTNESS OF BREATH: 0
WHEEZING: 0
RHINORRHEA: 0
COUGH: 0
DIARRHEA: 0
VOMITING: 0
SORE THROAT: 0

## 2021-10-25 ASSESSMENT — SOCIAL DETERMINANTS OF HEALTH (SDOH): HOW HARD IS IT FOR YOU TO PAY FOR THE VERY BASICS LIKE FOOD, HOUSING, MEDICAL CARE, AND HEATING?: PATIENT DECLINED

## 2021-10-25 NOTE — PATIENT INSTRUCTIONS
SURVEY:    You may be receiving a survey from [employer] regarding your visit today. Please complete the survey to enable us to provide the highest quality of care to you and your family. If you cannot score us a very good on any question, please call the office to discuss how we could have made your experience a very good one. Thank you. Manasa Chase, APRN-CNP  Horace Trujillo, CNP  Nam Forrester, LPN  Shagufta White, LPN  [facility], [facility]  Shira Sosa, [facility]  Madeleine, CMA  Vivian Abel, PCA    Personalized Preventive Plan for Kemi Zarate - 10/25/2021  Medicare offers a range of preventive health benefits. Some of the tests and screenings are paid in full while other may be subject to a deductible, co-insurance, and/or copay. Some of these benefits include a comprehensive review of your medical history including lifestyle, illnesses that may run in your family, and various assessments and screenings as appropriate. After reviewing your medical record and screening and assessments performed today your provider may have ordered immunizations, labs, imaging, and/or referrals for you. A list of these orders (if applicable) as well as your Preventive Care list are included within your After Visit Summary for your review. Other Preventive Recommendations:    · A preventive eye exam performed by an eye specialist is recommended every 1-2 years to screen for glaucoma; cataracts, macular degeneration, and other eye disorders. · A preventive dental visit is recommended every 6 months. · Try to get at least 150 minutes of exercise per week or 10,000 steps per day on a pedometer . · Order or download the FREE \"Exercise & Physical Activity: Your Everyday Guide\" from [employer]. Call 1-473.199.9066 or search [employer] online. · You need 0698-3780 mg of calcium and 5575-6802 IU of vitamin D per day.  It is possible to meet your calcium requirement with diet alone, but a vitamin D supplement is usually necessary to meet this goal.  · When exposed to the sun, use a sunscreen that protects against both UVA and UVB radiation with an SPF of 30 or greater. Reapply every 2 to 3 hours or after sweating, drying off with a towel, or swimming. · Always wear a seat belt when traveling in a car. Always wear a helmet when riding a bicycle or motorcycle.

## 2021-10-25 NOTE — PATIENT INSTRUCTIONS
SURVEY:    You may be receiving a survey from [employer] regarding your visit today. Please complete the survey to enable us to provide the highest quality of care to you and your family. If you cannot score us a very good on any question, please call the office to discuss how we could have made your experience a very good one. Thank you.

## 2021-10-25 NOTE — PROGRESS NOTES
Omari Kimball am scribing for and in the presence of Larisa Gamboa. Car ZEE, MS, F.A.C.C. Patient: Spencer Rhodes  : 1945  Date of Visit: 2021    REASON FOR VISIT / CONSULTATION: Follow-up (Hx:CP,ASHD,S/P CABG x3,S/P Stent,CHF,Severe Aortic Dyan Jeffrey. He has been doing better. No chest pains. Denies: SOB, Lightheaded/dizziness, Palpitations. )    Dear Carmen Isaacs, APRN - CNP,    I had the pleasure of seeing Spencer Rhodes in my office today. Mr. Sonia Turner is a 64 y.o. male with a history of atherosclerotic heart disease. In 2017 Mr. Sonia Turner had went into surgery for his shoulder and postoperatively had a myocardial infarction which led to a triple vessel coronary artery bypass as well as a aortic valve replacement  due to severe aortic stenosis. He was found to have postoperative atrial fibrillation which led to him starting on Amiodarone but had been stopped six weeks after due to no reoccurrence of atrial fibrillation. He had a ulcer in his small intenstine which had caused internal bleeding but has since been resolved and is taking pepcid for this. He was continued on Eliquis but had recently been stopped since there has been no evidence of atrial fibrillation including on his recent holter monitor. His cardiac catheterization on 2018 showed a 75% stenosis in RCA as well as a 70% stenosis in his Circumflex, but when he was sent down to [address] [place] [address] for possible stenting treatment, however Dr. Aidan Burton performed an FFR of the lesion which showed that the stenosis was about 60 percent blockage and therefore not likely to benefit from stenting. Although there was also a severe stenosis in the junction of the LIMA-LAD, this was felt to be high risk stenting and therefore deferred for a trial of guideline directed trial of maximal medical management including cardiac rehab. His echocardiogram on 2018 with an ejection fraction of 50-55% with a bioprosthetic aortic valve.    Dali Arshad had echo done 1/16/2020 EF 55% Compared to the previous study of 12/5/18, no significant change was seen. Heart cath on 7/15/2021 showed Severe three vessel disease involving the LAD and circumflex coronary arteries with moderate to severe disease in the proximal right coronary arteries. 2 of 3 bypass grafts patent with a known to be occluded SVG-RCA and a 80-90% stenosis in the touchdown of the LIMA-LAD. Normal LVEDPSuccessful PTCA ELIAN mid RCA on 7/16/2021 by Dr Ramiro Jarvis. Since the last time I saw Mr. Dali Arshad he has been doing better. He denies having any further episodes of chest pain since increasing the Toprol XL. He reports his breathing has been stable without any issues. He denies having any lightheaded/dizziness or palpitations. He denies any abdominal pain, bleeding problems, bowel issues, problems with his medications or any other concerns at this time. No cough, fever or chills. No nausea or vomiting. No further falls or near falls. Exercise Tolerance: He reports having a good exercise tolerance. Mr. Dali Arshad says that he can walk about a block without developing shortness of breath and/or chest discomfort. He states weakness is what limits him from going further. Past Medical History:   Diagnosis Date    Chronic back pain     Coronary artery disease     s/p MI and CABG in 12/2017    H/O aortic valve replacement 12/16/2017    Bioprosthetic valve     H/O cardiac catheterization 12/06/2018    Severe three vessel disease involving the LAD, circumflex and right coronary arteries  2 of 3 bypass grafts patent Normal LVEDP. Consult to interventional cardiology for likely angioplasty and/or  stenting of the patients severe stenosis with likely stenting of the unrevascularized RCA stenosis with plans to intervene on the higher risk LIMA-LAD stenosis only at a later date if the patient fails gu    H/O three vessel coronary artery bypass 12/2017    History of atrial fibrillation 12/2017    following CABG 12/2017    History of blood transfusion     History of cardiac cath 07/15/2021    [facility]) eDb/Dr. Yoon/Left Radial     History of myocardial infarction 12/2017    Hyperlipidemia     Hypertension     MI (myocardial infarction) [facility] 12/2017    POST-OP    Osteoarthritis     Sleep apnea     Type 2 diabetes mellitus without complication ([facility]      CURRENT ALLERGIES: Patient has no known allergies. REVIEW OF SYSTEMS: 14 systems were reviewed. Pertinent positives and negatives as above, all else negative. Past Surgical History:   Procedure Laterality Date    CARDIAC CATHETERIZATION Left 12/06/2018    Dr Regina Roach radial-Severe three vessel disease involving the LAD and circumflex coronary arteries with moderate to severe disease in the proximal right coronary arteries. 2 of 3 bypass grafts patent with a known to be occluded SVG-RCA and a 80-90% stenosis in the touchdown of the LIMA-LAD.  CARDIAC CATHETERIZATION  12/06/2018    cardiac catheterization on 12/6/2018 showed a 75% stenosis in RCA as well as a 70% stenosis in his Circumflex    COLONOSCOPY      CORONARY ANGIOPLASTY WITH STENT PLACEMENT  07/16/2021    CORONARY ARTERY BYPASS GRAFT  12/2017    EYE SURGERY Bilateral     cataracts    INTRACAPSULAR CATARACT EXTRACTION      KNEE ARTHROPLASTY      SHOULDER ARTHROPLASTY      Social History:  Social History     Tobacco Use    Smoking status: Never Smoker    Smokeless tobacco: Never Used   Vaping Use    Vaping Use: Never used   Substance Use Topics    Alcohol use:  Yes     Alcohol/week: 1.0 standard drinks     Types: 1 Glasses of wine per week     Comment: most days    Drug use: No        CURRENT MEDICATIONS:  Outpatient Medications Marked as Taking for the 10/25/21 encounter (Office Visit) with Xander Wright MD   Medication Sig Dispense Refill    lisinopril (PRINIVIL;ZESTRIL) 5 MG tablet Take 1 tablet by mouth daily 90 tablet 3    clopidogrel (PLAVIX) 75 MG tablet Take 1 tablet by mouth daily 90 tablet 3    metoprolol succinate (TOPROL XL) 100 MG extended release tablet Take 1 tablet by mouth daily 90 tablet 3    tamsulosin (FLOMAX) 0.4 MG capsule TAKE 1 CAPSULE BY MOUTH TWICE A  capsule 3    isosorbide mononitrate (IMDUR) 60 MG extended release tablet Take 1 tablet by mouth daily 90 tablet 3    [employer] Lancets Micro Thin 33G MISC USE AS DIRECTED DAILY 100 each 3    blood glucose test strips (TRUE METRIX BLOOD GLUCOSE TEST) strip USE ONCE DAILY 100 strip 3    atorvastatin (LIPITOR) 40 MG tablet Take 1 tablet by mouth daily (Patient taking differently: Take 40 mg by mouth nightly ) 90 tablet 3    famotidine (PEPCID) 40 MG tablet Take 1 tablet by mouth daily (Patient taking differently: Take 40 mg by mouth nightly ) 90 tablet 3    metFORMIN (GLUCOPHAGE) 1000 MG tablet TAKE 1 TABLET TWICE A DAY WITH MEALS 180 tablet 3    blood glucose monitor strips 1 strip by Other route daily Tung Metrix test strip. E11.9 100 strip 3    gabapentin (NEURONTIN) 400 MG capsule Take 400 mg by mouth 3 times daily.  aspirin 81 MG tablet Take 81 mg by mouth Daily with lunch       melatonin 5 MG TABS tablet Take 5 mg by mouth as needed       Multiple Vitamins-Minerals (CENTRUM SILVER PO) Take 1 tablet by mouth daily       traMADol (ULTRAM) 50 MG tablet Take 50 mg by mouth every 6 hours as needed for Pain. larryjose Flahertytho FAMILY HISTORY: family history is not on file. He was adopted. PHYSICAL EXAM:   BP (!) 100/57 (Site: Left Upper Arm, Position: Sitting, Cuff Size: Large Adult)   Pulse 61   Resp 16   Ht 6' (1.829 m)   Wt 233 lb (105.7 kg)   SpO2 98%   BMI 31.60 kg/m²  Body mass index is 31.6 kg/m². Constitutional: He is oriented to person, place, and time. He appears well-developed and well-nourished. In no acute distress. HEENT: Normocephalic and atraumatic. No JVD present. Carotid bruit is not present. No mass and no thyromegaly present.  No lymphadenopathy present. Cardiovascular: Normal rate, regular rhythm, normal heart sounds. Exam reveals no gallop and no friction rubs 2/6 systolic murmur, 2nd intercostal space on the RIGHT just lateral to the sternum. Pulmonary/Chest: Effort normal and breath sounds normal. No respiratory distress. He has no wheezes, rhonchi or rales. Abdominal: Soft, non-tender. Bowel sounds and aorta are normal. He exhibits no organomegaly, mass or bruit. Extremities: None. No cyanosis or clubbing. 2+ radial and carotid pulses. Distal extremity pulses: 2+ bilaterally. Neurological: He is alert and oriented to person, place, and time. No evidence of gross cranial nerve deficit. Coordination appeared normal.   Skin: Skin is warm and dry. There is no rash or diaphoresis. Psychiatric: He has a normal mood and affect. His speech is normal and behavior is normal.      MOST RECENT LABS ON RECORD:   Lab Results   Component Value Date    WBC 5.9 07/13/2021    HGB 13.5 07/13/2021    HCT 40.7 07/13/2021     07/13/2021    CHOL 97 12/05/2018    TRIG 45 12/05/2018    HDL 46 12/05/2018    ALT 20 12/16/2019    AST 15 12/16/2019     07/13/2021    K 4.9 07/13/2021     07/13/2021    CREATININE 0.72 07/13/2021    BUN 13 07/13/2021    CO2 24 07/13/2021    TSH 1.65 09/06/2018    INR 1.1 12/04/2018    LABA1C 7.1 (H) 12/14/2019    LABMICR CANNOT BE CALCULATED 08/06/2019     ASSESSMENT:  1. ASHD (arteriosclerotic heart disease)    2. History of myocardial infarction    3. S/P CABG x 3    4. S/P angioplasty with stent    5. Chronic diastolic congestive heart failure ([address]    6. Severe aortic stenosis    7. S/P aortic valve replacement with bioprosthetic valve    8. Chronic stable angina (HCC)       PLAN:    Atherosclerotic Heart Disease: Hx of MI with CABG x 3 in 12/2017. Severe disease in the circumflex coronary artery and LIMA-LAD graft but will plan to treat with guideline directed trial of maximal medical management.   Successful PTCA ELIAN mid RCA on 7/16/2021 by Dr Audra Xavier but still with Class II angina as LIMA-LAD touchdown stenosis was not treated. Antiplatelet Agent: Continue aspirin 81 mg daily and clopidogrel (Plavix) 75 mg daily. I also reminded him to watch for signs of blood in his stool or black tarry stools and stop the medication immediately if this develops as this could be life threatening. ACE Inibitor/ARB: Continue lisinopril 5 mg daily. Beta Blocker: Continue metoprolol succinate (Toprol XL) 100 mg once daily. Anti-anginal medications: Continue isosorbide mononitrate (Imdur) 60 mg once daily. Anti-anginal medications: Continue nitroglycerin 0.4 mg tablets as needed for chest pain. Cholesterol Reduction Therapy: Continue Atorvastatin (Lipitor) 40 mg daily. Chronic Diastolic Heart Failure: EF of 50-55% on 12/4/2018 - 55% in 1/16/20. Currently well controlled. Beta Blocker: Continue metoprolol succinate (Toprol XL) 100 mg  once daily. Nonpharmacologic management of Heart Failure: I advised him to try and keep his legs up whenever possible and to limit salt in his diet. History of Severe Aortic Stenosis: Asymptomatic-S/P: Bioprosthetic Aortic Valve replacement in 12/2017. · Beta Blocker: Continue metoprolol succinate (Toprol XL) 100 mg once daily. Essential Hypertension: Controlled  · Beta Blocker: Increase metoprolol succinate (Toprol XL) 100 mg once daily. · Chronic Stable Angina: Continue with guideline directed trial of maximal medical management. No further chest pain since increasing dose of Toprol XL. · Beta Blocker: Continue Metoprolol succinate (Toprol XL) 100 mg daily. · Anti-anginal medications: Continue isosorbide mononitrate (Imdur) 60 mg once daily. · Anti-anginal medications: Continue nitroglycerin 0.4 mg tablets as needed for chest pain. Finally, I recommended that he continue his current medications and follow up with you as previously scheduled.      FOLLOW UP:   I told  Pablito to call my office if he had any problems, but otherwise I asked him to Return in about 6 months (around 4/25/2022). However, I would be happy to see him sooner should the need arise. Sincerely,  Lance Stratton. Car ZEE, MS, F.A.C.C. [facility]    [address] [place] [address]  Phone: [phone], Fax: [phone]     I believe that the risk of significant morbidity and mortality related to the patient's current medical conditions are: Intermediate. The documentation recorded by the scribe, accurately and completely reflects the services I personally performed and the decisions made by me. Radha Porter MD, MS, F.A.C.C.  October 25, 2021

## 2021-10-26 ENCOUNTER — TELEPHONE (OUTPATIENT)
Dept: PRIMARY CARE CLINIC | Age: 76
End: 2021-10-26

## 2021-10-26 DIAGNOSIS — E11.9 TYPE 2 DIABETES MELLITUS WITHOUT COMPLICATION, WITHOUT LONG-TERM CURRENT USE OF INSULIN (HCC): Chronic | ICD-10-CM

## 2021-10-26 LAB
ESTIMATED AVERAGE GLUCOSE: 148 MG/DL
HBA1C MFR BLD: 6.8 % (ref 4–6)

## 2021-10-26 RX ORDER — CALCIUM CITRATE/VITAMIN D3 200MG-6.25
TABLET ORAL
Qty: 100 STRIP | Refills: 3 | Status: SHIPPED | OUTPATIENT
Start: 2021-10-26

## 2021-10-26 NOTE — TELEPHONE ENCOUNTER
Health Maintenance   Topic Date Due    DTaP/Tdap/Td vaccine (1 - Tdap) 11/15/2021 (Originally 2/5/1964)    Shingles Vaccine (1 of 2) 10/25/2022 (Originally 2/5/1993)    Hepatitis C screen  10/25/2022 (Originally 1945)    Lipid screen  10/25/2022    Potassium monitoring  10/25/2022    Creatinine monitoring  10/25/2022    Annual Wellness Visit (AWV)  10/26/2022    Flu vaccine  Completed    Pneumococcal 65+ years Vaccine  Completed    COVID-19 Vaccine  Completed    Hepatitis A vaccine  Aged Out    Hib vaccine  Aged Out    Meningococcal (ACWY) vaccine  Aged Out             (applicable per patient's age: Cancer Screenings, Depression Screening, Fall Risk Screening, Immunizations)    Hemoglobin A1C (%)   Date Value   10/25/2021 6.8 (H)   12/14/2019 7.1 (H)   07/16/2019 7.2 (H)     Microalb/Crt.  Ratio (mcg/mg creat)   Date Value   08/06/2019 CANNOT BE CALCULATED     LDL Cholesterol (mg/dL)   Date Value   10/25/2021 50     AST (U/L)   Date Value   12/16/2019 15     ALT (U/L)   Date Value   12/16/2019 20     BUN (mg/dL)   Date Value   10/25/2021 17      (goal A1C is < 7)   (goal LDL is <100) need 30-50% reduction from baseline     BP Readings from Last 3 Encounters:   10/25/21 (!) 100/57   10/25/21 122/70   08/26/21 114/70    (goal /80)      All Future Testing planned in [facility]:  Lab Frequency Next Occurrence   Referral to Cardiac Cath Once 07/15/2021       Next Visit Date:  Future Appointments   Date Time Provider Jerrod Meier   1/5/2022  2:00 PM Kobe Iglesias PA-C [facility] UROLOGY [facility]   4/25/2022  1:00 PM Mary Jane Chase APRN - CNP [facility]   4/25/2022  2:00 PM Marcela Robledo MD [facility] CARD [facility]   5/19/2022 10:30 AM Edy Velásquez MD [facility] PULM [facility]   10/27/2022  1:40 PM Mary Jane Chase, APRN - CNP [facility]            Patient Active Problem List:     Type 2 diabetes mellitus without complication, without long-term current use of insulin ([facility]     History of MI (myocardial

## 2021-12-23 RX ORDER — FAMOTIDINE 40 MG/1
TABLET, FILM COATED ORAL
Qty: 90 TABLET | Refills: 3 | Status: SHIPPED | OUTPATIENT
Start: 2021-12-23 | End: 2022-01-13 | Stop reason: SDUPTHER

## 2021-12-23 NOTE — TELEPHONE ENCOUNTER
Health Maintenance   Topic Date Due    DTaP/Tdap/Td vaccine (1 - Tdap) Never done    COVID-19 Vaccine (3 - Booster for Moderna series) 08/24/2021    Shingles Vaccine (1 of 2) 10/25/2022 (Originally 2/5/1993)    Hepatitis C screen  10/25/2022 (Originally 1945)    Lipid screen  10/25/2022    Potassium monitoring  10/25/2022    Creatinine monitoring  10/25/2022    Annual Wellness Visit (AWV)  10/26/2022    Flu vaccine  Completed    Pneumococcal 65+ years Vaccine  Completed    Hepatitis A vaccine  Aged Out    Hib vaccine  Aged Out    Meningococcal (ACWY) vaccine  Aged Out             (applicable per patient's age: Cancer Screenings, Depression Screening, Fall Risk Screening, Immunizations)    Hemoglobin A1C (%)   Date Value   10/25/2021 6.8 (H)   12/14/2019 7.1 (H)   07/16/2019 7.2 (H)     Microalb/Crt.  Ratio (mcg/mg creat)   Date Value   08/06/2019 CANNOT BE CALCULATED     LDL Cholesterol (mg/dL)   Date Value   10/25/2021 50     AST (U/L)   Date Value   12/16/2019 15     ALT (U/L)   Date Value   12/16/2019 20     BUN (mg/dL)   Date Value   10/25/2021 17      (goal A1C is < 7)   (goal LDL is <100) need 30-50% reduction from baseline     BP Readings from Last 3 Encounters:   10/25/21 (!) 100/57   10/25/21 122/70   08/26/21 114/70    (goal /80)      All Future Testing planned in [facility]:  Lab Frequency Next Occurrence   Referral to Cardiac Cath Once 07/15/2021       Next Visit Date:  Future Appointments   Date Time Provider Jerrod Meier   1/5/2022  2:00 PM Warnell Eisenmenger [place], PA-C [facility]   4/25/2022  1:00 PM HALEY Monte - CNP [facility]   4/25/2022  2:00 PM Reynaldo Bautista [facility]   5/19/2022 10:30 AM Armando Gordon MD [facility] PULM [facility]   10/27/2022  1:40 PM Love Chase, APRN - CNP [facility]            Patient Active Problem List:     Type 2 diabetes mellitus without complication, without long-term current use of insulin ([facility]     History of MI (myocardial infarction)     FABIAN (obstructive sleep apnea)     Other chronic pain     Bilateral hearing loss     Aneurysm of left ventricle of heart     Chronic diastolic CHF (congestive heart failure), NYHA class 3 ([facility])     Unstable angina ([facility])     History of atrial fibrillation     Hyperlipidemia     Hypertension     Sleep apnea     H/O aortic valve replacement     Hx of CABG     Coronary artery disease     Abnormal cardiovascular stress test     Chest pain     Coronary artery disease (CAD) excluded     Stable angina pectoris ([facility])     BPH with obstruction/lower urinary tract symptoms     Incomplete bladder emptying     Frequency of urination     Urgency of urination     Angina, class II ([facility])     S/P angioplasty with stent

## 2022-01-05 ENCOUNTER — OFFICE VISIT (OUTPATIENT)
Dept: UROLOGY | Age: 77
End: 2022-01-05
Payer: MEDICARE

## 2022-01-05 VITALS
WEIGHT: 232 LBS | TEMPERATURE: 97.8 F | DIASTOLIC BLOOD PRESSURE: 62 MMHG | HEIGHT: 72 IN | BODY MASS INDEX: 31.42 KG/M2 | SYSTOLIC BLOOD PRESSURE: 126 MMHG

## 2022-01-05 DIAGNOSIS — N40.1 BPH WITH OBSTRUCTION/LOWER URINARY TRACT SYMPTOMS: ICD-10-CM

## 2022-01-05 DIAGNOSIS — R39.15 URGENCY OF URINATION: ICD-10-CM

## 2022-01-05 DIAGNOSIS — N13.8 BPH WITH OBSTRUCTION/LOWER URINARY TRACT SYMPTOMS: ICD-10-CM

## 2022-01-05 DIAGNOSIS — R33.9 INCOMPLETE BLADDER EMPTYING: Primary | ICD-10-CM

## 2022-01-05 PROCEDURE — 99213 OFFICE O/P EST LOW 20 MIN: CPT | Performed by: PHYSICIAN ASSISTANT

## 2022-01-05 PROCEDURE — 51798 US URINE CAPACITY MEASURE: CPT | Performed by: PHYSICIAN ASSISTANT

## 2022-01-05 ASSESSMENT — ENCOUNTER SYMPTOMS
ABDOMINAL PAIN: 0
NAUSEA: 0
BACK PAIN: 0
COUGH: 0
CONSTIPATION: 0
VOMITING: 0
SHORTNESS OF BREATH: 0
EYE REDNESS: 0
WHEEZING: 0
COLOR CHANGE: 0

## 2022-01-05 NOTE — PATIENT INSTRUCTIONS
SURVEY:    You may be receiving a survey from [employer] regarding your visit today. Please complete the survey to enable us to provide the highest quality of care to you and your family. If you cannot score us a very good on any question, please call the office to discuss how we could have made your experience a very good one. Thank you.

## 2022-01-05 NOTE — PROGRESS NOTES
HPI:      Patient is a 64 y.o. male in no acute distress. He is alert and oriented to person, place, and time. History  B. Might APRN for frequent UTI. He is here with his daughter today. There is some baseline confusion and he is hard of hearing. His daughter reports he has been treated multiple times with antibiotics for suspected urinary tract infections. Upon further review of the chart I only see one positive culture from 2/2020 that shows E. coli. He did previously see a urologist in [place] before they moved to [address] [place] [address]. He has been on Flomax for the last 3 to 5 years. He does complain of frequency, urgency, and urge incontinence. He does not wear pads. The urge incontinence does result in him needing to change his underwear approximately 4 times per week. PVR is elevated in the office today, 223ml. He denies constipation. He denies any dysuria or gross hematuria. He denies history of stones. Flomax increased to twice a day    3/2021 Cysto: Bilobar hyperplasia of the prostate with normal bladder anatomy    Today:  Patient presents today for follow-up incomplete bladder emptying, BPH, urgency and frequency. Patient is on Flomax twice a day. He denies any adverse reactions from Flomax. Patient denies any new or worsening urinary tract symptoms. Baseline nocturia x2. He does wear briefs and changes once a day. He denies any fever, chills, gross hematuria, flank pain, dysuria. Patient last voided 25 mins ago, scan = 215 mL. This is consistent with his last visit. Past Medical History:   Diagnosis Date    Chronic back pain     Coronary artery disease     s/p MI and CABG in 12/2017    H/O aortic valve replacement 12/16/2017    Bioprosthetic valve     H/O cardiac catheterization 12/06/2018    Severe three vessel disease involving the LAD, circumflex and right coronary arteries  2 of 3 bypass grafts patent Normal LVEDP. Consult to interventional cardiology for likely angioplasty and/or  stenting of the patients severe stenosis with likely stenting of the unrevascularized RCA stenosis with plans to intervene on the higher risk LIMA-LAD stenosis only at a later date if the patient fails gu    H/O three vessel coronary artery bypass 12/2017    History of atrial fibrillation 12/2017    following CABG 12/2017    History of blood transfusion     History of cardiac cath 07/15/2021    [facility]) Deb/Dr. Yoon/Left Radial     History of myocardial infarction 12/2017    Hyperlipidemia     Hypertension     MI (myocardial infarction) ([address] 12/2017    POST-OP    Osteoarthritis     Sleep apnea     Type 2 diabetes mellitus without complication [facility])      Past Surgical History:   Procedure Laterality Date    CARDIAC CATHETERIZATION Left 12/06/2018    Dr Nena Hyman radial-Severe three vessel disease involving the LAD and circumflex coronary arteries with moderate to severe disease in the proximal right coronary arteries. 2 of 3 bypass grafts patent with a known to be occluded SVG-RCA and a 80-90% stenosis in the touchdown of the LIMA-LAD.     CARDIAC CATHETERIZATION  12/06/2018    cardiac catheterization on 12/6/2018 showed a 75% stenosis in RCA as well as a 70% stenosis in his Circumflex    COLONOSCOPY      CORONARY ANGIOPLASTY WITH STENT PLACEMENT  07/16/2021    CORONARY ARTERY BYPASS GRAFT  12/2017    EYE SURGERY Bilateral     cataracts    INTRACAPSULAR CATARACT EXTRACTION      KNEE ARTHROPLASTY      SHOULDER ARTHROPLASTY       Outpatient Encounter Medications as of 1/5/2022   Medication Sig Dispense Refill    famotidine (PEPCID) 40 MG tablet TAKE 1 TABLET DAILY 90 tablet 3    blood glucose test strips (TRUE METRIX BLOOD GLUCOSE TEST) strip USE ONCE DAILY 100 strip 3    lisinopril (PRINIVIL;ZESTRIL) 5 MG tablet Take 1 tablet by mouth daily 90 tablet 3    clopidogrel (PLAVIX) 75 MG tablet Take 1 tablet by mouth daily 90 tablet 3    metoprolol succinate (TOPROL XL) 100 MG extended release tablet Take 1 tablet by mouth daily 90 tablet 3    tamsulosin (FLOMAX) 0.4 MG capsule TAKE 1 CAPSULE BY MOUTH TWICE A  capsule 3    isosorbide mononitrate (IMDUR) 60 MG extended release tablet Take 1 tablet by mouth daily 90 tablet 3    [facility] Lancets Micro Thin 33G MISC USE AS DIRECTED DAILY 100 each 3    atorvastatin (LIPITOR) 40 MG tablet Take 1 tablet by mouth daily (Patient taking differently: Take 40 mg by mouth nightly ) 90 tablet 3    metFORMIN (GLUCOPHAGE) 1000 MG tablet TAKE 1 TABLET TWICE A DAY WITH MEALS 180 tablet 3    nitroGLYCERIN (NITROSTAT) 0.4 MG SL tablet PLACE 1 TABLET UNDER THE TONGUE EVERY 5 MINUTES AS NEEDED FOR CHEST PAIN UP TO MAX OF 3 TOTAL DOSES. IF NO RELIEF AFTER 1 DOSE, CALL 911. 75 tablet 1    blood glucose monitor strips 1 strip by Other route daily Tung Metrix test strip. E11.9 100 strip 3    gabapentin (NEURONTIN) 400 MG capsule Take 400 mg by mouth 3 times daily.  aspirin 81 MG tablet Take 81 mg by mouth Daily with lunch       melatonin 5 MG TABS tablet Take 5 mg by mouth as needed       Multiple Vitamins-Minerals (CENTRUM SILVER PO) Take 1 tablet by mouth daily       traMADol (ULTRAM) 50 MG tablet Take 50 mg by mouth every 6 hours as needed for Pain. Cornelia Smith No facility-administered encounter medications on file as of 1/5/2022.       Current Outpatient Medications on File Prior to Visit   Medication Sig Dispense Refill    famotidine (PEPCID) 40 MG tablet TAKE 1 TABLET DAILY 90 tablet 3    blood glucose test strips (TRUE METRIX BLOOD GLUCOSE TEST) strip USE ONCE DAILY 100 strip 3    lisinopril (PRINIVIL;ZESTRIL) 5 MG tablet Take 1 tablet by mouth daily 90 tablet 3    clopidogrel (PLAVIX) 75 MG tablet Take 1 tablet by mouth daily 90 tablet 3    metoprolol succinate (TOPROL XL) 100 MG extended release tablet Take 1 tablet by mouth daily 90 tablet 3    tamsulosin (FLOMAX) 0.4 MG capsule TAKE 1 CAPSULE BY MOUTH TWICE A DAY 180 capsule 3    isosorbide mononitrate (IMDUR) 60 MG extended release tablet Take 1 tablet by mouth daily 90 tablet 3    [facility]S Lancets Micro Thin 33G MISC USE AS DIRECTED DAILY 100 each 3    atorvastatin (LIPITOR) 40 MG tablet Take 1 tablet by mouth daily (Patient taking differently: Take 40 mg by mouth nightly ) 90 tablet 3    metFORMIN (GLUCOPHAGE) 1000 MG tablet TAKE 1 TABLET TWICE A DAY WITH MEALS 180 tablet 3    nitroGLYCERIN (NITROSTAT) 0.4 MG SL tablet PLACE 1 TABLET UNDER THE TONGUE EVERY 5 MINUTES AS NEEDED FOR CHEST PAIN UP TO MAX OF 3 TOTAL DOSES. IF NO RELIEF AFTER 1 DOSE, CALL 911. 75 tablet 1    blood glucose monitor strips 1 strip by Other route daily Tung Metrix test strip. E11.9 100 strip 3    gabapentin (NEURONTIN) 400 MG capsule Take 400 mg by mouth 3 times daily.  aspirin 81 MG tablet Take 81 mg by mouth Daily with lunch       melatonin 5 MG TABS tablet Take 5 mg by mouth as needed       Multiple Vitamins-Minerals (CENTRUM SILVER PO) Take 1 tablet by mouth daily       traMADol (ULTRAM) 50 MG tablet Take 50 mg by mouth every 6 hours as needed for Pain. .       No current facility-administered medications on file prior to visit. Patient has no known allergies. Family History   Adopted: Yes     Social History     Tobacco Use   Smoking Status Never Smoker   Smokeless Tobacco Never Used       Social History     Substance and Sexual Activity   Alcohol Use Yes    Alcohol/week: 1.0 standard drink    Types: 1 Glasses of wine per week    Comment: most days       Review of Systems   Constitutional: Negative for appetite change, chills and fever. Eyes: Negative for redness and visual disturbance. Respiratory: Negative for cough, shortness of breath and wheezing. Cardiovascular: Negative for chest pain and leg swelling. Gastrointestinal: Negative for abdominal pain, constipation, nausea and vomiting.    Genitourinary: Negative for decreased urine volume, difficulty urinating, dysuria, enuresis, flank pain, frequency, hematuria, penile discharge, penile pain, scrotal swelling, testicular pain and urgency. Positive for nocturia and incontinence   Musculoskeletal: Negative for back pain, joint swelling and myalgias. Skin: Negative for color change, rash and wound. Neurological: Negative for dizziness, tremors and numbness. Hematological: Negative for adenopathy. Does not bruise/bleed easily. /62   Temp 97.8 °F (36.6 °C) (Infrared)   Ht 6' (1.829 m)   Wt 232 lb (105.2 kg)   BMI 31.46 kg/m²       PHYSICAL EXAM:  Constitutional: Patient in no acute distress; Neuro: alert and oriented to person place and time. Psych: Mood and affect normal.  Lungs: Respiratory effort normal  Abdomen: Soft, non-tender, non-distended  Rectal: Deferred      Lab Results   Component Value Date    BUN 17 10/25/2021     Lab Results   Component Value Date    CREATININE 0.82 10/25/2021     No results found for: PSA    ASSESSMENT:   Diagnosis Orders   1. Incomplete bladder emptying     2. BPH with obstruction/lower urinary tract symptoms  MI MEASUREMENT,POST-VOID RESIDUAL VOLUME BY US,NON-IMAGING   3. Urgency of urination         PLAN:  Continue Flomax twice a day    Patient is doing well and is not having any bothersome symptoms for himself.     Follow-up in 6 months with PVR

## 2022-01-12 ENCOUNTER — TELEPHONE (OUTPATIENT)
Dept: PRIMARY CARE CLINIC | Age: 77
End: 2022-01-12

## 2022-01-12 NOTE — TELEPHONE ENCOUNTER
----- Message from Chau Alvarez sent at 1/11/2022  5:12 PM EST -----  Subject: Message to Provider    QUESTIONS  Information for Provider? pt wife called and asked for a short script for   famotidine (PEPCID) 40 MG tablet . asking for 10 days worth, he takes once   a day   ---------------------------------------------------------------------------  --------------  CALL BACK INFO  What is the best way for the office to contact you? OK to leave message on   voicemail, OK to respond with electronic message via [employer] portal (only   for patients who have registered [employer] account)  Preferred Call Back Phone Number? [phone]  ---------------------------------------------------------------------------  --------------  SCRIPT ANSWERS  Relationship to Patient? Third Party  Representative Name?  wife

## 2022-01-12 NOTE — TELEPHONE ENCOUNTER
Unable to call patient or patients wife back due to both phone numbers being disconnected. Unable to fill medication due to not knowing what pharmacy he would like the short script sent to.

## 2022-01-12 NOTE — TELEPHONE ENCOUNTER
----- Message from Grant Simeon sent at 1/12/2022  1:15 PM EST -----  Subject: Refill Request    QUESTIONS  Name of Medication? famotidine (PEPCID) 40 MG tablet  Patient-reported dosage and instructions? 1 daily   How many days do you have left? 0  [id number]  Pharmacy phone number (if available)? [phone]  Additional Information for Provider? pt is currently out   ---------------------------------------------------------------------------  --------------  [address]  What is the best way for the office to contact you? OK to leave message on   voicemail, OK to respond with electronic message via [email or web address] portal (only   for patients who have registered [email or web address] account)  Preferred Call Back Phone Number?  [phone]

## 2022-01-12 NOTE — TELEPHONE ENCOUNTER
Called [facility] to check on famotidine (Pepcid) 40mg. Was informed that medication was shipped on 12/27/21 and delivered on 12/29/21. 90 tablets. Called and left message on home phone and on Fabiola's phone.

## 2022-01-13 ENCOUNTER — TELEPHONE (OUTPATIENT)
Dept: PRIMARY CARE CLINIC | Age: 77
End: 2022-01-13

## 2022-01-13 RX ORDER — FAMOTIDINE 40 MG/1
TABLET, FILM COATED ORAL
Qty: 30 TABLET | Refills: 0 | Status: SHIPPED | OUTPATIENT
Start: 2022-01-13 | End: 2022-04-25 | Stop reason: SDUPTHER

## 2022-01-13 NOTE — TELEPHONE ENCOUNTER
Health Maintenance   Topic Date Due    DTaP/Tdap/Td vaccine (1 - Tdap) Never done    COVID-19 Vaccine (3 - Booster for Moderna series) 08/24/2021    Shingles Vaccine (1 of 2) 10/25/2022 (Originally 2/5/1995)    Hepatitis C screen  10/25/2022 (Originally 1945)    Lipid screen  10/25/2022    Depression Screen  10/25/2022    Potassium monitoring  10/25/2022    Creatinine monitoring  10/25/2022    Annual Wellness Visit (AWV)  10/26/2022    Flu vaccine  Completed    Pneumococcal 65+ years Vaccine  Completed    Hepatitis A vaccine  Aged Out    Hib vaccine  Aged Out    Meningococcal (ACWY) vaccine  Aged Out             (applicable per patient's age: Cancer Screenings, Depression Screening, Fall Risk Screening, Immunizations)    Hemoglobin A1C (%)   Date Value   10/25/2021 6.8 (H)   12/14/2019 7.1 (H)   07/16/2019 7.2 (H)     Microalb/Crt.  Ratio (mcg/mg creat)   Date Value   08/06/2019 CANNOT BE CALCULATED     LDL Cholesterol (mg/dL)   Date Value   10/25/2021 50     AST (U/L)   Date Value   12/16/2019 15     ALT (U/L)   Date Value   12/16/2019 20     BUN (mg/dL)   Date Value   10/25/2021 17      (goal A1C is < 7)   (goal LDL is <100) need 30-50% reduction from baseline     BP Readings from Last 3 Encounters:   01/05/22 126/62   10/25/21 (!) 100/57   10/25/21 122/70    (goal /80)      All Future Testing planned in [facility]:  Lab Frequency Next Occurrence   Referral to Cardiac Cath Once 07/15/2021       Next Visit Date:  Future Appointments   Date Time Provider Jerrod Meier   1/21/2022  2:00 PM HALEY Garcia CNP [facility]   4/25/2022  1:00 PM HALEY Garcia CNP [facility]   4/25/2022  2:00 PM Enrrique Castillo MD [facility]   5/19/2022 10:30 AM Carlin Farmer MD [facility] PULM [facility]   7/6/2022 11:15 AM XU HurtadoF UROLOGY [facility]   10/27/2022  1:40 PM [facility]            Patient Active Problem List:     Type 2 diabetes mellitus without complication, without long-term current use of insulin ([facility])     History of MI (myocardial infarction)     FABIAN (obstructive sleep apnea)     Other chronic pain     Bilateral hearing loss     Aneurysm of left ventricle of heart     Chronic diastolic CHF (congestive heart failure), NYHA class 3 ([facility])     Unstable angina ([facility])     History of atrial fibrillation     Hyperlipidemia     Hypertension     Sleep apnea     H/O aortic valve replacement     Hx of CABG     Coronary artery disease     Abnormal cardiovascular stress test     Chest pain     Coronary artery disease (CAD) excluded     Stable angina pectoris ([facility])     BPH with obstruction/lower urinary tract symptoms     Incomplete bladder emptying     Frequency of urination     Urgency of urination     Angina, class II ([facility])     S/P angioplasty with stent

## 2022-01-13 NOTE — TELEPHONE ENCOUNTER
----- Message from Antonio Morataya sent at 1/13/2022  8:23 AM EST -----  Subject: Refill Request    QUESTIONS  Name of Medication? famotidine (PEPCID) 40 MG tablet  Patient-reported dosage and instructions? 1- 40 MG tablet Daily  How many days do you have left? 0  Preferred Pharmacy? [id number]  Pharmacy phone number (if available)? [phone]  Additional Information for Provider? second refill request? pt is   completely out  ---------------------------------------------------------------------------  --------------  CALL BACK INFO  What is the best way for the office to contact you? OK to leave message on   voicemail  Preferred Call Back Phone Number?  [phone]

## 2022-01-17 DIAGNOSIS — E11.9 TYPE 2 DIABETES MELLITUS WITHOUT COMPLICATION, WITHOUT LONG-TERM CURRENT USE OF INSULIN (HCC): Chronic | ICD-10-CM

## 2022-01-17 NOTE — TELEPHONE ENCOUNTER
Health Maintenance   Topic Date Due    DTaP/Tdap/Td vaccine (1 - Tdap) Never done    COVID-19 Vaccine (3 - Booster for Moderna series) 08/24/2021    Shingles Vaccine (1 of 2) 10/25/2022 (Originally 2/5/1995)    Hepatitis C screen  10/25/2022 (Originally 1945)    Lipid screen  10/25/2022    Depression Screen  10/25/2022    Potassium monitoring  10/25/2022    Creatinine monitoring  10/25/2022    Annual Wellness Visit (AWV)  10/26/2022    Flu vaccine  Completed    Pneumococcal 65+ years Vaccine  Completed    Hepatitis A vaccine  Aged Out    Hib vaccine  Aged Out    Meningococcal (ACWY) vaccine  Aged Out             (applicable per patient's age: Cancer Screenings, Depression Screening, Fall Risk Screening, Immunizations)    Hemoglobin A1C (%)   Date Value   10/25/2021 6.8 (H)   12/14/2019 7.1 (H)   07/16/2019 7.2 (H)     Microalb/Crt.  Ratio (mcg/mg creat)   Date Value   08/06/2019 CANNOT BE CALCULATED     LDL Cholesterol (mg/dL)   Date Value   10/25/2021 50     AST (U/L)   Date Value   12/16/2019 15     ALT (U/L)   Date Value   12/16/2019 20     BUN (mg/dL)   Date Value   10/25/2021 17      (goal A1C is < 7)   (goal LDL is <100) need 30-50% reduction from baseline     BP Readings from Last 3 Encounters:   01/05/22 126/62   10/25/21 (!) 100/57   10/25/21 122/70    (goal /80)      All Future Testing planned in [facility]:  Lab Frequency Next Occurrence   Referral to Cardiac Cath Once 07/15/2021       Next Visit Date:  Future Appointments   Date Time Provider Jerrod Meier   1/21/2022  2:00 PM HALEY Gomes CNP [facility]   4/25/2022  1:00 PM HALEY Gomes - CNP [facility]   4/25/2022  2:00 PM Araceli Peñaloza MD [facility]   5/19/2022 10:30 AM Carlin Tellez MD [facility]   7/6/2022 11:15 AM XU DumontF UROLOGY [facility]   10/27/2022  1:40 PM [facility]            Patient Active Problem List:     Type 2 diabetes mellitus without complication, without long-term current use of insulin ([facility])     History of MI (myocardial infarction)     FABIAN (obstructive sleep apnea)     Other chronic pain     Bilateral hearing loss     Aneurysm of left ventricle of heart     Chronic diastolic CHF (congestive heart failure), NYHA class 3 ([facility])     Unstable angina ([facility])     History of atrial fibrillation     Hyperlipidemia     Hypertension     Sleep apnea     H/O aortic valve replacement     Hx of CABG     Coronary artery disease     Abnormal cardiovascular stress test     Chest pain     Coronary artery disease (CAD) excluded     Stable angina pectoris ([facility])     BPH with obstruction/lower urinary tract symptoms     Incomplete bladder emptying     Frequency of urination     Urgency of urination     Angina, class II ([facility])     S/P angioplasty with stent

## 2022-01-21 ENCOUNTER — OFFICE VISIT (OUTPATIENT)
Dept: PRIMARY CARE CLINIC | Age: 77
End: 2022-01-21
Payer: MEDICARE

## 2022-01-21 VITALS
HEART RATE: 82 BPM | WEIGHT: 230.7 LBS | OXYGEN SATURATION: 98 % | BODY MASS INDEX: 31.29 KG/M2 | RESPIRATION RATE: 22 BRPM | SYSTOLIC BLOOD PRESSURE: 128 MMHG | TEMPERATURE: 97.8 F | DIASTOLIC BLOOD PRESSURE: 78 MMHG

## 2022-01-21 DIAGNOSIS — B02.29 POST HERPETIC NEURALGIA: Primary | ICD-10-CM

## 2022-01-21 PROCEDURE — 99213 OFFICE O/P EST LOW 20 MIN: CPT | Performed by: NURSE PRACTITIONER

## 2022-01-21 RX ORDER — OXYCODONE HYDROCHLORIDE AND ACETAMINOPHEN 5; 325 MG/1; MG/1
1 TABLET ORAL EVERY 6 HOURS PRN
Qty: 20 TABLET | Refills: 0 | Status: SHIPPED | OUTPATIENT
Start: 2022-01-21 | End: 2022-01-26

## 2022-01-21 RX ORDER — VALACYCLOVIR HYDROCHLORIDE 1 G/1
TABLET, FILM COATED ORAL
COMMUNITY
Start: 2022-01-09 | End: 2022-10-27 | Stop reason: ALTCHOICE

## 2022-01-21 ASSESSMENT — ENCOUNTER SYMPTOMS
EYE PAIN: 0
COUGH: 0
DIARRHEA: 0
RHINORRHEA: 0
SHORTNESS OF BREATH: 0
NAIL CHANGES: 0
SORE THROAT: 0
VOMITING: 0

## 2022-01-21 NOTE — PATIENT INSTRUCTIONS
SURVEY:     You may be receiving a survey from [employer] regarding your visit today. Please complete the survey to enable us to provide the highest quality of care to you and your family. If you cannot score us a very good on any question, please call the office to discuss how we could have made your experience a very good one. Thank you. Jossue Chase, APRN-CNP  Josephine Mendiolahew, CNP  Alise Barbosa, LPN  Marta Fair, LPN  Gem Garner, A  Karen Correia, IRENA Salvador, CMA  Jasmyn, PCA    Patient Education        Shingles: Care Instructions  Your Care Instructions     Shingles (herpes zoster) causes pain and a blistered rash. The rash can appear anywhere on the body but will be on only one side of the body, the left or right. It will be in a band, a strip, or a small area. The pain can be very severe. Shingles can also cause tingling or itching in the area of the rash. The blisters scab over after a few days and heal in 2 to 4 weeks. Medicines can help you feel better and may help prevent more serious problems caused by shingles. Shingles is caused by the same virus that causes chickenpox. When you have chickenpox, the virus gets into your nerve roots and stays there (becomes dormant) long after you get over the chickenpox. If the virus becomes active again, it can cause shingles. Follow-up care is a key part of your treatment and safety. Be sure to make and go to all appointments, and call your doctor if you are having problems. It's also a good idea to know your test results and keep a list of the medicines you take. How can you care for yourself at home? · Be safe with medicines. Take your medicines exactly as prescribed. Call your doctor if you think you are having a problem with your medicine. Antiviral medicine helps you get better faster. · Try not to scratch or pick at the blisters. They will crust over and fall off on their own if you leave them alone. · Put cool, wet cloths on the area to relieve pain and itching.  You can also use calamine lotion. Try not to use so much lotion that it cakes and is hard to get off. · Put cornstarch or baking soda on the sores to help dry them out so they heal faster. · Do not use thick ointment, such as petroleum jelly, on the sores. This will keep them from drying and healing. · To help remove loose crusts, soak them in tap water. This can help decrease oozing, and dry and soothe the skin. · Take an over-the-counter pain medicine, such as acetaminophen (Tylenol), ibuprofen (Advil, Motrin), or naproxen (Aleve). Read and follow all instructions on the label. · Avoid close contact with people until the blisters have healed. It is very important for you to avoid contact with anyone who has never had chickenpox or the chickenpox vaccine. Pregnant women, young babies, and anyone else who has a hard time fighting infection (such as someone with HIV, diabetes, or cancer) is especially at risk. When should you call for help? Call your doctor now or seek immediate medical care if:    · You have a new or higher fever.     · You have a severe headache and a stiff neck.     · You lose the ability to think clearly.     · The rash spreads to your forehead, nose, eyes, or eyelids.     · You have eye pain, or your vision gets worse.     · You have new pain in your face, or you cannot move the muscles in your face.     · Blisters spread to new parts of your body. Watch closely for changes in your health, and be sure to contact your doctor if:    · The rash has not healed after 2 to 4 weeks.     · You still have pain after the rash has healed. Where can you learn more? Go to https://[email or web address]. org and sign in to your [employer] account. Bell Kam in the [facility] box to learn more about \"Shingles: Care Instructions. \"     If you do not have an account, please click on the \"Sign Up Now\" link.   Current as of: July 1, 2021               Content Version: 13.1  © 1105-1629

## 2022-01-21 NOTE — PROGRESS NOTES
Name: Kaye Armenta  : 1945         Chief Complaint:     Chief Complaint   Patient presents with    Herpes Zoster     left mid-back to lower abdomen. , little pain       History of Present Illness:      Kaye Armenta is a 64 y.o.  male who presents with Herpes Zoster (left mid-back to lower abdomen. , little pain)      Petar Flanagan is here today for a routine office visit. He was diagnosed with shingles approximately a week or so ago. He finished course of Valtrex but is having continued pain in the area. See below for further comment. Rash  This is a new problem. The current episode started 1 to 4 weeks ago. The problem has been gradually improving since onset. The affected locations include the torso and back. The rash is characterized by blistering, redness and pain. He was exposed to nothing. Pertinent negatives include no anorexia, congestion, cough, diarrhea, eye pain, facial edema, fatigue, fever, joint pain, nail changes, rhinorrhea, shortness of breath, sore throat or vomiting. Treatments tried: VALACYCLOVIR. The treatment provided moderate relief. His past medical history is significant for varicella. Past Medical History:     Past Medical History:   Diagnosis Date    Chronic back pain     Coronary artery disease     s/p MI and CABG in 2017    H/O aortic valve replacement 2017    Bioprosthetic valve     H/O cardiac catheterization 2018    Severe three vessel disease involving the LAD, circumflex and right coronary arteries  2 of 3 bypass grafts patent Normal LVEDP. Consult to interventional cardiology for likely angioplasty and/or  stenting of the patients severe stenosis with likely stenting of the unrevascularized RCA stenosis with plans to intervene on the higher risk LIMA-LAD stenosis only at a later date if the patient fails     H/O three vessel coronary artery bypass 2017    History of atrial fibrillation 2017    following CABG 2017    History of blood transfusion     History of cardiac cath 07/15/2021    [facility]) Deb/Dr. Yoon/Left Radial     History of myocardial infarction 12/2017    Hyperlipidemia     Hypertension     MI (myocardial infarction) ([facility] [address] 12/2017    POST-OP    Osteoarthritis     Sleep apnea     Type 2 diabetes mellitus without complication ([facility] [address]       Reviewed all health maintenance requirements and ordered appropriate tests  There are no preventive care reminders to display for this patient. Past Surgical History:     Past Surgical History:   Procedure Laterality Date    CARDIAC CATHETERIZATION Left 12/06/2018    Dr Severa Juniper radial-Severe three vessel disease involving the LAD and circumflex coronary arteries with moderate to severe disease in the proximal right coronary arteries. 2 of 3 bypass grafts patent with a known to be occluded SVG-RCA and a 80-90% stenosis in the touchdown of the LIMA-LAD.  CARDIAC CATHETERIZATION  12/06/2018    cardiac catheterization on 12/6/2018 showed a 75% stenosis in RCA as well as a 70% stenosis in his Circumflex    COLONOSCOPY      CORONARY ANGIOPLASTY WITH STENT PLACEMENT  07/16/2021    CORONARY ARTERY BYPASS GRAFT  12/2017    EYE SURGERY Bilateral     cataracts    INTRACAPSULAR CATARACT EXTRACTION      KNEE ARTHROPLASTY      SHOULDER ARTHROPLASTY          Medications:       Prior to Admission medications    Medication Sig Start Date End Date Taking? Authorizing Provider   oxyCODONE-acetaminophen (PERCOCET) 5-325 MG per tablet Take 1 tablet by mouth every 6 hours as needed for Pain for up to 5 days. Intended supply: 5 days.  Take lowest dose possible to manage pain 1/21/22 1/26/22 Yes Karli Chase, APRN - CNP   metFORMIN (GLUCOPHAGE) 1000 MG tablet TAKE 1 TABLET TWICE A DAY WITH MEALS 1/17/22  Yes Karli Chase, APRN - CNP   famotidine (PEPCID) 40 MG tablet TAKE 1 TABLET DAILY 1/13/22  Yes Karli Chase, APRN - CNP   blood glucose test strips (TRUE METRIX BLOOD GLUCOSE TEST) strip USE ONCE DAILY 10/26/21  Yes HALEY Barba CNP   lisinopril (PRINIVIL;ZESTRIL) 5 MG tablet Take 1 tablet by mouth daily 9/13/21  Yes Zacarias Hollis MD   clopidogrel (PLAVIX) 75 MG tablet Take 1 tablet by mouth daily 9/13/21  Yes Zacarias Hollis MD   metoprolol succinate (TOPROL XL) 100 MG extended release tablet Take 1 tablet by mouth daily 8/26/21  Yes Zacarias Hollis MD   tamsulosin (FLOMAX) 0.4 MG capsule TAKE 1 CAPSULE BY MOUTH TWICE A DAY 8/9/21  Yes Shilpi Iglesias PA-C   isosorbide mononitrate (IMDUR) 60 MG extended release tablet Take 1 tablet by mouth daily 7/13/21  Yes Zacarias Hollis MD   [employer] Lancets Micro Thin 33G MISC USE AS DIRECTED DAILY 3/31/21  Yes HALEY Barba CNP   atorvastatin (LIPITOR) 40 MG tablet Take 1 tablet by mouth daily  Patient taking differently: Take 40 mg by mouth nightly  1/8/21  Yes Zacarias Hollis MD   blood glucose monitor strips 1 strip by Other route daily Tung Metrix test strip. E11.9 6/12/19  Yes HALEY Barba CNP   gabapentin (NEURONTIN) 400 MG capsule Take 400 mg by mouth 3 times daily. 2/14/19  Yes Walt Boyd MD   aspirin 81 MG tablet Take 81 mg by mouth Daily with lunch    Yes Historical Provider, MD   melatonin 5 MG TABS tablet Take 5 mg by mouth as needed    Yes Historical Provider, MD   Multiple Vitamins-Minerals (CENTRUM SILVER PO) Take 1 tablet by mouth daily    Yes Historical Provider, MD   valACYclovir (VALTREX) 1 g tablet  1/9/22   Historical Provider, MD   nitroGLYCERIN (NITROSTAT) 0.4 MG SL tablet PLACE 1 TABLET UNDER THE TONGUE EVERY 5 MINUTES AS NEEDED FOR CHEST PAIN UP TO MAX OF 3 TOTAL DOSES. IF NO RELIEF AFTER 1 DOSE, CALL 911. Patient not taking: Reported on 1/21/2022 7/10/20   Zacarias Hollis MD   traMADol (ULTRAM) 50 MG tablet Take 50 mg by mouth every 6 hours as needed for Pain. Sonia Ackerman Historical Provider, MD        Allergies:       Patient has no known allergies.     Social History:     Tobacco:    reports that he has never smoked. He has never used smokeless tobacco.  Alcohol:      reports current alcohol use of about 1.0 standard drink of alcohol per week. Drug Use:  reports no history of drug use. Family History:     Family History   Adopted: Yes       Review of Systems:     Positive and Negative as described in HPI    Review of Systems   Constitutional: Negative for fatigue and fever. HENT: Negative for congestion, rhinorrhea and sore throat. Eyes: Negative for pain. Respiratory: Negative for cough and shortness of breath. Gastrointestinal: Negative for anorexia, diarrhea and vomiting. Musculoskeletal: Positive for myalgias. Negative for joint pain. Skin: Positive for rash. Negative for nail changes. Physical Exam:   Vitals:  /78 (Position: Sitting)   Pulse 82   Temp 97.8 °F (36.6 °C) (Temporal)   Resp 22   Wt 230 lb 11.2 oz (104.6 kg)   SpO2 98%   BMI 31.29 kg/m²     Physical Exam  Vitals and nursing note reviewed. Constitutional:       Appearance: Normal appearance. HENT:      Mouth/Throat:      Mouth: Mucous membranes are moist.   Cardiovascular:      Rate and Rhythm: Normal rate and regular rhythm. Pulmonary:      Effort: Pulmonary effort is normal.      Breath sounds: Normal breath sounds. Skin:     General: Skin is warm and dry. Findings: Rash present. Rash is crusting and vesicular. Comments: Healing vesicular rash with scabbing   Neurological:      Mental Status: He is alert and oriented to person, place, and time.          Data:     Lab Results   Component Value Date     10/25/2021    K 4.6 10/25/2021     10/25/2021    CO2 25 10/25/2021    BUN 17 10/25/2021    CREATININE 0.82 10/25/2021    GLUCOSE 97 10/25/2021    PROT 7.2 12/16/2019    LABALBU 3.8 12/16/2019    BILITOT 0.62 12/16/2019    ALKPHOS 72 12/16/2019    AST 15 12/16/2019    ALT 20 12/16/2019     Lab Results   Component Value Date    WBC 5.9 07/13/2021    RBC 3.91 07/13/2021 HGB 13.5 07/13/2021    HCT 40.7 07/13/2021    .1 07/13/2021    MCH 34.5 07/13/2021    MCHC 33.2 07/13/2021    RDW 13.7 07/13/2021     07/13/2021    MPV 10.4 07/13/2021     Lab Results   Component Value Date    TSH 1.65 09/06/2018     Lab Results   Component Value Date    CHOL 110 10/25/2021    HDL 51 10/25/2021    LABA1C 6.8 10/25/2021       Assessment/Plan:      Diagnosis Orders   1. Post herpetic neuralgia  oxyCODONE-acetaminophen (PERCOCET) 5-325 MG per tablet     He will discontinue tramadol at this time and start Percocet for 5 days. Continue OTC treatments such as lidocaine gel. We will see him back as needed. Sooner if any issues. 1.  Marta Melgoza received counseling on the following healthy behaviors: medication adherence  2. Patient given educational materials - see patient instructions  3. Was a self-tracking handout given in paper form or via vendome [id number]? No  If yes, see orders or list here. 4.  Discussed use, benefit, and side effects of prescribed medications. Barriers to medication compliance addressed. All patient questions answered. Pt voiced understanding. 5.  Reviewed prior labs and health maintenance  6. Continue current medications, diet and exercise. Completed Refills   Requested Prescriptions     Signed Prescriptions Disp Refills    oxyCODONE-acetaminophen (PERCOCET) 5-325 MG per tablet 20 tablet 0     Sig: Take 1 tablet by mouth every 6 hours as needed for Pain for up to 5 days. Intended supply: 5 days. Take lowest dose possible to manage pain         Return if symptoms worsen or fail to improve.

## 2022-02-07 NOTE — PROGRESS NOTES
Date:February 9, 2022      Patient was self referred, no letter generated. Do not send.        [facility] Health Information       Medicare Annual Wellness Visit  Name: Jake Del Castillo Date: 10/25/2021   MRN: [id number] Sex: Male   Age: 64 y.o. Ethnicity: Non- / Non    : 1945 Race: White (non-)      Mary Chahal is here for Medicare AWV    Screenings for behavioral, psychosocial and functional/safety risks, and cognitive dysfunction are all negative except as indicated below. These results, as well as other patient data from the [address] form, are documented in Flowsheets linked to this Encounter. Dwayne Sampson is here today for an annual Medicare wellness visit and routine office visit. He states he is feeling well and is compliant with his medications. He has not had labs for a while, we will order today. See below for further comments. Diabetes  He presents for his follow-up diabetic visit. He has type 2 diabetes mellitus. His disease course has been stable. There are no hypoglycemic associated symptoms. Pertinent negatives for hypoglycemia include no dizziness, headaches, nervousness/anxiousness, seizures, sleepiness or tremors. Associated symptoms include foot paresthesias. Pertinent negatives for diabetes include no chest pain, no fatigue, no foot ulcerations, no polydipsia, no polyphagia and no polyuria. There are no hypoglycemic complications. Symptoms are stable. Diabetic complications include heart disease. Pertinent negatives for diabetic complications include no nephropathy or peripheral neuropathy. Risk factors for coronary artery disease include male sex, sedentary lifestyle and obesity. Current diabetic treatment includes oral agent (monotherapy). He is compliant with treatment all of the time. His weight is stable. He is following a generally healthy diet. When asked about meal planning, he reported none. He has had a previous visit with a dietitian. He participates in exercise weekly. There is no change in his home blood glucose trend.  His breakfast blood glucose range is extended release tablet Take 1 tablet by mouth daily Yes Hayden Juarez MD   [employer] Lancets Micro Thin 33G MISC USE AS DIRECTED DAILY Yes HALEY Kc CNP   blood glucose test strips (TRUE METRIX BLOOD GLUCOSE TEST) strip USE ONCE DAILY Yes HALEY Kc CNP   atorvastatin (LIPITOR) 40 MG tablet Take 1 tablet by mouth daily  Patient taking differently: Take 40 mg by mouth nightly  Yes Hayden Juarez MD   famotidine (PEPCID) 40 MG tablet Take 1 tablet by mouth daily  Patient taking differently: Take 40 mg by mouth nightly  Yes HALEY Kc CNP   metFORMIN (GLUCOPHAGE) 1000 MG tablet TAKE 1 TABLET TWICE A DAY WITH MEALS Yes HALEY Quinteros CNP   blood glucose monitor strips 1 strip by Other route daily Tung Metrix test strip. E11.9 Yes HALEY Kc CNP   gabapentin (NEURONTIN) 400 MG capsule Take 400 mg by mouth 3 times daily. Yes Sudheer Bradshaw MD   aspirin 81 MG tablet Take 81 mg by mouth Daily with lunch  Yes Historical Provider, MD   melatonin 5 MG TABS tablet Take 5 mg by mouth as needed  Yes Historical Provider, MD   Multiple Vitamins-Minerals (CENTRUM SILVER PO) Take 1 tablet by mouth daily  Yes Historical Provider, MD   traMADol (ULTRAM) 50 MG tablet Take 50 mg by mouth every 6 hours as needed for Pain. . Yes Historical Provider, MD   nitroGLYCERIN (NITROSTAT) 0.4 MG SL tablet PLACE 1 TABLET UNDER THE TONGUE EVERY 5 MINUTES AS NEEDED FOR CHEST PAIN UP TO MAX OF 3 TOTAL DOSES. IF NO RELIEF AFTER 1 DOSE, CALL 911. Patient not taking: Reported on 10/25/2021  Hayden Juarez MD         Past Medical History:   Diagnosis Date    Chronic back pain     Coronary artery disease     s/p MI and CABG in 12/2017    H/O aortic valve replacement 12/16/2017    Bioprosthetic valve     H/O cardiac catheterization 12/06/2018    Severe three vessel disease involving the LAD, circumflex and right coronary arteries  2 of 3 bypass grafts patent Normal LVEDP. Consult to interventional cardiology for likely angioplasty and/or  stenting of the patients severe stenosis with likely stenting of the unrevascularized RCA stenosis with plans to intervene on the higher risk LIMA-LAD stenosis only at a later date if the patient fails gu    H/O three vessel coronary artery bypass 12/2017    History of atrial fibrillation 12/2017    following CABG 12/2017    History of blood transfusion     History of cardiac cath 07/15/2021    [facility] Deb/Dr. Yoon/Left Radial     History of myocardial infarction 12/2017    Hyperlipidemia     Hypertension     MI (myocardial infarction) ([facility] [address] 12/2017    POST-OP    Osteoarthritis     Sleep apnea     Type 2 diabetes mellitus without complication [facility])        Past Surgical History:   Procedure Laterality Date    CARDIAC CATHETERIZATION Left 12/06/2018    Dr Rodriguez Infante radial-Severe three vessel disease involving the LAD and circumflex coronary arteries with moderate to severe disease in the proximal right coronary arteries. 2 of 3 bypass grafts patent with a known to be occluded SVG-RCA and a 80-90% stenosis in the touchdown of the LIMA-LAD.     CARDIAC CATHETERIZATION  12/06/2018    cardiac catheterization on 12/6/2018 showed a 75% stenosis in RCA as well as a 70% stenosis in his Circumflex    COLONOSCOPY      CORONARY ANGIOPLASTY WITH STENT PLACEMENT  07/16/2021    CORONARY ARTERY BYPASS GRAFT  12/2017    EYE SURGERY Bilateral     cataracts    INTRACAPSULAR CATARACT EXTRACTION      KNEE ARTHROPLASTY      SHOULDER ARTHROPLASTY           Family History   Adopted: Yes       CareTeam (Including outside providers/suppliers regularly involved in providing care):   Patient Care Team:  HALEY Ashley CNP as PCP - General (Family Nurse Practitioner)  HALEY Taveras CNP as PCP - [facility] Empaneled Provider    Wt Readings from Last 3 Encounters:   10/25/21 234 lb 11.2 oz (106.5 kg)   08/26/21 233 lb 9.6 oz (106 kg)   07/16/21 233 lb (105.7 kg) Vitals:    10/25/21 1259   BP: 122/70   Position: Sitting   Pulse: 66   Resp: 18   Temp: 97.8 °F (36.6 °C)   TempSrc: Temporal   SpO2: 95%   Weight: 234 lb 11.2 oz (106.5 kg)   Height: 6' (1.829 m)     Body mass index is 31.83 kg/m². Based upon direct observation of the patient, evaluation of cognition reveals remote memory intact, recent memory impaired. General Appearance: well-developed and well nourished and in no acute distress  Skin: warm and dry  Head: normocephalic and atraumatic  Eyes: sclera anicteric  ENT: oropharynx clear and moist with normal mucous membranes  Neck: neck supple and non tender without mass   Pulmonary/Chest: clear  Cardiovascular: normal rate  Abdomen: soft, non-tender  Extremities: trace + edema-  bilateral lower legs  Neurologic: speech normal    Patient's complete Health Risk Assessment and screening values have been reviewed and are found in Flowsheets. The following problems were reviewed today and where indicated follow up appointments were made and/or referrals ordered.     Positive Risk Factor Screenings with Interventions:            General Health and ACP:  General  In general, how would you say your health is?: Good  Do you get the social and emotional support that you need?: Yes  Do you have a Living Will?: (!) No  Advance Directives     Power of  Living Will ACP-Advance Directive ACP-Power of     Not on File Not on File Not on File Not on File      General Health Risk Interventions:  · No Living Will: Patient declines ACP discussion/assistance    Health Habits/Nutrition:  Health Habits/Nutrition  Do you exercise for at least 20 minutes 2-3 times per week?: Yes  Have you lost any weight without trying in the past 3 months?: No  Do you eat only one meal per day?: No  Have you seen the dentist within the past year?: (!) No  Body mass index: (!) 31.83  Health Habits/Nutrition Interventions:  · Nutritional issues:  educational materials for healthy, well-balanced diet provided    Hearing/Vision:  No exam data present  Hearing/Vision  Do you or your family notice any trouble with your hearing that hasn't been managed with hearing aids?: No  Do you have difficulty driving, watching TV, or doing any of your daily activities because of your eyesight?: No  Have you had an eye exam within the past year?: (!) No  Hearing/Vision Interventions:  · Vision concerns:  patient declines any further evaluation/treatment for this issue      Personalized Preventive Plan   Current Health Maintenance Status  Immunization History   Administered Date(s) Administered    COVID-19, Moderna, PF, 100mcg/0.5mL 01/27/2021, 02/24/2021    Influenza Virus Vaccine 10/23/2018    Influenza, High Dose (Fluzone 65 yrs and older) 10/22/2018    Influenza, High-dose, Quadv, 65 yrs +, IM (Fluzone) 10/25/2020    Influenza, Quadv, IM, (6 mo and older Fluzone, Flulaval, Fluarix and 3 yrs and older Afluria) 10/16/2021    Influenza, Quadv, IM, PF (6 mo and older Fluzone, Flulaval, Fluarix, and 3 yrs and older Afluria) 09/11/2019    Pneumococcal Conjugate 13-valent ([id number]) 01/10/2019    Pneumococcal Conjugate Vaccine 08/25/2017    Pneumococcal Polysaccharide ([id number]) 02/20/2020        Health Maintenance   Topic Date Due    Lipid screen  12/05/2019    Annual Wellness Visit (AWV)  09/24/2021    DTaP/Tdap/Td vaccine (1 - Tdap) 11/15/2021 (Originally 2/5/1964)    Shingles Vaccine (1 of 2) 10/25/2022 (Originally 2/5/1993)    Hepatitis C screen  10/25/2022 (Originally 1945)    Potassium monitoring  07/13/2022    Creatinine monitoring  07/13/2022    Flu vaccine  Completed    Pneumococcal 65+ years Vaccine  Completed    COVID-19 Vaccine  Completed    Hepatitis A vaccine  Aged Out    Hib vaccine  Aged Out    Meningococcal (ACWY) vaccine  Aged Out     Recommendations for N-Trig Due: see orders and patient instructions/AVS.  .   Recommended screening schedule for the next 5-10 years is provided to the patient in written form: see Patient Instructions/AVS.    Dwayne Sampson was seen today for medicare aw. Diagnoses and all orders for this visit:    Routine general medical examination at a health care facility    Type 2 diabetes mellitus without complication, without long-term current use of insulin ([facility] [address]  -     Lipid Panel; Future  -     Basic Metabolic Panel;  Future  -     Hemoglobin A1C; Future    Chronic diastolic CHF (congestive heart failure), NYHA class 3 (HCC)

## 2022-02-14 DIAGNOSIS — I25.708 CORONARY ARTERY DISEASE OF BYPASS GRAFT OF NATIVE HEART WITH STABLE ANGINA PECTORIS (HCC): ICD-10-CM

## 2022-02-14 RX ORDER — ATORVASTATIN CALCIUM 40 MG/1
40 TABLET, FILM COATED ORAL NIGHTLY
Qty: 90 TABLET | Refills: 3 | Status: SHIPPED | OUTPATIENT
Start: 2022-02-14 | End: 2022-04-25 | Stop reason: SDUPTHER

## 2022-03-04 DIAGNOSIS — Z23 NEED FOR SHINGLES VACCINE: Primary | ICD-10-CM

## 2022-03-04 RX ORDER — ZOSTER VACCINE RECOMBINANT, ADJUVANTED 50 MCG/0.5
0.5 KIT INTRAMUSCULAR SEE ADMIN INSTRUCTIONS
Qty: 0.5 ML | Refills: 0 | Status: SHIPPED | OUTPATIENT
Start: 2022-03-04 | End: 2022-08-31

## 2022-04-25 ENCOUNTER — OFFICE VISIT (OUTPATIENT)
Dept: PRIMARY CARE CLINIC | Age: 77
End: 2022-04-25
Payer: MEDICARE

## 2022-04-25 ENCOUNTER — OFFICE VISIT (OUTPATIENT)
Dept: CARDIOLOGY | Age: 77
End: 2022-04-25
Payer: MEDICARE

## 2022-04-25 VITALS
RESPIRATION RATE: 22 BRPM | HEART RATE: 82 BPM | OXYGEN SATURATION: 98 % | WEIGHT: 232.5 LBS | DIASTOLIC BLOOD PRESSURE: 64 MMHG | SYSTOLIC BLOOD PRESSURE: 118 MMHG | TEMPERATURE: 97.7 F | BODY MASS INDEX: 31.49 KG/M2 | HEIGHT: 72 IN

## 2022-04-25 VITALS
WEIGHT: 233.6 LBS | SYSTOLIC BLOOD PRESSURE: 105 MMHG | HEIGHT: 72 IN | RESPIRATION RATE: 18 BRPM | BODY MASS INDEX: 31.64 KG/M2 | HEART RATE: 73 BPM | DIASTOLIC BLOOD PRESSURE: 65 MMHG | OXYGEN SATURATION: 94 %

## 2022-04-25 DIAGNOSIS — I35.0 SEVERE AORTIC STENOSIS: ICD-10-CM

## 2022-04-25 DIAGNOSIS — Z95.1 S/P CABG X 3: ICD-10-CM

## 2022-04-25 DIAGNOSIS — I20.8 CHRONIC STABLE ANGINA (HCC): ICD-10-CM

## 2022-04-25 DIAGNOSIS — I25.708 CORONARY ARTERY DISEASE OF BYPASS GRAFT OF NATIVE HEART WITH STABLE ANGINA PECTORIS (HCC): ICD-10-CM

## 2022-04-25 DIAGNOSIS — I50.32 CHRONIC DIASTOLIC CONGESTIVE HEART FAILURE (HCC): ICD-10-CM

## 2022-04-25 DIAGNOSIS — I20.8 STABLE ANGINA (HCC): ICD-10-CM

## 2022-04-25 DIAGNOSIS — I10 ESSENTIAL HYPERTENSION: ICD-10-CM

## 2022-04-25 DIAGNOSIS — E78.2 MIXED HYPERLIPIDEMIA: ICD-10-CM

## 2022-04-25 DIAGNOSIS — Z95.820 S/P ANGIOPLASTY WITH STENT: ICD-10-CM

## 2022-04-25 DIAGNOSIS — R07.89 ATYPICAL CHEST PAIN: ICD-10-CM

## 2022-04-25 DIAGNOSIS — R26.81 GAIT INSTABILITY: ICD-10-CM

## 2022-04-25 DIAGNOSIS — E11.9 TYPE 2 DIABETES MELLITUS WITHOUT COMPLICATION, WITHOUT LONG-TERM CURRENT USE OF INSULIN (HCC): Primary | ICD-10-CM

## 2022-04-25 DIAGNOSIS — I25.10 ASHD (ARTERIOSCLEROTIC HEART DISEASE): ICD-10-CM

## 2022-04-25 PROBLEM — K28.4 BLEEDING ULCER: Status: ACTIVE | Noted: 2022-04-25

## 2022-04-25 LAB — HBA1C MFR BLD: 6.3 %

## 2022-04-25 PROCEDURE — 99214 OFFICE O/P EST MOD 30 MIN: CPT | Performed by: FAMILY MEDICINE

## 2022-04-25 PROCEDURE — 83036 HEMOGLOBIN GLYCOSYLATED A1C: CPT | Performed by: NURSE PRACTITIONER

## 2022-04-25 PROCEDURE — 99214 OFFICE O/P EST MOD 30 MIN: CPT | Performed by: NURSE PRACTITIONER

## 2022-04-25 PROCEDURE — 3044F HG A1C LEVEL LT 7.0%: CPT | Performed by: NURSE PRACTITIONER

## 2022-04-25 RX ORDER — FAMOTIDINE 40 MG/1
TABLET, FILM COATED ORAL
Qty: 90 TABLET | Refills: 3 | Status: SHIPPED | OUTPATIENT
Start: 2022-04-25

## 2022-04-25 RX ORDER — ATORVASTATIN CALCIUM 40 MG/1
40 TABLET, FILM COATED ORAL NIGHTLY
Qty: 90 TABLET | Refills: 3 | Status: SHIPPED | OUTPATIENT
Start: 2022-04-25

## 2022-04-25 RX ORDER — LISINOPRIL 5 MG/1
5 TABLET ORAL DAILY
Qty: 90 TABLET | Refills: 3 | Status: SHIPPED | OUTPATIENT
Start: 2022-04-25

## 2022-04-25 RX ORDER — ISOSORBIDE MONONITRATE 60 MG/1
60 TABLET, EXTENDED RELEASE ORAL DAILY
Qty: 90 TABLET | Refills: 3 | Status: CANCELLED | OUTPATIENT
Start: 2022-04-25

## 2022-04-25 RX ORDER — CLOPIDOGREL BISULFATE 75 MG/1
75 TABLET ORAL DAILY
Qty: 90 TABLET | Refills: 3 | Status: SHIPPED | OUTPATIENT
Start: 2022-04-25

## 2022-04-25 RX ORDER — ISOSORBIDE MONONITRATE 30 MG/1
30 TABLET, EXTENDED RELEASE ORAL DAILY
Qty: 90 TABLET | Refills: 3 | Status: SHIPPED | OUTPATIENT
Start: 2022-04-25

## 2022-04-25 RX ORDER — METOPROLOL SUCCINATE 100 MG/1
100 TABLET, EXTENDED RELEASE ORAL DAILY
Qty: 90 TABLET | Refills: 3 | Status: SHIPPED | OUTPATIENT
Start: 2022-04-25

## 2022-04-25 ASSESSMENT — ENCOUNTER SYMPTOMS
NAUSEA: 0
DIARRHEA: 0
RHINORRHEA: 0
ABDOMINAL PAIN: 0
COUGH: 0
SHORTNESS OF BREATH: 0
VOMITING: 0
WHEEZING: 0
SORE THROAT: 0
CONSTIPATION: 0

## 2022-04-25 NOTE — PROGRESS NOTES
Name: John Reynolds  : 1945         Chief Complaint:     Chief Complaint   Patient presents with    Diabetes     routine check, no concerns. BS this     Hypertension       History of Present Illness:      John Reynolds is a 76 y.o.  male who presents with Diabetes (routine check, no concerns. BS this ) and Hypertension      Abad Arguello is here today for a routine office visit. Overall he states he is feeling very well. Chronic stable angina- follows with cardiology, having no current issues. Gait instability-patient has been having some problems with his gait. He did buy a new pair of shoes which is somewhat helpful but he would like to attend physical therapy for additional benefit. Diabetes  He presents for his follow-up diabetic visit. He has type 2 diabetes mellitus. His disease course has been stable. There are no hypoglycemic associated symptoms. Pertinent negatives for hypoglycemia include no dizziness, headaches, nervousness/anxiousness, seizures, sleepiness or tremors. Associated symptoms include foot paresthesias. Pertinent negatives for diabetes include no chest pain, no fatigue, no foot ulcerations, no polydipsia, no polyphagia and no polyuria. There are no hypoglycemic complications. Symptoms are stable. Diabetic complications include heart disease. Pertinent negatives for diabetic complications include no nephropathy or peripheral neuropathy. Risk factors for coronary artery disease include male sex, sedentary lifestyle and obesity. Current diabetic treatment includes oral agent (monotherapy). He is compliant with treatment all of the time. His weight is stable. He is following a generally healthy diet. When asked about meal planning, he reported none. He has had a previous visit with a dietitian. He participates in exercise weekly. There is no change in his home blood glucose trend. His breakfast blood glucose range is generally 130-140 mg/dl.  An ACE risk LIMA-LAD stenosis only at a later date if the patient fails gu    H/O three vessel coronary artery bypass 12/2017    History of atrial fibrillation 12/2017    following CABG 12/2017    History of blood transfusion     History of cardiac cath 07/15/2021    [facility]) Deb/Dr. Yoon/Left Radial     History of myocardial infarction 12/2017    Hyperlipidemia     Hypertension     MI (myocardial infarction) ([facility] [address] 12/2017    POST-OP    Osteoarthritis     Sleep apnea     Type 2 diabetes mellitus without complication ([facility] [address]       Reviewed all health maintenance requirements and ordered appropriate tests  There are no preventive care reminders to display for this patient. Past Surgical History:     Past Surgical History:   Procedure Laterality Date    CARDIAC CATHETERIZATION Left 12/06/2018    Dr Brodie Homans radial-Severe three vessel disease involving the LAD and circumflex coronary arteries with moderate to severe disease in the proximal right coronary arteries. 2 of 3 bypass grafts patent with a known to be occluded SVG-RCA and a 80-90% stenosis in the touchdown of the LIMA-LAD.  CARDIAC CATHETERIZATION  12/06/2018    cardiac catheterization on 12/6/2018 showed a 75% stenosis in RCA as well as a 70% stenosis in his Circumflex    COLONOSCOPY      CORONARY ANGIOPLASTY WITH STENT PLACEMENT  07/16/2021    CORONARY ARTERY BYPASS GRAFT  12/2017    EYE SURGERY Bilateral     cataracts    INTRACAPSULAR CATARACT EXTRACTION      KNEE ARTHROPLASTY      SHOULDER ARTHROPLASTY          Medications:       Prior to Admission medications    Medication Sig Start Date End Date Taking?  Authorizing Provider   famotidine (PEPCID) 40 MG tablet TAKE 1 TABLET DAILY 4/25/22  Yes Monet Lima MightHALEY - CNP   zoster recombinant adjuvanted vaccine [facility]) 50 MCG/0.5ML SUSR injection Inject 0.5 mLs into the muscle See Admin Instructions 1 dose now and repeat in 2-6 months 3/4/22 8/31/22 Yes Monet Lima HALEY Chase CNP   metFORMIN (GLUCOPHAGE) 1000 MG tablet TAKE 1 TABLET TWICE A DAY WITH MEALS 1/17/22  Yes HALEY Jon CNP   blood glucose test strips (TRUE METRIX BLOOD GLUCOSE TEST) strip USE ONCE DAILY 10/26/21  Yes HALEY Jon CNP   tamsulosin (FLOMAX) 0.4 MG capsule TAKE 1 CAPSULE BY MOUTH TWICE A DAY 8/9/21  Yes Rodrigo Iglesias PA-C   [facility] Lancets Micro Thin 33G MISC USE AS DIRECTED DAILY 3/31/21  Yes HALEY Jon CNP   blood glucose monitor strips 1 strip by Other route daily Tung Metrix test strip. E11.9 6/12/19  Yes HALEY Jon CNP   gabapentin (NEURONTIN) 400 MG capsule Take 400 mg by mouth 3 times daily. 2/14/19  Yes Berto Roper MD   aspirin 81 MG tablet Take 81 mg by mouth Daily with lunch    Yes Historical Provider, MD   melatonin 5 MG TABS tablet Take 5 mg by mouth as needed    Yes Historical Provider, MD   Multiple Vitamins-Minerals (CENTRUM SILVER PO) Take 1 tablet by mouth daily    Yes Historical Provider, MD   traMADol (ULTRAM) 50 MG tablet Take 50 mg by mouth every 6 hours as needed for Pain. .   Yes Historical Provider, MD   atorvastatin (LIPITOR) 40 MG tablet Take 1 tablet by mouth nightly 4/25/22   Lynette Rivas MD   clopidogrel (PLAVIX) 75 MG tablet Take 1 tablet by mouth daily 4/25/22   Lynette Rivas MD   lisinopril (PRINIVIL;ZESTRIL) 5 MG tablet Take 1 tablet by mouth daily 4/25/22   Lynette Rivas MD   metoprolol succinate (TOPROL XL) 100 MG extended release tablet Take 1 tablet by mouth daily 4/25/22   Lynette Rivas MD   isosorbide mononitrate (IMDUR) 30 MG extended release tablet Take 1 tablet by mouth daily 4/25/22   Lynette Rivas MD   valACYclovir (VALTREX) 1 g tablet  1/9/22   Historical Provider, MD   nitroGLYCERIN (NITROSTAT) 0.4 MG SL tablet PLACE 1 TABLET UNDER THE TONGUE EVERY 5 MINUTES AS NEEDED FOR CHEST PAIN UP TO MAX OF 3 TOTAL DOSES.  IF NO RELIEF AFTER 1 DOSE, CALL 911. 7/10/20   Lynette Rivas MD        Allergies:       Patient has no known allergies. Social History:     Tobacco:    reports that he has never smoked. He has never used smokeless tobacco.  Alcohol:      reports current alcohol use of about 1.0 standard drink of alcohol per week. Drug Use:  reports no history of drug use. Family History:     Family History   Adopted: Yes       Review of Systems:     Positive and Negative as described in HPI    Review of Systems   Constitutional: Negative for chills, fatigue and fever. HENT: Negative for congestion, rhinorrhea and sore throat. Eyes: Negative for visual disturbance. Respiratory: Negative for cough, shortness of breath and wheezing. Cardiovascular: Positive for leg swelling (intermittent). Negative for chest pain, palpitations and near-syncope. Gastrointestinal: Negative for abdominal pain, constipation, diarrhea, nausea and vomiting. Endocrine: Negative for polydipsia, polyphagia and polyuria. Genitourinary: Negative for difficulty urinating and dysuria. Musculoskeletal: Positive for gait problem. Negative for myalgias, neck pain and neck stiffness. Skin: Negative for rash. Neurological: Negative for dizziness, tremors, focal weakness, seizures, syncope and headaches. Psychiatric/Behavioral: The patient is not nervous/anxious. Physical Exam:   Vitals:  /64 (Position: Sitting)   Pulse 82   Temp 97.7 °F (36.5 °C) (Temporal)   Resp 22   Ht 6' (1.829 m)   Wt 232 lb 8 oz (105.5 kg)   SpO2 98%   BMI 31.53 kg/m²     Physical Exam  Vitals and nursing note reviewed. Constitutional:       General: He is not in acute distress. Appearance: Normal appearance. He is well-developed. He is obese. He is not ill-appearing. HENT:      Mouth/Throat:      Mouth: Mucous membranes are moist.   Eyes:      General: No scleral icterus. Conjunctiva/sclera: Conjunctivae normal.   Neck:      Vascular: No carotid bruit. Cardiovascular:      Rate and Rhythm: Normal rate and regular rhythm.       Heart sounds: Murmur heard. Pulmonary:      Effort: Pulmonary effort is normal.      Breath sounds: Normal breath sounds. No wheezing or rales. Abdominal:      General: Bowel sounds are normal. There is no distension. Palpations: Abdomen is soft. Tenderness: There is no abdominal tenderness. Musculoskeletal:      Cervical back: Normal range of motion and neck supple. Right lower leg: Edema (trace pitting) present. Left lower leg: Edema (trace pitting) present. Skin:     General: Skin is warm and dry. Findings: No rash. Neurological:      Mental Status: He is alert and oriented to person, place, and time. Psychiatric:         Mood and Affect: Mood normal.         Behavior: Behavior normal.         Data:     Lab Results   Component Value Date     10/25/2021    K 4.6 10/25/2021     10/25/2021    CO2 25 10/25/2021    BUN 17 10/25/2021    CREATININE 0.82 10/25/2021    GLUCOSE 97 10/25/2021    PROT 7.2 12/16/2019    LABALBU 3.8 12/16/2019    BILITOT 0.62 12/16/2019    ALKPHOS 72 12/16/2019    AST 15 12/16/2019    ALT 20 12/16/2019     Lab Results   Component Value Date    WBC 5.9 07/13/2021    RBC 3.91 07/13/2021    HGB 13.5 07/13/2021    HCT 40.7 07/13/2021    .1 07/13/2021    MCH 34.5 07/13/2021    MCHC 33.2 07/13/2021    RDW 13.7 07/13/2021     07/13/2021    MPV 10.4 07/13/2021     Lab Results   Component Value Date    TSH 1.65 09/06/2018     Lab Results   Component Value Date    CHOL 110 10/25/2021    HDL 51 10/25/2021    LABA1C 6.3 04/25/2022       Assessment/Plan:      Diagnosis Orders   1. Type 2 diabetes mellitus without complication, without long-term current use of insulin ([facility])  POCT glycosylated hemoglobin (Hb A1C)    CBC with Auto Differential    Basic Metabolic Panel    ALT    AST    Lipid Panel    Hemoglobin A1C    Microalbumin, Ur   2. Mixed hyperlipidemia     3. Chronic diastolic congestive heart failure ([address]     4.  Chronic stable angina ([facility]) 5. Gait instability  External Referral To Physical Therapy     Continue all current medications. A1c is stable. Continue all further visits with specialist.    PT ordered. We will see him back in 6 months with routine labs, sooner if any issues. 1.  James Burns received counseling on the following healthy behaviors: nutrition, exercise and medication adherence  2. Patient given educational materials - see patient instructions  3. Was a self-tracking handout given in paper form or via [employer]? No  If yes, see orders or list here. 4.  Discussed use, benefit, and side effects of prescribed medications. Barriers to medication compliance addressed. All patient questions answered. Pt voiced understanding. 5.  Reviewed prior labs and health maintenance  6. Continue current medications, diet and exercise. Completed Refills   Requested Prescriptions     Signed Prescriptions Disp Refills    famotidine (PEPCID) 40 MG tablet 90 tablet 3     Sig: TAKE 1 TABLET DAILY         Return in about 6 months (around 10/25/2022) for Check up.

## 2022-04-25 NOTE — PATIENT INSTRUCTIONS
SURVEY:     You may be receiving a survey from [employer] regarding your visit today. Please complete the survey to enable us to provide the highest quality of care to you and your family. If you cannot score us a very good on any question, please call the office to discuss how we could have made your experience a very good one. Thank you. Nithin Chase, APRN-CNP  Karina Orlando, CNP  Daniel Saldañar, LPN  Manda Jones, LPN  Nicole Neely, CMA  [place], CMA  Madeleine, CMA  Jasmyn, PCA    Patient Education        Counting Carbohydrates for Diabetes: Care Instructions  Overview     Managing the amount of carbohydrate (carbs) you eat is an important part of planning healthy meals when you have diabetes. Carbs raise blood sugar more than any other nutrient. Carbs are found in grains, starchy vegetables, fruits,and milk and yogurt. Carbs are also found in sugar-sweetened foods and drinks. The more carbs you eat at one time, the higher your blood sugar will rise. Counting carbs can help you keep your blood sugar within your target range. If you use insulin, counting carbs helps you match the right amount of insulinto the number of grams of carbs in a meal.  A registered dietitian or diabetes educator can help you plan meals and snacks. Follow-up care is a key part of your treatment and safety. Be sure to make and go to all appointments, and call your doctor if you are having problems. It's also a good idea to know your test results and keep alist of the medicines you take. How can you care for yourself at home? Know your daily amount of carbohydrates  Your daily amount depends on several things, such as your weight, how active you are, which diabetes medicines you take, and what your goals are for your blood sugar levels. A registered dietitian or diabetes educator can help youplan how many carbs to include in each meal and snack.   For most adults, a guideline for the daily amount of carbs is:   45 to 60 grams at each meal. That's about the same as 3 to 4 carbohydrate servings.  15 to 20 grams at each snack. That's about the same as 1 carbohydrate serving. Count carbs  Counting carbs lets you know how much rapid-acting insulin to take before you eat. If you use an insulin pump, you get a constant rate of insulin during the day. So the pump must be programmed at meals. This gives you extra insulin tocover the rise in blood sugar after meals. If you take insulin:   Learn your own insulin-to-carb ratio. You and your diabetes health professional will figure out the ratio. You can do this by testing your blood sugar after meals. For example, you may need a certain amount of insulin for every 15 grams of carbs.  Add up the carb grams in a meal. Then you can figure out how many units of insulin to take based on your insulin-to-carb ratio.  Exercise lowers blood sugar. You can use less insulin than you would if you were not doing exercise. Keep in mind that timing matters. If you exercise within 1 hour after a meal, your body may need less insulin for that meal than it would if you exercised 3 hours after the meal. Test your blood sugar to find out how exercise affects your need for insulin. If you do or don't take insulin:   Look at labels on packaged foods. This can tell you how many carbs are in a serving.  Be aware of portions, or serving sizes. If a package has two servings and you eat the whole package, you need to double the number of grams of carbohydrate listed for one serving.  Protein, fat, and fiber do not raise blood sugar as much as carbs do. If you eat a lot of these nutrients in a meal, your blood sugar will rise more slowly than it would otherwise. Where can you learn more? Go to https://Yieldexirma.HereOrThere. org and sign in to your [facility] account. Enter R186 in the [facility] box to learn more about \"Counting Carbohydrates for Diabetes: Care Instructions. \"     If you do not have an account, please click on the \"Sign Up Now\" link. Current as of: July 28, 2021               Content Version: 13.2  © 2006-2022 [employer]. Care instructions adapted under license by [facility] [place] ([facility]). If you have questions about a medical condition or this instruction, always ask your healthcare professional. [address] any warranty or liability for your use of this information.

## 2022-04-25 NOTE — PATIENT INSTRUCTIONS
SURVEY:    You may be receiving a survey from [employer] regarding your visit today. Please complete the survey to enable us to provide the highest quality of care to you and your family. If you cannot score us a very good on any question, please call the office to discuss how we could have made your experience a very good one. Thank you.

## 2022-04-25 NOTE — PROGRESS NOTES
Aiden Pruitt am scribing for and in the presence of Alin Christy. Car ZEE, MS, F.A.C.C. Patient: Lauren Bryant  : 1945  Date of Visit: 2022    REASON FOR VISIT / CONSULTATION: Follow-up (Hx: ASHD, MI, s/p CABg x3, s/p AVR. doing well. SOB sometimes on exertion. occasional palps Denies: CP, dizziness, lightheaded,)    Dear HALEY Gonzalez CNP,    I had the pleasure of seeing Lauren Bryant in my office today. Mr. Cele Jhaveri is a 76 y.o. male with a history of atherosclerotic heart disease. In 2017 Mr. Cele Jhaveri had went into surgery for his shoulder and postoperatively had a myocardial infarction which led to a triple vessel coronary artery bypass as well as a aortic valve replacement  due to severe aortic stenosis. He was found to have postoperative atrial fibrillation which led to him starting on Amiodarone but had been stopped six weeks after due to no reoccurrence of atrial fibrillation. He had a ulcer in his small intenstine which had caused internal bleeding but has since been resolved and is taking pepcid for this. He was continued on Eliquis but had recently been stopped since there has been no evidence of atrial fibrillation including on his recent holter monitor. His cardiac catheterization on 2018 showed a 75% stenosis in RCA as well as a 70% stenosis in his Circumflex, but [facility], however Dr. Katherine Ash performed an FFR of the lesion which showed that the stenosis was about 60 percent blockage and therefore not likely to benefit from stenting. Although there was also a severe stenosis in the junction of the LIMA-LAD, this was felt to be high risk stenting and therefore deferred for a trial of guideline directed trial of maximal medical management including cardiac rehab. His echocardiogram on 2018 with an ejection fraction of 50-55% with a bioprosthetic aortic valve.   Mr. Cele Jhaveri had echo done 2020 EF 55% Compared to the previous study of 12/5/18, no significant change was seen. Heart cath on 7/15/2021 showed Severe three vessel disease involving the LAD and circumflex coronary arteries with moderate to severe disease in the proximal right coronary arteries. 2 of 3 bypass grafts patent with a known to be occluded SVG-RCA and a 80-90% stenosis in the touchdown of the LIMA-LAD. Normal LVEDPSuccessful PTCA ELIAN mid RCA on 7/16/2021 by Dr Sandy Patel. Since the last time I saw Mr. Jessica Gill he has been doing well. He does have some shortness of breath. His daughter said his general mobility is decreasing. Going around [place] he gets short of breath and wore out so he ends up using a motorized scooter. His PCP referred him to Physical Therapy. He does do some workouts at home from a sheet that was given to him by Physical Therapy. He also uses an exercise bike. Occasionally mild chest pressure. He did have a recent fall. He did not have an injury with this. His daughter did ask about him using a scooter. He denies having any lightheaded/dizziness or palpitations. He denies any abdominal pain, bleeding problems, bowel issues, problems with his medications or any other concerns at this time. No cough, fever or chills. No nausea or vomiting. Past Medical History:   Diagnosis Date    Chronic back pain     Coronary artery disease     s/p MI and CABG in 12/2017    H/O aortic valve replacement 12/16/2017    Bioprosthetic valve     H/O cardiac catheterization 12/06/2018    Severe three vessel disease involving the LAD, circumflex and right coronary arteries  2 of 3 bypass grafts patent Normal LVEDP. Consult to interventional cardiology for likely angioplasty and/or  stenting of the patients severe stenosis with likely stenting of the unrevascularized RCA stenosis with plans to intervene on the higher risk LIMA-LAD stenosis only at a later date if the patient fails gu    H/O three vessel coronary artery bypass 12/2017    History of atrial fibrillation 12/2017    following CABG 12/2017    History of blood transfusion     History of cardiac cath 07/15/2021    [facility]) Deb/Dr. Yoon/Left Radial     History of myocardial infarction 12/2017    Hyperlipidemia     Hypertension     MI (myocardial infarction) [facility] [address] 12/2017    POST-OP    Osteoarthritis     Sleep apnea     Type 2 diabetes mellitus without complication ([facility] [address]      CURRENT ALLERGIES: Patient has no known allergies. REVIEW OF SYSTEMS: 14 systems were reviewed. Pertinent positives and negatives as above, all else negative. Past Surgical History:   Procedure Laterality Date    CARDIAC CATHETERIZATION Left 12/06/2018    Dr Fabrizio Oropeza radial-Severe three vessel disease involving the LAD and circumflex coronary arteries with moderate to severe disease in the proximal right coronary arteries. 2 of 3 bypass grafts patent with a known to be occluded SVG-RCA and a 80-90% stenosis in the touchdown of the LIMA-LAD.  CARDIAC CATHETERIZATION  12/06/2018    cardiac catheterization on 12/6/2018 showed a 75% stenosis in RCA as well as a 70% stenosis in his Circumflex    COLONOSCOPY      CORONARY ANGIOPLASTY WITH STENT PLACEMENT  07/16/2021    CORONARY ARTERY BYPASS GRAFT  12/2017    EYE SURGERY Bilateral     cataracts    INTRACAPSULAR CATARACT EXTRACTION      KNEE ARTHROPLASTY      SHOULDER ARTHROPLASTY      Social History:  Social History     Tobacco Use    Smoking status: Never Smoker    Smokeless tobacco: Never Used   Vaping Use    Vaping Use: Never used   Substance Use Topics    Alcohol use:  Yes     Alcohol/week: 1.0 standard drink     Types: 1 Glasses of wine per week     Comment: most days    Drug use: No        CURRENT MEDICATIONS:  Outpatient Medications Marked as Taking for the 4/25/22 encounter (Office Visit) with Gisel Bingham MD   Medication Sig Dispense Refill    famotidine (PEPCID) 40 MG tablet TAKE 1 TABLET DAILY 90 tablet 3    atorvastatin (LIPITOR) 40 MG tablet Take 1 tablet by mouth nightly 90 tablet 3    metFORMIN (GLUCOPHAGE) 1000 MG tablet TAKE 1 TABLET TWICE A DAY WITH MEALS 180 tablet 3    blood glucose test strips (TRUE METRIX BLOOD GLUCOSE TEST) strip USE ONCE DAILY 100 strip 3    lisinopril (PRINIVIL;ZESTRIL) 5 MG tablet Take 1 tablet by mouth daily 90 tablet 3    clopidogrel (PLAVIX) 75 MG tablet Take 1 tablet by mouth daily 90 tablet 3    metoprolol succinate (TOPROL XL) 100 MG extended release tablet Take 1 tablet by mouth daily 90 tablet 3    tamsulosin (FLOMAX) 0.4 MG capsule TAKE 1 CAPSULE BY MOUTH TWICE A  capsule 3    isosorbide mononitrate (IMDUR) 60 MG extended release tablet Take 1 tablet by mouth daily 90 tablet 3    [employer] Lancets Micro Thin 33G MISC USE AS DIRECTED DAILY 100 each 3    nitroGLYCERIN (NITROSTAT) 0.4 MG SL tablet PLACE 1 TABLET UNDER THE TONGUE EVERY 5 MINUTES AS NEEDED FOR CHEST PAIN UP TO MAX OF 3 TOTAL DOSES. IF NO RELIEF AFTER 1 DOSE, CALL 911. 75 tablet 1    blood glucose monitor strips 1 strip by Other route daily Tung Metrix test strip. E11.9 100 strip 3    gabapentin (NEURONTIN) 400 MG capsule Take 400 mg by mouth 3 times daily.  aspirin 81 MG tablet Take 81 mg by mouth Daily with lunch       melatonin 5 MG TABS tablet Take 5 mg by mouth as needed       Multiple Vitamins-Minerals (CENTRUM SILVER PO) Take 1 tablet by mouth daily       traMADol (ULTRAM) 50 MG tablet Take 50 mg by mouth every 6 hours as needed for Pain. Claire Spencer FAMILY HISTORY: family history is not on file. He was adopted. PHYSICAL EXAM:   /65 (Site: Right Upper Arm, Position: Sitting, Cuff Size: Large Adult)   Pulse 73   Resp 18   Ht 6' 0.01\" (1.829 m)   Wt 233 lb 9.6 oz (106 kg)   SpO2 94%   BMI 31.67 kg/m²  Body mass index is 31.67 kg/m². Constitutional: He is oriented to person, place, and time. He appears well-developed and well-nourished. In no acute distress.    HEENT: Normocephalic and atraumatic. No JVD present. Carotid bruit is not present. No mass and no thyromegaly present. No lymphadenopathy present. Cardiovascular: Normal rate, regular rhythm, normal heart sounds. Exam reveals no gallop and no friction rubs 2/6 systolic murmur, 5th intercostal space on the LEFT in the mid-clavicular line (cardiac apex). Pulmonary/Chest: Effort normal and breath sounds normal. No respiratory distress. He has no wheezes, rhonchi or rales. Left hemidiaphragm. Abdominal: Soft, non-tender. Bowel sounds and aorta are normal. He exhibits no organomegaly, mass or bruit. Extremities: None. No cyanosis or clubbing. 2+ radial and carotid pulses. Distal extremity pulses: 2+ bilaterally. Neurological: He is alert and oriented to person, place, and time. No evidence of gross cranial nerve deficit. Coordination appeared normal.   Skin: Skin is warm and dry. There is no rash or diaphoresis. Psychiatric: He has a normal mood and affect. His speech is normal and behavior is normal.      MOST RECENT LABS ON RECORD:   Lab Results   Component Value Date    WBC 5.9 07/13/2021    HGB 13.5 07/13/2021    HCT 40.7 07/13/2021     07/13/2021    CHOL 110 10/25/2021    TRIG 46 10/25/2021    HDL 51 10/25/2021    ALT 20 12/16/2019    AST 15 12/16/2019     10/25/2021    K 4.6 10/25/2021     10/25/2021    CREATININE 0.82 10/25/2021    BUN 17 10/25/2021    CO2 25 10/25/2021    TSH 1.65 09/06/2018    INR 1.1 12/04/2018    LABA1C 6.3 04/25/2022    LABMICR CANNOT BE CALCULATED 08/06/2019     ASSESSMENT:  1. Coronary artery disease of bypass graft of native heart with stable angina pectoris ([address]    2. Atypical chest pain    3. ASHD (arteriosclerotic heart disease)    4. S/P CABG x 3    5. S/P angioplasty with stent    6. Chronic diastolic congestive heart failure ([address]    7. Severe aortic stenosis    8. Essential hypertension    9.  Stable angina (HCC)       PLAN:    Atherosclerotic Heart Disease: Hx of MI with CABG x 3 in 12/2017. Severe disease in the circumflex coronary artery and LIMA-LAD graft but will plan to treat with guideline directed trial of maximal medical management. Successful PTCA ELIAN mid RCA on 7/16/2021 by Dr Tino Ritchie but still with Class II angina as LIMA-LAD touchdown stenosis was not treated. Antiplatelet Agent: Continue aspirin 81 mg daily and clopidogrel (Plavix) 75 mg daily. I also reminded him to watch for signs of blood in his stool or black tarry stools and stop the medication immediately if this develops as this could be life threatening. ACE Inibitor/ARB: Continue lisinopril 5 mg daily. Beta Blocker: Continue metoprolol succinate (Toprol XL) 100 mg once daily. Anti-anginal medications: DECREASE to isosorbide mononitrate (Imdur) 30 mg once daily. Anti-anginal medications: Continue nitroglycerin 0.4 mg tablets as needed for chest pain. Cholesterol Reduction Therapy: Continue Atorvastatin (Lipitor) 40 mg daily. Chronic Diastolic Heart Failure: EF of 50-55% on 12/4/2018 - 55% in 1/16/20. Currently well controlled. Beta Blocker: Continue metoprolol succinate (Toprol XL) 100 mg  once daily. Nonpharmacologic management of Heart Failure: I advised him to try and keep his legs up whenever possible and to limit salt in his diet. · Shortness of breath with mild-moderate exertion:   Continue Physical Therapy   I do want him to continue doing his home exercises and using his exercise bike to increase his leg strength to help reduce falls. I did advise him that he should start with the bike and if he is able to walk better then walking on a treadmill at the right speed should be okay.  I also advised him that using a cane or a walker is a good idea until he builds his strength up enough to walk without it. History of Severe Aortic Stenosis: Asymptomatic-S/P: Bioprosthetic Aortic Valve replacement in 12/2017.    · Beta Blocker: Continue metoprolol succinate (Toprol XL) 100 mg once daily.     ·  Additional Testing List: I ordered a echocardiogram to better assess for the etiology of this problem and to help guide future management     Essential Hypertension: Controlled  · Beta Blocker: Continue metoprolol succinate (Toprol XL) 100 mg once daily. ACE Inibitor/ARB: Continue lisinopril 5 mg daily. · Chronic Stable Angina: Continue with guideline directed trial of maximal medical management. · Beta Blocker: Continue Metoprolol succinate (Toprol XL) 100 mg daily. · Anti-anginal medications: DECREASE to isosorbide mononitrate (Imdur) 30 mg once daily. · Anti-anginal medications: Continue nitroglycerin 0.4 mg tablets as needed for chest pain. Finally, I recommended that he continue his current medications and follow up with you as previously scheduled. FOLLOW UP:   I told Mr. Lisa Fuentes to call my office if he had any problems, but otherwise I asked him to Return in about 6 months (around 10/25/2022). However, I would be happy to see him sooner should the need arise. Sincerely,  Sujey Harry. Car ZEE, MS, F.A.C.C. [facility] Cardiology Specialist    [address]  Phone: [phone], Fax: [phone]     I believe that the risk of significant morbidity and mortality related to the patient's current medical conditions are: Intermediate. Approximately 35 minutes were spent during prep work, discussion and exam of the patient, and follow up documentation and all of their questions were answered. The documentation recorded by the scribe, accurately and completely reflects the services I personally performed and the decisions made by me. Stephanie Reich MD, MS, F.A.C.C.  April 25, 2022

## 2022-05-19 ENCOUNTER — HOSPITAL ENCOUNTER (OUTPATIENT)
Dept: NON INVASIVE DIAGNOSTICS | Age: 77
Discharge: HOME | End: 2022-05-19
Payer: MEDICARE

## 2022-05-19 DIAGNOSIS — R07.89 ATYPICAL CHEST PAIN: ICD-10-CM

## 2022-05-19 DIAGNOSIS — I35.0 SEVERE AORTIC STENOSIS: ICD-10-CM

## 2022-05-19 DIAGNOSIS — Z95.820 S/P ANGIOPLASTY WITH STENT: ICD-10-CM

## 2022-05-19 DIAGNOSIS — I10 ESSENTIAL HYPERTENSION: ICD-10-CM

## 2022-05-19 DIAGNOSIS — I20.8 STABLE ANGINA (HCC): ICD-10-CM

## 2022-05-19 DIAGNOSIS — I50.32 CHRONIC DIASTOLIC CONGESTIVE HEART FAILURE (HCC): ICD-10-CM

## 2022-05-19 DIAGNOSIS — I25.10 ASHD (ARTERIOSCLEROTIC HEART DISEASE): ICD-10-CM

## 2022-05-19 DIAGNOSIS — I25.708 CORONARY ARTERY DISEASE OF BYPASS GRAFT OF NATIVE HEART WITH STABLE ANGINA PECTORIS (HCC): ICD-10-CM

## 2022-05-19 DIAGNOSIS — Z95.1 S/P CABG X 3: ICD-10-CM

## 2022-05-19 LAB
LV EF: 60 %
LVEF MODALITY: NORMAL

## 2022-05-19 PROCEDURE — C8929 TTE W OR WO FOL WCON,DOPPLER: HCPCS

## 2022-05-19 PROCEDURE — 6360000004 HC RX CONTRAST MEDICATION: Performed by: FAMILY MEDICINE

## 2022-05-19 RX ORDER — LANCETS 33 GAUGE
EACH MISCELLANEOUS
Qty: 100 EACH | Refills: 3 | Status: SHIPPED | OUTPATIENT
Start: 2022-05-19

## 2022-05-19 RX ADMIN — PERFLUTREN 1.65 MG: 6.52 INJECTION, SUSPENSION INTRAVENOUS at 14:47

## 2022-05-19 NOTE — TELEPHONE ENCOUNTER
Health Maintenance   Topic Date Due    Shingles vaccine (2 of 2) 04/30/2022    Hepatitis C screen  10/25/2024 (Originally 2/5/1963)    DTaP/Tdap/Td vaccine (1 - Tdap) 01/21/2023 (Originally 2/5/1964)    Lipids  10/25/2022    Depression Screen  10/25/2022    Annual Wellness Visit (AWV)  10/26/2022    Flu vaccine  Completed    Pneumococcal 65+ years Vaccine  Completed    COVID-19 Vaccine  Completed    Hepatitis A vaccine  Aged Out    Hib vaccine  Aged Out    Meningococcal (ACWY) vaccine  Aged Out             (applicable per patient's age: Cancer Screenings, Depression Screening, Fall Risk Screening, Immunizations)    Hemoglobin A1C (%)   Date Value   04/25/2022 6.3   10/25/2021 6.8 (H)   12/14/2019 7.1 (H)     Microalb/Crt.  Ratio (mcg/mg creat)   Date Value   08/06/2019 CANNOT BE CALCULATED     LDL Cholesterol (mg/dL)   Date Value   10/25/2021 50     AST (U/L)   Date Value   12/16/2019 15     ALT (U/L)   Date Value   12/16/2019 20     BUN (mg/dL)   Date Value   10/25/2021 17      (goal A1C is < 7)   (goal LDL is <100) need 30-50% reduction from baseline     BP Readings from Last 3 Encounters:   04/25/22 105/65   04/25/22 118/64   01/21/22 128/78    (goal /80)      All Future Testing planned in [facility]:  Lab Frequency Next Occurrence   Referral to Cardiac Cath Once 07/15/2021   CBC with Auto Differential Once 37/59/0678   Basic Metabolic Panel Once 01/59/5362   ALT Once 10/25/2022   AST Once 10/25/2022   Lipid Panel Once 10/22/2022   Hemoglobin A1C Once 10/22/2022   Microalbumin, Ur Once 10/22/2022   Echo 2D w doppler w color complete Once 04/25/2022       Next Visit Date:  Future Appointments   Date Time Provider Jerrod Meier   5/19/2022  2:00 PM [facility] ECHO RO[facility] ECHO [place]   6/15/2022  2:45 PM [facility]   7/6/2022 11:15 AM [facility]   10/27/2022  1:40 PM [facility]   11/7/2022  3:00 PM Merlyn Ackerman MD Car TIFF CARD [facility]            Patient Active Problem List:     Type 2 diabetes mellitus without complication, without long-term current use of insulin ([facility])     History of MI (myocardial infarction)     FABIAN (obstructive sleep apnea)     Other chronic pain     Bilateral hearing loss     Aneurysm of left ventricle of heart     Chronic diastolic CHF (congestive heart failure), NYHA class 3 ([facility])     Unstable angina ([facility])     History of atrial fibrillation     Hyperlipidemia     Hypertension     Sleep apnea     H/O aortic valve replacement     Hx of CABG     Coronary artery disease     Abnormal cardiovascular stress test     Chest pain     Coronary artery disease (CAD) excluded     Stable angina pectoris ([facility])     BPH with obstruction/lower urinary tract symptoms     Incomplete bladder emptying     Frequency of urination     Urgency of urination     Angina, class II ([facility])     S/P angioplasty with stent     Bleeding ulcer

## 2022-05-20 ENCOUNTER — TELEPHONE (OUTPATIENT)
Dept: CARDIOLOGY | Age: 77
End: 2022-05-20

## 2022-05-20 NOTE — TELEPHONE ENCOUNTER
----- Message from Kimo Agustin MD sent at 5/19/2022 11:53 PM EDT -----  Let Mr. April Jolly know their test result was ok. Will discuss at next visit. Thanks.

## 2022-05-20 NOTE — TELEPHONE ENCOUNTER
Let  Berto Leatha know that per Dr. Chun Shahid their test results was ok and we will discuss at next visit. Thanks!

## 2022-06-07 NOTE — PROGRESS NOTES
Subjective:      Patient ID: Lexi Oneill is a 76 y.o. male. HPI  Patient here for follow-up for FABIAN, left hemidiaphragm elevation. Patient was last seen in the office in May 2021 per Dr. Shannon Puri.    Patient states that his breathing is overall stable. Denies any shortness of breath or cough outside of his usual.  He states that his BiPAP is his \"baby\", cannot sleep without it. Reviewed compliance data 3/26/2022 through 4/26/2022, 30 out of 30 days used, all 30 days for more than 4 hours. He is on IPAP of 16 and EPAP 11 cm. Residual AHI is 5.6. He spends 12.1 L/min and leak at minimum and up to 108.0 L/min in leak maximum. Patient has facial hair and his family member endorses that there are times when she has to tell him to tighten his straps because he is making all kinds of noise with his BiPAP mask. Medications:   No pulmonary medications    PRIOR WORKUP:  PFT:    CT Imaging:  High resolution CT chest 12/14/2019: Negative for pulmonary embolism. Marked left hemidiaphragm elevation with atelectatic changes at the lung bases and paramediastinal lingula. Minor subsegmental atelectasis in the posterior lung bases. No significant new infiltrates. No pleural effusion or pneumothorax. CTA chest 12/4/2018: Mass-effect on the heart related to the elevation of the left hemidiaphragm. No pulmonary embolism. Prominent elevation of the left hemidiaphragm with atelectasis in the subadjacent left lung. Respiratory motion limits detailed assessment of the lungs with no definitive intralobular septal thickening, consolidation, pleural effusion or pneumothorax. Scattered groundglass attenuation in the lungs likely due to motion    Sleep Study:  Sleep study 12/11/2018: Patient had 37 respiratory events of which 27 were complete obstructions 10 partial obstructions for an AHI of 17/h and a minimum SPO2 of 78%. Patient did not have any significant PLM. Patient was initiated onto PAP therapy. Recommendations for final settings were for BiPAP with a 16 cm IPAP and 12 cm H2O EPAP. And a minimum SPO2 in the settings being 88% with 1 residual respiratory events. No significant degree of PLM. Jeff Fuentes       Laboratory Evaluation:    Immunizations:   Immunization History   Administered Date(s) Administered    COVID-19, Moderna, Primary or Immunocompromised, PF, 100mcg/0.5mL 01/27/2021, 02/24/2021, 10/28/2021    Influenza Virus Vaccine 10/23/2018    Influenza, High Dose (Fluzone 65 yrs and older) 10/22/2018    Influenza, High-dose, Quadv, 65 yrs +, IM (Fluzone) 10/25/2020    Influenza, Quadv, IM, (6 mo and older Fluzone, Flulaval, Fluarix and 3 yrs and older Afluria) 10/16/2021    Influenza, Quadv, IM, PF (6 mo and older Fluzone, Flulaval, Fluarix, and 3 yrs and older Afluria) 09/11/2019    Influenza, Quadv, adjuvanted, 65 yrs +, IM, PF (Fluad) 10/16/2021    Pneumococcal Conjugate 13-valent ([id number]) 01/10/2019    Pneumococcal Conjugate Vaccine 08/25/2017    Pneumococcal Polysaccharide ([id number]) 02/20/2020        Sleep Medicine 12/11/2018   Sitting and reading 2   Watching TV 1   Sitting, inactive in a public place (e.g. a theatre or a meeting) 2   As a passenger in a car for an hour without a break 3   Lying down to rest in the afternoon when circumstances permit 3   Sitting and talking to someone 0   Sitting quietly after a lunch without alcohol 3   In a car, while stopped for a few minutes in traffic 0   Total score 14   Neck circumference (Inches) 17.75       /67   Pulse 62   Temp 97.6 °F (36.4 °C)   Resp 16   Ht 6' (1.829 m)   Wt 231 lb 8 oz (105 kg)   SpO2 93%   BMI 31.40 kg/m²     Past Medical History:   Diagnosis Date    Chronic back pain     Coronary artery disease     s/p MI and CABG in 12/2017    H/O aortic valve replacement 12/16/2017    Bioprosthetic valve     H/O cardiac catheterization 12/06/2018    Severe three vessel disease involving the LAD, circumflex and right coronary arteries  2 of 3 bypass grafts patent Normal LVEDP. Consult to interventional cardiology for likely angioplasty and/or  stenting of the patients severe stenosis with likely stenting of the unrevascularized RCA stenosis with plans to intervene on the higher risk LIMA-LAD stenosis only at a later date if the patient fails gu    H/O three vessel coronary artery bypass 12/2017    History of atrial fibrillation 12/2017    following CABG 12/2017    History of blood transfusion     History of cardiac cath 07/15/2021    [facility]) Deb/Dr. Yoon/Left Radial     History of myocardial infarction 12/2017    Hyperlipidemia     Hypertension     MI (myocardial infarction) ([facility] [address] 12/2017    POST-OP    Osteoarthritis     Sleep apnea     Type 2 diabetes mellitus without complication [facility])      Past Surgical History:   Procedure Laterality Date    CARDIAC CATHETERIZATION Left 12/06/2018    Dr Fabrizio Oropeza radial-Severe three vessel disease involving the LAD and circumflex coronary arteries with moderate to severe disease in the proximal right coronary arteries. 2 of 3 bypass grafts patent with a known to be occluded SVG-RCA and a 80-90% stenosis in the touchdown of the LIMA-LAD.     CARDIAC CATHETERIZATION  12/06/2018    cardiac catheterization on 12/6/2018 showed a 75% stenosis in RCA as well as a 70% stenosis in his Circumflex    COLONOSCOPY      CORONARY ANGIOPLASTY WITH STENT PLACEMENT  07/16/2021    CORONARY ARTERY BYPASS GRAFT  12/2017    EYE SURGERY Bilateral     cataracts    INTRACAPSULAR CATARACT EXTRACTION      KNEE ARTHROPLASTY      SHOULDER ARTHROPLASTY       Family History   Adopted: Yes       Social History     Socioeconomic History    Marital status:      Spouse name: Not on file    Number of children: Not on file    Years of education: Not on file    Highest education level: Not on file   Occupational History    Not on file   Tobacco Use    Smoking status: Negative. Allergic/Immunologic: Negative. Neurological: Negative. Hematological: Negative. Psychiatric/Behavioral: Negative.         Objective:       Physical Exam  General appearance - alert, well appearing, and in no distress, oriented to person, place, and time and overweight  Mental status - alert, oriented to person, place, and time, normal mood, behavior, speech, dress, motor activity, and thought processes  Eyes - pupils equal and reactive, extraocular eye movements intact  Ears - not examined  Nose - normal and patent, no erythema, discharge or polyps  Mouth - mucous membranes moist, pharynx normal without lesions  Neck - supple, no significant adenopathy  Chest - clear to auscultation, no wheezes, rales or rhonchi, symmetric air entry  Heart -normal rate, regular rhythm, normal S1, S2, no murmurs, rubs, clicks or gallops  Abdomen - soft, nontender, nondistended, no masses or organomegaly  Neuro- alert, oriented, normal speech, no focal findings or movement disorder noted}  Extremities - peripheral pulses normal, no pedal edema, no clubbing or cyanosis  Skin - normal coloration and turgor, no rashes, no suspicious skin lesions noted     Wt Readings from Last 3 Encounters:   06/15/22 231 lb 8 oz (105 kg)   04/25/22 233 lb 9.6 oz (106 kg)   04/25/22 232 lb 8 oz (105.5 kg)       Results for orders placed or performed during the hospital encounter of 05/19/22   Echo 2D w doppler w color complete   Result Value Ref Range    Left Ventricular Ejection Fraction 60     LVEF MODALITY ECHO        Echo 2D w doppler w color complete    Result Date: 5/19/2022  [facility] Transthoracic Echocardiography Report (TTE)  Patient Name [facility]       Date of Study               05/19/2022               Shanon ELLIOTT   Date of      1945  Gender                      Male  Birth   Age          76 year(s)  Race                           Room Number              Height:                     72 inch, 182.88 cm   Corporate ID [id number]    Weight:                     233 pounds, 105.7 kg  #   Patient Acct [de-identified]   BSA:          2.27 m^2      BMI:      31.6 kg/m^2  #   MR #         [id number]      Sonographer                 Elsa Thomas   Accession #  [id number]  Interpreting Physician      Jayshree Long                   Referring Nurse                           Practitioner   Interpreting             Referring Physician         Jayshree Pinto  Fellow  Type of Study   TTE procedure:2D Echocardiogram, M-Mode, Doppler, Color Doppler, Contrast  study. Procedure Date Date: 05/19/2022 Start: 02:01 PM Study Location: [facility] Indications:Chest pain, Arterioosclerotic cardiovascular disease, Congestive heart failure, diastolic dysfunction and Angina. History / Tech. Comments: Dx: atypical CP, ASHD, chronic diastolic CHF, stable angina Hx: CAD, CABG, stent, HTN, bioprosthetic AVR (Mosaic Ultra), atrial fibrillation, hyperlipidemia, MI, DM, FABIAN Patient Status: Outpatient Height: 72 inches Weight: 233 pounds BSA: 2.27 m^2 BMI: 31.6 kg/m^2 BP: 105/65 mmHg Allergies   - *No Known Allergies. CONCLUSIONS Summary Poor image quality, likely due to patient body habitus and/or lung disease. Definity was used to better assess the endocardial border. Global left ventricular function is difficult to assess but appears normal with an estimated EF of >60%. Mild left ventricular hypertrophy and with normal left ventricular cavity size. Unable to assess specific wall motion abnormalities due to image quality. No significant valvular disease was seen. Evidence of mild (grade I) diastolic dysfunction is seen. Compared to the previous study of 1/16/2020, no significant change was seen.  Signature ----------------------------------------------------------------------------  Electronically signed by Elsa Thomas(Sonographer) on 05/19/2022 03:51 PM ---------------------------------------------------------------------------- ----------------------------------------------------------------------------  Electronically signed by David Yoon(Interpreting physician) on  2022 05:21 PM ---------------------------------------------------------------------------- FINDINGS Left Atrium Left atrium is normal in size. Left Ventricle Poor image quality, likely due to patient body habitus and/or lung disease. Definity was used to better assess the endocardial border. Global left ventricular function is difficult to assess but appears normal with an estimated EF of >60%. Mild left ventricular hypertrophy and with normal left ventricular cavity size. Unable to assess specific wall motion abnormalities due to image quality. Right Atrium Right atrium is normal in size. Right Ventricle Technically difficult visualization of the right ventricle, but it does appear to be normal in size. Mitral Valve Normal mitral valve structure and function. Aortic Valve What appears to be a bioprosthetic aortic valve is seen with a mean gradient of 17 mmHg. No significant aortic insufficiency. Tricuspid Valve Normal tricuspid valve structure and function. Pulmonic Valve The pulmonic valve is normal in structure. Pericardial Effusion No significant pericardial effusion is seen. Miscellaneous Evidence of mild (grade I) diastolic dysfunction is seen. Normal aortic root dimension.  M-mode / 2D Measurements & Calculations:   LVIDd:3.92 cm(3.7 - 5.6 cm)      Diastolic NOELQF:28.99 ml  LVIDs:2.86 cm(2.2 - 4.0 cm)      Systolic VHJPR.57 ml  IVSd:1.24 cm(0.6 - 1.1 cm)       Aortic Root:3.24 cm(2.0 - 3.7 cm)  LVPWd:1.17 cm(0.6 - 1.1 cm)      LA Dimension: 4.44 cm(1.9 - 4.0 cm)  Fractional Shortenin.04 %    LA volume/Index: 52.9 ml /23m^2  Calculated LVEF (%): 61.18 %     AV Cusp Separation: 1.48 cm   Mitral:                                Aortic   Valve Area (P1/2-Time): 3.54 cm^2      Peak Velocity: 2.59 m/s  Peak E-Wave: 1.19 m/s                  Mean Velocity: 1.57 m/s  Peak A-Wave: 1.01 m/s                  Peak Gradient: 26.83 mmHg  E/A Ratio: 1.17                        Mean Gradient: 17.15 mmHg  Peak Gradient: 5.68 mmHg                                         Acceleration Time: 121.55 msec  P1/2t: 62.17 msec                                         AV VTI: 49.36 cm  Diastology / Tissue Doppler Lateral Wall E' velocity:0.08 m/s Lateral Wall E/E':15.23      Assessment:      1. FABIAN treated with BiPAP    2. Elevated diaphragm    3. Atelectasis, left          Plan:      1. Medications reviewed, continue as ordered. On no pulmonary medications. 2. Educated and clarified the medication use. 3. Recommend flu vaccination in the fall annually. 4. Patient is up-to-date with pneumococcal vaccinations from pulmonary perspective. 5. Patient has received Covid vaccination. Recommended booster when eligible. Discussed strategies to protect against Covid 19.   6. Maintain an active lifestyle. 7. Patient's questions were answered to his satisfaction. 8. Pulmonary function tests were reviewed  9. CT scan of the chest was reviewed  10. CPAP/BiPAP to be used for at least 4 hours every night. Use humidifier if needed. Weight loss was recommended and discussed. Recommended following good sleep hygiene instructions. Sleep hygiene instructions given to the patient. Explained importance of compliance with treatment of sleep apnea. Compliance data was reviewed and the patient is  compliant per insurance requirement.   11. We'll see the patient back in 12 months          Electronically signed by HALEY Cerda CNP on 6/15/2022 at 3:43 PM

## 2022-06-15 ENCOUNTER — OFFICE VISIT (OUTPATIENT)
Dept: PULMONOLOGY | Age: 77
End: 2022-06-15
Payer: MEDICARE

## 2022-06-15 VITALS
BODY MASS INDEX: 31.36 KG/M2 | SYSTOLIC BLOOD PRESSURE: 113 MMHG | OXYGEN SATURATION: 93 % | TEMPERATURE: 97.6 F | HEART RATE: 62 BPM | WEIGHT: 231.5 LBS | HEIGHT: 72 IN | RESPIRATION RATE: 16 BRPM | DIASTOLIC BLOOD PRESSURE: 67 MMHG

## 2022-06-15 DIAGNOSIS — G47.33 OSA TREATED WITH BIPAP: Primary | ICD-10-CM

## 2022-06-15 DIAGNOSIS — J98.6 ELEVATED DIAPHRAGM: ICD-10-CM

## 2022-06-15 DIAGNOSIS — J98.11 ATELECTASIS, LEFT: ICD-10-CM

## 2022-06-15 PROCEDURE — 1123F ACP DISCUSS/DSCN MKR DOCD: CPT | Performed by: NURSE PRACTITIONER

## 2022-06-15 PROCEDURE — 99214 OFFICE O/P EST MOD 30 MIN: CPT | Performed by: NURSE PRACTITIONER

## 2022-06-15 ASSESSMENT — ENCOUNTER SYMPTOMS
ALLERGIC/IMMUNOLOGIC NEGATIVE: 1
GASTROINTESTINAL NEGATIVE: 1
RESPIRATORY NEGATIVE: 1
EYES NEGATIVE: 1

## 2022-06-15 NOTE — PATIENT INSTRUCTIONS
SURVEY:    You may be receiving a survey from [employer] regarding your visit today. Please complete the survey to enable us to provide the highest quality of care to you and your family. If you cannot score us a very good on any question, please call the office to discuss how we could have made your experience a very good one. Thank you.

## 2022-07-06 ENCOUNTER — OFFICE VISIT (OUTPATIENT)
Dept: UROLOGY | Age: 77
End: 2022-07-06
Payer: MEDICARE

## 2022-07-06 VITALS
WEIGHT: 246 LBS | HEIGHT: 73 IN | HEART RATE: 61 BPM | TEMPERATURE: 97.1 F | SYSTOLIC BLOOD PRESSURE: 122 MMHG | DIASTOLIC BLOOD PRESSURE: 62 MMHG | BODY MASS INDEX: 32.6 KG/M2

## 2022-07-06 DIAGNOSIS — N40.1 BPH WITH OBSTRUCTION/LOWER URINARY TRACT SYMPTOMS: Primary | ICD-10-CM

## 2022-07-06 DIAGNOSIS — R39.15 URGENCY OF URINATION: ICD-10-CM

## 2022-07-06 DIAGNOSIS — R33.9 INCOMPLETE BLADDER EMPTYING: ICD-10-CM

## 2022-07-06 DIAGNOSIS — N13.8 BPH WITH OBSTRUCTION/LOWER URINARY TRACT SYMPTOMS: Primary | ICD-10-CM

## 2022-07-06 DIAGNOSIS — R35.0 FREQUENCY OF URINATION: ICD-10-CM

## 2022-07-06 PROCEDURE — 51798 US URINE CAPACITY MEASURE: CPT | Performed by: PHYSICIAN ASSISTANT

## 2022-07-06 PROCEDURE — 99213 OFFICE O/P EST LOW 20 MIN: CPT | Performed by: PHYSICIAN ASSISTANT

## 2022-07-06 PROCEDURE — 1123F ACP DISCUSS/DSCN MKR DOCD: CPT | Performed by: PHYSICIAN ASSISTANT

## 2022-07-06 ASSESSMENT — ENCOUNTER SYMPTOMS
ABDOMINAL PAIN: 0
WHEEZING: 0
COUGH: 0
SHORTNESS OF BREATH: 0
COLOR CHANGE: 0
EYE REDNESS: 0
BACK PAIN: 0
CONSTIPATION: 0
VOMITING: 0
NAUSEA: 0

## 2022-07-06 NOTE — PATIENT INSTRUCTIONS
SURVEY:    You may be receiving a survey from [employer] regarding your visit today. Please complete the survey to enable us to provide the highest quality of care to you and your family. If you cannot score us a very good on any question, please call the office to discuss how we could have made your experience a very good one. Thank you.

## 2022-07-06 NOTE — PROGRESS NOTES
interventional cardiology for likely angioplasty and/or  stenting of the patients severe stenosis with likely stenting of the unrevascularized RCA stenosis with plans to intervene on the higher risk LIMA-LAD stenosis only at a later date if the patient fails gu    H/O three vessel coronary artery bypass 12/2017    History of atrial fibrillation 12/2017    following CABG 12/2017    History of blood transfusion     History of cardiac cath 07/15/2021    [facility]) Deb/Dr. Yoon/Left Radial     History of myocardial infarction 12/2017    Hyperlipidemia     Hypertension     MI (myocardial infarction) ([address] 12/2017    POST-OP    Osteoarthritis     Sleep apnea     Type 2 diabetes mellitus without complication [facility])      Past Surgical History:   Procedure Laterality Date    CARDIAC CATHETERIZATION Left 12/06/2018    Dr Kylah Bean radial-Severe three vessel disease involving the LAD and circumflex coronary arteries with moderate to severe disease in the proximal right coronary arteries. 2 of 3 bypass grafts patent with a known to be occluded SVG-RCA and a 80-90% stenosis in the touchdown of the LIMA-LAD.     CARDIAC CATHETERIZATION  12/06/2018    cardiac catheterization on 12/6/2018 showed a 75% stenosis in RCA as well as a 70% stenosis in his Circumflex    COLONOSCOPY      CORONARY ANGIOPLASTY WITH STENT PLACEMENT  07/16/2021    CORONARY ARTERY BYPASS GRAFT  12/2017    EYE SURGERY Bilateral     cataracts    INTRACAPSULAR CATARACT EXTRACTION      KNEE ARTHROPLASTY      SHOULDER ARTHROPLASTY       Outpatient Encounter Medications as of 7/6/2022   Medication Sig Dispense Refill    [employer] Lancets Micro Thin 33G MISC For once a day testing E11.9 100 each 3    famotidine (PEPCID) 40 MG tablet TAKE 1 TABLET DAILY 90 tablet 3    atorvastatin (LIPITOR) 40 MG tablet Take 1 tablet by mouth nightly 90 tablet 3    clopidogrel (PLAVIX) 75 MG tablet Take 1 tablet by mouth daily 90 tablet 3    lisinopril (PRINIVIL;ZESTRIL) 5 MG tablet Take 1 tablet by mouth daily 90 tablet 3    metoprolol succinate (TOPROL XL) 100 MG extended release tablet Take 1 tablet by mouth daily 90 tablet 3    isosorbide mononitrate (IMDUR) 30 MG extended release tablet Take 1 tablet by mouth daily 90 tablet 3    zoster recombinant adjuvanted vaccine (SHINGRIX) 50 MCG/0.5ML SUSR injection Inject 0.5 mLs into the muscle See Admin Instructions 1 dose now and repeat in 2-6 months 0.5 mL 0    valACYclovir (VALTREX) 1 g tablet       metFORMIN (GLUCOPHAGE) 1000 MG tablet TAKE 1 TABLET TWICE A DAY WITH MEALS 180 tablet 3    blood glucose test strips (TRUE METRIX BLOOD GLUCOSE TEST) strip USE ONCE DAILY 100 strip 3    tamsulosin (FLOMAX) 0.4 MG capsule TAKE 1 CAPSULE BY MOUTH TWICE A  capsule 3    nitroGLYCERIN (NITROSTAT) 0.4 MG SL tablet PLACE 1 TABLET UNDER THE TONGUE EVERY 5 MINUTES AS NEEDED FOR CHEST PAIN UP TO MAX OF 3 TOTAL DOSES. IF NO RELIEF AFTER 1 DOSE, CALL 911. 75 tablet 1    blood glucose monitor strips 1 strip by Other route daily Tung Metrix test strip. E11.9 100 strip 3    gabapentin (NEURONTIN) 400 MG capsule Take 400 mg by mouth 3 times daily.  aspirin 81 MG tablet Take 81 mg by mouth Daily with lunch       melatonin 5 MG TABS tablet Take 5 mg by mouth as needed       Multiple Vitamins-Minerals (CENTRUM SILVER PO) Take 1 tablet by mouth daily       traMADol (ULTRAM) 50 MG tablet Take 50 mg by mouth every 6 hours as needed for Pain. Sandy Araujo No facility-administered encounter medications on file as of 7/6/2022.       Current Outpatient Medications on File Prior to Visit   Medication Sig Dispense Refill    [employer] Lancets Micro Thin 33G MISC For once a day testing E11.9 100 each 3    famotidine (PEPCID) 40 MG tablet TAKE 1 TABLET DAILY 90 tablet 3    atorvastatin (LIPITOR) 40 MG tablet Take 1 tablet by mouth nightly 90 tablet 3    clopidogrel (PLAVIX) 75 MG tablet Take 1 tablet by mouth daily 90 tablet 3    lisinopril (PRINIVIL;ZESTRIL) 5 MG tablet Take 1 tablet by mouth daily 90 tablet 3    metoprolol succinate (TOPROL XL) 100 MG extended release tablet Take 1 tablet by mouth daily 90 tablet 3    isosorbide mononitrate (IMDUR) 30 MG extended release tablet Take 1 tablet by mouth daily 90 tablet 3    zoster recombinant adjuvanted vaccine (SHINGRIX) 50 MCG/0.5ML SUSR injection Inject 0.5 mLs into the muscle See Admin Instructions 1 dose now and repeat in 2-6 months 0.5 mL 0    valACYclovir (VALTREX) 1 g tablet       metFORMIN (GLUCOPHAGE) 1000 MG tablet TAKE 1 TABLET TWICE A DAY WITH MEALS 180 tablet 3    blood glucose test strips (TRUE METRIX BLOOD GLUCOSE TEST) strip USE ONCE DAILY 100 strip 3    tamsulosin (FLOMAX) 0.4 MG capsule TAKE 1 CAPSULE BY MOUTH TWICE A  capsule 3    nitroGLYCERIN (NITROSTAT) 0.4 MG SL tablet PLACE 1 TABLET UNDER THE TONGUE EVERY 5 MINUTES AS NEEDED FOR CHEST PAIN UP TO MAX OF 3 TOTAL DOSES. IF NO RELIEF AFTER 1 DOSE, CALL 911. 75 tablet 1    blood glucose monitor strips 1 strip by Other route daily Tung Metrix test strip. E11.9 100 strip 3    gabapentin (NEURONTIN) 400 MG capsule Take 400 mg by mouth 3 times daily.  aspirin 81 MG tablet Take 81 mg by mouth Daily with lunch       melatonin 5 MG TABS tablet Take 5 mg by mouth as needed       Multiple Vitamins-Minerals (CENTRUM SILVER PO) Take 1 tablet by mouth daily       traMADol (ULTRAM) 50 MG tablet Take 50 mg by mouth every 6 hours as needed for Pain. .       No current facility-administered medications on file prior to visit. Patient has no known allergies.   Family History   Adopted: Yes     Social History     Tobacco Use   Smoking Status Never Smoker   Smokeless Tobacco Never Used       Social History     Substance and Sexual Activity   Alcohol Use Yes    Alcohol/week: 1.0 standard drink    Types: 1 Glasses of wine per week    Comment: most days       Review of Systems   Constitutional: Negative for appetite change, chills and fever. Eyes: Negative for redness and visual disturbance. Respiratory: Negative for cough, shortness of breath and wheezing. Cardiovascular: Negative for chest pain and leg swelling. Gastrointestinal: Negative for abdominal pain, constipation, nausea and vomiting. Genitourinary: Negative for decreased urine volume, difficulty urinating, dysuria, enuresis, flank pain, frequency, hematuria, penile discharge, penile pain, scrotal swelling, testicular pain and urgency. Positive for nocturia and incontinence   Musculoskeletal: Negative for back pain, joint swelling and myalgias. Skin: Negative for color change, rash and wound. Neurological: Negative for dizziness, tremors and numbness. Hematological: Negative for adenopathy. Does not bruise/bleed easily. /62 (Site: Right Upper Arm, Position: Sitting, Cuff Size: Large Adult)   Pulse 61   Temp 97.1 °F (36.2 °C) (Infrared)   Ht 6' 1\" (1.854 m)   Wt 246 lb (111.6 kg)   BMI 32.46 kg/m²       PHYSICAL EXAM:  Constitutional: Patient in no acute distress; Neuro: alert and oriented to person place and time. Psych: Mood and affect normal.  Lungs: Respiratory effort normal  Abdomen: Soft, non-tender, non-distended   Rectal: deferred       Lab Results   Component Value Date    BUN 17 10/25/2021     Lab Results   Component Value Date    CREATININE 0.82 10/25/2021     No results found for: PSA    ASSESSMENT:   Diagnosis Orders   1. BPH with obstruction/lower urinary tract symptoms  RI MEASUREMENT,POST-VOID RESIDUAL VOLUME BY US,NON-IMAGING   2. Incomplete bladder emptying  RI MEASUREMENT,POST-VOID RESIDUAL VOLUME BY US,NON-IMAGING   3. Urgency of urination     4. Frequency of urination           PLAN:  Continue Flomax twice a day    Patient does not want any changes to medication or procedures at this time.     Stop liquids 2 hours prior to bed    Attempt void every 2-3 hours during the day    Avoid bladder irritants    Follow-up in 6 months with [facility]

## 2022-10-10 ENCOUNTER — HOSPITAL ENCOUNTER (OUTPATIENT)
Age: 77
Discharge: HOME | End: 2022-10-10
Payer: MEDICARE

## 2022-10-10 DIAGNOSIS — E11.9 TYPE 2 DIABETES MELLITUS WITHOUT COMPLICATION, WITHOUT LONG-TERM CURRENT USE OF INSULIN (HCC): ICD-10-CM

## 2022-10-10 LAB
ABSOLUTE EOS #: 0.29 K/UL (ref 0–0.44)
ABSOLUTE IMMATURE GRANULOCYTE: <0.03 K/UL (ref 0–0.3)
ABSOLUTE LYMPH #: 3.03 K/UL (ref 1.1–3.7)
ABSOLUTE MONO #: 0.62 K/UL (ref 0.1–1.2)
ALT SERPL-CCNC: 21 U/L (ref 5–41)
ANION GAP SERPL CALCULATED.3IONS-SCNC: 9 MMOL/L (ref 9–17)
AST SERPL-CCNC: 17 U/L
BASOPHILS # BLD: 1 % (ref 0–2)
BASOPHILS ABSOLUTE: 0.05 K/UL (ref 0–0.2)
BUN BLDV-MCNC: 18 MG/DL (ref 8–23)
BUN/CREAT BLD: 22 (ref 9–20)
CALCIUM SERPL-MCNC: 10.2 MG/DL (ref 8.6–10.4)
CHLORIDE BLD-SCNC: 105 MMOL/L (ref 98–107)
CO2: 27 MMOL/L (ref 20–31)
CREAT SERPL-MCNC: 0.82 MG/DL (ref 0.7–1.2)
CREATININE URINE: 122 MG/DL (ref 39–259)
EOSINOPHILS RELATIVE PERCENT: 5 % (ref 1–4)
GFR SERPL CREATININE-BSD FRML MDRD: >60 ML/MIN/1.73M2
GLUCOSE BLD-MCNC: 137 MG/DL (ref 70–99)
HCT VFR BLD CALC: 40.1 % (ref 40.7–50.3)
HEMOGLOBIN: 13.1 G/DL (ref 13–17)
IMMATURE GRANULOCYTES: 0 %
LYMPHOCYTES # BLD: 49 % (ref 24–43)
MCH RBC QN AUTO: 34.7 PG (ref 25.2–33.5)
MCHC RBC AUTO-ENTMCNC: 32.7 G/DL (ref 28.4–34.8)
MCV RBC AUTO: 106.4 FL (ref 82.6–102.9)
MICROALBUMIN/CREAT 24H UR: 20 MG/L
MICROALBUMIN/CREAT UR-RTO: 16 MCG/MG CREAT
MONOCYTES # BLD: 10 % (ref 3–12)
NRBC AUTOMATED: 0.3 PER 100 WBC
PDW BLD-RTO: 13.5 % (ref 11.8–14.4)
PLATELET # BLD: 179 K/UL (ref 138–453)
PMV BLD AUTO: 10 FL (ref 8.1–13.5)
POTASSIUM SERPL-SCNC: 4.7 MMOL/L (ref 3.7–5.3)
RBC # BLD: 3.77 M/UL (ref 4.21–5.77)
SEG NEUTROPHILS: 35 % (ref 36–65)
SEGMENTED NEUTROPHILS ABSOLUTE COUNT: 2.17 K/UL (ref 1.5–8.1)
SODIUM BLD-SCNC: 141 MMOL/L (ref 135–144)
WBC # BLD: 6.2 K/UL (ref 3.5–11.3)

## 2022-10-10 PROCEDURE — 84460 ALANINE AMINO (ALT) (SGPT): CPT

## 2022-10-10 PROCEDURE — 80048 BASIC METABOLIC PNL TOTAL CA: CPT

## 2022-10-10 PROCEDURE — 80061 LIPID PANEL: CPT

## 2022-10-10 PROCEDURE — 82043 UR ALBUMIN QUANTITATIVE: CPT

## 2022-10-10 PROCEDURE — 83036 HEMOGLOBIN GLYCOSYLATED A1C: CPT

## 2022-10-10 PROCEDURE — 84450 TRANSFERASE (AST) (SGOT): CPT

## 2022-10-10 PROCEDURE — 85025 COMPLETE CBC W/AUTO DIFF WBC: CPT

## 2022-10-10 PROCEDURE — 36415 COLL VENOUS BLD VENIPUNCTURE: CPT

## 2022-10-10 PROCEDURE — 82570 ASSAY OF URINE CREATININE: CPT

## 2022-10-11 ENCOUNTER — TELEPHONE (OUTPATIENT)
Dept: PRIMARY CARE CLINIC | Age: 77
End: 2022-10-11

## 2022-10-11 LAB
CHOLESTEROL/HDL RATIO: 2.4
CHOLESTEROL: 100 MG/DL
ESTIMATED AVERAGE GLUCOSE: 154 MG/DL
HBA1C MFR BLD: 7 % (ref 4–6)
HDLC SERPL-MCNC: 41 MG/DL
LDL CHOLESTEROL: 49 MG/DL (ref 0–130)
TRIGL SERPL-MCNC: 50 MG/DL

## 2022-10-11 NOTE — TELEPHONE ENCOUNTER
----- Message from HALEY Thompson CNP sent at 10/11/2022 12:52 PM EDT -----  Please notify patient of stable lab results.   Thanks Egan

## 2022-10-27 ENCOUNTER — OFFICE VISIT (OUTPATIENT)
Dept: PRIMARY CARE CLINIC | Age: 77
End: 2022-10-27
Payer: MEDICARE

## 2022-10-27 VITALS
DIASTOLIC BLOOD PRESSURE: 68 MMHG | WEIGHT: 238.6 LBS | TEMPERATURE: 97.7 F | BODY MASS INDEX: 31.62 KG/M2 | OXYGEN SATURATION: 97 % | SYSTOLIC BLOOD PRESSURE: 110 MMHG | RESPIRATION RATE: 20 BRPM | HEIGHT: 73 IN | HEART RATE: 78 BPM

## 2022-10-27 DIAGNOSIS — Z74.09 IMPAIRED MOBILITY: ICD-10-CM

## 2022-10-27 DIAGNOSIS — Z00.00 MEDICARE ANNUAL WELLNESS VISIT, SUBSEQUENT: Primary | ICD-10-CM

## 2022-10-27 DIAGNOSIS — E78.2 MIXED HYPERLIPIDEMIA: ICD-10-CM

## 2022-10-27 DIAGNOSIS — Z23 NEED FOR INFLUENZA VACCINATION: ICD-10-CM

## 2022-10-27 DIAGNOSIS — G89.29 CHRONIC BILATERAL LOW BACK PAIN WITHOUT SCIATICA: ICD-10-CM

## 2022-10-27 DIAGNOSIS — E11.9 TYPE 2 DIABETES MELLITUS WITHOUT COMPLICATION, WITHOUT LONG-TERM CURRENT USE OF INSULIN (HCC): ICD-10-CM

## 2022-10-27 DIAGNOSIS — M54.50 CHRONIC BILATERAL LOW BACK PAIN WITHOUT SCIATICA: ICD-10-CM

## 2022-10-27 PROCEDURE — 90694 VACC AIIV4 NO PRSRV 0.5ML IM: CPT | Performed by: NURSE PRACTITIONER

## 2022-10-27 PROCEDURE — 1123F ACP DISCUSS/DSCN MKR DOCD: CPT | Performed by: NURSE PRACTITIONER

## 2022-10-27 PROCEDURE — 3074F SYST BP LT 130 MM HG: CPT | Performed by: NURSE PRACTITIONER

## 2022-10-27 PROCEDURE — 3051F HG A1C>EQUAL 7.0%<8.0%: CPT | Performed by: NURSE PRACTITIONER

## 2022-10-27 PROCEDURE — G0008 ADMIN INFLUENZA VIRUS VAC: HCPCS | Performed by: NURSE PRACTITIONER

## 2022-10-27 PROCEDURE — 3078F DIAST BP <80 MM HG: CPT | Performed by: NURSE PRACTITIONER

## 2022-10-27 PROCEDURE — G0439 PPPS, SUBSEQ VISIT: HCPCS | Performed by: NURSE PRACTITIONER

## 2022-10-27 SDOH — ECONOMIC STABILITY: FOOD INSECURITY: WITHIN THE PAST 12 MONTHS, THE FOOD YOU BOUGHT JUST DIDN'T LAST AND YOU DIDN'T HAVE MONEY TO GET MORE.: PATIENT DECLINED

## 2022-10-27 SDOH — ECONOMIC STABILITY: FOOD INSECURITY: WITHIN THE PAST 12 MONTHS, YOU WORRIED THAT YOUR FOOD WOULD RUN OUT BEFORE YOU GOT MONEY TO BUY MORE.: PATIENT DECLINED

## 2022-10-27 ASSESSMENT — ENCOUNTER SYMPTOMS
BOWEL INCONTINENCE: 0
BACK PAIN: 1
ABDOMINAL PAIN: 0
SHORTNESS OF BREATH: 0

## 2022-10-27 ASSESSMENT — LIFESTYLE VARIABLES
HOW MANY STANDARD DRINKS CONTAINING ALCOHOL DO YOU HAVE ON A TYPICAL DAY: 3 OR 4
HAVE YOU OR SOMEONE ELSE BEEN INJURED AS A RESULT OF YOUR DRINKING: 0
HAS A RELATIVE, FRIEND, DOCTOR, OR ANOTHER HEALTH PROFESSIONAL EXPRESSED CONCERN ABOUT YOUR DRINKING OR SUGGESTED YOU CUT DOWN: 0
HOW OFTEN DURING THE LAST YEAR HAVE YOU FOUND THAT YOU WERE NOT ABLE TO STOP DRINKING ONCE YOU HAD STARTED: 0
HOW OFTEN DURING THE LAST YEAR HAVE YOU BEEN UNABLE TO REMEMBER WHAT HAPPENED THE NIGHT BEFORE BECAUSE YOU HAD BEEN DRINKING: 0
HOW OFTEN DURING THE LAST YEAR HAVE YOU HAD A FEELING OF GUILT OR REMORSE AFTER DRINKING: 0
HOW OFTEN DURING THE LAST YEAR HAVE YOU FAILED TO DO WHAT WAS NORMALLY EXPECTED FROM YOU BECAUSE OF DRINKING: 0
HOW OFTEN DURING THE LAST YEAR HAVE YOU NEEDED AN ALCOHOLIC DRINK FIRST THING IN THE MORNING TO GET YOURSELF GOING AFTER A NIGHT OF HEAVY DRINKING: 0
HOW OFTEN DO YOU HAVE A DRINK CONTAINING ALCOHOL: 4 OR MORE TIMES A WEEK

## 2022-10-27 ASSESSMENT — PATIENT HEALTH QUESTIONNAIRE - PHQ9
SUM OF ALL RESPONSES TO PHQ QUESTIONS 1-9: 0
1. LITTLE INTEREST OR PLEASURE IN DOING THINGS: 0
SUM OF ALL RESPONSES TO PHQ9 QUESTIONS 1 & 2: 0
SUM OF ALL RESPONSES TO PHQ QUESTIONS 1-9: 0
2. FEELING DOWN, DEPRESSED OR HOPELESS: 0

## 2022-10-27 ASSESSMENT — SOCIAL DETERMINANTS OF HEALTH (SDOH): HOW HARD IS IT FOR YOU TO PAY FOR THE VERY BASICS LIKE FOOD, HOUSING, MEDICAL CARE, AND HEATING?: PATIENT DECLINED

## 2022-10-27 NOTE — PROGRESS NOTES
After obtaining consent, and per orders of Dr. Kee Chase CNP, injection of Flu given in Right deltoid by Osman Lara LPN. Patient instructed to remain in clinic for 20 minutes afterwards, and to report any adverse reaction to me immediately. Vaccine Information Sheet, \"Influenza - Inactivated\"  given to [place], or parent/legal guardian of  [place] Areas and verbalized understanding. Patient responses:    Have you ever had a reaction to a flu vaccine? No  Are you able to eat eggs without adverse effects? Yes  Do you have any current illness? No  Have you ever had Gu[place] Syndrome? No    Flu vaccine given per order. Please see immunization tab.

## 2022-10-27 NOTE — PROGRESS NOTES
Medicare Annual Wellness Visit    Sydney Vaughn is here for Medicare AWV (AWV/routine check diabetes.)    Assessment & Plan   Medicare annual wellness visit, subsequent  Type 2 diabetes mellitus without complication, without long-term current use of insulin ([facility] [address]  -     Basic Metabolic Panel; Future  -     Hemoglobin A1C; Future  Mixed hyperlipidemia  Impaired mobility  -     Scooter MISC; Starting Thu 10/27/2022, Disp-1 each, R-0, Print  Chronic bilateral low back pain without sciatica  -     Scooter MISC; Starting Thu 10/27/2022, Disp-1 each, R-0, Print  Need for influenza vaccination  -     Influenza, FLUAD, (age 72 y+), IM, Preservative Free, 0.5 mL    Recommendations for Preventive Services Due: see orders and patient instructions/AVS.  Recommended screening schedule for the next 5-10 years is provided to the patient in written form: see Patient Instructions/AVS.     Return in 6 months (on 4/27/2023) for Medicare Annual Wellness Visit in 1 year, Check up. Subjective   The following acute and/or chronic problems were also addressed today:  Indu Sams is here today for a Medicare annual wellness visit and routine office visit. Gait instability-patient has been having some problems with his gait. He did buy a new pair of shoes which is somewhat helpful but he would like to attend physical therapy for additional benefit. UPDATE 10/27/2022-patient did attend physical therapy and had some improvement. He is still having some issues with gait instability and low back pain. I believe a motorized scooter would help him tremendously on long trips and places where using a cane is not feasible. This would help him perform ADLs at home as well. Diabetes  He presents for his follow-up diabetic visit. He has type 2 diabetes mellitus. Onset time: YEARS. His disease course has been stable. There are no hypoglycemic associated symptoms.  Pertinent negatives for hypoglycemia include no dizziness, headaches, nervousness/anxiousness, seizures, sleepiness or tremors. Associated symptoms include foot paresthesias. Pertinent negatives for diabetes include no chest pain, no fatigue, no foot ulcerations, no polydipsia, no polyphagia, no polyuria, no weakness and no weight loss. There are no hypoglycemic complications. Symptoms are stable. Diabetic complications include heart disease. Pertinent negatives for diabetic complications include no nephropathy or peripheral neuropathy. Risk factors for coronary artery disease include male sex, sedentary lifestyle and obesity. Current diabetic treatment includes oral agent (monotherapy). He is compliant with treatment all of the time. His weight is stable. He is following a generally healthy diet. When asked about meal planning, he reported none. He has had a previous visit with a dietitian. He participates in exercise weekly. There is no change in his home blood glucose trend. His breakfast blood glucose range is generally 130-140 mg/dl. An ACE inhibitor/angiotensin II receptor blocker is not being taken. He does not see a podiatrist.Eye exam is not current. Hyperlipidemia  This is a chronic problem. The current episode started more than 1 year ago. The problem is controlled. Recent lipid tests were reviewed and are normal. Exacerbating diseases include diabetes and obesity. He has no history of hypothyroidism. Factors aggravating his hyperlipidemia include beta blockers. Pertinent negatives include no chest pain, focal weakness, leg pain, myalgias or shortness of breath. Current antihyperlipidemic treatment includes statins. The current treatment provides moderate improvement of lipids. Compliance problems include adherence to exercise and adherence to diet. Risk factors for coronary artery disease include diabetes mellitus, dyslipidemia, family history, obesity, hypertension, male sex and a sedentary lifestyle. Back Pain  This is a chronic problem.  The current episode started more than 1 year ago. The problem occurs daily. The problem has been waxing and waning since onset. The pain is present in the lumbar spine. The quality of the pain is described as aching, cramping and stabbing. The pain does not radiate. The pain is at a severity of 5/10. The pain is moderate. The pain is Worse during the day. The symptoms are aggravated by position, twisting, bending and standing. Stiffness is present All day. Pertinent negatives include no abdominal pain, bladder incontinence, bowel incontinence, chest pain, dysuria, fever, headaches, leg pain, numbness, paresis, paresthesias, pelvic pain, perianal numbness, tingling, weakness or weight loss. Risk factors include sedentary lifestyle and lack of exercise. He has tried analgesics, home exercises, heat, chiropractic manipulation, ice and walking for the symptoms. The treatment provided moderate relief. Review of Systems   Constitutional:  Negative for fatigue, fever and weight loss. Respiratory:  Negative for shortness of breath. Cardiovascular:  Negative for chest pain and palpitations. Gastrointestinal:  Negative for abdominal pain and bowel incontinence. Endocrine: Negative for polydipsia, polyphagia and polyuria. Genitourinary:  Negative for bladder incontinence, dysuria and pelvic pain. Musculoskeletal:  Positive for back pain. Negative for myalgias. Neurological:  Negative for dizziness, tingling, tremors, focal weakness, seizures, weakness, numbness, headaches and paresthesias. Psychiatric/Behavioral:  The patient is not nervous/anxious. Patient's complete Health Risk Assessment and screening values have been reviewed and are found in Flowsheets. The following problems were reviewed today and where indicated follow up appointments were made and/or referrals ordered.     Positive Risk Factor Screenings with Interventions:        Alcohol Screening:  Alcohol Use: Heavy Drinker    Frequency of Alcohol Consumption: 4 or more times a week    Average Number of Drinks: 3 or 4    Frequency of Binge Drinking: Daily or almost daily     AUDIT-C Score: 9  AUDIT Total Score: 9  An AUDIT-C score of 3-7 indicates potential alcohol risk. An AUDIT Total score of 8 or more is associated with harmful or hazardous drinking. A score of 13 or more in women, and 15 or more in men, is likely to indicate alcohol dependence. Substance Use - Alcohol Interventions:  Patient is not ready to change his/her alcohol consumption behavior at this time        Opioid Risk: (Low risk score <55) Opioid risk score: 6    Patient is low risk for opioid use disorder or overdose. Last PDMP [place] as Reviewed:  Review User Review Instant Review Result   Josh STONE 10/27/2022  3:42 PM     Reviewed PDMP [1]     Last Controlled Substance Monitoring Documentation      [id number] Office Visit from 10/27/2022 in [facility] [place]   Periodic Controlled Substance Monitoring No signs of potential drug abuse or diversion identified.  filed at 10/27/2022 [address] and ACP:  General  In general, how would you say your health is?: Good  In the past 7 days, have you experienced any of the following: New or Increased Pain, New or Increased Fatigue, Loneliness, Social Isolation, Stress or Anger?: No  Do you get the social and emotional support that you need?: Yes  Do you have a Living Will?: (!) No    Advance Directives       Power of [address] [place] [address] Will ACP-Advance Directive ACP-Power of     Not on File Not on File Not on File Not on File        General Health Risk Interventions:  No Living Will: Patient declines ACP discussion/assistance    Health Habits/Nutrition:  Physical Activity: Inactive    Days of Exercise per Week: 0 days    Minutes of Exercise per Session: 0 min     Have you lost any weight without trying in the past 3 months?: No  Body mass index: (!) 31.48  Have you seen the dentist within the past year?: (!) No  Health Habits/Nutrition Interventions:  Nutritional issues:  educational materials for healthy, well-balanced diet provided    Hearing/Vision:  Do you or your family notice any trouble with your hearing that hasn't been managed with hearing aids?: No  Do you have difficulty driving, watching TV, or doing any of your daily activities because of your eyesight?: No  Have you had an eye exam within the past year?: (!) No  No results found. Hearing/Vision Interventions:  Vision concerns:  patient encouraged to make appointment with his/her eye specialist     ADLs:  In the past 7 days, did you need help from others to perform any of the following everyday activities: Eating, dressing, grooming, bathing, toileting, or walking/balance?: (!) Yes  Select all that apply: (!) Dressing  In the past 7 days, did you need help from others to take care of any of the following: Laundry, housekeeping, banking/finances, shopping, telephone use, food preparation, transportation, or taking medications?: No  ADL Interventions:  Patient declines any further evaluation/treatment for this issue          Objective   Vitals:    10/27/22 1348   BP: 110/68   Position: Sitting   Pulse: 78   Resp: 20   Temp: 97.7 °F (36.5 °C)   TempSrc: Temporal   SpO2: 97%   Weight: 238 lb 9.6 oz (108.2 kg)   Height: 6' 1\" (1.854 m)      Body mass index is 31.48 kg/m².       General Appearance: alert and oriented to person, place and time, well-developed and well nourished, and in no acute distress  Skin: warm and dry  Head: normocephalic and atraumatic  Eyes: conjunctivae normal and sclera anicteric  ENT: oropharynx clear and moist with normal mucous membranes  Neck: neck supple and non tender without mass   Pulmonary/Chest: clear to auscultation bilaterally- no wheezes, rales or rhonchi, normal air movement, no respiratory distress  Cardiovascular: normal rate, regular rhythm, and no carotid bruits  Abdomen: soft, non-tender  Extremities: trace + edema-  bilateral release tablet Take 1 tablet by mouth daily  Marino rOdaz MD   nitroGLYCERIN (NITROSTAT) 0.4 MG SL tablet PLACE 1 TABLET UNDER THE TONGUE EVERY 5 MINUTES AS NEEDED FOR CHEST PAIN UP TO MAX OF 3 TOTAL DOSES. IF NO RELIEF AFTER 1 DOSE, CALL 911.   Patient not taking: Reported on 10/27/2022  Marino Ordaz MD       CareTeam (Including outside providers/suppliers regularly involved in providing care):   Patient Care Team:  HALEY Costello CNP as PCP - General (Family Nurse Practitioner)  HALEY Mas CNP as PCP - [facility] Empaneled Provider     Reviewed and updated this visit:  Tobacco  Allergies  Meds  Problems  Med Hx  Surg Hx  Soc Hx  Fam Hx

## 2022-10-27 NOTE — PATIENT INSTRUCTIONS
SURVEY:     You may be receiving a survey from [employer] regarding your visit today. Please complete the survey to enable us to provide the highest quality of care to you and your family. If you cannot score us a very good on any question, please call the office to discuss how we could have made your experience a very good one. Thank you,    Daryl Chase, APRN-CNP  Anoop Matute, APRN-CNP  Jaya Anne, LPN  Kush Snowden, [facility]  Dian, [facility]  Madeleine, [facility]  Mayela Auguste, ADAN [place], PM    Personalized Preventive Plan for Héctor Sheth - 10/27/2022  Medicare offers a range of preventive health benefits. Some of the tests and screenings are paid in full while other may be subject to a deductible, co-insurance, and/or copay. Some of these benefits include a comprehensive review of your medical history including lifestyle, illnesses that may run in your family, and various assessments and screenings as appropriate. After reviewing your medical record and screening and assessments performed today your provider may have ordered immunizations, labs, imaging, and/or referrals for you. A list of these orders (if applicable) as well as your Preventive Care list are included within your After Visit Summary for your review. Other Preventive Recommendations:    A preventive eye exam performed by an eye specialist is recommended every 1-2 years to screen for glaucoma; cataracts, macular degeneration, and other eye disorders. A preventive dental visit is recommended every 6 months. Try to get at least 150 minutes of exercise per week or 10,000 steps per day on a pedometer . Order or download the FREE \"Exercise & Physical Activity: Your Everyday Guide\" from [employer]. Call 0-296.555.9776 or search [employer] online. You need 8870-8892 mg of calcium and 2116-5536 IU of vitamin D per day.  It is possible to meet your calcium requirement with diet alone, but a vitamin D supplement is usually necessary to meet this goal.  When exposed to the sun, use a sunscreen that protects against both UVA and UVB radiation with an SPF of 30 or greater. Reapply every 2 to 3 hours or after sweating, drying off with a towel, or swimming. Always wear a seat belt when traveling in a car. Always wear a helmet when riding a bicycle or motorcycle.

## 2022-10-31 RX ORDER — TAMSULOSIN HYDROCHLORIDE 0.4 MG/1
CAPSULE ORAL
Qty: 180 CAPSULE | Refills: 3 | Status: SHIPPED | OUTPATIENT
Start: 2022-10-31

## 2022-10-31 NOTE — TELEPHONE ENCOUNTER
Patient tolerates Flomax twice a day. We will refill this medication. He was seen within the past year.

## 2022-11-07 ENCOUNTER — OFFICE VISIT (OUTPATIENT)
Dept: CARDIOLOGY | Age: 77
End: 2022-11-07
Payer: MEDICARE

## 2022-11-07 VITALS
SYSTOLIC BLOOD PRESSURE: 116 MMHG | RESPIRATION RATE: 18 BRPM | OXYGEN SATURATION: 91 % | WEIGHT: 237 LBS | HEIGHT: 73 IN | DIASTOLIC BLOOD PRESSURE: 62 MMHG | HEART RATE: 69 BPM | BODY MASS INDEX: 31.41 KG/M2

## 2022-11-07 DIAGNOSIS — I25.708 CORONARY ARTERY DISEASE OF BYPASS GRAFT OF NATIVE HEART WITH STABLE ANGINA PECTORIS (HCC): Primary | ICD-10-CM

## 2022-11-07 DIAGNOSIS — I50.32 CHRONIC DIASTOLIC CONGESTIVE HEART FAILURE (HCC): ICD-10-CM

## 2022-11-07 DIAGNOSIS — I35.0 SEVERE AORTIC STENOSIS: ICD-10-CM

## 2022-11-07 DIAGNOSIS — Z95.1 S/P CABG X 3: ICD-10-CM

## 2022-11-07 DIAGNOSIS — I10 ESSENTIAL HYPERTENSION: ICD-10-CM

## 2022-11-07 DIAGNOSIS — I20.8 STABLE ANGINA (HCC): ICD-10-CM

## 2022-11-07 PROCEDURE — 3078F DIAST BP <80 MM HG: CPT | Performed by: FAMILY MEDICINE

## 2022-11-07 PROCEDURE — 1123F ACP DISCUSS/DSCN MKR DOCD: CPT | Performed by: FAMILY MEDICINE

## 2022-11-07 PROCEDURE — 3074F SYST BP LT 130 MM HG: CPT | Performed by: FAMILY MEDICINE

## 2022-11-07 PROCEDURE — 99214 OFFICE O/P EST MOD 30 MIN: CPT | Performed by: FAMILY MEDICINE

## 2022-11-07 RX ORDER — NITROGLYCERIN 0.4 MG/1
0.4 TABLET SUBLINGUAL EVERY 5 MIN PRN
Qty: 75 TABLET | Refills: 1 | Status: SHIPPED | OUTPATIENT
Start: 2022-11-07

## 2022-11-07 RX ORDER — RIVAROXABAN 2.5 MG/1
2.5 TABLET, FILM COATED ORAL 2 TIMES DAILY
Qty: 60 TABLET | Refills: 0 | Status: SHIPPED | OUTPATIENT
Start: 2022-11-07

## 2022-11-07 NOTE — PROGRESS NOTES
12/5/2018 with an ejection fraction of 50-55% with a bioprosthetic aortic valve. Mr. Perla Mcgovern had echo done 1/16/2020 EF 55% Compared to the previous study of 12/5/18, no significant change was seen. Heart cath on 7/15/2021 showed Severe three vessel disease involving the LAD and circumflex coronary arteries with moderate to severe disease in the proximal right coronary arteries. 2 of 3 bypass grafts patent with a known to be occluded SVG-RCA and a 80-90% stenosis in the touchdown of the LIMA-LAD. Normal LVEDP Successful PTCA ELIAN mid RCA on 7/16/2021 by Dr Yakov Slater. He had echo done on 5/19/2022 compared to previous study no significant change was seen. Since the last time I saw Mr. Perla Mcgovern he has been doing over all well. His only complaint is that he would like to be off some of his medications. His wife reports his balance and weakness is still off. He has had 2 falls due to a dog and once with pulling a garbage can. He did take Nitro last week for chest pressure, lasted up to 30 minutes, felt tight, denies any further episodes and no previous episode. He denies having any lightheaded/dizziness or palpitations. He denies any abdominal pain, bleeding problems, bowel issues, problems with his medications or any other concerns at this time. No cough, fever or chills. No nausea or vomiting. Past Medical History:   Diagnosis Date    Chronic back pain     Coronary artery disease     s/p MI and CABG in 12/2017    H/O aortic valve replacement 12/16/2017    Bioprosthetic valve     H/O cardiac catheterization 12/06/2018    Severe three vessel disease involving the LAD, circumflex and right coronary arteries  2 of 3 bypass grafts patent Normal LVEDP. Consult to interventional cardiology for likely angioplasty and/or  stenting of the patients severe stenosis with likely stenting of the unrevascularized RCA stenosis with plans to intervene on the higher risk LIMA-LAD stenosis only at a later date if the patient fails gu H/O three vessel coronary artery bypass 12/2017    History of atrial fibrillation 12/2017    following CABG 12/2017    History of blood transfusion     History of cardiac cath 07/15/2021    [facility]) Deb/Dr. Yoon/Left Radial     History of myocardial infarction 12/2017    Hyperlipidemia     Hypertension     MI (myocardial infarction) ([facility] [address] 12/2017    POST-OP    Osteoarthritis     Sleep apnea     Type 2 diabetes mellitus without complication ([facility] [address]      CURRENT ALLERGIES: Patient has no known allergies. REVIEW OF SYSTEMS: 14 systems were reviewed. Pertinent positives and negatives as above, all else negative. Past Surgical History:   Procedure Laterality Date    CARDIAC CATHETERIZATION Left 12/06/2018    Dr Concetta Rivers radial-Severe three vessel disease involving the LAD and circumflex coronary arteries with moderate to severe disease in the proximal right coronary arteries. 2 of 3 bypass grafts patent with a known to be occluded SVG-RCA and a 80-90% stenosis in the touchdown of the LIMA-LAD. CARDIAC CATHETERIZATION  12/06/2018    cardiac catheterization on 12/6/2018 showed a 75% stenosis in RCA as well as a 70% stenosis in his Circumflex    COLONOSCOPY      CORONARY ANGIOPLASTY WITH STENT PLACEMENT  07/16/2021    CORONARY ARTERY BYPASS GRAFT  12/2017    EYE SURGERY Bilateral     cataracts    INTRACAPSULAR CATARACT EXTRACTION      KNEE ARTHROPLASTY      SHOULDER ARTHROPLASTY      Social History:  Social History     Tobacco Use    Smoking status: Never    Smokeless tobacco: Never   Vaping Use    Vaping Use: Never used   Substance Use Topics    Alcohol use:  Yes     Alcohol/week: 1.0 standard drink     Types: 1 Glasses of wine per week     Comment: most days    Drug use: No        CURRENT MEDICATIONS:  Outpatient Medications Marked as Taking for the 11/7/22 encounter (Office Visit) with Kale Jose MD   Medication Sig Dispense Refill    tamsulosin (FLOMAX) 0.4 MG capsule TAKE 1 CAPSULE BY MOUTH TWICE A  capsule 3    [employer] Lancets Micro Thin 33G MISC For once a day testing E11.9 100 each 3    famotidine (PEPCID) 40 MG tablet TAKE 1 TABLET DAILY 90 tablet 3    atorvastatin (LIPITOR) 40 MG tablet Take 1 tablet by mouth nightly 90 tablet 3    clopidogrel (PLAVIX) 75 MG tablet Take 1 tablet by mouth daily 90 tablet 3    lisinopril (PRINIVIL;ZESTRIL) 5 MG tablet Take 1 tablet by mouth daily 90 tablet 3    metoprolol succinate (TOPROL XL) 100 MG extended release tablet Take 1 tablet by mouth daily 90 tablet 3    isosorbide mononitrate (IMDUR) 30 MG extended release tablet Take 1 tablet by mouth daily 90 tablet 3    metFORMIN (GLUCOPHAGE) 1000 MG tablet TAKE 1 TABLET TWICE A DAY WITH MEALS 180 tablet 3    blood glucose test strips (TRUE METRIX BLOOD GLUCOSE TEST) strip USE ONCE DAILY 100 strip 3    nitroGLYCERIN (NITROSTAT) 0.4 MG SL tablet PLACE 1 TABLET UNDER THE TONGUE EVERY 5 MINUTES AS NEEDED FOR CHEST PAIN UP TO MAX OF 3 TOTAL DOSES. IF NO RELIEF AFTER 1 DOSE, CALL 911. 75 tablet 1    blood glucose monitor strips 1 strip by Other route daily Tung Metrix test strip. E11.9 100 strip 3    gabapentin (NEURONTIN) 400 MG capsule Take 400 mg by mouth 3 times daily. aspirin 81 MG tablet Take 81 mg by mouth Daily with lunch       melatonin 5 MG TABS tablet Take 5 mg by mouth as needed       Multiple Vitamins-Minerals (CENTRUM SILVER PO) Take 1 tablet by mouth daily       traMADol (ULTRAM) 50 MG tablet Take 50 mg by mouth every 6 hours as needed for Pain. James Leonard FAMILY HISTORY: family history is not on file. He was adopted. PHYSICAL EXAM:   /62 (Site: Right Upper Arm, Position: Sitting, Cuff Size: Large Adult)   Pulse 69   Resp 18   Ht 6' 1\" (1.854 m)   Wt 237 lb (107.5 kg)   SpO2 91%   BMI 31.27 kg/m²  Body mass index is 31.27 kg/m². Constitutional: He is oriented to person, place, and time. He appears well-developed and well-nourished. In no acute distress.    HEENT: Normocephalic and atraumatic. No JVD present. Carotid bruit is not present. No mass and no thyromegaly present. No lymphadenopathy present. Cardiovascular: Normal rate, regular rhythm, normal heart sounds. Exam reveals no gallop and no friction rubs 4/6 systolic murmur, 2nd intercostal space on the RIGHT just lateral to the sternum. Pulmonary/Chest: Effort normal and breath sounds normal. No respiratory distress. He has no wheezes, rhonchi or rales. Abdominal: Soft, non-tender. Bowel sounds and aorta are normal. He exhibits no organomegaly, mass or bruit. Extremities: None. No cyanosis or clubbing. 2+ radial and carotid pulses. Distal extremity pulses: 2+ bilaterally. Neurological: He is alert and oriented to person, place, and time. No evidence of gross cranial nerve deficit. Coordination appeared normal.   Skin: Skin is warm and dry. There is no rash or diaphoresis. Psychiatric: He has a normal mood and affect. His speech is normal and behavior is normal.      MOST RECENT LABS ON RECORD:   Lab Results   Component Value Date    WBC 6.2 10/10/2022    HGB 13.1 10/10/2022    HCT 40.1 (L) 10/10/2022     10/10/2022    CHOL 100 10/10/2022    TRIG 50 10/10/2022    HDL 41 10/10/2022    ALT 21 10/10/2022    AST 17 10/10/2022     10/10/2022    K 4.7 10/10/2022     10/10/2022    CREATININE 0.82 10/10/2022    BUN 18 10/10/2022    CO2 27 10/10/2022    TSH 1.65 09/06/2018    INR 1.1 12/04/2018    LABA1C 7.0 (H) 10/10/2022    LABMICR 16 10/10/2022     ASSESSMENT:  1. Coronary artery disease of bypass graft of native heart with stable angina pectoris ([address]    2. S/P CABG x 3    3. Chronic diastolic congestive heart failure ([address]    4. Severe aortic stenosis    5. Essential hypertension    6. Stable angina (HCC)      PLAN:  Atherosclerotic Heart Disease: Hx of MI with CABG x 3 in 12/2017.  Severe disease in the circumflex coronary artery and LIMA-LAD graft but will plan to treat with guideline directed trial of maximal medical management. Successful PTCA ELIAN mid RCA on 7/16/2021 by Dr Nette Polanco but still with Class II angina as LIMA-LAD touchdown stenosis was not treated. Antiplatelet Agent: Continue aspirin 81 mg daily and Stop clopidogrel (Plavix). I also reminded him to watch for signs of blood in his stool or black tarry stools and stop the medication immediately if this develops as this could be life threatening. ACE Inibitor/ARB: Continue lisinopril 5 mg daily. Beta Blocker: Continue metoprolol succinate (Toprol XL) 100 mg once daily. Anti-anginal medications: Continue isosorbide mononitrate (Imdur) 30 mg once daily. Anti-anginal medications: Continue nitroglycerin 0.4 mg tablets as needed for chest pain. Cholesterol Reduction Therapy: Continue Atorvastatin (Lipitor) 40 mg daily. Based on the patients increased risk of future cardiovascular events, I discussed the risks and benefits of starting him on Xarelto 2.5 mg bid. I explained that as per the [facility] trial published in the [place] [address] of [facility] in 2017, treatment in patients with a known history of severe coronary artery disease had a 22% relative risk reduction in CV death, a 14% relative risk reduction in MI, and a 42% relative risk reduction in stroke. I also explained that the this will increase his risk of bleeding which can rarely be fatal but told them that they needed to watch for any blood in their stool or black tarry stools and call immediately if this develops. The patient was in agreement with this plan and therefore I started Xarelto 2.5 mg bid. Chronic Diastolic Heart Failure: EF of 50-55% on 12/4/2018 - 55% in 1/16/20. Currently well controlled. Beta Blocker: Continue metoprolol succinate (Toprol XL) 100 mg  once daily. Nonpharmacologic management of Heart Failure: I advised him to try and keep his legs up whenever possible and to limit salt in his diet.        History of Severe Aortic Stenosis: Asymptomatic-S/P: Bioprosthetic Aortic Valve replacement in 12/2017. Beta Blocker: Continue metoprolol succinate (Toprol XL) 100 mg once daily. Additional Testing List: None     Essential Hypertension: Controlled  Beta Blocker: Continue metoprolol succinate (Toprol XL) 100 mg once daily. ACE Inibitor/ARB: Continue lisinopril 5 mg daily. Chronic Stable Angina: Continue with guideline directed trial of maximal medical management. Beta Blocker: Continue Metoprolol succinate (Toprol XL) 100 mg daily. Anti-anginal medications: Continue isosorbide mononitrate (Imdur) 30 mg once daily. Anti-anginal medications: Continue nitroglycerin 0.4 mg tablets as needed for chest pain. Finally, I recommended that he continue his current medications and follow up with you as previously scheduled. FOLLOW UP:   I told Mr. Shanique Forrester to call my office if he had any problems, but otherwise I asked him to Return in about 6 months (around 5/7/2023). However, I would be happy to see him sooner should the need arise. Sincerely,  Laura Yoon MD, MS, F.A.C.C. [facility] Cardiology Specialist    [address]  Phone: [phone], Fax: [phone]     I believe that the risk of significant morbidity and mortality related to the patient's current medical conditions are: Intermediate. The documentation recorded by the scribe, accurately and completely reflects the services I personally performed and the decisions made by me. Kerrie Adair MD, MS, F.A.C.C.  November 7, 2022 95

## 2022-11-07 NOTE — PATIENT INSTRUCTIONS
SURVEY:    You may be receiving a survey from [employer] regarding your visit today. Please complete the survey to enable us to provide the highest quality of care to you and your family. If you cannot score us a very good on any question, please call the office to discuss how we could have made your experience a very good one. Thank you.

## 2022-12-13 RX ORDER — RIVAROXABAN 2.5 MG/1
2.5 TABLET, FILM COATED ORAL 2 TIMES DAILY
Qty: 60 TABLET | Refills: 0 | Status: SHIPPED | OUTPATIENT
Start: 2022-12-13

## 2022-12-29 ENCOUNTER — TELEPHONE (OUTPATIENT)
Dept: PRIMARY CARE CLINIC | Age: 77
End: 2022-12-29

## 2022-12-29 NOTE — TELEPHONE ENCOUNTER
Message left with  Laquita to call office , attempted to notify Fantasma Gauthier, to fill form out for scooter.  Unable to reach Law Albrecht. @ [facility].

## 2023-01-09 ENCOUNTER — TELEPHONE (OUTPATIENT)
Dept: PRIMARY CARE CLINIC | Age: 78
End: 2023-01-09

## 2023-01-09 ENCOUNTER — TELEPHONE (OUTPATIENT)
Dept: CARDIOLOGY | Age: 78
End: 2023-01-09

## 2023-01-09 NOTE — TELEPHONE ENCOUNTER
Please let patient know that depends on how much he needs the Paxlovid. If he really needs it, Hold the Xarelto and Atorvastatin for 3 days. Thanks.

## 2023-01-09 NOTE — TELEPHONE ENCOUNTER
Patient tested positive for COVID today and is having balance issues. He went to Urgent Care where they were going to prescribe him Paxlovid but it conflicts with some of his medications. They advised patient to call his PCP and Cardiologist and have the PCP call in the Paxlovid if appropriate.

## 2023-01-09 NOTE — TELEPHONE ENCOUNTER
I sent the prescription but I would like him to check with cardiology prior to taking the medication. If they say it is okay then fine. Thank you.

## 2023-01-09 NOTE — TELEPHONE ENCOUNTER
Pt was prescribed Paxlovid for Covid but there is a drug interaction with Xarelto and Atorvastatin. Pt would like to know if he can take it Paxlovid ?

## 2023-01-11 DIAGNOSIS — I50.32 CHRONIC DIASTOLIC CONGESTIVE HEART FAILURE (HCC): ICD-10-CM

## 2023-01-11 DIAGNOSIS — I20.8 STABLE ANGINA (HCC): ICD-10-CM

## 2023-01-11 DIAGNOSIS — I25.10 ASHD (ARTERIOSCLEROTIC HEART DISEASE): ICD-10-CM

## 2023-01-11 DIAGNOSIS — I25.708 CORONARY ARTERY DISEASE OF BYPASS GRAFT OF NATIVE HEART WITH STABLE ANGINA PECTORIS (HCC): ICD-10-CM

## 2023-01-11 DIAGNOSIS — Z95.820 S/P ANGIOPLASTY WITH STENT: ICD-10-CM

## 2023-01-11 DIAGNOSIS — Z95.1 S/P CABG X 3: ICD-10-CM

## 2023-01-11 DIAGNOSIS — I10 ESSENTIAL HYPERTENSION: ICD-10-CM

## 2023-01-11 DIAGNOSIS — I35.0 SEVERE AORTIC STENOSIS: ICD-10-CM

## 2023-01-11 DIAGNOSIS — R07.89 ATYPICAL CHEST PAIN: ICD-10-CM

## 2023-01-11 RX ORDER — FAMOTIDINE 40 MG/1
TABLET, FILM COATED ORAL
Qty: 90 TABLET | Refills: 3 | Status: SHIPPED | OUTPATIENT
Start: 2023-01-11

## 2023-01-11 RX ORDER — TAMSULOSIN HYDROCHLORIDE 0.4 MG/1
CAPSULE ORAL
Qty: 180 CAPSULE | Refills: 3 | Status: SHIPPED | OUTPATIENT
Start: 2023-01-11

## 2023-01-11 RX ORDER — RIVAROXABAN 2.5 MG/1
TABLET, FILM COATED ORAL
Qty: 180 TABLET | Refills: 3 | Status: SHIPPED | OUTPATIENT
Start: 2023-01-11 | End: 2023-01-12 | Stop reason: SDUPTHER

## 2023-01-11 NOTE — TELEPHONE ENCOUNTER
Health Maintenance   Topic Date Due    DTaP/Tdap/Td vaccine (1 - Tdap) 01/21/2023 (Originally 2/5/1964)    Hepatitis C screen  10/27/2023 (Originally 2/5/1963)    Lipids  10/10/2023    Depression Screen  10/27/2023    Annual Wellness Visit (AWV)  10/28/2023    Flu vaccine  Completed    Shingles vaccine  Completed    Pneumococcal 65+ years Vaccine  Completed    COVID-19 Vaccine  Completed    Hepatitis A vaccine  Aged Out    Hib vaccine  Aged Out    Meningococcal (ACWY) vaccine  Aged Out             (applicable per patient's age: Cancer Screenings, Depression Screening, Fall Risk Screening, Immunizations)    Hemoglobin A1C (%)   Date Value   10/10/2022 7.0 (H)   04/25/2022 6.3   10/25/2021 6.8 (H)     Microalb/Crt.  Ratio (mcg/mg creat)   Date Value   10/10/2022 16     LDL Cholesterol (mg/dL)   Date Value   10/10/2022 49     AST (U/L)   Date Value   10/10/2022 17     ALT (U/L)   Date Value   10/10/2022 21     BUN (mg/dL)   Date Value   10/10/2022 18      (goal A1C is < 7)   (goal LDL is <100) need 30-50% reduction from baseline     BP Readings from Last 3 Encounters:   11/07/22 116/62   10/27/22 110/68   07/06/22 122/62    (goal /80)      All Future Testing planned in [facility]:  Lab Frequency Next Occurrence   Basic Metabolic Panel Once 40/89/5385   Hemoglobin A1C Once 04/25/2023       Next Visit Date:  Future Appointments   Date Time Provider Jerrod Meier   1/16/2023 11:00 AM SCHEDULE, [facility] TIFF UROLOGY GREEN PT SCHEDULE TIFF UROLOGY [facility]   4/27/2023  1:40 PM HALYE Rao CNP Tiff Prim Ca [facility]   5/17/2023  1:00 PM Amanuel Tsang MD TIFF CARD [facility]   6/15/2023  1:45 PM Raina Bailon MD TIFF PULM [facility]   11/1/2023  1:40 PM HALEY Rao CNP Tiff Prim Ca [facility]            Patient Active Problem List:     Type 2 diabetes mellitus without complication, without long-term current use of insulin (HCC)     History of MI (myocardial infarction)     FABIAN (obstructive sleep apnea)     Other chronic pain     Bilateral hearing loss     Aneurysm of left ventricle of heart     Chronic diastolic CHF (congestive heart failure), NYHA class 3 ([facility])     Unstable angina ([facility])     History of atrial fibrillation     Hyperlipidemia     Hypertension     Sleep apnea     H/O aortic valve replacement     Hx of CABG     Coronary artery disease     Abnormal cardiovascular stress test     Chest pain     Coronary artery disease (CAD) excluded     Stable angina pectoris ([facility])     BPH with obstruction/lower urinary tract symptoms     Incomplete bladder emptying     Frequency of urination     Urgency of urination     Angina, class II ([facility])     S/P angioplasty with stent     Bleeding ulcer

## 2023-01-12 RX ORDER — ATORVASTATIN CALCIUM 40 MG/1
40 TABLET, FILM COATED ORAL NIGHTLY
Qty: 90 TABLET | Refills: 3 | Status: SHIPPED | OUTPATIENT
Start: 2023-01-12

## 2023-01-12 RX ORDER — ISOSORBIDE MONONITRATE 30 MG/1
30 TABLET, EXTENDED RELEASE ORAL DAILY
Qty: 90 TABLET | Refills: 3 | Status: SHIPPED | OUTPATIENT
Start: 2023-01-12

## 2023-01-12 RX ORDER — METOPROLOL SUCCINATE 100 MG/1
100 TABLET, EXTENDED RELEASE ORAL DAILY
Qty: 90 TABLET | Refills: 3 | Status: SHIPPED | OUTPATIENT
Start: 2023-01-12

## 2023-01-12 RX ORDER — RIVAROXABAN 2.5 MG/1
TABLET, FILM COATED ORAL
Qty: 180 TABLET | Refills: 3 | Status: SHIPPED | OUTPATIENT
Start: 2023-01-12 | End: 2023-01-14 | Stop reason: SDUPTHER

## 2023-01-12 RX ORDER — LISINOPRIL 5 MG/1
5 TABLET ORAL DAILY
Qty: 90 TABLET | Refills: 3 | Status: SHIPPED | OUTPATIENT
Start: 2023-01-12

## 2023-01-13 DIAGNOSIS — E11.9 TYPE 2 DIABETES MELLITUS WITHOUT COMPLICATION, WITHOUT LONG-TERM CURRENT USE OF INSULIN (HCC): Chronic | ICD-10-CM

## 2023-01-13 RX ORDER — CLOPIDOGREL BISULFATE 75 MG/1
TABLET ORAL
COMMUNITY
Start: 2023-01-11

## 2023-01-13 RX ORDER — CALCIUM CITRATE/VITAMIN D3 200MG-6.25
TABLET ORAL
Qty: 100 STRIP | Refills: 3 | Status: SHIPPED | OUTPATIENT
Start: 2023-01-13

## 2023-01-13 NOTE — TELEPHONE ENCOUNTER
Pt needs a 30 day script of his xarelto sent to a local pharmacy until his mail order comes in. Can you please sign a 30 day supply?

## 2023-01-13 NOTE — TELEPHONE ENCOUNTER
Health Maintenance   Topic Date Due    DTaP/Tdap/Td vaccine (1 - Tdap) 01/21/2023 (Originally 2/5/1964)    Hepatitis C screen  10/27/2023 (Originally 2/5/1963)    Lipids  10/10/2023    Depression Screen  10/27/2023    Annual Wellness Visit (AWV)  10/28/2023    Flu vaccine  Completed    Shingles vaccine  Completed    Pneumococcal 65+ years Vaccine  Completed    COVID-19 Vaccine  Completed    Hepatitis A vaccine  Aged Out    Hib vaccine  Aged Out    Meningococcal (ACWY) vaccine  Aged Out             (applicable per patient's age: Cancer Screenings, Depression Screening, Fall Risk Screening, Immunizations)    Hemoglobin A1C (%)   Date Value   10/10/2022 7.0 (H)   04/25/2022 6.3   10/25/2021 6.8 (H)     Microalb/Crt.  Ratio (mcg/mg creat)   Date Value   10/10/2022 16     LDL Cholesterol (mg/dL)   Date Value   10/10/2022 49     AST (U/L)   Date Value   10/10/2022 17     ALT (U/L)   Date Value   10/10/2022 21     BUN (mg/dL)   Date Value   10/10/2022 18      (goal A1C is < 7)   (goal LDL is <100) need 30-50% reduction from baseline     BP Readings from Last 3 Encounters:   11/07/22 116/62   10/27/22 110/68   07/06/22 122/62    (goal /80)      All Future Testing planned in [facility]:  Lab Frequency Next Occurrence   Basic Metabolic Panel Once 85/91/0890   Hemoglobin A1C Once 04/25/2023       Next Visit Date:  Future Appointments   Date Time Provider Jerrod Meier   1/16/2023 11:00 AM SCHEDULE, [facility] TIFF UROLOGY GREEN PT SCHEDULE TIFF UROLOGY [facility]   4/27/2023  1:40 PM HALEY Lucia CNP [facility] Prim Ca [facility]   5/17/2023  1:00 PM Edy Patel MD TIFF CARD [facility]   6/15/2023  1:45 PM Sarabjit Gallegos MD TIFF PULM [facility]   11/1/2023  1:40 PM HALEY Lucia - CNP Tiff Prim Ca [facility]            Patient Active Problem List:     Type 2 diabetes mellitus without complication, without long-term current use of insulin (HCC)     History of MI (myocardial infarction)     FABIAN (obstructive sleep apnea)     Other chronic pain     Bilateral hearing loss     Aneurysm of left ventricle of heart     Chronic diastolic CHF (congestive heart failure), NYHA class 3 ([facility])     Unstable angina ([facility])     History of atrial fibrillation     Hyperlipidemia     Hypertension     Sleep apnea     H/O aortic valve replacement     Hx of CABG     Coronary artery disease     Abnormal cardiovascular stress test     Chest pain     Coronary artery disease (CAD) excluded     Stable angina pectoris ([facility])     BPH with obstruction/lower urinary tract symptoms     Incomplete bladder emptying     Frequency of urination     Urgency of urination     Angina, class II ([facility])     S/P angioplasty with stent     Bleeding ulcer

## 2023-01-14 RX ORDER — RIVAROXABAN 2.5 MG/1
TABLET, FILM COATED ORAL
Qty: 60 TABLET | Refills: 0 | Status: SHIPPED | OUTPATIENT
Start: 2023-01-14

## 2023-02-15 ENCOUNTER — TELEPHONE (OUTPATIENT)
Dept: PRIMARY CARE CLINIC | Age: 78
End: 2023-02-15

## 2023-02-15 ENCOUNTER — OFFICE VISIT (OUTPATIENT)
Dept: PRIMARY CARE CLINIC | Age: 78
End: 2023-02-15

## 2023-02-15 VITALS
RESPIRATION RATE: 22 BRPM | SYSTOLIC BLOOD PRESSURE: 118 MMHG | TEMPERATURE: 97.7 F | DIASTOLIC BLOOD PRESSURE: 70 MMHG | BODY MASS INDEX: 31.62 KG/M2 | OXYGEN SATURATION: 96 % | WEIGHT: 239.7 LBS | HEART RATE: 70 BPM

## 2023-02-15 DIAGNOSIS — G89.29 CHRONIC BILATERAL LOW BACK PAIN WITHOUT SCIATICA: Primary | ICD-10-CM

## 2023-02-15 DIAGNOSIS — R53.81 PHYSICAL DECONDITIONING: ICD-10-CM

## 2023-02-15 DIAGNOSIS — M54.50 CHRONIC BILATERAL LOW BACK PAIN WITHOUT SCIATICA: Primary | ICD-10-CM

## 2023-02-15 DIAGNOSIS — F34.1 DYSTHYMIA: ICD-10-CM

## 2023-02-15 RX ORDER — BUPROPION HYDROCHLORIDE 150 MG/1
150 TABLET ORAL EVERY MORNING
Qty: 90 TABLET | Refills: 0 | Status: SHIPPED | OUTPATIENT
Start: 2023-02-15

## 2023-02-15 SDOH — ECONOMIC STABILITY: HOUSING INSECURITY
IN THE LAST 12 MONTHS, WAS THERE A TIME WHEN YOU DID NOT HAVE A STEADY PLACE TO SLEEP OR SLEPT IN A SHELTER (INCLUDING NOW)?: PATIENT REFUSED

## 2023-02-15 SDOH — ECONOMIC STABILITY: FOOD INSECURITY: WITHIN THE PAST 12 MONTHS, THE FOOD YOU BOUGHT JUST DIDN'T LAST AND YOU DIDN'T HAVE MONEY TO GET MORE.: PATIENT DECLINED

## 2023-02-15 SDOH — ECONOMIC STABILITY: FOOD INSECURITY: WITHIN THE PAST 12 MONTHS, YOU WORRIED THAT YOUR FOOD WOULD RUN OUT BEFORE YOU GOT MONEY TO BUY MORE.: PATIENT DECLINED

## 2023-02-15 SDOH — ECONOMIC STABILITY: INCOME INSECURITY: HOW HARD IS IT FOR YOU TO PAY FOR THE VERY BASICS LIKE FOOD, HOUSING, MEDICAL CARE, AND HEATING?: PATIENT DECLINED

## 2023-02-15 ASSESSMENT — PATIENT HEALTH QUESTIONNAIRE - PHQ9
2. FEELING DOWN, DEPRESSED OR HOPELESS: 0
1. LITTLE INTEREST OR PLEASURE IN DOING THINGS: 0
SUM OF ALL RESPONSES TO PHQ QUESTIONS 1-9: 0
SUM OF ALL RESPONSES TO PHQ9 QUESTIONS 1 & 2: 0
SUM OF ALL RESPONSES TO PHQ QUESTIONS 1-9: 0

## 2023-02-15 ASSESSMENT — ENCOUNTER SYMPTOMS
ABDOMINAL PAIN: 0
SORE THROAT: 0
BACK PAIN: 1
SHORTNESS OF BREATH: 0
VISUAL CHANGE: 0
NAUSEA: 0
COUGH: 0
CHANGE IN BOWEL HABIT: 0
SWOLLEN GLANDS: 0
VOMITING: 0

## 2023-02-15 NOTE — TELEPHONE ENCOUNTER
Unfortunately I do not think I can do the scooter certification. I think he would need to be seen by occupational therapy. He can check with physical therapy when he starts his program.  Thank you.

## 2023-02-15 NOTE — PATIENT INSTRUCTIONS
SURVEY:     You may be receiving a survey from [employer] regarding your visit today. Please complete the survey to enable us to provide the highest quality of care to you and your family. If you cannot score us a very good on any question, please call the office to discuss how we could have made your experience a very good one.      Thank you,    Priscila Sheehan, APRN-MG Gerardo, APRN-CNP  Hema De León, KANDICE Miles, IRENA Lindsey, IRENA Salvador, CMA  Jasmyn, ADAN Red[place], PM

## 2023-02-15 NOTE — PROGRESS NOTES
Name: Pearl Flores  : 1945         Chief Complaint:     Chief Complaint   Patient presents with    Lower Back Pain     Radiates to hips, getting worse. Sleep Problem     \"Sleeping all the time\". History of Present Illness:      Pearl Flores is a [de-identified] y.o.  male who presents with Lower Back Pain (Radiates to hips, getting worse.) and Sleep Problem (\"Sleeping all the time\". )      Samia Byrd is here today for a routine office visit. Low back pain/physical deconditioning-patient states he has been having trouble getting in and out of a chair and walking more than short distances. Patient states his legs feel weak. See below for further comments. Dysthymia/Fatigue-states he has been having some increased fatigue and sleeping a lot. He states he also has no energy or motivation. See below for further comments. Back Pain  This is a chronic problem. The current episode started more than 1 year ago. The problem occurs daily. The problem has been gradually worsening since onset. The pain is present in the lumbar spine. The quality of the pain is described as aching, cramping and shooting. The pain does not radiate. The pain is at a severity of 5/10. The pain is moderate. The pain is Worse during the day. The symptoms are aggravated by standing and position. Stiffness is present All day. Associated symptoms include weakness. Pertinent negatives include no abdominal pain, chest pain, dysuria, fever, headaches or numbness. Risk factors include sedentary lifestyle, obesity and lack of exercise. He has tried analgesics for the symptoms. The treatment provided mild relief. Fatigue  This is a recurrent problem. The current episode started more than 1 month ago. The problem occurs daily. The problem has been unchanged. Associated symptoms include fatigue and weakness.  Pertinent negatives include no abdominal pain, anorexia, arthralgias, change in bowel habit, chest pain, chills, congestion, coughing, diaphoresis, fever, headaches, joint swelling, myalgias, nausea, neck pain, numbness, rash, sore throat, swollen glands, urinary symptoms, vertigo, visual change or vomiting. Nothing aggravates the symptoms. He has tried rest and sleep for the symptoms. The treatment provided no relief.       Past Medical History:     Past Medical History:   Diagnosis Date    Chronic back pain     Coronary artery disease     s/p MI and CABG in 12/2017    H/O aortic valve replacement 12/16/2017    Bioprosthetic valve     H/O cardiac catheterization 12/06/2018    Severe three vessel disease involving the LAD, circumflex and right coronary arteries  2 of 3 bypass grafts patent Normal LVEDP.Consult to interventional cardiology for likely angioplasty and/or  stenting of the patients severe stenosis with likely stenting of the unrevascularized RCA stenosis with plans to intervene on the higher risk LIMA-LAD stenosis only at a later date if the patient fails gu    H/O three vessel coronary artery bypass 12/2017    History of atrial fibrillation 12/2017    following CABG 12/2017    History of blood transfusion     History of cardiac cath 07/15/2021    [facility]/Dr. Yoon/Left Radial     History of myocardial infarction 12/2017    Hyperlipidemia     Hypertension     MI (myocardial infarction) (HCC) 12/2017    POST-OP    Osteoarthritis     Sleep apnea     Type 2 diabetes mellitus without complication (HCC)       Reviewed all health maintenance requirements and ordered appropriate tests  There are no preventive care reminders to display for this patient.    Past Surgical History:     Past Surgical History:   Procedure Laterality Date    CARDIAC CATHETERIZATION Left 12/06/2018    Dr Yoon/[facility]/left radial-Severe three vessel disease involving the LAD and circumflex coronary arteries with moderate to severe disease in the proximal right coronary arteries. 2 of 3 bypass grafts patent with a known to be occluded SVG-RCA  and a 80-90% stenosis in the touchdown of the LIMA-LAD. CARDIAC CATHETERIZATION  12/06/2018    cardiac catheterization on 12/6/2018 showed a 75% stenosis in RCA as well as a 70% stenosis in his Circumflex    COLONOSCOPY      CORONARY ANGIOPLASTY WITH STENT PLACEMENT  07/16/2021    CORONARY ARTERY BYPASS GRAFT  12/2017    EYE SURGERY Bilateral     cataracts    INTRACAPSULAR CATARACT EXTRACTION      KNEE ARTHROPLASTY      SHOULDER ARTHROPLASTY          Medications:       Prior to Admission medications    Medication Sig Start Date End Date Taking?  Authorizing Provider   buPROPion (WELLBUTRIN XL) 150 MG extended release tablet Take 1 tablet by mouth every morning 2/15/23  Yes HALEY Ruff CNP   rivaroxaban (XARELTO) 2.5 MG TABS tablet TAKE 1 TABLET BY MOUTH TWICE A DAY 1/14/23  Yes Tasha Smas MD   blood glucose test strips (TRUE METRIX BLOOD GLUCOSE TEST) strip USE ONCE DAILY 1/13/23  Yes HALEY Ruff CNP   clopidogrel (PLAVIX) 75 MG tablet  1/11/23  Yes Tasha Sams MD   metoprolol succinate (TOPROL XL) 100 MG extended release tablet Take 1 tablet by mouth daily 1/12/23  Yes Tasha Sams MD   lisinopril (PRINIVIL;ZESTRIL) 5 MG tablet Take 1 tablet by mouth daily 1/12/23  Yes Tasha Sams MD   isosorbide mononitrate (IMDUR) 30 MG extended release tablet Take 1 tablet by mouth daily 1/12/23  Yes Tasha Sams MD   atorvastatin (LIPITOR) 40 MG tablet Take 1 tablet by mouth nightly 1/12/23  Yes Tasha Sams MD   famotidine (PEPCID) 40 MG tablet TAKE 1 TABLET DAILY 1/11/23  Yes HALEY Ruff CNP   tamsulosin (FLOMAX) 0.4 MG capsule TAKE 1 CAPSULE BY MOUTH TWICE A DAY 1/11/23  Yes HALEY Ruff CNP   [employer] Lancets Micro Thin 33G MISC For once a day testing E11.9 5/19/22  Yes HALEY Ruff CNP   metFORMIN (GLUCOPHAGE) 1000 MG tablet TAKE 1 TABLET TWICE A DAY WITH MEALS 1/17/22  Yes HALEY Ruff CNP   blood glucose monitor strips 1 strip by Other route daily Tung Metrix test strip. E11.9 6/12/19  Yes HALEY Sultana CNP   gabapentin (NEURONTIN) 400 MG capsule Take 400 mg by mouth 3 times daily. 2/14/19  Yes Julio Hargrove MD   aspirin 81 MG tablet Take 81 mg by mouth Daily with lunch    Yes Historical Provider, MD   melatonin 5 MG TABS tablet Take 5 mg by mouth as needed    Yes Historical Provider, MD   Multiple Vitamins-Minerals (CENTRUM SILVER PO) Take 1 tablet by mouth daily    Yes Historical Provider, MD   traMADol (ULTRAM) 50 MG tablet Take 50 mg by mouth every 6 hours as needed for Pain. .   Yes Historical Provider, MD   nitroGLYCERIN (NITROSTAT) 0.4 MG SL tablet Place 1 tablet under the tongue every 5 minutes as needed for Chest pain up to max of 3 total doses. If no relief after 1 dose, call 911. Patient not taking: Reported on 2/15/2023 11/7/22   Chris Boykin MD   Scooter MISC by Does not apply route  Patient not taking: Reported on 2/15/2023 10/27/22   HALEY Sultana CNP        Allergies:       Patient has no known allergies. Social History:     Tobacco:    reports that he has never smoked. He has never used smokeless tobacco.  Alcohol:      reports current alcohol use of about 1.0 standard drink per week. Drug Use:  reports no history of drug use. Family History:     Family History   Adopted: Yes       Review of Systems:     Positive and Negative as described in HPI    Review of Systems   Constitutional:  Positive for fatigue. Negative for chills, diaphoresis and fever. HENT:  Negative for congestion and sore throat. Respiratory:  Negative for cough and shortness of breath. Cardiovascular:  Negative for chest pain and palpitations. Gastrointestinal:  Negative for abdominal pain, anorexia, change in bowel habit, nausea and vomiting. Endocrine: Negative for polydipsia, polyphagia and polyuria. Genitourinary:  Negative for dysuria. Musculoskeletal:  Positive for back pain and gait problem.  Negative for arthralgias, joint swelling, myalgias and neck pain. Skin:  Negative for rash. Neurological:  Positive for weakness. Negative for dizziness, vertigo, tremors, seizures, numbness and headaches. Psychiatric/Behavioral:  Positive for dysphoric mood and sleep disturbance. Negative for agitation, behavioral problems, confusion, decreased concentration, hallucinations, self-injury and suicidal ideas. The patient is not nervous/anxious and is not hyperactive. Physical Exam:   Vitals:  /70 (Position: Sitting)   Pulse 70   Temp 97.7 °F (36.5 °C) (Temporal)   Resp 22   Wt 239 lb 11.2 oz (108.7 kg)   SpO2 96%   BMI 31.62 kg/m²     Physical Exam  Vitals and nursing note reviewed. Constitutional:       General: He is not in acute distress. Appearance: Normal appearance. He is well-developed. He is obese. He is not ill-appearing. HENT:      Mouth/Throat:      Mouth: Mucous membranes are moist.   Eyes:      General: No scleral icterus. Conjunctiva/sclera: Conjunctivae normal.   Neck:      Vascular: No carotid bruit. Cardiovascular:      Rate and Rhythm: Normal rate and regular rhythm. Heart sounds: Murmur heard. Pulmonary:      Effort: Pulmonary effort is normal.      Breath sounds: Normal breath sounds. No wheezing or rales. Abdominal:      General: Bowel sounds are normal. There is no distension. Palpations: Abdomen is soft. Tenderness: There is no abdominal tenderness. Musculoskeletal:      Cervical back: Normal range of motion and neck supple. Lumbar back: Tenderness present. Decreased range of motion. Right lower leg: Edema (trace pitting) present. Left lower leg: Edema (trace pitting) present. Legs:       Comments: Antalgic gait noted, ambulates with assistance of cane   Skin:     General: Skin is warm and dry. Findings: No rash. Neurological:      Mental Status: He is alert and oriented to person, place, and time.    Psychiatric:         Attention and Perception: Attention normal.         Mood and Affect: Mood is depressed (mild). Mood is not anxious. Speech: Speech normal.         Behavior: Behavior normal. Behavior is cooperative. Thought Content: Thought content normal. Thought content does not include homicidal or suicidal ideation. Thought content does not include homicidal or suicidal plan. Cognition and Memory: Cognition normal.         Judgment: Judgment normal.       Data:     Lab Results   Component Value Date/Time     10/10/2022 03:07 PM    K 4.7 10/10/2022 03:07 PM     10/10/2022 03:07 PM    CO2 27 10/10/2022 03:07 PM    BUN 18 10/10/2022 03:07 PM    CREATININE 0.82 10/10/2022 03:07 PM    GLUCOSE 137 10/10/2022 03:07 PM    PROT 7.2 12/16/2019 05:15 AM    LABALBU 3.8 12/16/2019 05:15 AM    BILITOT 0.62 12/16/2019 05:15 AM    ALKPHOS 72 12/16/2019 05:15 AM    AST 17 10/10/2022 03:07 PM    ALT 21 10/10/2022 03:07 PM     Lab Results   Component Value Date/Time    WBC 6.2 10/10/2022 03:07 PM    RBC 3.77 10/10/2022 03:07 PM    HGB 13.1 10/10/2022 03:07 PM    HCT 40.1 10/10/2022 03:07 PM    .4 10/10/2022 03:07 PM    MCH 34.7 10/10/2022 03:07 PM    MCHC 32.7 10/10/2022 03:07 PM    RDW 13.5 10/10/2022 03:07 PM     10/10/2022 03:07 PM    MPV 10.0 10/10/2022 03:07 PM     Lab Results   Component Value Date/Time    TSH 1.65 09/06/2018 08:28 AM     Lab Results   Component Value Date/Time    CHOL 100 10/10/2022 03:07 PM    HDL 41 10/10/2022 03:07 PM    LABA1C 7.0 10/10/2022 03:07 PM       Assessment/Plan:      Diagnosis Orders   1. Chronic bilateral low back pain without sciatica  [facility] [place]      2. Physical deconditioning  [facility] [place]      3. Dysthymia  buPROPion (WELLBUTRIN XL) 150 MG extended release tablet        We will order physical therapy for deconditioning and back pain. We will follow with results. Start bupropion 150 mg XL daily.   We will see him back in a month to assess efficacy. Sooner if any issues. 1.  Rafael Amos received counseling on the following healthy behaviors: nutrition, exercise, and medication adherence  2. Patient given educational materials - see patient instructions  3. Was a self-tracking handout given in paper form or via [employer]? No  If yes, see orders or list here. 4.  Discussed use, benefit, and side effects of prescribed medications. Barriers to medication compliance addressed. All patient questions answered. Pt voiced understanding. 5.  Reviewed prior labs and health maintenance  6. Continue current medications, diet and exercise. Completed Refills   Requested Prescriptions     Signed Prescriptions Disp Refills    buPROPion (WELLBUTRIN XL) 150 MG extended release tablet 90 tablet 0     Sig: Take 1 tablet by mouth every morning         No follow-ups on file.

## 2023-02-15 NOTE — TELEPHONE ENCOUNTER
Danella Duverney from Medical services called as Daphne Villegas had stopped by store for a scooter. A prescription was sent in October that required the OV notes from 10/27/22 to be addended. If you do not want to addend those notes, Danella Duverney suggests using the scooter documentation guidelines for today's OV notes. Also a new prescription for the scooter would be required. Please advise, thank you!

## 2023-04-20 ENCOUNTER — TELEPHONE (OUTPATIENT)
Dept: PRIMARY CARE CLINIC | Age: 78
End: 2023-04-20

## 2023-04-27 ENCOUNTER — OFFICE VISIT (OUTPATIENT)
Dept: PRIMARY CARE CLINIC | Age: 78
End: 2023-04-27
Payer: MEDICARE

## 2023-04-27 VITALS
WEIGHT: 235.2 LBS | BODY MASS INDEX: 31.03 KG/M2 | HEART RATE: 78 BPM | TEMPERATURE: 98.7 F | SYSTOLIC BLOOD PRESSURE: 122 MMHG | RESPIRATION RATE: 20 BRPM | DIASTOLIC BLOOD PRESSURE: 70 MMHG | OXYGEN SATURATION: 95 %

## 2023-04-27 DIAGNOSIS — R41.3 MEMORY DEFICIT: ICD-10-CM

## 2023-04-27 DIAGNOSIS — E78.2 MIXED HYPERLIPIDEMIA: ICD-10-CM

## 2023-04-27 DIAGNOSIS — M25.371 RIGHT ANKLE INSTABILITY: ICD-10-CM

## 2023-04-27 DIAGNOSIS — E11.9 TYPE 2 DIABETES MELLITUS WITHOUT COMPLICATION, WITHOUT LONG-TERM CURRENT USE OF INSULIN (HCC): Primary | Chronic | ICD-10-CM

## 2023-04-27 DIAGNOSIS — Z74.1 REQUIRES DAILY ASSISTANCE FOR ACTIVITIES OF DAILY LIVING (ADL) AND COMFORT NEEDS: ICD-10-CM

## 2023-04-27 LAB — HBA1C MFR BLD: 6.8 %

## 2023-04-27 PROCEDURE — 3044F HG A1C LEVEL LT 7.0%: CPT | Performed by: NURSE PRACTITIONER

## 2023-04-27 PROCEDURE — 3078F DIAST BP <80 MM HG: CPT | Performed by: NURSE PRACTITIONER

## 2023-04-27 PROCEDURE — 99214 OFFICE O/P EST MOD 30 MIN: CPT | Performed by: NURSE PRACTITIONER

## 2023-04-27 PROCEDURE — G8417 CALC BMI ABV UP PARAM F/U: HCPCS | Performed by: NURSE PRACTITIONER

## 2023-04-27 PROCEDURE — 3074F SYST BP LT 130 MM HG: CPT | Performed by: NURSE PRACTITIONER

## 2023-04-27 PROCEDURE — 83036 HEMOGLOBIN GLYCOSYLATED A1C: CPT | Performed by: NURSE PRACTITIONER

## 2023-04-27 PROCEDURE — 1036F TOBACCO NON-USER: CPT | Performed by: NURSE PRACTITIONER

## 2023-04-27 PROCEDURE — 1123F ACP DISCUSS/DSCN MKR DOCD: CPT | Performed by: NURSE PRACTITIONER

## 2023-04-27 PROCEDURE — G8427 DOCREV CUR MEDS BY ELIG CLIN: HCPCS | Performed by: NURSE PRACTITIONER

## 2023-04-27 RX ORDER — BENZALKONIUM CHLORIDE .1%-1X3.25
1 ADHESIVE PATCH, MEDICATED TOPICAL DAILY
Qty: 1 EACH | Refills: 0 | Status: SHIPPED | OUTPATIENT
Start: 2023-04-27

## 2023-04-27 SDOH — ECONOMIC STABILITY: FOOD INSECURITY: WITHIN THE PAST 12 MONTHS, THE FOOD YOU BOUGHT JUST DIDN'T LAST AND YOU DIDN'T HAVE MONEY TO GET MORE.: PATIENT DECLINED

## 2023-04-27 SDOH — ECONOMIC STABILITY: INCOME INSECURITY: HOW HARD IS IT FOR YOU TO PAY FOR THE VERY BASICS LIKE FOOD, HOUSING, MEDICAL CARE, AND HEATING?: PATIENT DECLINED

## 2023-04-27 SDOH — ECONOMIC STABILITY: FOOD INSECURITY: WITHIN THE PAST 12 MONTHS, YOU WORRIED THAT YOUR FOOD WOULD RUN OUT BEFORE YOU GOT MONEY TO BUY MORE.: PATIENT DECLINED

## 2023-04-27 ASSESSMENT — PATIENT HEALTH QUESTIONNAIRE - PHQ9
2. FEELING DOWN, DEPRESSED OR HOPELESS: 0
10. IF YOU CHECKED OFF ANY PROBLEMS, HOW DIFFICULT HAVE THESE PROBLEMS MADE IT FOR YOU TO DO YOUR WORK, TAKE CARE OF THINGS AT HOME, OR GET ALONG WITH OTHER PEOPLE: 0
SUM OF ALL RESPONSES TO PHQ QUESTIONS 1-9: 0
7. TROUBLE CONCENTRATING ON THINGS, SUCH AS READING THE NEWSPAPER OR WATCHING TELEVISION: 0
5. POOR APPETITE OR OVEREATING: 0
SUM OF ALL RESPONSES TO PHQ9 QUESTIONS 1 & 2: 0
4. FEELING TIRED OR HAVING LITTLE ENERGY: 0
SUM OF ALL RESPONSES TO PHQ QUESTIONS 1-9: 0
1. LITTLE INTEREST OR PLEASURE IN DOING THINGS: 0
3. TROUBLE FALLING OR STAYING ASLEEP: 0
9. THOUGHTS THAT YOU WOULD BE BETTER OFF DEAD, OR OF HURTING YOURSELF: 0
SUM OF ALL RESPONSES TO PHQ QUESTIONS 1-9: 0
SUM OF ALL RESPONSES TO PHQ QUESTIONS 1-9: 0
6. FEELING BAD ABOUT YOURSELF - OR THAT YOU ARE A FAILURE OR HAVE LET YOURSELF OR YOUR FAMILY DOWN: 0
8. MOVING OR SPEAKING SO SLOWLY THAT OTHER PEOPLE COULD HAVE NOTICED. OR THE OPPOSITE, BEING SO FIGETY OR RESTLESS THAT YOU HAVE BEEN MOVING AROUND A LOT MORE THAN USUAL: 0

## 2023-04-27 ASSESSMENT — ENCOUNTER SYMPTOMS
SHORTNESS OF BREATH: 0
ABDOMINAL PAIN: 0
SORE THROAT: 0
RHINORRHEA: 0
CONSTIPATION: 0
WHEEZING: 0
COUGH: 0
NAUSEA: 0
DIARRHEA: 0
VOMITING: 0

## 2023-04-27 NOTE — PROGRESS NOTES
blood glucose trend. His breakfast blood glucose range is generally 130-140 mg/dl. An ACE inhibitor/angiotensin II receptor blocker is not being taken. He does not see a podiatrist.Eye exam is not current. Hyperlipidemia  This is a chronic problem. The current episode started more than 1 year ago. The problem is controlled. Recent lipid tests were reviewed and are normal. Exacerbating diseases include diabetes and obesity. He has no history of hypothyroidism. Factors aggravating his hyperlipidemia include beta blockers. Pertinent negatives include no chest pain, focal weakness, leg pain, myalgias or shortness of breath. Current antihyperlipidemic treatment includes statins. The current treatment provides moderate improvement of lipids. Compliance problems include adherence to exercise and adherence to diet. Risk factors for coronary artery disease include diabetes mellitus, dyslipidemia, family history, obesity, hypertension, male sex and a sedentary lifestyle. Ankle Pain   The incident occurred more than 1 week ago. The incident occurred at home. The injury mechanism is unknown. The pain is present in the right ankle. The quality of the pain is described as aching. The pain is at a severity of 2/10. The pain is mild. The pain has been Intermittent since onset. Associated symptoms include a loss of motion. Pertinent negatives include no inability to bear weight, loss of sensation, muscle weakness, numbness or tingling. He reports no foreign bodies present. The symptoms are aggravated by weight bearing. He has tried rest for the symptoms. The treatment provided mild relief.        Past Medical History:     Past Medical History:   Diagnosis Date    Chronic back pain     Coronary artery disease     s/p MI and CABG in 12/2017    H/O aortic valve replacement 12/16/2017    Bioprosthetic valve     H/O cardiac catheterization 12/06/2018    Severe three vessel disease involving the LAD, circumflex and right coronary

## 2023-04-27 NOTE — PATIENT INSTRUCTIONS
SURVEY:     You may be receiving a survey from [employer] regarding your visit today. Please complete the survey to enable us to provide the highest quality of care to you and your family. If you cannot score us a very good on any question, please call the office to discuss how we could have made your experience a very good one.      Thank you,    Horace Chase, APRN-CNP  Eric Morales, APRN-CNP  [place]my [place], KANDICE Becerra, IRENA Spivey, IRENA Salvador, CMA  Jasmyn, PCA  Miladis, PM

## 2023-05-01 NOTE — PROGRESS NOTES
Random bladderscan performed in office today:  Pt last voided 15 mins ago, scan = 207 mL DISPLAY PLAN FREE TEXT

## 2023-05-02 RX ORDER — GLUCOSAM/CHON-MSM1/C/MANG/BOSW 500-416.6
TABLET ORAL
Qty: 100 EACH | Refills: 3 | Status: SHIPPED | OUTPATIENT
Start: 2023-05-02

## 2023-05-02 NOTE — TELEPHONE ENCOUNTER
Health Maintenance   Topic Date Due    DTaP/Tdap/Td vaccine (1 - Tdap) 02/15/2024 (Originally 2/5/1964)    Lipids  10/10/2023    Annual Wellness Visit (AWV)  10/28/2023    Depression Monitoring  04/27/2024    Flu vaccine  Completed    Shingles vaccine  Completed    Pneumococcal 65+ years Vaccine  Completed    COVID-19 Vaccine  Completed    Hepatitis A vaccine  Aged Out    Hib vaccine  Aged Out    Meningococcal (ACWY) vaccine  Aged Out    Depression Screen  Discontinued             (applicable per patient's age: Cancer Screenings, Depression Screening, Fall Risk Screening, Immunizations)    Hemoglobin A1C (%)   Date Value   04/27/2023 6.8   10/10/2022 7.0 (H)   04/25/2022 6.3     Microalb/Crt.  Ratio (mcg/mg creat)   Date Value   10/10/2022 16     LDL Cholesterol (mg/dL)   Date Value   10/10/2022 49     AST (U/L)   Date Value   10/10/2022 17     ALT (U/L)   Date Value   10/10/2022 21     BUN (mg/dL)   Date Value   10/10/2022 18      (goal A1C is < 7)   (goal LDL is <100) need 30-50% reduction from baseline     BP Readings from Last 3 Encounters:   04/27/23 122/70   02/15/23 118/70   11/07/22 116/62    (goal /80)      All Future Testing planned in [facility]:  Lab Frequency Next Occurrence   Basic Metabolic Panel Once 76/07/9187   Hemoglobin A1C Once 04/25/2023   CBC with Auto Differential Once 13/26/3863   Basic Metabolic Panel Once 37/01/1771   Urinalysis with Reflex to Culture Once 04/27/2023   CBC with Auto Differential Once 10/24/2023   ALT Once 10/27/2023   AST Once 15/03/3300   Basic Metabolic Panel Once 66/84/7776   Hemoglobin A1C Once 10/24/2023   Lipid Panel Once 10/24/2023   Microalbumin, Ur Once 10/24/2023       Next Visit Date:  Future Appointments   Date Time Provider Jerrod Meier   5/17/2023  1:00 PM Srinivas Moore MD TIFF CARD [facility]   6/15/2023  1:45 PM [facility]   7/17/2023 11:20 AM Javed Last MD TIFF [facility] Neurology -   11/1/2023  1:40 PM HALEY Krishnan

## 2023-05-14 DIAGNOSIS — F34.1 DYSTHYMIA: ICD-10-CM

## 2023-05-15 DIAGNOSIS — F34.1 DYSTHYMIA: ICD-10-CM

## 2023-05-15 DIAGNOSIS — E11.9 TYPE 2 DIABETES MELLITUS WITHOUT COMPLICATION, WITHOUT LONG-TERM CURRENT USE OF INSULIN (HCC): Chronic | ICD-10-CM

## 2023-05-15 RX ORDER — BUPROPION HYDROCHLORIDE 150 MG/1
TABLET ORAL
Qty: 90 TABLET | Refills: 0 | Status: SHIPPED | OUTPATIENT
Start: 2023-05-15 | End: 2023-05-15 | Stop reason: SDUPTHER

## 2023-05-15 RX ORDER — BUPROPION HYDROCHLORIDE 150 MG/1
150 TABLET ORAL EVERY MORNING
Qty: 90 TABLET | Refills: 1 | Status: SHIPPED | OUTPATIENT
Start: 2023-05-15

## 2023-05-15 NOTE — TELEPHONE ENCOUNTER
Using mailorder now. Health Maintenance   Topic Date Due    DTaP/Tdap/Td vaccine (1 - Tdap) 02/15/2024 (Originally 2/5/1964)    Lipids  10/10/2023    Annual Wellness Visit (AWV)  10/28/2023    Depression Monitoring  04/27/2024    Flu vaccine  Completed    Shingles vaccine  Completed    Pneumococcal 65+ years Vaccine  Completed    COVID-19 Vaccine  Completed    Hepatitis A vaccine  Aged Out    Hib vaccine  Aged Out    Meningococcal (ACWY) vaccine  Aged Out    Depression Screen  Discontinued             (applicable per patient's age: Cancer Screenings, Depression Screening, Fall Risk Screening, Immunizations)    Hemoglobin A1C (%)   Date Value   04/27/2023 6.8   10/10/2022 7.0 (H)   04/25/2022 6.3     Microalb/Crt.  Ratio (mcg/mg creat)   Date Value   10/10/2022 16     LDL Cholesterol (mg/dL)   Date Value   10/10/2022 49     AST (U/L)   Date Value   10/10/2022 17     ALT (U/L)   Date Value   10/10/2022 21     BUN (mg/dL)   Date Value   10/10/2022 18      (goal A1C is < 7)   (goal LDL is <100) need 30-50% reduction from baseline     BP Readings from Last 3 Encounters:   04/27/23 122/70   02/15/23 118/70   11/07/22 116/62    (goal /80)      All Future Testing planned in [facility]:  Lab Frequency Next Occurrence   Basic Metabolic Panel Once 38/77/3978   Hemoglobin A1C Once 04/25/2023   CBC with Auto Differential Once 46/08/4525   Basic Metabolic Panel Once 73/96/1445   Urinalysis with Reflex to Culture Once 04/27/2023   CBC with Auto Differential Once 10/24/2023   ALT Once 10/27/2023   AST Once 24/62/0974   Basic Metabolic Panel Once 95/30/8320   Hemoglobin A1C Once 10/24/2023   Lipid Panel Once 10/24/2023   Microalbumin, Ur Once 10/24/2023       Next Visit Date:  Future Appointments   Date Time Provider Jerrod Meier   5/17/2023  1:00 PM Kvng Orlando MD TIFF CARD [facility]   6/15/2023  1:45 PM [facility]   7/17/2023 11:20 AM Georgiana Marks MD [address] [facility] [facility] Neurology

## 2023-05-15 NOTE — TELEPHONE ENCOUNTER
Health Maintenance   Topic Date Due    DTaP/Tdap/Td vaccine (1 - Tdap) 02/15/2024 (Originally 2/5/1964)    Lipids  10/10/2023    Annual Wellness Visit (AWV)  10/28/2023    Depression Monitoring  04/27/2024    Flu vaccine  Completed    Shingles vaccine  Completed    Pneumococcal 65+ years Vaccine  Completed    COVID-19 Vaccine  Completed    Hepatitis A vaccine  Aged Out    Hib vaccine  Aged Out    Meningococcal (ACWY) vaccine  Aged Out    Depression Screen  Discontinued             (applicable per patient's age: Cancer Screenings, Depression Screening, Fall Risk Screening, Immunizations)    Hemoglobin A1C (%)   Date Value   04/27/2023 6.8   10/10/2022 7.0 (H)   04/25/2022 6.3     Microalb/Crt.  Ratio (mcg/mg creat)   Date Value   10/10/2022 16     LDL Cholesterol (mg/dL)   Date Value   10/10/2022 49     AST (U/L)   Date Value   10/10/2022 17     ALT (U/L)   Date Value   10/10/2022 21     BUN (mg/dL)   Date Value   10/10/2022 18      (goal A1C is < 7)   (goal LDL is <100) need 30-50% reduction from baseline     BP Readings from Last 3 Encounters:   04/27/23 122/70   02/15/23 118/70   11/07/22 116/62    (goal /80)      All Future Testing planned in [facility]:  Lab Frequency Next Occurrence   Basic Metabolic Panel Once 84/74/7749   Hemoglobin A1C Once 04/25/2023   CBC with Auto Differential Once 42/14/1244   Basic Metabolic Panel Once 46/63/5449   Urinalysis with Reflex to Culture Once 04/27/2023   CBC with Auto Differential Once 10/24/2023   ALT Once 10/27/2023   AST Once 14/94/7057   Basic Metabolic Panel Once 31/58/1002   Hemoglobin A1C Once 10/24/2023   Lipid Panel Once 10/24/2023   Microalbumin, Ur Once 10/24/2023       Next Visit Date:  Future Appointments   Date Time Provider Jerrod Meier   5/17/2023  1:00 PM Srinivas Fowler MD TIFF [facility]ARD [facility]   6/15/2023  1:45 PM Melisa Roberts MD TIFF P[facility]   7/17/2023 11:20 AM Neha Flanagan MD TIFF [address] [facility] Neurology -   11/1/2023  1:40 PM Ulisses Chase APRN

## 2023-05-17 ENCOUNTER — HOSPITAL ENCOUNTER (OUTPATIENT)
Dept: NON INVASIVE DIAGNOSTICS | Age: 78
Discharge: HOME | End: 2023-05-17
Payer: MEDICARE

## 2023-05-17 ENCOUNTER — OFFICE VISIT (OUTPATIENT)
Dept: CARDIOLOGY | Age: 78
End: 2023-05-17
Payer: MEDICARE

## 2023-05-17 ENCOUNTER — HOSPITAL ENCOUNTER (OUTPATIENT)
Age: 78
Discharge: HOME | End: 2023-05-17
Payer: MEDICARE

## 2023-05-17 VITALS
OXYGEN SATURATION: 95 % | RESPIRATION RATE: 18 BRPM | HEIGHT: 73 IN | HEART RATE: 71 BPM | WEIGHT: 232 LBS | SYSTOLIC BLOOD PRESSURE: 110 MMHG | DIASTOLIC BLOOD PRESSURE: 57 MMHG | BODY MASS INDEX: 30.75 KG/M2

## 2023-05-17 DIAGNOSIS — R29.6 RECURRENT FALLS: ICD-10-CM

## 2023-05-17 DIAGNOSIS — I25.810 CORONARY ARTERY DISEASE INVOLVING CORONARY BYPASS GRAFT OF NATIVE HEART WITHOUT ANGINA PECTORIS: ICD-10-CM

## 2023-05-17 DIAGNOSIS — I20.8 CHRONIC STABLE ANGINA (HCC): ICD-10-CM

## 2023-05-17 DIAGNOSIS — Z95.1 S/P CABG X 3: ICD-10-CM

## 2023-05-17 DIAGNOSIS — Z95.2 H/O AORTIC VALVE REPLACEMENT: Chronic | ICD-10-CM

## 2023-05-17 DIAGNOSIS — R42 LIGHTHEADED: ICD-10-CM

## 2023-05-17 DIAGNOSIS — I10 ESSENTIAL HYPERTENSION: ICD-10-CM

## 2023-05-17 DIAGNOSIS — R41.3 MEMORY DEFICIT: ICD-10-CM

## 2023-05-17 DIAGNOSIS — I35.0 SEVERE AORTIC STENOSIS: ICD-10-CM

## 2023-05-17 DIAGNOSIS — I50.32 CHRONIC DIASTOLIC CONGESTIVE HEART FAILURE (HCC): ICD-10-CM

## 2023-05-17 DIAGNOSIS — I25.2 HISTORY OF MI (MYOCARDIAL INFARCTION): ICD-10-CM

## 2023-05-17 DIAGNOSIS — E11.9 TYPE 2 DIABETES MELLITUS WITHOUT COMPLICATION, WITHOUT LONG-TERM CURRENT USE OF INSULIN (HCC): Chronic | ICD-10-CM

## 2023-05-17 LAB
ANION GAP SERPL CALCULATED.3IONS-SCNC: 9 MMOL/L (ref 9–17)
BACTERIA URNS QL MICRO: ABNORMAL
BASOPHILS # BLD: 0.05 K/UL (ref 0–0.2)
BASOPHILS NFR BLD: 1 % (ref 0–2)
BILIRUB UR QL STRIP: NEGATIVE
BUN SERPL-MCNC: 16 MG/DL (ref 8–23)
BUN/CREAT SERPL: 17 (ref 9–20)
CALCIUM SERPL-MCNC: 10.2 MG/DL (ref 8.6–10.4)
CHLORIDE SERPL-SCNC: 106 MMOL/L (ref 98–107)
CLARITY UR: ABNORMAL
CO2 SERPL-SCNC: 27 MMOL/L (ref 20–31)
COLOR UR: YELLOW
CREAT SERPL-MCNC: 0.94 MG/DL (ref 0.7–1.2)
EOSINOPHIL # BLD: 0.26 K/UL (ref 0–0.44)
EOSINOPHILS RELATIVE PERCENT: 5 % (ref 1–4)
EPI CELLS #/AREA URNS HPF: ABNORMAL /HPF (ref 0–5)
ERYTHROCYTE [DISTWIDTH] IN BLOOD BY AUTOMATED COUNT: 14.3 % (ref 11.8–14.4)
GFR SERPL CREATININE-BSD FRML MDRD: >60 ML/MIN/1.73M2
GLUCOSE SERPL-MCNC: 118 MG/DL (ref 70–99)
GLUCOSE UR STRIP.AUTO-MCNC: NEGATIVE MG/DL
HCT VFR BLD AUTO: 39 % (ref 40.7–50.3)
HGB BLD-MCNC: 12.8 G/DL (ref 13–17)
HGB UR QL STRIP.AUTO: ABNORMAL
IMM GRANULOCYTES # BLD AUTO: <0.03 K/UL (ref 0–0.3)
IMM GRANULOCYTES NFR BLD: 0 %
KETONES UR STRIP.AUTO-MCNC: NEGATIVE MG/DL
LEUKOCYTE ESTERASE UR QL STRIP: ABNORMAL
LYMPHOCYTES # BLD: 46 % (ref 24–43)
LYMPHOCYTES NFR BLD: 2.55 K/UL (ref 1.1–3.7)
MCH RBC QN AUTO: 34.4 PG (ref 25.2–33.5)
MCHC RBC AUTO-ENTMCNC: 32.8 G/DL (ref 28.4–34.8)
MCV RBC AUTO: 104.8 FL (ref 82.6–102.9)
MONOCYTES NFR BLD: 0.64 K/UL (ref 0.1–1.2)
MONOCYTES NFR BLD: 11 % (ref 3–12)
NEUTROPHILS NFR BLD: 37 % (ref 36–65)
NEUTS SEG NFR BLD: 2.1 K/UL (ref 1.5–8.1)
NITRITE UR QL STRIP: POSITIVE
NRBC AUTOMATED: 0.4 PER 100 WBC
PLATELET # BLD AUTO: 193 K/UL (ref 138–453)
PMV BLD AUTO: 9.9 FL (ref 8.1–13.5)
POTASSIUM SERPL-SCNC: 4.9 MMOL/L (ref 3.7–5.3)
PROT UR STRIP-MCNC: ABNORMAL MG/DL
PROT UR STRIP.AUTO-MCNC: 6 MG/DL (ref 5–9)
RBC # BLD AUTO: 3.72 M/UL (ref 4.21–5.77)
RBC #/AREA URNS HPF: ABNORMAL /HPF (ref 0–2)
RENAL EPITHELIAL, UA: ABNORMAL /HPF
SODIUM SERPL-SCNC: 142 MMOL/L (ref 135–144)
SP GR UR STRIP.AUTO: 1.02 (ref 1.01–1.02)
UROBILINOGEN UR STRIP-ACNC: NORMAL
WBC #/AREA URNS HPF: ABNORMAL /HPF (ref 0–5)
WBC OTHER # BLD: 5.6 K/UL (ref 3.5–11.3)

## 2023-05-17 PROCEDURE — G8427 DOCREV CUR MEDS BY ELIG CLIN: HCPCS | Performed by: PHYSICIAN ASSISTANT

## 2023-05-17 PROCEDURE — G8417 CALC BMI ABV UP PARAM F/U: HCPCS | Performed by: PHYSICIAN ASSISTANT

## 2023-05-17 PROCEDURE — 99214 OFFICE O/P EST MOD 30 MIN: CPT | Performed by: PHYSICIAN ASSISTANT

## 2023-05-17 PROCEDURE — 85025 COMPLETE CBC W/AUTO DIFF WBC: CPT

## 2023-05-17 PROCEDURE — 36415 COLL VENOUS BLD VENIPUNCTURE: CPT

## 2023-05-17 PROCEDURE — 3078F DIAST BP <80 MM HG: CPT | Performed by: PHYSICIAN ASSISTANT

## 2023-05-17 PROCEDURE — 87186 SC STD MICRODIL/AGAR DIL: CPT

## 2023-05-17 PROCEDURE — 87077 CULTURE AEROBIC IDENTIFY: CPT

## 2023-05-17 PROCEDURE — 1123F ACP DISCUSS/DSCN MKR DOCD: CPT | Performed by: PHYSICIAN ASSISTANT

## 2023-05-17 PROCEDURE — 1036F TOBACCO NON-USER: CPT | Performed by: PHYSICIAN ASSISTANT

## 2023-05-17 PROCEDURE — 83036 HEMOGLOBIN GLYCOSYLATED A1C: CPT

## 2023-05-17 PROCEDURE — 93243 EXT ECG>48HR<7D SCAN A/R: CPT

## 2023-05-17 PROCEDURE — 87086 URINE CULTURE/COLONY COUNT: CPT

## 2023-05-17 PROCEDURE — 3074F SYST BP LT 130 MM HG: CPT | Performed by: PHYSICIAN ASSISTANT

## 2023-05-17 PROCEDURE — 86403 PARTICLE AGGLUT ANTBDY SCRN: CPT

## 2023-05-17 PROCEDURE — 93242 EXT ECG>48HR<7D RECORDING: CPT

## 2023-05-17 PROCEDURE — 81001 URINALYSIS AUTO W/SCOPE: CPT

## 2023-05-17 PROCEDURE — 80048 BASIC METABOLIC PNL TOTAL CA: CPT

## 2023-05-17 NOTE — PROGRESS NOTES
Nayla Mccann am scribing for and in the presence of Alexander Vu [place]    Patient: Babita Cesar  : 1945  Date of Visit: May 17, 2023    REASON FOR VISIT / CONSULTATION: 6 Month Follow-Up (Patient in office for 6 month follow up. Patient states he is doing okay. Denies CP, SOB, Palpitations, Dizziness, lightheadedness. )      Dear HALEY Kelly CNP,    I had the pleasure of seeing Babita Cesar in my office today. Mr. Emerald Reeves is a [de-identified] y.o. male with a history of atherosclerotic heart disease. In 2017 Mr. Emerald Reeves had went into surgery for his shoulder and postoperatively had a myocardial infarction which led to a triple vessel coronary artery bypass as well as a aortic valve replacement  due to severe aortic stenosis. He was found to have postoperative atrial fibrillation which led to him starting on Amiodarone but had been stopped six weeks after due to no reoccurrence of atrial fibrillation. He had a ulcer in his small intenstine which had caused internal bleeding but has since been resolved and is taking pepcid for this. He was continued on Eliquis but had recently been stopped since there has been no evidence of atrial fibrillation including on his recent holter monitor. His cardiac catheterization on 2018 showed a 75% stenosis in RCA as well as a 70% stenosis in his Circumflex, but when he was sent down to [facility] for possible stenting treatment, however Dr. Vic Zuniga performed an FFR of the lesion which showed that the stenosis was about 60 percent blockage and therefore not likely to benefit from stenting. Although there was also a severe stenosis in the junction of the LIMA-LAD, this was felt to be high risk stenting and therefore deferred for a trial of guideline directed trial of maximal medical management including cardiac rehab. His echocardiogram on 2018 with an ejection fraction of 50-55% with a bioprosthetic aortic valve.   Mr. Emerald Reeves had echo done 2020 EF

## 2023-05-17 NOTE — PATIENT INSTRUCTIONS
SURVEY:    You may be receiving a survey from [employer] regarding your visit today. Please complete the survey to enable us to provide the highest quality of care to you and your family. If you cannot score us a very good on any question, please call the office to discuss how we could have made your experience a very good one. Thank you.

## 2023-05-18 ENCOUNTER — TELEPHONE (OUTPATIENT)
Dept: PRIMARY CARE CLINIC | Age: 78
End: 2023-05-18

## 2023-05-18 DIAGNOSIS — N30.01 ACUTE CYSTITIS WITH HEMATURIA: Primary | ICD-10-CM

## 2023-05-18 LAB
EST. AVERAGE GLUCOSE BLD GHB EST-MCNC: 154 MG/DL
HBA1C MFR BLD: 7 % (ref 4–6)

## 2023-05-18 RX ORDER — CIPROFLOXACIN 500 MG/1
500 TABLET, FILM COATED ORAL 2 TIMES DAILY
Qty: 14 TABLET | Refills: 0 | Status: SHIPPED | OUTPATIENT
Start: 2023-05-18 | End: 2023-05-25

## 2023-05-18 NOTE — TELEPHONE ENCOUNTER
----- Message from HALEY Cunningham CNP sent at 5/18/2023 12:23 PM EDT -----  Please notify patient of A1C results.   Thanks [facility]

## 2023-05-18 NOTE — TELEPHONE ENCOUNTER
----- Message from Sydell Mcardle, APRN - CNP sent at 5/18/2023 10:39 AM EDT -----  Please notify patient of abnormal lab results. He does have a current UTI and I am sending antibiotic for him to start to the pharmacy. Ask him if he is having any dysuria or frequency. We will call back when culture returns.   Thanks Yaw Ontiveros

## 2023-05-19 LAB
MICROORGANISM SPEC CULT: ABNORMAL
SPECIMEN DESCRIPTION: ABNORMAL

## 2023-05-22 DIAGNOSIS — E11.9 TYPE 2 DIABETES MELLITUS WITHOUT COMPLICATION, WITHOUT LONG-TERM CURRENT USE OF INSULIN (HCC): Chronic | ICD-10-CM

## 2023-05-22 NOTE — TELEPHONE ENCOUNTER
Patients wife called in and stated Dans metformin was sent to mail order like she asked last week. It did not ship in time and he is completely out today and needs a short script sent to [place].  Medication pended thank you

## 2023-05-23 ENCOUNTER — TELEPHONE (OUTPATIENT)
Dept: PRIMARY CARE CLINIC | Age: 78
End: 2023-05-23

## 2023-05-23 NOTE — TELEPHONE ENCOUNTER
----- Message from HALEY Tang CNP sent at 5/22/2023  8:20 AM EDT -----  Please notify patient of urine culture results. The cipro I prescribed is sensitive to the bacteria. Ask him if he is feeling better.   Thanks [facility]

## 2023-06-04 RX ORDER — CLOPIDOGREL BISULFATE 75 MG/1
TABLET ORAL
Qty: 90 TABLET | Refills: 3 | Status: SHIPPED | OUTPATIENT
Start: 2023-06-04

## 2023-06-21 ENCOUNTER — OFFICE VISIT (OUTPATIENT)
Dept: PULMONOLOGY | Age: 78
End: 2023-06-21
Payer: MEDICARE

## 2023-06-21 VITALS
BODY MASS INDEX: 30.76 KG/M2 | OXYGEN SATURATION: 95 % | HEIGHT: 72 IN | SYSTOLIC BLOOD PRESSURE: 109 MMHG | WEIGHT: 227.1 LBS | DIASTOLIC BLOOD PRESSURE: 58 MMHG | RESPIRATION RATE: 18 BRPM | TEMPERATURE: 98.2 F | HEART RATE: 69 BPM

## 2023-06-21 DIAGNOSIS — J98.6 ELEVATED DIAPHRAGM: ICD-10-CM

## 2023-06-21 DIAGNOSIS — G47.33 OSA TREATED WITH BIPAP: Primary | ICD-10-CM

## 2023-06-21 PROCEDURE — 99213 OFFICE O/P EST LOW 20 MIN: CPT | Performed by: NURSE PRACTITIONER

## 2023-06-21 PROCEDURE — G8427 DOCREV CUR MEDS BY ELIG CLIN: HCPCS | Performed by: NURSE PRACTITIONER

## 2023-06-21 PROCEDURE — 3074F SYST BP LT 130 MM HG: CPT | Performed by: NURSE PRACTITIONER

## 2023-06-21 PROCEDURE — 1036F TOBACCO NON-USER: CPT | Performed by: NURSE PRACTITIONER

## 2023-06-21 PROCEDURE — G8417 CALC BMI ABV UP PARAM F/U: HCPCS | Performed by: NURSE PRACTITIONER

## 2023-06-21 PROCEDURE — 1123F ACP DISCUSS/DSCN MKR DOCD: CPT | Performed by: NURSE PRACTITIONER

## 2023-06-21 PROCEDURE — 3078F DIAST BP <80 MM HG: CPT | Performed by: NURSE PRACTITIONER

## 2023-06-21 ASSESSMENT — ENCOUNTER SYMPTOMS
GASTROINTESTINAL NEGATIVE: 1
ALLERGIC/IMMUNOLOGIC NEGATIVE: 1
EYES NEGATIVE: 1

## 2023-06-21 NOTE — PATIENT INSTRUCTIONS
SURVEY:    You may be receiving a survey from [employer] regarding your visit today. Please complete the survey to enable us to provide the highest quality of care to you and your family. If you cannot score us a very good on any question, please call the office to discuss how we could have made your experience a very good one. Thank you.

## 2023-06-21 NOTE — PROGRESS NOTES
Not on file    Number of children: Not on file    Years of education: Not on file    Highest education level: Not on file   Occupational History    Not on file   Tobacco Use    Smoking status: Never    Smokeless tobacco: Never   Vaping Use    Vaping Use: Never used   Substance and Sexual Activity    Alcohol use: Yes     Alcohol/week: 1.0 standard drink     Types: 1 Glasses of wine per week     Comment: most days    Drug use: No    Sexual activity: Yes     Partners: Female   Other Topics Concern    Not on file   Social History Narrative    Not on file     Social Determinants of Health     Financial Resource Strain: Unknown    Difficulty of Paying Living Expenses: Patient refused   Food Insecurity: Unknown    Worried About Running Out of Food in the Last Year: Patient refused    [address] in the Last Year: Patient refused   Transportation Needs: Unknown    Lack of Transportation (Medical): Not on file    Lack of Transportation (Non-Medical): Patient refused   Physical Activity: Inactive    Days of Exercise per Week: 0 days    Minutes of Exercise per Session: 0 min   Stress: Not on file   Social Connections: Not on file   Intimate Partner Violence: Not on file   Housing Stability: Unknown    Unable to Pay for Housing in the Last Year: Not on file    Number of Places Lived in the Last Year: Not on file    Unstable Housing in the Last Year: Patient refused       Review of Systems   Constitutional:         Decreased activity tolerance secondary to exertional dyspnea. HENT:          Denies sinus pain/pressure. Denies rhinorrhea. Occasional cough   Eyes: Negative. Respiratory:          Exertional dyspnea. No significant cough. Denies sputum production-specifically purulent or hemoptysis   Cardiovascular: Negative. Gastrointestinal: Negative. Endocrine: Negative. Genitourinary: Negative. Musculoskeletal: Negative. Skin: Negative. Allergic/Immunologic: Negative. Neurological: Negative.

## 2023-07-17 ENCOUNTER — OFFICE VISIT (OUTPATIENT)
Dept: NEUROLOGY | Age: 78
End: 2023-07-17
Payer: MEDICARE

## 2023-07-17 VITALS
HEIGHT: 72 IN | RESPIRATION RATE: 20 BRPM | HEART RATE: 70 BPM | BODY MASS INDEX: 30.62 KG/M2 | WEIGHT: 226.1 LBS | DIASTOLIC BLOOD PRESSURE: 58 MMHG | TEMPERATURE: 97.4 F | SYSTOLIC BLOOD PRESSURE: 109 MMHG

## 2023-07-17 DIAGNOSIS — R41.3 POOR MEMORY: ICD-10-CM

## 2023-07-17 DIAGNOSIS — R26.89 POOR BALANCE: Primary | ICD-10-CM

## 2023-07-17 PROCEDURE — 3074F SYST BP LT 130 MM HG: CPT | Performed by: NEUROMUSCULOSKELETAL MEDICINE, SPORTS MEDICINE

## 2023-07-17 PROCEDURE — G8427 DOCREV CUR MEDS BY ELIG CLIN: HCPCS | Performed by: NEUROMUSCULOSKELETAL MEDICINE, SPORTS MEDICINE

## 2023-07-17 PROCEDURE — 1036F TOBACCO NON-USER: CPT | Performed by: NEUROMUSCULOSKELETAL MEDICINE, SPORTS MEDICINE

## 2023-07-17 PROCEDURE — 1123F ACP DISCUSS/DSCN MKR DOCD: CPT | Performed by: NEUROMUSCULOSKELETAL MEDICINE, SPORTS MEDICINE

## 2023-07-17 PROCEDURE — 3078F DIAST BP <80 MM HG: CPT | Performed by: NEUROMUSCULOSKELETAL MEDICINE, SPORTS MEDICINE

## 2023-07-17 PROCEDURE — G8417 CALC BMI ABV UP PARAM F/U: HCPCS | Performed by: NEUROMUSCULOSKELETAL MEDICINE, SPORTS MEDICINE

## 2023-07-17 PROCEDURE — 99204 OFFICE O/P NEW MOD 45 MIN: CPT | Performed by: NEUROMUSCULOSKELETAL MEDICINE, SPORTS MEDICINE

## 2023-07-17 NOTE — PATIENT INSTRUCTIONS
SURVEY:    You may be receiving a survey from [facility] regarding your visit today. Please complete the survey to enable us to provide the highest quality of care to you and your family. If you cannot score us a very good on any question, please call the office to discuss how we could have made your experience a very good one. Thank you.

## 2023-07-24 ENCOUNTER — TELEPHONE (OUTPATIENT)
Dept: PRIMARY CARE CLINIC | Age: 78
End: 2023-07-24

## 2023-07-24 NOTE — TELEPHONE ENCOUNTER
Patients wife called and stated he had a fall about 2 weeks on his left arm. He did have some bruising and pain and swelling but the bruising has gone away he still has the swelling and pain. She wanted to know any advise to help with the swelling and pain?   Please advise thank you

## 2023-07-31 ENCOUNTER — HOSPITAL ENCOUNTER (OUTPATIENT)
Age: 78
Setting detail: SPECIMEN
Discharge: HOME | End: 2023-07-31
Payer: MEDICARE

## 2023-07-31 ENCOUNTER — HOSPITAL ENCOUNTER (OUTPATIENT)
Age: 78
Discharge: HOME | End: 2023-07-31
Payer: MEDICARE

## 2023-07-31 ENCOUNTER — HOSPITAL ENCOUNTER (OUTPATIENT)
Dept: CT IMAGING | Age: 78
Discharge: HOME | End: 2023-08-02
Payer: MEDICARE

## 2023-07-31 ENCOUNTER — OFFICE VISIT (OUTPATIENT)
Dept: UROLOGY | Age: 78
End: 2023-07-31
Payer: MEDICARE

## 2023-07-31 VITALS
BODY MASS INDEX: 30.61 KG/M2 | TEMPERATURE: 97.7 F | DIASTOLIC BLOOD PRESSURE: 60 MMHG | WEIGHT: 226 LBS | HEIGHT: 72 IN | SYSTOLIC BLOOD PRESSURE: 106 MMHG | HEART RATE: 65 BPM

## 2023-07-31 DIAGNOSIS — R39.15 URGENCY OF URINATION: ICD-10-CM

## 2023-07-31 DIAGNOSIS — N39.41 URGE INCONTINENCE: ICD-10-CM

## 2023-07-31 DIAGNOSIS — N40.1 BPH WITH OBSTRUCTION/LOWER URINARY TRACT SYMPTOMS: ICD-10-CM

## 2023-07-31 DIAGNOSIS — R33.9 INCOMPLETE BLADDER EMPTYING: Primary | ICD-10-CM

## 2023-07-31 DIAGNOSIS — R33.9 INCOMPLETE BLADDER EMPTYING: ICD-10-CM

## 2023-07-31 DIAGNOSIS — R26.89 POOR BALANCE: ICD-10-CM

## 2023-07-31 DIAGNOSIS — R41.3 POOR MEMORY: ICD-10-CM

## 2023-07-31 DIAGNOSIS — N13.8 BPH WITH OBSTRUCTION/LOWER URINARY TRACT SYMPTOMS: ICD-10-CM

## 2023-07-31 LAB
BACTERIA URNS QL MICRO: ABNORMAL
BILIRUB UR QL STRIP: NEGATIVE
CLARITY UR: ABNORMAL
COLOR UR: YELLOW
EPI CELLS #/AREA URNS HPF: ABNORMAL /HPF (ref 0–5)
FOLATE SERPL-MCNC: >20 NG/ML
GLUCOSE UR STRIP-MCNC: NEGATIVE MG/DL
HGB UR QL STRIP.AUTO: ABNORMAL
KETONES UR STRIP-MCNC: NEGATIVE MG/DL
LEUKOCYTE ESTERASE UR QL STRIP: ABNORMAL
NITRITE UR QL STRIP: POSITIVE
PH UR STRIP: 6.5 [PH] (ref 5–9)
PROT UR STRIP-MCNC: NEGATIVE MG/DL
RBC #/AREA URNS HPF: ABNORMAL /HPF (ref 0–2)
SP GR UR STRIP: 1.01 (ref 1.01–1.02)
TSH SERPL DL<=0.05 MIU/L-ACNC: 1.46 UIU/ML (ref 0.3–5)
UROBILINOGEN UR STRIP-ACNC: NORMAL EU/DL (ref 0–1)
VIT B12 SERPL-MCNC: 801 PG/ML (ref 232–1245)
WBC #/AREA URNS HPF: ABNORMAL /HPF (ref 0–5)

## 2023-07-31 PROCEDURE — 87086 URINE CULTURE/COLONY COUNT: CPT

## 2023-07-31 PROCEDURE — G8427 DOCREV CUR MEDS BY ELIG CLIN: HCPCS | Performed by: NURSE PRACTITIONER

## 2023-07-31 PROCEDURE — 1036F TOBACCO NON-USER: CPT | Performed by: NURSE PRACTITIONER

## 2023-07-31 PROCEDURE — 99214 OFFICE O/P EST MOD 30 MIN: CPT | Performed by: NURSE PRACTITIONER

## 2023-07-31 PROCEDURE — 87077 CULTURE AEROBIC IDENTIFY: CPT

## 2023-07-31 PROCEDURE — 51798 US URINE CAPACITY MEASURE: CPT | Performed by: NURSE PRACTITIONER

## 2023-07-31 PROCEDURE — 36415 COLL VENOUS BLD VENIPUNCTURE: CPT

## 2023-07-31 PROCEDURE — 82607 VITAMIN B-12: CPT

## 2023-07-31 PROCEDURE — 1123F ACP DISCUSS/DSCN MKR DOCD: CPT | Performed by: NURSE PRACTITIONER

## 2023-07-31 PROCEDURE — 3074F SYST BP LT 130 MM HG: CPT | Performed by: NURSE PRACTITIONER

## 2023-07-31 PROCEDURE — 82746 ASSAY OF FOLIC ACID SERUM: CPT

## 2023-07-31 PROCEDURE — 81001 URINALYSIS AUTO W/SCOPE: CPT

## 2023-07-31 PROCEDURE — G8417 CALC BMI ABV UP PARAM F/U: HCPCS | Performed by: NURSE PRACTITIONER

## 2023-07-31 PROCEDURE — 3078F DIAST BP <80 MM HG: CPT | Performed by: NURSE PRACTITIONER

## 2023-07-31 PROCEDURE — 70450 CT HEAD/BRAIN W/O DYE: CPT

## 2023-07-31 PROCEDURE — 87186 SC STD MICRODIL/AGAR DIL: CPT

## 2023-07-31 PROCEDURE — 84443 ASSAY THYROID STIM HORMONE: CPT

## 2023-07-31 ASSESSMENT — ENCOUNTER SYMPTOMS
COLOR CHANGE: 0
EYE REDNESS: 0
VOMITING: 0
SHORTNESS OF BREATH: 0
CONSTIPATION: 0
NAUSEA: 0
ABDOMINAL PAIN: 0
BACK PAIN: 0
COUGH: 0
WHEEZING: 0

## 2023-07-31 NOTE — PATIENT INSTRUCTIONS
SURVEY:    You may be receiving a survey from [employer] regarding your visit today. Please complete the survey to enable us to provide the highest quality of care to you and your family. If you cannot score us a very good on any question, please call the office to discuss how we could have made your experience a very good one. Thank you.

## 2023-08-02 LAB
MICROORGANISM SPEC CULT: ABNORMAL
SPECIMEN DESCRIPTION: ABNORMAL

## 2023-08-08 ENCOUNTER — TELEPHONE (OUTPATIENT)
Dept: NEUROLOGY | Age: 78
End: 2023-08-08

## 2023-08-09 NOTE — TELEPHONE ENCOUNTER
Attempted to call patient with result note from Dr. Shira Peñaloza again today, phone number still states \"disconnected or no longer in service. \"

## 2023-08-10 DIAGNOSIS — F34.1 DYSTHYMIA: ICD-10-CM

## 2023-08-10 RX ORDER — BUPROPION HYDROCHLORIDE 150 MG/1
TABLET ORAL
Qty: 90 TABLET | Refills: 1 | OUTPATIENT
Start: 2023-08-10

## 2023-08-16 ENCOUNTER — TELEPHONE (OUTPATIENT)
Dept: PRIMARY CARE CLINIC | Age: 78
End: 2023-08-16

## 2023-08-16 NOTE — TELEPHONE ENCOUNTER
Patient wife called. Requesting appointment for Enrique Deleon, concerned with shortness of breath and patient took a NTG \"few days ago. \" After speaking with Berto Moncada N=Rena HOLLIDAY, he advised to call cardiology, informed patient wife to call cardiology.

## 2023-08-25 NOTE — TELEPHONE ENCOUNTER
Wife called back, receiving note. Informed of CT scan being normal per Dr. Cheri Aden. Verbalized understanding. Verified phone number for future needs.

## 2023-09-01 ENCOUNTER — PROCEDURE VISIT (OUTPATIENT)
Dept: UROLOGY | Age: 78
End: 2023-09-01

## 2023-09-01 VITALS
HEIGHT: 72 IN | WEIGHT: 225 LBS | BODY MASS INDEX: 30.48 KG/M2 | DIASTOLIC BLOOD PRESSURE: 64 MMHG | HEART RATE: 80 BPM | SYSTOLIC BLOOD PRESSURE: 111 MMHG | TEMPERATURE: 98 F

## 2023-09-01 DIAGNOSIS — N39.41 URGE INCONTINENCE: ICD-10-CM

## 2023-09-01 DIAGNOSIS — N40.1 BPH WITH OBSTRUCTION/LOWER URINARY TRACT SYMPTOMS: ICD-10-CM

## 2023-09-01 DIAGNOSIS — R35.0 FREQUENCY OF URINATION: ICD-10-CM

## 2023-09-01 DIAGNOSIS — N13.8 BPH WITH OBSTRUCTION/LOWER URINARY TRACT SYMPTOMS: ICD-10-CM

## 2023-09-01 DIAGNOSIS — R39.15 URGENCY OF URINATION: ICD-10-CM

## 2023-09-01 DIAGNOSIS — R33.9 INCOMPLETE BLADDER EMPTYING: Primary | ICD-10-CM

## 2023-09-01 ASSESSMENT — ENCOUNTER SYMPTOMS
EYE REDNESS: 0
COLOR CHANGE: 0
COUGH: 0
NAUSEA: 0
BACK PAIN: 0
ABDOMINAL PAIN: 0
CONSTIPATION: 0
VOMITING: 0
WHEEZING: 0
SHORTNESS OF BREATH: 0

## 2023-09-01 NOTE — PROGRESS NOTES
RANDOM  Bladderscan performed in office today:  Patient voided - (patient unable to void) mL, PVR - 185 mL       During cystoscopy the following was utilized on patient with no adverse affects:    45% SODIUM CHLORIDE 500 ML BAG  Lot number: [id number]  Expiration date: 07/2024      LIDOCAINE HYDROCHLORIDE JELLY 2%   Lot number: [id number]  Expiration date: 01/2025
DAILY 90 tablet 3    tamsulosin (FLOMAX) 0.4 MG capsule TAKE 1 CAPSULE BY MOUTH TWICE A  capsule 3    nitroGLYCERIN (NITROSTAT) 0.4 MG SL tablet Place 1 tablet under the tongue every 5 minutes as needed for Chest pain up to max of 3 total doses. If no relief after 1 dose, call 911. 75 tablet 1    blood glucose monitor strips 1 strip by Other route daily Tung Metrix test strip. E11.9 100 strip 3    gabapentin (NEURONTIN) 400 MG capsule Take 1 capsule by mouth 3 times daily. aspirin 81 MG tablet Take 1 tablet by mouth Daily with lunch      melatonin 5 MG TABS tablet Take 1 tablet by mouth as needed      Multiple Vitamins-Minerals (CENTRUM SILVER PO) Take 1 tablet by mouth daily       traMADol (ULTRAM) 50 MG tablet Take 1 tablet by mouth every 6 hours as needed for Pain. No current facility-administered medications on file prior to visit. Patient has no known allergies. Family History   Adopted: Yes     Social History     Tobacco Use   Smoking Status Never   Smokeless Tobacco Never       Social History     Substance and Sexual Activity   Alcohol Use Yes    Alcohol/week: 1.0 standard drink    Types: 1 Glasses of wine per week    Comment: most days       Review of Systems   Constitutional:  Negative for appetite change, chills and fever. Eyes:  Negative for redness and visual disturbance. Respiratory:  Negative for cough, shortness of breath and wheezing. Cardiovascular:  Negative for chest pain and leg swelling. Gastrointestinal:  Negative for abdominal pain, constipation, nausea and vomiting. Genitourinary:  Negative for decreased urine volume, difficulty urinating, dysuria, enuresis, flank pain, frequency, hematuria, penile discharge, penile pain, scrotal swelling, testicular pain and urgency. Musculoskeletal:  Negative for back pain, joint swelling and myalgias. Skin:  Negative for color change, rash and wound. Neurological:  Negative for dizziness, tremors and numbness.

## 2023-09-01 NOTE — PATIENT INSTRUCTIONS
SURVEY:    You may be receiving a survey from [employer] regarding your visit today. Please complete the survey to enable us to provide the highest quality of care to you and your family. If you cannot score us a very good on any question, please call the office to discuss how we could have made your experience a very good one. Thank you.

## 2023-09-14 ENCOUNTER — OFFICE VISIT (OUTPATIENT)
Dept: PRIMARY CARE CLINIC | Age: 78
End: 2023-09-14
Payer: MEDICARE

## 2023-09-14 VITALS
WEIGHT: 224.7 LBS | BODY MASS INDEX: 30.47 KG/M2 | SYSTOLIC BLOOD PRESSURE: 122 MMHG | RESPIRATION RATE: 20 BRPM | HEART RATE: 74 BPM | OXYGEN SATURATION: 96 % | TEMPERATURE: 98.7 F | DIASTOLIC BLOOD PRESSURE: 70 MMHG

## 2023-09-14 DIAGNOSIS — R53.83 OTHER FATIGUE: Primary | ICD-10-CM

## 2023-09-14 DIAGNOSIS — R29.6 FREQUENT FALLS: ICD-10-CM

## 2023-09-14 DIAGNOSIS — F45.8 OTHER SOMATOFORM DISORDERS: ICD-10-CM

## 2023-09-14 PROCEDURE — 99214 OFFICE O/P EST MOD 30 MIN: CPT | Performed by: NURSE PRACTITIONER

## 2023-09-14 PROCEDURE — 3078F DIAST BP <80 MM HG: CPT | Performed by: NURSE PRACTITIONER

## 2023-09-14 PROCEDURE — 1123F ACP DISCUSS/DSCN MKR DOCD: CPT | Performed by: NURSE PRACTITIONER

## 2023-09-14 PROCEDURE — G8417 CALC BMI ABV UP PARAM F/U: HCPCS | Performed by: NURSE PRACTITIONER

## 2023-09-14 PROCEDURE — 3074F SYST BP LT 130 MM HG: CPT | Performed by: NURSE PRACTITIONER

## 2023-09-14 PROCEDURE — G8427 DOCREV CUR MEDS BY ELIG CLIN: HCPCS | Performed by: NURSE PRACTITIONER

## 2023-09-14 PROCEDURE — 1036F TOBACCO NON-USER: CPT | Performed by: NURSE PRACTITIONER

## 2023-09-14 SDOH — ECONOMIC STABILITY: FOOD INSECURITY: WITHIN THE PAST 12 MONTHS, YOU WORRIED THAT YOUR FOOD WOULD RUN OUT BEFORE YOU GOT MONEY TO BUY MORE.: PATIENT DECLINED

## 2023-09-14 SDOH — ECONOMIC STABILITY: FOOD INSECURITY: WITHIN THE PAST 12 MONTHS, THE FOOD YOU BOUGHT JUST DIDN'T LAST AND YOU DIDN'T HAVE MONEY TO GET MORE.: PATIENT DECLINED

## 2023-09-14 SDOH — ECONOMIC STABILITY: INCOME INSECURITY: HOW HARD IS IT FOR YOU TO PAY FOR THE VERY BASICS LIKE FOOD, HOUSING, MEDICAL CARE, AND HEATING?: PATIENT DECLINED

## 2023-09-14 ASSESSMENT — ENCOUNTER SYMPTOMS
CONSTIPATION: 0
VISUAL CHANGE: 0
ABDOMINAL PAIN: 0
RHINORRHEA: 0
COUGH: 0
DIARRHEA: 0
SORE THROAT: 0
BACK PAIN: 1
SHORTNESS OF BREATH: 0
SWOLLEN GLANDS: 0
VOMITING: 0
WHEEZING: 0
CHANGE IN BOWEL HABIT: 0
NAUSEA: 0

## 2023-09-14 ASSESSMENT — PATIENT HEALTH QUESTIONNAIRE - PHQ9
7. TROUBLE CONCENTRATING ON THINGS, SUCH AS READING THE NEWSPAPER OR WATCHING TELEVISION: 0
SUM OF ALL RESPONSES TO PHQ QUESTIONS 1-9: 0
2. FEELING DOWN, DEPRESSED OR HOPELESS: 0
1. LITTLE INTEREST OR PLEASURE IN DOING THINGS: 0
SUM OF ALL RESPONSES TO PHQ QUESTIONS 1-9: 0
3. TROUBLE FALLING OR STAYING ASLEEP: 0
9. THOUGHTS THAT YOU WOULD BE BETTER OFF DEAD, OR OF HURTING YOURSELF: 0
8. MOVING OR SPEAKING SO SLOWLY THAT OTHER PEOPLE COULD HAVE NOTICED. OR THE OPPOSITE, BEING SO FIGETY OR RESTLESS THAT YOU HAVE BEEN MOVING AROUND A LOT MORE THAN USUAL: 0
6. FEELING BAD ABOUT YOURSELF - OR THAT YOU ARE A FAILURE OR HAVE LET YOURSELF OR YOUR FAMILY DOWN: 0
10. IF YOU CHECKED OFF ANY PROBLEMS, HOW DIFFICULT HAVE THESE PROBLEMS MADE IT FOR YOU TO DO YOUR WORK, TAKE CARE OF THINGS AT HOME, OR GET ALONG WITH OTHER PEOPLE: 0
4. FEELING TIRED OR HAVING LITTLE ENERGY: 0
SUM OF ALL RESPONSES TO PHQ QUESTIONS 1-9: 0
SUM OF ALL RESPONSES TO PHQ QUESTIONS 1-9: 0
5. POOR APPETITE OR OVEREATING: 0
SUM OF ALL RESPONSES TO PHQ9 QUESTIONS 1 & 2: 0

## 2023-09-14 NOTE — PROGRESS NOTES
(NEURONTIN) 400 MG capsule Take 1 capsule by mouth 3 times daily. 2/14/19  Yes Stephanie Multani MD   aspirin 81 MG tablet Take 1 tablet by mouth Daily with lunch   Yes Historical Provider, MD   melatonin 5 MG TABS tablet Take 1 tablet by mouth as needed   Yes Historical Provider, MD   Multiple Vitamins-Minerals (CENTRUM SILVER PO) Take 1 tablet by mouth daily    Yes Historical Provider, MD   traMADol (ULTRAM) 50 MG tablet Take 1 tablet by mouth every 6 hours as needed for Pain. Yes Historical Provider, MD   nitroGLYCERIN (NITROSTAT) 0.4 MG SL tablet Place 1 tablet under the tongue every 5 minutes as needed for Chest pain up to max of 3 total doses. If no relief after 1 dose, call 911. Patient not taking: Reported on 9/14/2023 11/7/22   Joby Cavazos MD        Allergies:       Patient has no known allergies. Social History:     Tobacco:    reports that he has never smoked. He has never used smokeless tobacco.  Alcohol:      reports current alcohol use of about 1.0 standard drink of alcohol per week. Drug Use:  reports no history of drug use. Family History:     Family History   Adopted: Yes       Review of Systems:     Positive and Negative as described in HPI    Review of Systems   Constitutional:  Negative for chills, diaphoresis, fatigue and fever. HENT:  Negative for congestion, rhinorrhea and sore throat. Eyes:  Negative for visual disturbance. Respiratory:  Negative for cough, shortness of breath and wheezing. Cardiovascular:  Positive for leg swelling (intermittent). Negative for chest pain and palpitations. Gastrointestinal:  Negative for abdominal pain, anorexia, change in bowel habit, constipation, diarrhea, nausea and vomiting. Endocrine: Negative for polydipsia, polyphagia and polyuria. Genitourinary:  Negative for difficulty urinating and dysuria. Musculoskeletal:  Positive for arthralgias, back pain and gait problem.  Negative for joint swelling, myalgias, neck pain and neck

## 2023-10-09 ENCOUNTER — HOSPITAL ENCOUNTER (OUTPATIENT)
Age: 78
Discharge: HOME | End: 2023-10-09
Payer: MEDICARE

## 2023-10-09 ENCOUNTER — TELEPHONE (OUTPATIENT)
Dept: PRIMARY CARE CLINIC | Age: 78
End: 2023-10-09

## 2023-10-09 DIAGNOSIS — G89.29 CHRONIC BILATERAL LOW BACK PAIN WITHOUT SCIATICA: Primary | ICD-10-CM

## 2023-10-09 DIAGNOSIS — R53.83 OTHER FATIGUE: ICD-10-CM

## 2023-10-09 DIAGNOSIS — M54.50 CHRONIC BILATERAL LOW BACK PAIN WITHOUT SCIATICA: Primary | ICD-10-CM

## 2023-10-09 DIAGNOSIS — F45.8 OTHER SOMATOFORM DISORDERS: ICD-10-CM

## 2023-10-09 LAB
BASOPHILS # BLD: 0.07 K/UL (ref 0–0.2)
BASOPHILS NFR BLD: 1 % (ref 0–2)
EOSINOPHIL # BLD: 0.36 K/UL (ref 0–0.44)
EOSINOPHILS RELATIVE PERCENT: 6 % (ref 1–4)
ERYTHROCYTE [DISTWIDTH] IN BLOOD BY AUTOMATED COUNT: 13.8 % (ref 11.8–14.4)
FERRITIN SERPL-MCNC: 201 NG/ML (ref 30–400)
HCT VFR BLD AUTO: 38.8 % (ref 40.7–50.3)
HGB BLD-MCNC: 12.5 G/DL (ref 13–17)
IMM GRANULOCYTES # BLD AUTO: <0.03 K/UL (ref 0–0.3)
IMM GRANULOCYTES NFR BLD: 0 %
IRON SATN MFR SERPL: 30 % (ref 20–55)
IRON SERPL-MCNC: 90 UG/DL (ref 59–158)
LYMPHOCYTES NFR BLD: 2.53 K/UL (ref 1.1–3.7)
LYMPHOCYTES RELATIVE PERCENT: 40 % (ref 24–43)
MCH RBC QN AUTO: 34.3 PG (ref 25.2–33.5)
MCHC RBC AUTO-ENTMCNC: 32.2 G/DL (ref 28.4–34.8)
MCV RBC AUTO: 106.6 FL (ref 82.6–102.9)
MONOCYTES NFR BLD: 0.63 K/UL (ref 0.1–1.2)
MONOCYTES NFR BLD: 10 % (ref 3–12)
NEUTROPHILS NFR BLD: 43 % (ref 36–65)
NEUTS SEG NFR BLD: 2.69 K/UL (ref 1.5–8.1)
NRBC BLD-RTO: 0.3 PER 100 WBC
PLATELET # BLD AUTO: 196 K/UL (ref 138–453)
PMV BLD AUTO: 10 FL (ref 8.1–13.5)
RBC # BLD AUTO: 3.64 M/UL (ref 4.21–5.77)
TIBC SERPL-MCNC: 301 UG/DL (ref 250–450)
UNSATURATED IRON BINDING CAPACITY: 211 UG/DL (ref 112–347)
WBC OTHER # BLD: 6.3 K/UL (ref 3.5–11.3)

## 2023-10-09 PROCEDURE — 82728 ASSAY OF FERRITIN: CPT

## 2023-10-09 PROCEDURE — 83540 ASSAY OF IRON: CPT

## 2023-10-09 PROCEDURE — 85025 COMPLETE CBC W/AUTO DIFF WBC: CPT

## 2023-10-09 PROCEDURE — 36415 COLL VENOUS BLD VENIPUNCTURE: CPT

## 2023-10-09 PROCEDURE — 83550 IRON BINDING TEST: CPT

## 2023-10-09 RX ORDER — CLOPIDOGREL BISULFATE 75 MG/1
TABLET ORAL
COMMUNITY
Start: 2023-09-23

## 2023-10-09 NOTE — TELEPHONE ENCOUNTER
Chandler's wife came to office to let you know he had the lab work today. She also stated they are waiting for a pain management referral from you.   Since Chandler lives in [place], they would prefer a referral to [facility]

## 2023-10-10 ENCOUNTER — TELEPHONE (OUTPATIENT)
Dept: PRIMARY CARE CLINIC | Age: 78
End: 2023-10-10

## 2023-10-10 NOTE — TELEPHONE ENCOUNTER
----- Message from [address] [facility] Stephie, HALEY - CNP sent at 10/10/2023  8:17 AM EDT -----  Iron looks good, hemoglobin is actually stable. Have him follow-up with pain management like we are planning. Thank you.

## 2023-10-10 NOTE — TELEPHONE ENCOUNTER
Message left with [facility] lab work results and follow up with pain management

## 2023-11-01 ENCOUNTER — OFFICE VISIT (OUTPATIENT)
Dept: PRIMARY CARE CLINIC | Age: 78
End: 2023-11-01
Payer: MEDICARE

## 2023-11-01 VITALS
BODY MASS INDEX: 30.14 KG/M2 | TEMPERATURE: 98.5 F | RESPIRATION RATE: 18 BRPM | HEIGHT: 72 IN | OXYGEN SATURATION: 96 % | SYSTOLIC BLOOD PRESSURE: 118 MMHG | HEART RATE: 78 BPM | DIASTOLIC BLOOD PRESSURE: 64 MMHG | WEIGHT: 222.5 LBS

## 2023-11-01 DIAGNOSIS — Z00.00 MEDICARE ANNUAL WELLNESS VISIT, SUBSEQUENT: Primary | ICD-10-CM

## 2023-11-01 DIAGNOSIS — E78.2 MIXED HYPERLIPIDEMIA: ICD-10-CM

## 2023-11-01 DIAGNOSIS — E11.9 TYPE 2 DIABETES MELLITUS WITHOUT COMPLICATION, WITHOUT LONG-TERM CURRENT USE OF INSULIN (HCC): Chronic | ICD-10-CM

## 2023-11-01 LAB — HBA1C MFR BLD: 6.3 %

## 2023-11-01 PROCEDURE — 3074F SYST BP LT 130 MM HG: CPT | Performed by: NURSE PRACTITIONER

## 2023-11-01 PROCEDURE — G8484 FLU IMMUNIZE NO ADMIN: HCPCS | Performed by: NURSE PRACTITIONER

## 2023-11-01 PROCEDURE — 1123F ACP DISCUSS/DSCN MKR DOCD: CPT | Performed by: NURSE PRACTITIONER

## 2023-11-01 PROCEDURE — 3044F HG A1C LEVEL LT 7.0%: CPT | Performed by: NURSE PRACTITIONER

## 2023-11-01 PROCEDURE — G0439 PPPS, SUBSEQ VISIT: HCPCS | Performed by: NURSE PRACTITIONER

## 2023-11-01 PROCEDURE — 83036 HEMOGLOBIN GLYCOSYLATED A1C: CPT | Performed by: NURSE PRACTITIONER

## 2023-11-01 PROCEDURE — 3078F DIAST BP <80 MM HG: CPT | Performed by: NURSE PRACTITIONER

## 2023-11-01 RX ORDER — FERROUS SULFATE 325(65) MG
325 TABLET ORAL
COMMUNITY

## 2023-11-01 ASSESSMENT — LIFESTYLE VARIABLES
HOW OFTEN DURING THE LAST YEAR HAVE YOU HAD A FEELING OF GUILT OR REMORSE AFTER DRINKING: 0
HAVE YOU OR SOMEONE ELSE BEEN INJURED AS A RESULT OF YOUR DRINKING: 0
HOW OFTEN DURING THE LAST YEAR HAVE YOU BEEN UNABLE TO REMEMBER WHAT HAPPENED THE NIGHT BEFORE BECAUSE YOU HAD BEEN DRINKING: 0
HOW OFTEN DURING THE LAST YEAR HAVE YOU NEEDED AN ALCOHOLIC DRINK FIRST THING IN THE MORNING TO GET YOURSELF GOING AFTER A NIGHT OF HEAVY DRINKING: 0
HOW OFTEN DO YOU HAVE A DRINK CONTAINING ALCOHOL: 4 OR MORE TIMES A WEEK
HOW OFTEN DURING THE LAST YEAR HAVE YOU FOUND THAT YOU WERE NOT ABLE TO STOP DRINKING ONCE YOU HAD STARTED: 0
HAS A RELATIVE, FRIEND, DOCTOR, OR ANOTHER HEALTH PROFESSIONAL EXPRESSED CONCERN ABOUT YOUR DRINKING OR SUGGESTED YOU CUT DOWN: 0
HOW MANY STANDARD DRINKS CONTAINING ALCOHOL DO YOU HAVE ON A TYPICAL DAY: 1 OR 2
HOW OFTEN DURING THE LAST YEAR HAVE YOU FAILED TO DO WHAT WAS NORMALLY EXPECTED FROM YOU BECAUSE OF DRINKING: 0

## 2023-11-01 ASSESSMENT — ENCOUNTER SYMPTOMS
DIARRHEA: 0
WHEEZING: 0
SHORTNESS OF BREATH: 0
COUGH: 0
BACK PAIN: 1
NAUSEA: 0
VOMITING: 0
SORE THROAT: 0
CONSTIPATION: 0
ABDOMINAL PAIN: 0
RHINORRHEA: 0

## 2023-11-01 ASSESSMENT — PATIENT HEALTH QUESTIONNAIRE - PHQ9
4. FEELING TIRED OR HAVING LITTLE ENERGY: 0
7. TROUBLE CONCENTRATING ON THINGS, SUCH AS READING THE NEWSPAPER OR WATCHING TELEVISION: 0
6. FEELING BAD ABOUT YOURSELF - OR THAT YOU ARE A FAILURE OR HAVE LET YOURSELF OR YOUR FAMILY DOWN: 0
10. IF YOU CHECKED OFF ANY PROBLEMS, HOW DIFFICULT HAVE THESE PROBLEMS MADE IT FOR YOU TO DO YOUR WORK, TAKE CARE OF THINGS AT HOME, OR GET ALONG WITH OTHER PEOPLE: 0
2. FEELING DOWN, DEPRESSED OR HOPELESS: 0
5. POOR APPETITE OR OVEREATING: 0
3. TROUBLE FALLING OR STAYING ASLEEP: 0
8. MOVING OR SPEAKING SO SLOWLY THAT OTHER PEOPLE COULD HAVE NOTICED. OR THE OPPOSITE, BEING SO FIGETY OR RESTLESS THAT YOU HAVE BEEN MOVING AROUND A LOT MORE THAN USUAL: 0
SUM OF ALL RESPONSES TO PHQ9 QUESTIONS 1 & 2: 0
SUM OF ALL RESPONSES TO PHQ QUESTIONS 1-9: 0
9. THOUGHTS THAT YOU WOULD BE BETTER OFF DEAD, OR OF HURTING YOURSELF: 0
SUM OF ALL RESPONSES TO PHQ QUESTIONS 1-9: 0
1. LITTLE INTEREST OR PLEASURE IN DOING THINGS: 0
SUM OF ALL RESPONSES TO PHQ QUESTIONS 1-9: 0
SUM OF ALL RESPONSES TO PHQ QUESTIONS 1-9: 0

## 2023-11-01 ASSESSMENT — COLUMBIA-SUICIDE SEVERITY RATING SCALE - C-SSRS
4. HAVE YOU HAD THESE THOUGHTS AND HAD SOME INTENTION OF ACTING ON THEM?: NO
7. DID THIS OCCUR IN THE LAST THREE MONTHS: NO
5. HAVE YOU STARTED TO WORK OUT OR WORKED OUT THE DETAILS OF HOW TO KILL YOURSELF? DO YOU INTEND TO CARRY OUT THIS PLAN?: NO
3. HAVE YOU BEEN THINKING ABOUT HOW YOU MIGHT KILL YOURSELF?: NO

## 2023-11-06 DIAGNOSIS — M54.50 CHRONIC BILATERAL LOW BACK PAIN WITHOUT SCIATICA: Primary | ICD-10-CM

## 2023-11-06 DIAGNOSIS — G89.29 CHRONIC BILATERAL LOW BACK PAIN WITHOUT SCIATICA: Primary | ICD-10-CM

## 2023-11-06 RX ORDER — TRAMADOL HYDROCHLORIDE 50 MG/1
50 TABLET ORAL EVERY 8 HOURS PRN
Qty: 25 TABLET | Refills: 0 | Status: SHIPPED | OUTPATIENT
Start: 2023-11-06 | End: 2023-11-16

## 2023-11-06 NOTE — TELEPHONE ENCOUNTER
Health Maintenance   Topic Date Due    Lipids  10/10/2023    DTaP/Tdap/Td vaccine (1 - Tdap) 02/15/2024 (Originally 2/5/1964)    Hepatitis B vaccine (1 of 3 - Risk 3-dose series) 09/14/2024 (Originally 2/5/2003)    COVID-19 Vaccine (7 - 2023-24 season) 11/18/2023    Depression Monitoring  11/01/2024    Annual Wellness Visit (AWV)  11/01/2024    Flu vaccine  Completed    Shingles vaccine  Completed    Pneumococcal 65+ years Vaccine  Completed    Hepatitis A vaccine  Aged Out    Hib vaccine  Aged Out    Meningococcal (ACWY) vaccine  Aged Out    Depression Screen  Discontinued             (applicable per patient's age: Cancer Screenings, Depression Screening, Fall Risk Screening, Immunizations)    Hemoglobin A1C (%)   Date Value   11/01/2023 6.3   05/17/2023 7.0 (H)   04/27/2023 6.8     LDL Cholesterol (mg/dL)   Date Value   10/10/2022 49     AST (U/L)   Date Value   10/10/2022 17     ALT (U/L)   Date Value   10/10/2022 21     BUN (mg/dL)   Date Value   05/17/2023 16      (goal A1C is < 7)   (goal LDL is <100) need 30-50% reduction from baseline     BP Readings from Last 3 Encounters:   11/01/23 118/64   09/14/23 122/70   09/01/23 111/64    (goal /80)      All Future Testing planned in [facility]:  Lab Frequency Next Occurrence   CBC with Auto Differential Once 10/24/2023   ALT Once 10/27/2023   AST Once 17/96/9747   Basic Metabolic Panel Once 69/24/1980   Hemoglobin A1C Once 10/24/2023   Lipid Panel Once 10/24/2023   Microalbumin, Ur Once 10/24/2023   XR RADIUS ULNA LEFT (2 VIEWS) Once 08/09/2023   XR ELBOW LEFT (MIN 3 VIEWS) Once 08/09/2023   CBC with Auto Differential Once 04/29/2024   ALT Once 05/01/2024   AST Once 10/91/0902   Basic Metabolic Panel Once 46/37/4357   Hemoglobin A1C Once 04/29/2024   Lipid Panel Once 04/29/2024   Microalbumin, Ur Once 04/29/2024       Next Visit Date:  Future Appointments   Date Time Provider [address]  [facility]   11/16/2023  3:45 PM Geovany Kennedy DO [facility]

## 2023-11-16 ENCOUNTER — HOSPITAL ENCOUNTER (OUTPATIENT)
Age: 78
Discharge: HOME | End: 2023-11-16
Payer: MEDICARE

## 2023-11-16 ENCOUNTER — OFFICE VISIT (OUTPATIENT)
Age: 78
End: 2023-11-16
Payer: MEDICARE

## 2023-11-16 VITALS
BODY MASS INDEX: 30.4 KG/M2 | HEART RATE: 63 BPM | RESPIRATION RATE: 19 BRPM | DIASTOLIC BLOOD PRESSURE: 64 MMHG | OXYGEN SATURATION: 95 % | SYSTOLIC BLOOD PRESSURE: 118 MMHG | WEIGHT: 222 LBS

## 2023-11-16 DIAGNOSIS — Z79.01 LONG TERM (CURRENT) USE OF ANTICOAGULANTS: ICD-10-CM

## 2023-11-16 DIAGNOSIS — M47.817 LUMBOSACRAL SPONDYLOSIS WITHOUT MYELOPATHY: Primary | ICD-10-CM

## 2023-11-16 DIAGNOSIS — Z79.891 CHRONIC USE OF OPIATE FOR THERAPEUTIC PURPOSE: ICD-10-CM

## 2023-11-16 DIAGNOSIS — M51.36 LUMBAR DEGENERATIVE DISC DISEASE: ICD-10-CM

## 2023-11-16 LAB
6-ACETYLMORPHINE, UR: NORMAL
7-AMINOCLONAZEPAM, URINE: NORMAL
ALPHA-OH-ALPRAZ, URINE: NORMAL
ALPHA-OH-MIDAZOLAM, URINE: NORMAL
ALPRAZOLAM, URINE: NORMAL
AMPHETAMINES, URINE: NORMAL
BARBITURATES, URINE: NORMAL
BENZOYLECGONINE, UR: NORMAL
BUPRENORPHINE URINE: NORMAL
CARISOPRODOL, UR: NORMAL
CLONAZEPAM, URINE: NORMAL
CODEINE, URINE: NORMAL
CREATININE URINE: NORMAL
DIAZEPAM, URINE: NORMAL
EER HI RES INTERP UR: NORMAL
ETHYL GLUCURONIDE UR: NORMAL
FENTANYL URINE: NORMAL
GABAPENTIN: NORMAL
HYDROCODONE, OPI6M: NORMAL
HYDROMORPHONE, OPI3M: NORMAL
LORAZEPAM, URINE: NORMAL
MARIJUANA METAB, UR: NORMAL
MDA, UR: NORMAL
MDEA, EVE, UR: NORMAL
MDMA URINE: NORMAL
MEPERIDINE METAB, UR: NORMAL
METHADONE, URINE: NORMAL
METHAMPHETAMINE, URINE: NORMAL
METHYLPHENIDATE: NORMAL
MIDAZOLAM, URINE: NORMAL
MORPHINE, OPI1M: NORMAL
NALOXONE URINE: NORMAL
NORBUPRENORPHINE, URINE: NORMAL
NORDIAZEPAM, URINE: NORMAL
NORFENTANYL, URINE: NORMAL
NORHYDROCODONE, URINE: NORMAL
NOROXYCODONE, URINE: NORMAL
NOROXYMORPHONE, URINE: NORMAL
OXAZEPAM, URINE: NORMAL
OXYCODONE URINE: NORMAL
OXYMORPHONE, URINE: NORMAL
PAIN MANAGEMENT DRUG PANEL INTERP, URINE: NORMAL
PAIN MGT DRUG PANEL, HI RES, UR: NORMAL
PCP,URINE: NORMAL
PHENTERMINE, UR: NORMAL
PREGABALIN: NORMAL
TAPENTADOL, URINE: NORMAL
TAPENTADOL-O-SULFATE, URINE: NORMAL
TEMAZEPAM, URINE: NORMAL
TRAMADOL, URINE: NORMAL
ZOLPIDEM METABOLITE (ZCA), URINE: NORMAL
ZOLPIDEM, URINE: NORMAL

## 2023-11-16 PROCEDURE — G0481 DRUG TEST DEF 8-14 CLASSES: HCPCS

## 2023-11-16 PROCEDURE — G8484 FLU IMMUNIZE NO ADMIN: HCPCS | Performed by: STUDENT IN AN ORGANIZED HEALTH CARE EDUCATION/TRAINING PROGRAM

## 2023-11-16 PROCEDURE — G8417 CALC BMI ABV UP PARAM F/U: HCPCS | Performed by: STUDENT IN AN ORGANIZED HEALTH CARE EDUCATION/TRAINING PROGRAM

## 2023-11-16 PROCEDURE — 1123F ACP DISCUSS/DSCN MKR DOCD: CPT | Performed by: STUDENT IN AN ORGANIZED HEALTH CARE EDUCATION/TRAINING PROGRAM

## 2023-11-16 PROCEDURE — 3074F SYST BP LT 130 MM HG: CPT | Performed by: STUDENT IN AN ORGANIZED HEALTH CARE EDUCATION/TRAINING PROGRAM

## 2023-11-16 PROCEDURE — 80307 DRUG TEST PRSMV CHEM ANLYZR: CPT

## 2023-11-16 PROCEDURE — 99204 OFFICE O/P NEW MOD 45 MIN: CPT | Performed by: STUDENT IN AN ORGANIZED HEALTH CARE EDUCATION/TRAINING PROGRAM

## 2023-11-16 PROCEDURE — G8427 DOCREV CUR MEDS BY ELIG CLIN: HCPCS | Performed by: STUDENT IN AN ORGANIZED HEALTH CARE EDUCATION/TRAINING PROGRAM

## 2023-11-16 PROCEDURE — 1036F TOBACCO NON-USER: CPT | Performed by: STUDENT IN AN ORGANIZED HEALTH CARE EDUCATION/TRAINING PROGRAM

## 2023-11-16 PROCEDURE — 3078F DIAST BP <80 MM HG: CPT | Performed by: STUDENT IN AN ORGANIZED HEALTH CARE EDUCATION/TRAINING PROGRAM

## 2023-11-16 NOTE — PROGRESS NOTES
Chronic Pain Clinic Note     Encounter Date: 11/16/2023     SUBJECTIVE:  Chief Complaint   Patient presents with    New Patient    Back Pain       History of Present Illness:   Yessica Hernandez is a 78 y.o. male who presents with chronic lumbar pain as a new patient. He states radiation into the right buttocks. He has never had a back surgery. Patient also c/o pain between the shoulder blades. Medication Refill: tramadol, gabapentin    Current Complaints of Pain:   Location: Low back  Radiation: None  Severity: moderate, sometimes severe  Pain Numerical Score - 5   Average: 5-6     Highest: 10  Lowest: 4  Character/Quality: Complains of pain that is aching  Timing: Constant  Associated symptoms: none  Numbness:   Weakness:   Exacerbating factors: walking, standing   Alleviating factors:   Length of time pain has been present: Started on - chronic   Inciting event/injury: None  Bowel/Bladder incontinence: No  Falls: Yes  Physical Therapy: yes     History of Interventions:   Surgery: No previous lumbar/cervical surgeries  Injections: Yes    Imaging:    Lumbar MRI 6/2/2023    MR LUMBAR SPINE WO CONT     Multisequence multiplanar imaging of the lumbar spine was obtained utilizing the routine lumbar protocol. Inversion recovery images show normal bone marrow signal without edema, contusion, or fracture. Endplates are intact. There is no compression or malalignment. At the L5-S1 level there is   hypertrophic degenerative changes and facet arthropathy. However, the neural foramina are only narrowed to a mild degree bilaterally, left more so than right. Central canal is widely patent. Remainder the lumbar levels show mild degenerative changes without significant central canal or neural   foraminal compromise.  Sacral ala and SI joints appear intact within the limits of this exam. There is incidental left renal cyst.     IMPRESSION:     Mild to moderate changes, predominantly at L5-S1, without significant central canal

## 2023-11-19 RX ORDER — FAMOTIDINE 40 MG/1
TABLET, FILM COATED ORAL
Qty: 90 TABLET | Refills: 3 | Status: SHIPPED | OUTPATIENT
Start: 2023-11-19

## 2023-11-20 ENCOUNTER — TELEPHONE (OUTPATIENT)
Dept: PRIMARY CARE CLINIC | Age: 78
End: 2023-11-20

## 2023-11-20 DIAGNOSIS — G89.29 CHRONIC BILATERAL LOW BACK PAIN WITHOUT SCIATICA: ICD-10-CM

## 2023-11-20 DIAGNOSIS — M54.50 CHRONIC BILATERAL LOW BACK PAIN WITHOUT SCIATICA: ICD-10-CM

## 2023-11-20 RX ORDER — TRAMADOL HYDROCHLORIDE 50 MG/1
50 TABLET ORAL EVERY 8 HOURS PRN
Qty: 54 TABLET | Refills: 0 | Status: SHIPPED | OUTPATIENT
Start: 2023-11-20 | End: 2023-12-12

## 2023-11-20 NOTE — TELEPHONE ENCOUNTER
Chandler saw Dr Rosy Garcia 11/16/23 for back pain. Dr Rosy Garcia will not prescribe pain medication until the 2nd visit on 12/12/23. Mario Salomon will be out of tramadol 50 mg bid after tomorrow. Mariel Gottlieb wants to know if you would prescribe tramadol to get Chandler to the 12/12 appt?

## 2023-11-21 LAB
6-ACETYLMORPHINE, UR: NOT DETECTED
7-AMINOCLONAZEPAM, URINE: NOT DETECTED
ALPHA-OH-ALPRAZ, URINE: NOT DETECTED
ALPHA-OH-MIDAZOLAM, URINE: NOT DETECTED
ALPRAZOLAM, URINE: NOT DETECTED
AMPHETAMINES, URINE: NOT DETECTED
BARBITURATES, URINE: NEGATIVE
BENZOYLECGONINE, UR: NEGATIVE
BUPRENORPHINE URINE: NOT DETECTED
CARISOPRODOL, UR: NEGATIVE
CLONAZEPAM, URINE: NOT DETECTED
CODEINE, URINE: NOT DETECTED
CREATININE URINE: 74.6 MG/DL (ref 20–400)
DIAZEPAM, URINE: NOT DETECTED
EER HI RES INTERP UR: NORMAL
ETHYL GLUCURONIDE UR: NORMAL
FENTANYL URINE: NOT DETECTED
GABAPENTIN: PRESENT
HYDROCODONE, OPI6M: NOT DETECTED
HYDROMORPHONE, OPI3M: NOT DETECTED
LORAZEPAM, URINE: NOT DETECTED
MARIJUANA METAB, UR: NEGATIVE
MDA, UR: NOT DETECTED
MDEA, EVE, UR: NOT DETECTED
MDMA URINE: NOT DETECTED
MEPERIDINE METAB, UR: NOT DETECTED
METHADONE, URINE: NEGATIVE
METHAMPHETAMINE, URINE: NOT DETECTED
METHYLPHENIDATE: NOT DETECTED
MIDAZOLAM, URINE: NOT DETECTED
MORPHINE, OPI1M: NOT DETECTED
NALOXONE URINE: NOT DETECTED
NORBUPRENORPHINE, URINE: NOT DETECTED
NORDIAZEPAM, URINE: NOT DETECTED
NORFENTANYL, URINE: NOT DETECTED
NORHYDROCODONE, URINE: NOT DETECTED
NOROXYCODONE, URINE: NOT DETECTED
NOROXYMORPHONE, URINE: NOT DETECTED
OXAZEPAM, URINE: NOT DETECTED
OXYCODONE URINE: NOT DETECTED
OXYMORPHONE, URINE: NOT DETECTED
PAIN MANAGEMENT DRUG PANEL INTERP, URINE: NORMAL
PAIN MGT DRUG PANEL, HI RES, UR: NORMAL
PCP,URINE: NEGATIVE
PHENTERMINE, UR: NOT DETECTED
PREGABALIN: NOT DETECTED
TAPENTADOL, URINE: NOT DETECTED
TAPENTADOL-O-SULFATE, URINE: NOT DETECTED
TEMAZEPAM, URINE: NOT DETECTED
TRAMADOL, URINE: NORMAL
ZOLPIDEM METABOLITE (ZCA), URINE: NOT DETECTED
ZOLPIDEM, URINE: NOT DETECTED

## 2023-12-11 ENCOUNTER — OFFICE VISIT (OUTPATIENT)
Dept: CARDIOLOGY | Age: 78
End: 2023-12-11
Payer: MEDICARE

## 2023-12-11 VITALS
BODY MASS INDEX: 31.36 KG/M2 | DIASTOLIC BLOOD PRESSURE: 52 MMHG | HEIGHT: 71 IN | RESPIRATION RATE: 18 BRPM | HEART RATE: 58 BPM | OXYGEN SATURATION: 97 % | WEIGHT: 224 LBS | SYSTOLIC BLOOD PRESSURE: 90 MMHG

## 2023-12-11 DIAGNOSIS — I50.32 CHRONIC DIASTOLIC CONGESTIVE HEART FAILURE (HCC): Primary | ICD-10-CM

## 2023-12-11 DIAGNOSIS — R07.89 ATYPICAL CHEST PAIN: ICD-10-CM

## 2023-12-11 DIAGNOSIS — Z95.820 S/P ANGIOPLASTY WITH STENT: ICD-10-CM

## 2023-12-11 DIAGNOSIS — I35.0 SEVERE AORTIC STENOSIS: ICD-10-CM

## 2023-12-11 DIAGNOSIS — I10 ESSENTIAL HYPERTENSION: ICD-10-CM

## 2023-12-11 DIAGNOSIS — I20.89 STABLE ANGINA: ICD-10-CM

## 2023-12-11 DIAGNOSIS — Z95.2 H/O AORTIC VALVE REPLACEMENT: Chronic | ICD-10-CM

## 2023-12-11 DIAGNOSIS — I25.10 ASHD (ARTERIOSCLEROTIC HEART DISEASE): ICD-10-CM

## 2023-12-11 DIAGNOSIS — Z95.1 S/P CABG X 3: ICD-10-CM

## 2023-12-11 DIAGNOSIS — I25.708 CORONARY ARTERY DISEASE OF BYPASS GRAFT OF NATIVE HEART WITH STABLE ANGINA PECTORIS (HCC): ICD-10-CM

## 2023-12-11 PROCEDURE — 93000 ELECTROCARDIOGRAM COMPLETE: CPT | Performed by: FAMILY MEDICINE

## 2023-12-11 PROCEDURE — 1036F TOBACCO NON-USER: CPT | Performed by: FAMILY MEDICINE

## 2023-12-11 PROCEDURE — G8427 DOCREV CUR MEDS BY ELIG CLIN: HCPCS | Performed by: FAMILY MEDICINE

## 2023-12-11 PROCEDURE — 99214 OFFICE O/P EST MOD 30 MIN: CPT | Performed by: FAMILY MEDICINE

## 2023-12-11 PROCEDURE — G8484 FLU IMMUNIZE NO ADMIN: HCPCS | Performed by: FAMILY MEDICINE

## 2023-12-11 PROCEDURE — G8417 CALC BMI ABV UP PARAM F/U: HCPCS | Performed by: FAMILY MEDICINE

## 2023-12-11 PROCEDURE — 3074F SYST BP LT 130 MM HG: CPT | Performed by: FAMILY MEDICINE

## 2023-12-11 PROCEDURE — 1123F ACP DISCUSS/DSCN MKR DOCD: CPT | Performed by: FAMILY MEDICINE

## 2023-12-11 PROCEDURE — 3078F DIAST BP <80 MM HG: CPT | Performed by: FAMILY MEDICINE

## 2023-12-11 RX ORDER — ATORVASTATIN CALCIUM 40 MG/1
40 TABLET, FILM COATED ORAL NIGHTLY
Qty: 90 TABLET | Refills: 3 | Status: SHIPPED | OUTPATIENT
Start: 2023-12-11

## 2023-12-11 RX ORDER — ISOSORBIDE MONONITRATE 30 MG/1
30 TABLET, EXTENDED RELEASE ORAL DAILY
Qty: 90 TABLET | Refills: 3 | Status: SHIPPED | OUTPATIENT
Start: 2023-12-11

## 2023-12-11 RX ORDER — METOPROLOL SUCCINATE 50 MG/1
50 TABLET, EXTENDED RELEASE ORAL DAILY
Qty: 90 TABLET | Refills: 3 | Status: SHIPPED | OUTPATIENT
Start: 2023-12-11

## 2023-12-11 RX ORDER — RIVAROXABAN 2.5 MG/1
TABLET, FILM COATED ORAL
Qty: 180 TABLET | Refills: 3 | Status: SHIPPED | OUTPATIENT
Start: 2023-12-11

## 2023-12-11 RX ORDER — LISINOPRIL 5 MG/1
5 TABLET ORAL DAILY
Qty: 90 TABLET | Refills: 3 | Status: SHIPPED | OUTPATIENT
Start: 2023-12-11

## 2023-12-11 NOTE — PATIENT INSTRUCTIONS
SURVEY:    You may be receiving a survey from [employer] regarding your visit today. Please complete the survey to enable us to provide the highest quality of care to you and your family. If you cannot score us a very good on any question, please call the office to discuss how we could have made your experience a very good one. Thank you.

## 2023-12-11 NOTE — PROGRESS NOTES
I, Everett Peter am scribing for and in the presence of Juan Davila MD, MS, F.A.C.C. Patient: Esmer Beavers  : 1945  Date of Visit: May 17, 2023    REASON FOR VISIT / CONSULTATION: 6 Month Follow-Up (Patient in office for 6 month follow up. Patient states he is doing okay. Denies CP, SOB, Palpitations, Dizziness, lightheadedness. )    Dear HALEY Harmon CNP,    I had the pleasure of seeing Esmer Beavers in my office today. Mr. Elisa Quiroz is a 78 y.o. male with a history of atherosclerotic heart disease. In 2017 Mr. Elisa Quiroz had went into surgery for his shoulder and postoperatively had a myocardial infarction which led to a triple vessel coronary artery bypass as well as a aortic valve replacement  due to severe aortic stenosis. He was found to have postoperative atrial fibrillation which led to him starting on Amiodarone but had been stopped six weeks after due to no reoccurrence of atrial fibrillation. He had a ulcer in his small intenstine which had caused internal bleeding but has since been resolved and is taking pepcid for this. He was continued on Eliquis but had recently been stopped since there has been no evidence of atrial fibrillation including on his recent holter monitor. His cardiac catheterization on 2018 showed a 75% stenosis in RCA as well as a 70% stenosis in his Circumflex, but when he was sent down to [facility] for possible stenting treatment, however Dr. Zaria Morgan performed an FFR of the lesion which showed that the stenosis was about 60 percent blockage and therefore not likely to benefit from stenting. Although there was also a severe stenosis in the junction of the LIMA-LAD, this was felt to be high risk stenting and therefore deferred for a trial of guideline directed trial of maximal medical management including cardiac rehab. His echocardiogram on 2018 with an ejection fraction of 50-55% with a bioprosthetic aortic valve.   Mr. Elisa Quiroz had echo done 2020

## 2023-12-13 ENCOUNTER — OFFICE VISIT (OUTPATIENT)
Age: 78
End: 2023-12-13
Payer: MEDICARE

## 2023-12-13 VITALS
DIASTOLIC BLOOD PRESSURE: 64 MMHG | BODY MASS INDEX: 31.24 KG/M2 | WEIGHT: 224 LBS | SYSTOLIC BLOOD PRESSURE: 100 MMHG | RESPIRATION RATE: 19 BRPM | HEART RATE: 67 BPM | OXYGEN SATURATION: 97 %

## 2023-12-13 DIAGNOSIS — Z79.01 LONG TERM (CURRENT) USE OF ANTICOAGULANTS: ICD-10-CM

## 2023-12-13 DIAGNOSIS — M51.36 LUMBAR DEGENERATIVE DISC DISEASE: ICD-10-CM

## 2023-12-13 DIAGNOSIS — Z79.891 CHRONIC USE OF OPIATE FOR THERAPEUTIC PURPOSE: ICD-10-CM

## 2023-12-13 DIAGNOSIS — M47.817 LUMBOSACRAL SPONDYLOSIS WITHOUT MYELOPATHY: Primary | ICD-10-CM

## 2023-12-13 PROCEDURE — G8484 FLU IMMUNIZE NO ADMIN: HCPCS | Performed by: STUDENT IN AN ORGANIZED HEALTH CARE EDUCATION/TRAINING PROGRAM

## 2023-12-13 PROCEDURE — 3078F DIAST BP <80 MM HG: CPT | Performed by: STUDENT IN AN ORGANIZED HEALTH CARE EDUCATION/TRAINING PROGRAM

## 2023-12-13 PROCEDURE — 1123F ACP DISCUSS/DSCN MKR DOCD: CPT | Performed by: STUDENT IN AN ORGANIZED HEALTH CARE EDUCATION/TRAINING PROGRAM

## 2023-12-13 PROCEDURE — G8427 DOCREV CUR MEDS BY ELIG CLIN: HCPCS | Performed by: STUDENT IN AN ORGANIZED HEALTH CARE EDUCATION/TRAINING PROGRAM

## 2023-12-13 PROCEDURE — 1036F TOBACCO NON-USER: CPT | Performed by: STUDENT IN AN ORGANIZED HEALTH CARE EDUCATION/TRAINING PROGRAM

## 2023-12-13 PROCEDURE — 99214 OFFICE O/P EST MOD 30 MIN: CPT | Performed by: STUDENT IN AN ORGANIZED HEALTH CARE EDUCATION/TRAINING PROGRAM

## 2023-12-13 PROCEDURE — 3074F SYST BP LT 130 MM HG: CPT | Performed by: STUDENT IN AN ORGANIZED HEALTH CARE EDUCATION/TRAINING PROGRAM

## 2023-12-13 PROCEDURE — G8417 CALC BMI ABV UP PARAM F/U: HCPCS | Performed by: STUDENT IN AN ORGANIZED HEALTH CARE EDUCATION/TRAINING PROGRAM

## 2023-12-13 RX ORDER — TRAMADOL HYDROCHLORIDE 50 MG/1
50 TABLET ORAL
Qty: 75 TABLET | Refills: 0 | Status: SHIPPED | OUTPATIENT
Start: 2023-12-13 | End: 2024-01-12

## 2023-12-13 RX ORDER — GABAPENTIN 400 MG/1
400 CAPSULE ORAL 3 TIMES DAILY
Qty: 90 CAPSULE | Refills: 2 | Status: SHIPPED | OUTPATIENT
Start: 2023-12-13 | End: 2024-03-12

## 2023-12-13 RX ORDER — CLOPIDOGREL BISULFATE 75 MG/1
TABLET ORAL
COMMUNITY
Start: 2023-12-13 | End: 2023-12-13

## 2023-12-13 NOTE — PROGRESS NOTES
Chronic Pain Clinic Note     Encounter Date: 12/13/2023     SUBJECTIVE:  Chief Complaint   Patient presents with    Back Pain       History of Present Illness:   Jose Angel Carrillo is a 78 y.o. male who presents with chronic lumbar pain, here to discuss medication options. UDS complete. He is scheduled for his injections. Medication Refill: tramadol, gabapentin    Current Complaints of Pain:   Location: Low back  Radiation: None  Severity: moderate, sometimes severe  Pain Numerical Score - 4-5   Average: 5-6     Highest: 10  Lowest: 4  Character/Quality: Complains of pain that is aching, stab, sharp  Timing: Constant  Associated symptoms: none  Numbness:   Weakness:   Exacerbating factors: walking, standing   Alleviating factors:   Length of time pain has been present: Started on - chronic   Inciting event/injury: None  Bowel/Bladder incontinence: No  Falls: Yes  Physical Therapy: yes     History of Interventions:   Surgery: No previous lumbar/cervical surgeries  Injections: Yes    Imaging:    Lumbar MRI 6/2/2023    MR LUMBAR SPINE WO CONT     Multisequence multiplanar imaging of the lumbar spine was obtained utilizing the routine lumbar protocol. Inversion recovery images show normal bone marrow signal without edema, contusion, or fracture. Endplates are intact. There is no compression or malalignment. At the L5-S1 level there is   hypertrophic degenerative changes and facet arthropathy. However, the neural foramina are only narrowed to a mild degree bilaterally, left more so than right. Central canal is widely patent. Remainder the lumbar levels show mild degenerative changes without significant central canal or neural   foraminal compromise. Sacral ala and SI joints appear intact within the limits of this exam. There is incidental left renal cyst.     IMPRESSION:     Mild to moderate changes, predominantly at L5-S1, without significant central canal stenosis.      Past Medical History:   Diagnosis Date    Chronic

## 2023-12-15 DIAGNOSIS — F34.1 DYSTHYMIA: ICD-10-CM

## 2023-12-15 RX ORDER — BUPROPION HYDROCHLORIDE 150 MG/1
150 TABLET ORAL EVERY MORNING
Qty: 90 TABLET | Refills: 3 | Status: SHIPPED | OUTPATIENT
Start: 2023-12-15

## 2023-12-15 NOTE — TELEPHONE ENCOUNTER
Health Maintenance   Topic Date Due    Respiratory Syncytial Virus (RSV) age 61 yrs+ (3 - 1-dose 60+ series) Never done    Lipids  10/10/2023    DTaP/Tdap/Td vaccine (1 - Tdap) 02/15/2024 (Originally 2/5/1964)    Hepatitis B vaccine (1 of 3 - Risk 3-dose series) 09/14/2024 (Originally 2/5/2003)    Depression Monitoring  11/01/2024    Annual Wellness Visit (AWV)  11/01/2024    Flu vaccine  Completed    Shingles vaccine  Completed    Pneumococcal 65+ years Vaccine  Completed    COVID-19 Vaccine  Completed    Hepatitis A vaccine  Aged Out    Hib vaccine  Aged Out    Meningococcal (ACWY) vaccine  Aged Out    Depression Screen  Discontinued             (applicable per patient's age: Cancer Screenings, Depression Screening, Fall Risk Screening, Immunizations)    Hemoglobin A1C (%)   Date Value   11/01/2023 6.3   05/17/2023 7.0 (H)   04/27/2023 6.8     LDL Cholesterol (mg/dL)   Date Value   10/10/2022 49     AST (U/L)   Date Value   10/10/2022 17     ALT (U/L)   Date Value   10/10/2022 21     BUN (mg/dL)   Date Value   05/17/2023 16      (goal A1C is < 7)   (goal LDL is <100) need 30-50% reduction from baseline     BP Readings from Last 3 Encounters:   12/13/23 100/64   12/11/23 (!) 90/52   11/16/23 118/64    (goal /80)      All Future Testing planned in [facility]:  Lab Frequency Next Occurrence   CBC with Auto Differential Once 10/24/2023   ALT Once 10/27/2023   AST Once 72/14/9728   Basic Metabolic Panel Once 37/42/1050   Hemoglobin A1C Once 10/24/2023   Lipid Panel Once 10/24/2023   Microalbumin, Ur Once 10/24/2023   XR RADIUS ULNA LEFT (2 VIEWS) Once 08/09/2023   XR ELBOW LEFT (MIN 3 VIEWS) Once 08/09/2023   CBC with Auto Differential Once 04/29/2024   ALT Once 05/01/2024   AST Once 53/58/8345   Basic Metabolic Panel Once 33/69/2456   Hemoglobin A1C Once 04/29/2024   Lipid Panel Once 04/29/2024   Microalbumin, Ur Once 04/29/2024   FACET JOINT L/S Once 11/16/2023   FACET JOINT L/S 2ND LEVEL Once 11/16/2023   Echo

## 2024-01-18 ENCOUNTER — APPOINTMENT (OUTPATIENT)
Dept: GENERAL RADIOLOGY | Age: 79
End: 2024-01-18
Attending: STUDENT IN AN ORGANIZED HEALTH CARE EDUCATION/TRAINING PROGRAM
Payer: MEDICARE

## 2024-01-18 ENCOUNTER — HOSPITAL ENCOUNTER (OUTPATIENT)
Age: 79
Setting detail: OUTPATIENT SURGERY
Discharge: HOME | End: 2024-01-18
Attending: STUDENT IN AN ORGANIZED HEALTH CARE EDUCATION/TRAINING PROGRAM | Admitting: STUDENT IN AN ORGANIZED HEALTH CARE EDUCATION/TRAINING PROGRAM
Payer: MEDICARE

## 2024-01-18 VITALS
OXYGEN SATURATION: 94 % | BODY MASS INDEX: 30.48 KG/M2 | RESPIRATION RATE: 16 BRPM | DIASTOLIC BLOOD PRESSURE: 63 MMHG | SYSTOLIC BLOOD PRESSURE: 141 MMHG | HEIGHT: 72 IN | TEMPERATURE: 98 F | HEART RATE: 70 BPM | WEIGHT: 225 LBS

## 2024-01-18 PROCEDURE — 2500000003 HC RX 250 WO HCPCS: Performed by: STUDENT IN AN ORGANIZED HEALTH CARE EDUCATION/TRAINING PROGRAM

## 2024-01-18 PROCEDURE — 2709999900 HC NON-CHARGEABLE SUPPLY: Performed by: STUDENT IN AN ORGANIZED HEALTH CARE EDUCATION/TRAINING PROGRAM

## 2024-01-18 PROCEDURE — 3600000002 HC SURGERY LEVEL 2 BASE: Performed by: STUDENT IN AN ORGANIZED HEALTH CARE EDUCATION/TRAINING PROGRAM

## 2024-01-18 PROCEDURE — 64494 INJ PARAVERT F JNT L/S 2 LEV: CPT | Performed by: STUDENT IN AN ORGANIZED HEALTH CARE EDUCATION/TRAINING PROGRAM

## 2024-01-18 PROCEDURE — 64493 INJ PARAVERT F JNT L/S 1 LEV: CPT | Performed by: STUDENT IN AN ORGANIZED HEALTH CARE EDUCATION/TRAINING PROGRAM

## 2024-01-18 RX ORDER — LIDOCAINE HYDROCHLORIDE 20 MG/ML
INJECTION, SOLUTION EPIDURAL; INFILTRATION; INTRACAUDAL; PERINEURAL PRN
Status: DISCONTINUED | OUTPATIENT
Start: 2024-01-18 | End: 2024-01-18 | Stop reason: ALTCHOICE

## 2024-01-18 RX ORDER — TRAMADOL HYDROCHLORIDE 50 MG/1
50 TABLET ORAL 2 TIMES DAILY
COMMUNITY
End: 2024-01-22 | Stop reason: SDUPTHER

## 2024-01-18 ASSESSMENT — PAIN DESCRIPTION - DESCRIPTORS: DESCRIPTORS: PRESSURE

## 2024-01-18 ASSESSMENT — PAIN - FUNCTIONAL ASSESSMENT: PAIN_FUNCTIONAL_ASSESSMENT: 0-10

## 2024-01-18 ASSESSMENT — PAIN SCALES - GENERAL: PAINLEVEL_OUTOF10: 3

## 2024-01-18 NOTE — PROGRESS NOTES
Discharge instructions reviewed with patient and patient's responsible adult. Verbalized understanding and denied any questions.

## 2024-01-18 NOTE — OP NOTE
PROCEDURE PERFORMED: Bilateral Lumbar medial branch block    PREOPERATIVE DIAGNOSIS: Lumbosacral spondylosis    INDICATIONS: Chronic low back pain    The patient's history and physical exam were reviewed.  The risk, benefits, and alternatives of the procedure were discussed and all questions were answered to the patient's satisfaction.  The patient agreed to proceed and written informed consent was obtained.    POSTOPERATIVE DIAGNOSIS: Lumbar spondylosis    PHYSICIAN:  Dr. Geovany Kennedy DO    ANESTHESIA:  LOCAL    ASSISTANT:  NONE    PATHOLOGY:  NONE    ESTIMATED BLOOD LOSS:  N/A    IMPLANTS:  NONE    PROCEDURE DESCRIPTION: Diagnostic bilateral lumbar medial branch block using fluoroscopy    The patient was placed on the operative bed in prone position.  The area was prepped with  Chlorhexidine.  The area was then draped in a sterile fashion.    Targeted levels: Bilateral lumbar L3, L4, L5 medial branch block    An AP  fluoroscopy image was used to identify and monie Agustin's point at the L3, L4 levels on the targeted side.  Additionally, the junction of the SAP and sacral ala was also marked on the same side.   A 25-gauge 3-1/2 inch Quincke spinal needle was then advanced toward each of these points under fluoroscopic guidance.  Once bone was contacted, negative aspiration was confirmed and 0.5 mL of lidocaine 2% was injected each level.  The needles were removed and the needle sites were dressed appropriately. The same procedure was performed on the opposite side.    The patient was transferred to the postoperative care unit in stable condition.  Written discharge instructions were given to the patient.  The patient was given a pain diary upon discharge.    COMPLICATIONS:  There were no apparent complications.  The patient tolerated the procedure well.

## 2024-01-18 NOTE — H&P
the patient/family and informed consent has been obtained for the procedure/sedation.    Diagnosis:   Lumbosacral spondylosis      Plan: Bilateral lumbar L3, L4, L5 medial branch block    Geovany Kennedy DO

## 2024-01-18 NOTE — DISCHARGE INSTRUCTIONS
You have received an injection of local anesthetic and corticosteroids.     The local anesthetic effect typically last 4-6 hours.     It may take 3-7 days for the corticosteroids to reach optimal effect.    Please pay close attention to the following information/instructions:    You may NOT drive for 12 hours after your injection if you had sedation or epidural injection, operate heavy machinery or make important decisions.  It is common to experience mild soreness at the injection site(s) for 24-48 hours. Ice is the best remedy. You may apply ice for 20 minutes at a time several times a day as needed.  Avoid heat to the injection area for 72 hours. No hot packs, saunas, or steam rooms during this time. A regular shower is OK.  You may immediately restart your regular medication regimen, including pain medications, anti-inflammatory, and blood thinners.  Please remove the sterile dressing/band-aid tonight or tomorrow morning. Do not leave it on after you have taken a shower or gotten it wet.  Corticosteroid side effects can occur after this injection, but they usually resolve after several days. These side effects can include flushing, hot flashes, mild palpitations, insomnia, water retention, feeling anxious/restless, or headaches.  While it is extremely rare to get an infection after a spinal procedure, please call the office if you develop any signs of infection. These signs include fevers/chills, severely increased pain, redness at the injections site, or any drainage from the injection site.  Remember that the corticosteroid benefit (long-term pain relief) from an injection can take as long as 10 days to occur. You may have a period of slightly increased pain after your injection before the cortisone takes effect.  You may resume all of your normal daily activities 24 hours after your injection.  It is OK to restart your exercise or physical therapy program as soon as you feel comfortable doing so.  Please  complete the pain diary given to you after your injection. The information you provide on this diary is used to guide your treatment plan.  You should schedule a follow-up visit in 2-3 weeks to review your response to this injection.  Please bring your pain diary with you to this appointment.        Education Materials Received: yes  Belongings Returned: yes    Resume all current outpatient medication(s).         The discharge instructions have been reviewed with the patient and/or Guardian.  Patient and/or Guardian signed and retained a printed copy.

## 2024-01-20 RX ORDER — TAMSULOSIN HYDROCHLORIDE 0.4 MG/1
CAPSULE ORAL
Qty: 180 CAPSULE | Refills: 3 | Status: SHIPPED | OUTPATIENT
Start: 2024-01-20

## 2024-01-22 ENCOUNTER — TELEPHONE (OUTPATIENT)
Age: 79
End: 2024-01-22

## 2024-01-22 DIAGNOSIS — M47.817 LUMBOSACRAL SPONDYLOSIS WITHOUT MYELOPATHY: Primary | ICD-10-CM

## 2024-01-22 NOTE — TELEPHONE ENCOUNTER
Spouse states that the patient had 100% relief all day  80% relief for 2-3 days    The patient is still having relief today as well.

## 2024-01-23 RX ORDER — TRAMADOL HYDROCHLORIDE 50 MG/1
50 TABLET ORAL 3 TIMES DAILY PRN
Qty: 75 TABLET | Refills: 0 | Status: SHIPPED | OUTPATIENT
Start: 2024-01-23 | End: 2024-02-22

## 2024-02-06 ENCOUNTER — TELEPHONE (OUTPATIENT)
Dept: PRIMARY CARE CLINIC | Age: 79
End: 2024-02-06

## 2024-02-06 NOTE — TELEPHONE ENCOUNTER
Parameds contacted office yesterday stating that they have never received this document, this document was attempted to be faxed 4 other times on a different fax number. Writer advised caller of this and they offered a different fax number [phone] writer has attempted this fax number 5 times and got no answer or a busy response.

## 2024-02-14 ENCOUNTER — OFFICE VISIT (OUTPATIENT)
Age: 79
End: 2024-02-14
Payer: MEDICARE

## 2024-02-14 VITALS
SYSTOLIC BLOOD PRESSURE: 97 MMHG | OXYGEN SATURATION: 98 % | HEART RATE: 73 BPM | RESPIRATION RATE: 18 BRPM | WEIGHT: 225 LBS | DIASTOLIC BLOOD PRESSURE: 61 MMHG | BODY MASS INDEX: 30.52 KG/M2

## 2024-02-14 DIAGNOSIS — M47.817 LUMBOSACRAL SPONDYLOSIS WITHOUT MYELOPATHY: ICD-10-CM

## 2024-02-14 DIAGNOSIS — Z79.01 LONG TERM (CURRENT) USE OF ANTICOAGULANTS: ICD-10-CM

## 2024-02-14 DIAGNOSIS — M51.36 LUMBAR DEGENERATIVE DISC DISEASE: ICD-10-CM

## 2024-02-14 DIAGNOSIS — Z79.891 CHRONIC USE OF OPIATE FOR THERAPEUTIC PURPOSE: Primary | ICD-10-CM

## 2024-02-14 PROCEDURE — G8427 DOCREV CUR MEDS BY ELIG CLIN: HCPCS | Performed by: STUDENT IN AN ORGANIZED HEALTH CARE EDUCATION/TRAINING PROGRAM

## 2024-02-14 PROCEDURE — G8417 CALC BMI ABV UP PARAM F/U: HCPCS | Performed by: STUDENT IN AN ORGANIZED HEALTH CARE EDUCATION/TRAINING PROGRAM

## 2024-02-14 PROCEDURE — 99214 OFFICE O/P EST MOD 30 MIN: CPT | Performed by: STUDENT IN AN ORGANIZED HEALTH CARE EDUCATION/TRAINING PROGRAM

## 2024-02-14 PROCEDURE — 1123F ACP DISCUSS/DSCN MKR DOCD: CPT | Performed by: STUDENT IN AN ORGANIZED HEALTH CARE EDUCATION/TRAINING PROGRAM

## 2024-02-14 PROCEDURE — 3078F DIAST BP <80 MM HG: CPT | Performed by: STUDENT IN AN ORGANIZED HEALTH CARE EDUCATION/TRAINING PROGRAM

## 2024-02-14 PROCEDURE — G8484 FLU IMMUNIZE NO ADMIN: HCPCS | Performed by: STUDENT IN AN ORGANIZED HEALTH CARE EDUCATION/TRAINING PROGRAM

## 2024-02-14 PROCEDURE — 1036F TOBACCO NON-USER: CPT | Performed by: STUDENT IN AN ORGANIZED HEALTH CARE EDUCATION/TRAINING PROGRAM

## 2024-02-14 PROCEDURE — 3074F SYST BP LT 130 MM HG: CPT | Performed by: STUDENT IN AN ORGANIZED HEALTH CARE EDUCATION/TRAINING PROGRAM

## 2024-02-14 RX ORDER — TRAMADOL HYDROCHLORIDE 50 MG/1
50 TABLET ORAL 3 TIMES DAILY PRN
Qty: 75 TABLET | Refills: 0 | Status: SHIPPED | OUTPATIENT
Start: 2024-02-22 | End: 2024-03-23

## 2024-02-14 NOTE — H&P (VIEW-ONLY)
pain     Coronary artery disease     s/p MI and CABG in 12/2017    H/O aortic valve replacement 12/16/2017    Bioprosthetic valve     H/O cardiac catheterization 12/06/2018    Severe three vessel disease involving the LAD, circumflex and right coronary arteries  2 of 3 bypass grafts patent Normal LVEDP.Consult to interventional cardiology for likely angioplasty and/or  stenting of the patients severe stenosis with likely stenting of the unrevascularized RCA stenosis with plans to intervene on the higher risk LIMA-LAD stenosis only at a later date if the patient fails gu    H/O three vessel coronary artery bypass 12/2017    History of atrial fibrillation 12/2017    following CABG 12/2017    History of blood transfusion     History of cardiac cath 07/15/2021    [facility]/Dr. Yoon/Left Radial     History of myocardial infarction 12/2017    Hyperlipidemia     Hypertension     MI (myocardial infarction) ([facility]) 12/2017    POST-OP    Osteoarthritis     Sleep apnea     Type 2 diabetes mellitus without complication ([facility])        Past Surgical History:   Procedure Laterality Date    CARDIAC CATHETERIZATION Left 12/06/2018    Dr Yoon/[facility]/left radial-Severe three vessel disease involving the LAD and circumflex coronary arteries with moderate to severe disease in the proximal right coronary arteries. 2 of 3 bypass grafts patent with a known to be occluded SVG-RCA and a 80-90% stenosis in the touchdown of the LIMA-LAD.    CARDIAC CATHETERIZATION  12/06/2018    cardiac catheterization on 12/6/2018 showed a 75% stenosis in RCA as well as a 70% stenosis in his Circumflex    COLONOSCOPY      CORONARY ANGIOPLASTY WITH STENT PLACEMENT  07/16/2021    CORONARY ARTERY BYPASS GRAFT  12/2017    EYE SURGERY Bilateral     cataracts    INTRACAPSULAR CATARACT EXTRACTION      KNEE ARTHROPLASTY      PAIN MANAGEMENT PROCEDURE Bilateral 1/18/2024    LUMBAR MEDIAL BRANCH BLOCK-L3,L4,L5 performed by Geovany Kennedy DO at  Education provided to patient regarding short term and long term implications of opioid medication use. Repeat opioid risk stratification today, discussion regarding functional achievements, safe storage, and optimization of non-opioid interventional, behavioral, and pharmaceutical modalities. Will continue attempt to wean off medication as appropriate.    Geovany Kennedy,   Interventional Pain Management/PM&R   [facility]    Orders Placed This Encounter    traMADol (ULTRAM) 50 MG tablet     Sig: Take 1 tablet by mouth 3 times daily as needed for Pain for up to 30 days. Max Daily Amount: 150 mg     Dispense:  75 tablet     Refill:  0     Reduce doses taken as pain becomes manageable

## 2024-02-14 NOTE — PROGRESS NOTES
if successful.     Neurologically, it appears the patient has full strength and normal sensation. There is no evidence of radiculopathy or myelopathy on examination. There are no red flags in the patient's history. The patient has failed conservative measures including outpatient physical therapy, greater than 3 medications for pain relief, a self-directed therapy program, as well as activity modification all within the last 6 weeks over 3 months. The patient's pain is moderate to severe that causes functional deficit measured on pain and disability scale. The patient reports worsening quality of life.    The patient continues to take opioid medications to improve pain, function and quality of life.  The patient denies any side effects from the medications including constipation or respiratory depression.  The patient reports adequate analgesia with the medication.  There is no evidence of aberrant behavior.  At todays visit, I refilled his Tramadol.    PLAN:  Medications:    For nonopioid therapy, the following medications were prescribed:    -Continue gabapentin 400 mg 3 times daily    Opioid therapy:  -Continue Tramadol 50 mg 2-3 times daily as needed, refilled  -Pain Treatment agreement: 12/13/2023  -Urine Drug Screen:11/16/2023, reviewed and appropriate except for alcohol  -OARRS reviewed and appropriate    Interventions:  -Plan for bilateral lumbar L3, L4, L5 medial branch block - confirmatory block  -No need to hold Xarelto or aspirin    Imaging:  -No new imaging    Behavioral Therapies:  -Continue daily stretching and home exercise program    Referrals:  -None    Follow-up Plan:  -2 months    Patient was offered intervention where appropriate.     Multi-modal Pain Therapy:  The patient was explicitly considered for multimodal and interdisciplinary therapy. Non-opioid and non-pharmacological opportunities to enhance analgesia and quality of life have been and will continue to be pursued.    Opioid Therapy:

## 2024-02-14 NOTE — PATIENT INSTRUCTIONS
Survey:    You may be receiving a survey from [employer] regarding your visit today.    Please complete the survey to enable us to provide the highest quality of care to you and your family.    If you cannot score us a very good on any question, please call the office to discuss how we could have made your experience a very good one.    Thank you.    [facility]  DO Kellie Mcdonald, CMA

## 2024-02-15 ENCOUNTER — TELEPHONE (OUTPATIENT)
Age: 79
End: 2024-02-15

## 2024-02-15 NOTE — TELEPHONE ENCOUNTER
SURGERY SCHEDULING FORM  [facility]   [address] [place] [address]    Phone [phone] Fax [phone]      Rubin Kenney 1945 male    [address] [address]  Marital Status:           Home Phone: [phone]      Cell Phone:    Telephone Information:   Mobile [phone]      This is a reschedule from 2/15/24       Surgeon: Brent   Surgery Date: 3/7/24     Time:     Procedure: bilateral lumbar L3, L4, L5 medial branch blocks    Diagnosis: M47.817     Important Medical History:  In Epic    Special Inst/Equip: Regular    CPT Codes:    75609  Latex Allergy: No     Cardiac Device:  No    Anesthesia:  Local          Admission Type:  Same Day                          Admit Prior to Day of Surgery: No    Case Location:  Ambulatory            Preadmission Testing:  Phone Call          PAT Date and Time:     Need Preop Cardiac Clearance: No    Does Patient have Cardiologist/physician?     Yes    No need to hold Xarelto or ASA

## 2024-02-24 DIAGNOSIS — M47.817 LUMBOSACRAL SPONDYLOSIS WITHOUT MYELOPATHY: ICD-10-CM

## 2024-02-26 ENCOUNTER — TELEPHONE (OUTPATIENT)
Dept: SURGERY | Age: 79
End: 2024-02-26

## 2024-02-26 NOTE — TELEPHONE ENCOUNTER
Yes sorry, prescription sent 2/22/24 to [facility]. Writer called wife and left her a message of this.

## 2024-02-27 RX ORDER — TRAMADOL HYDROCHLORIDE 50 MG/1
50 TABLET ORAL 3 TIMES DAILY PRN
Qty: 75 TABLET | Refills: 0 | OUTPATIENT
Start: 2024-02-27

## 2024-02-29 ENCOUNTER — HOSPITAL ENCOUNTER (OUTPATIENT)
Age: 79
Setting detail: SPECIMEN
Discharge: HOME | End: 2024-02-29
Payer: MEDICARE

## 2024-02-29 ENCOUNTER — OFFICE VISIT (OUTPATIENT)
Dept: UROLOGY | Age: 79
End: 2024-02-29
Payer: MEDICARE

## 2024-02-29 VITALS — DIASTOLIC BLOOD PRESSURE: 71 MMHG | TEMPERATURE: 97.1 F | SYSTOLIC BLOOD PRESSURE: 122 MMHG | HEART RATE: 69 BPM

## 2024-02-29 DIAGNOSIS — N40.1 BPH WITH OBSTRUCTION/LOWER URINARY TRACT SYMPTOMS: Primary | ICD-10-CM

## 2024-02-29 DIAGNOSIS — N39.41 URGE INCONTINENCE: ICD-10-CM

## 2024-02-29 DIAGNOSIS — R33.9 INCOMPLETE BLADDER EMPTYING: ICD-10-CM

## 2024-02-29 DIAGNOSIS — Z01.818 PRE-OP TESTING: ICD-10-CM

## 2024-02-29 DIAGNOSIS — N13.8 BPH WITH OBSTRUCTION/LOWER URINARY TRACT SYMPTOMS: Primary | ICD-10-CM

## 2024-02-29 DIAGNOSIS — N13.8 BPH WITH OBSTRUCTION/LOWER URINARY TRACT SYMPTOMS: ICD-10-CM

## 2024-02-29 DIAGNOSIS — N40.1 BPH WITH OBSTRUCTION/LOWER URINARY TRACT SYMPTOMS: ICD-10-CM

## 2024-02-29 PROCEDURE — 99214 OFFICE O/P EST MOD 30 MIN: CPT | Performed by: NURSE PRACTITIONER

## 2024-02-29 PROCEDURE — G8427 DOCREV CUR MEDS BY ELIG CLIN: HCPCS | Performed by: NURSE PRACTITIONER

## 2024-02-29 PROCEDURE — 1036F TOBACCO NON-USER: CPT | Performed by: NURSE PRACTITIONER

## 2024-02-29 PROCEDURE — G8484 FLU IMMUNIZE NO ADMIN: HCPCS | Performed by: NURSE PRACTITIONER

## 2024-02-29 PROCEDURE — 1123F ACP DISCUSS/DSCN MKR DOCD: CPT | Performed by: NURSE PRACTITIONER

## 2024-02-29 PROCEDURE — 87086 URINE CULTURE/COLONY COUNT: CPT

## 2024-02-29 PROCEDURE — 51798 US URINE CAPACITY MEASURE: CPT | Performed by: NURSE PRACTITIONER

## 2024-02-29 PROCEDURE — G8417 CALC BMI ABV UP PARAM F/U: HCPCS | Performed by: NURSE PRACTITIONER

## 2024-02-29 PROCEDURE — 3074F SYST BP LT 130 MM HG: CPT | Performed by: NURSE PRACTITIONER

## 2024-02-29 PROCEDURE — 3078F DIAST BP <80 MM HG: CPT | Performed by: NURSE PRACTITIONER

## 2024-02-29 ASSESSMENT — ENCOUNTER SYMPTOMS
COUGH: 0
BACK PAIN: 0
NAUSEA: 0
VOMITING: 0
ABDOMINAL PAIN: 0
WHEEZING: 0
COLOR CHANGE: 0
SHORTNESS OF BREATH: 0
EYE REDNESS: 0
CONSTIPATION: 0

## 2024-02-29 NOTE — PROGRESS NOTES
Bladderscan performed in office today:  Pt voided 2 hours ago and is unable to urinate in the office, PVR - 275 mL

## 2024-02-29 NOTE — PROGRESS NOTES
HPI:          Patient is a 79 y.o. male in no acute distress.  He is alert and oriented to person, place, and time.         History  Referral from [facility] for frequent UTI.  There is some baseline confusion and he is hard of hearing.  His daughter reports he has been treated multiple times with antibiotics for suspected urinary tract infections.  Upon further review of the chart I only see one positive culture from 2/2020 that shows E. coli.  He did previously see a urologist in [place] before they moved to [place].  He has been on Flomax for the last 3 to 5 years.  He does complain of frequency, urgency, and urge incontinence.  He does not wear pads.  The urge incontinence does result in him needing to change his underwear approximately 4 times per week.  PVR is elevated in the office today, 223ml. He denies constipation.  He denies any dysuria or gross hematuria.  He denies history of stones.  Flomax increased to twice a day    3/2021 Cysto: Bilobar hyperplasia of the prostate with normal bladder anatomy    7/2022 Nocturia x2, urge incontinence.  He does wear briefs and changes once a day and once at night.     Offered medications vs procedure, but he declined    9/2023 cysto showed significant bilobar hyperplasia     Today  Here today to follow-up for BPH.  He is here with his wife today.  They report worsening urgency and incontinence.  She is having to change their bedding at least twice per week due to saturation.  He is using 2-3 depends per day.  They deny any gross hematuria or dysuria.  He does consume coffee, soda, and wine during the day.  He does have daily bowel movements.  He is on Flomax twice per day.  PVR is 275ml.  This is elevated compared to prior.     Past Medical History:   Diagnosis Date    Chronic back pain     Coronary artery disease     s/p MI and CABG in 12/2017    H/O aortic valve replacement 12/16/2017    Bioprosthetic valve     H/O cardiac catheterization 12/06/2018    Severe

## 2024-03-01 LAB
MICROORGANISM SPEC CULT: NORMAL
SPECIMEN DESCRIPTION: NORMAL

## 2024-03-04 ENCOUNTER — HOSPITAL ENCOUNTER (OUTPATIENT)
Age: 79
Discharge: HOME | End: 2024-03-06
Attending: FAMILY MEDICINE
Payer: MEDICARE

## 2024-03-04 ENCOUNTER — OFFICE VISIT (OUTPATIENT)
Dept: CARDIOLOGY | Age: 79
End: 2024-03-04
Payer: MEDICARE

## 2024-03-04 ENCOUNTER — ANESTHESIA EVENT (OUTPATIENT)
Dept: OPERATING ROOM | Age: 79
End: 2024-03-04
Payer: MEDICARE

## 2024-03-04 ENCOUNTER — HOSPITAL ENCOUNTER (OUTPATIENT)
Age: 79
Discharge: HOME | End: 2024-03-04
Attending: FAMILY MEDICINE
Payer: MEDICARE

## 2024-03-04 ENCOUNTER — TELEPHONE (OUTPATIENT)
Dept: CARDIOLOGY | Age: 79
End: 2024-03-04

## 2024-03-04 ENCOUNTER — TELEPHONE (OUTPATIENT)
Dept: UROLOGY | Age: 79
End: 2024-03-04

## 2024-03-04 ENCOUNTER — TELEPHONE (OUTPATIENT)
Dept: PREADMISSION TESTING | Age: 79
End: 2024-03-04

## 2024-03-04 VITALS
RESPIRATION RATE: 16 BRPM | HEIGHT: 72 IN | WEIGHT: 225 LBS | SYSTOLIC BLOOD PRESSURE: 111 MMHG | BODY MASS INDEX: 30.48 KG/M2 | DIASTOLIC BLOOD PRESSURE: 57 MMHG | HEART RATE: 80 BPM

## 2024-03-04 VITALS
BODY MASS INDEX: 30.49 KG/M2 | DIASTOLIC BLOOD PRESSURE: 57 MMHG | SYSTOLIC BLOOD PRESSURE: 111 MMHG | HEIGHT: 72 IN | WEIGHT: 225.09 LBS

## 2024-03-04 DIAGNOSIS — N39.41 URGE INCONTINENCE: ICD-10-CM

## 2024-03-04 DIAGNOSIS — R42 LIGHTHEADED: ICD-10-CM

## 2024-03-04 DIAGNOSIS — N40.1 BPH WITH OBSTRUCTION/LOWER URINARY TRACT SYMPTOMS: ICD-10-CM

## 2024-03-04 DIAGNOSIS — R07.89 ATYPICAL CHEST PAIN: ICD-10-CM

## 2024-03-04 DIAGNOSIS — Z95.1 S/P CABG X 3: ICD-10-CM

## 2024-03-04 DIAGNOSIS — I25.10 ASHD (ARTERIOSCLEROTIC HEART DISEASE): ICD-10-CM

## 2024-03-04 DIAGNOSIS — Z95.820 S/P ANGIOPLASTY WITH STENT: ICD-10-CM

## 2024-03-04 DIAGNOSIS — Z95.2 HISTORY OF AORTIC VALVE REPLACEMENT: ICD-10-CM

## 2024-03-04 DIAGNOSIS — R33.9 INCOMPLETE BLADDER EMPTYING: ICD-10-CM

## 2024-03-04 DIAGNOSIS — I35.0 SEVERE AORTIC STENOSIS: ICD-10-CM

## 2024-03-04 DIAGNOSIS — Z01.818 PRE-OP TESTING: ICD-10-CM

## 2024-03-04 DIAGNOSIS — I50.32 CHRONIC DIASTOLIC CONGESTIVE HEART FAILURE (HCC): ICD-10-CM

## 2024-03-04 DIAGNOSIS — Z01.810 PREOP CARDIOVASCULAR EXAM: Primary | ICD-10-CM

## 2024-03-04 DIAGNOSIS — I10 ESSENTIAL HYPERTENSION: ICD-10-CM

## 2024-03-04 DIAGNOSIS — N13.8 BPH WITH OBSTRUCTION/LOWER URINARY TRACT SYMPTOMS: ICD-10-CM

## 2024-03-04 DIAGNOSIS — I25.708 CORONARY ARTERY DISEASE OF BYPASS GRAFT OF NATIVE HEART WITH STABLE ANGINA PECTORIS (HCC): ICD-10-CM

## 2024-03-04 DIAGNOSIS — Z01.810 PREOP CARDIOVASCULAR EXAM: ICD-10-CM

## 2024-03-04 DIAGNOSIS — I20.89 STABLE ANGINA: ICD-10-CM

## 2024-03-04 LAB
ANION GAP SERPL CALCULATED.3IONS-SCNC: 10 MMOL/L (ref 9–17)
BASOPHILS # BLD: 0.05 K/UL (ref 0–0.2)
BASOPHILS NFR BLD: 1 % (ref 0–2)
BNP SERPL-MCNC: 89 PG/ML
BUN SERPL-MCNC: 16 MG/DL (ref 8–23)
BUN/CREAT SERPL: 20 (ref 9–20)
CALCIUM SERPL-MCNC: 10.1 MG/DL (ref 8.6–10.4)
CHLORIDE SERPL-SCNC: 101 MMOL/L (ref 98–107)
CO2 SERPL-SCNC: 26 MMOL/L (ref 20–31)
CREAT SERPL-MCNC: 0.8 MG/DL (ref 0.7–1.2)
ECHO AO ROOT DIAM: 3.2 CM
ECHO AO ROOT INDEX: 1.43 CM/M2
ECHO AV ACCELERATION TIME: 70.45 MS
ECHO AV MEAN GRADIENT: 13 MMHG
ECHO AV MEAN VELOCITY: 1.7 M/S
ECHO AV PEAK VELOCITY: 2.5 M/S
ECHO AV VTI: 44.9 CM
ECHO BSA: 2.28 M2
ECHO LA DIAMETER INDEX: 2.23 CM/M2
ECHO LA DIAMETER: 5 CM
ECHO LA MAJOR AXIS: 6.1 CM
ECHO LA TO AORTIC ROOT RATIO: 1.56
ECHO LA VOL BP: 39 ML (ref 18–58)
ECHO LA VOL MOD A2C: 53 ML (ref 18–58)
ECHO LA VOL MOD A4C: 27 ML (ref 18–58)
ECHO LA VOL/BSA BIPLANE: 17 ML/M2 (ref 16–34)
ECHO LA VOLUME AREA LENGTH: 41 ML
ECHO LA VOLUME INDEX AREA LENGTH: 18 ML/M2 (ref 16–34)
ECHO LA VOLUME INDEX MOD A2C: 24 ML/M2 (ref 16–34)
ECHO LA VOLUME INDEX MOD A4C: 12 ML/M2 (ref 16–34)
ECHO LV E' LATERAL VELOCITY: 6 CM/S
ECHO LV EDV A2C: 29 ML
ECHO LV EDV A4C: 65 ML
ECHO LV EDV BP: 44 ML (ref 67–155)
ECHO LV EDV INDEX A4C: 29 ML/M2
ECHO LV EDV INDEX BP: 20 ML/M2
ECHO LV EDV NDEX A2C: 13 ML/M2
ECHO LV EJECTION FRACTION BIPLANE: 56 % (ref 55–100)
ECHO LV ESV A2C: 14 ML
ECHO LV ESV A4C: 24 ML
ECHO LV ESV BP: 19 ML (ref 22–58)
ECHO LV ESV INDEX A2C: 6 ML/M2
ECHO LV ESV INDEX A4C: 11 ML/M2
ECHO LV ESV INDEX BP: 8 ML/M2
ECHO LV FRACTIONAL SHORTENING: 21 % (ref 28–44)
ECHO LV INTERNAL DIMENSION DIASTOLE INDEX: 1.47 CM/M2
ECHO LV INTERNAL DIMENSION DIASTOLIC: 3.3 CM (ref 4.2–5.9)
ECHO LV INTERNAL DIMENSION SYSTOLIC INDEX: 1.16 CM/M2
ECHO LV INTERNAL DIMENSION SYSTOLIC: 2.6 CM
ECHO LV IVSD: 1 CM (ref 0.6–1)
ECHO LV IVSS: 1.3 CM
ECHO LV MASS 2D: 101.7 G (ref 88–224)
ECHO LV MASS INDEX 2D: 45.4 G/M2 (ref 49–115)
ECHO LV POSTERIOR WALL DIASTOLIC: 1.1 CM (ref 0.6–1)
ECHO LV POSTERIOR WALL SYSTOLIC: 1.3 CM
ECHO LV RELATIVE WALL THICKNESS RATIO: 0.67
ECHO LVOT AV VTI INDEX: 0.47
ECHO LVOT MEAN GRADIENT: 2 MMHG
ECHO LVOT VTI: 20.9 CM
ECHO MV A VELOCITY: 1.4 M/S
ECHO MV E DECELERATION TIME (DT): 295.6 MS
ECHO MV E VELOCITY: 0.81 M/S
ECHO MV E/A RATIO: 0.58
ECHO MV E/E' LATERAL: 13.5
ECHO MV MEAN GRADIENT: 3 MMHG
ECHO PV MAX VELOCITY: 0.9 M/S
EOSINOPHIL # BLD: 0.26 K/UL (ref 0–0.44)
EOSINOPHILS RELATIVE PERCENT: 5 % (ref 1–4)
ERYTHROCYTE [DISTWIDTH] IN BLOOD BY AUTOMATED COUNT: 13.9 % (ref 11.8–14.4)
GFR SERPL CREATININE-BSD FRML MDRD: >60 ML/MIN/1.73M2
GLUCOSE SERPL-MCNC: 132 MG/DL (ref 70–99)
HCT VFR BLD AUTO: 38.6 % (ref 40.7–50.3)
HGB BLD-MCNC: 13.2 G/DL (ref 13–17)
IMM GRANULOCYTES # BLD AUTO: <0.03 K/UL (ref 0–0.3)
IMM GRANULOCYTES NFR BLD: 0 %
LYMPHOCYTES NFR BLD: 2.42 K/UL (ref 1.1–3.7)
LYMPHOCYTES RELATIVE PERCENT: 42 % (ref 24–43)
MCH RBC QN AUTO: 35.3 PG (ref 25.2–33.5)
MCHC RBC AUTO-ENTMCNC: 34.2 G/DL (ref 28.4–34.8)
MCV RBC AUTO: 103.2 FL (ref 82.6–102.9)
MONOCYTES NFR BLD: 0.59 K/UL (ref 0.1–1.2)
MONOCYTES NFR BLD: 10 % (ref 3–12)
NEUTROPHILS NFR BLD: 42 % (ref 36–65)
NEUTS SEG NFR BLD: 2.45 K/UL (ref 1.5–8.1)
NRBC BLD-RTO: 0 PER 100 WBC
PLATELET # BLD AUTO: 201 K/UL (ref 138–453)
PMV BLD AUTO: 9.6 FL (ref 8.1–13.5)
POTASSIUM SERPL-SCNC: 4.9 MMOL/L (ref 3.7–5.3)
RBC # BLD AUTO: 3.74 M/UL (ref 4.21–5.77)
SODIUM SERPL-SCNC: 137 MMOL/L (ref 135–144)
WBC OTHER # BLD: 5.8 K/UL (ref 3.5–11.3)

## 2024-03-04 PROCEDURE — 3078F DIAST BP <80 MM HG: CPT | Performed by: PHYSICIAN ASSISTANT

## 2024-03-04 PROCEDURE — G8417 CALC BMI ABV UP PARAM F/U: HCPCS | Performed by: PHYSICIAN ASSISTANT

## 2024-03-04 PROCEDURE — 3074F SYST BP LT 130 MM HG: CPT | Performed by: PHYSICIAN ASSISTANT

## 2024-03-04 PROCEDURE — G8484 FLU IMMUNIZE NO ADMIN: HCPCS | Performed by: PHYSICIAN ASSISTANT

## 2024-03-04 PROCEDURE — 36415 COLL VENOUS BLD VENIPUNCTURE: CPT

## 2024-03-04 PROCEDURE — 1123F ACP DISCUSS/DSCN MKR DOCD: CPT | Performed by: PHYSICIAN ASSISTANT

## 2024-03-04 PROCEDURE — 83880 ASSAY OF NATRIURETIC PEPTIDE: CPT

## 2024-03-04 PROCEDURE — 1036F TOBACCO NON-USER: CPT | Performed by: PHYSICIAN ASSISTANT

## 2024-03-04 PROCEDURE — 99214 OFFICE O/P EST MOD 30 MIN: CPT | Performed by: PHYSICIAN ASSISTANT

## 2024-03-04 PROCEDURE — G8427 DOCREV CUR MEDS BY ELIG CLIN: HCPCS | Performed by: PHYSICIAN ASSISTANT

## 2024-03-04 PROCEDURE — 85025 COMPLETE CBC W/AUTO DIFF WBC: CPT

## 2024-03-04 PROCEDURE — 93306 TTE W/DOPPLER COMPLETE: CPT | Performed by: FAMILY MEDICINE

## 2024-03-04 PROCEDURE — 80048 BASIC METABOLIC PNL TOTAL CA: CPT

## 2024-03-04 PROCEDURE — 93000 ELECTROCARDIOGRAM COMPLETE: CPT | Performed by: FAMILY MEDICINE

## 2024-03-04 PROCEDURE — 93306 TTE W/DOPPLER COMPLETE: CPT

## 2024-03-04 NOTE — TELEPHONE ENCOUNTER
Please review chart. Patient has a significant health and cardiac history. Scheduled with Dr. Geiger on 3/12/24. Thank you.

## 2024-03-04 NOTE — TELEPHONE ENCOUNTER
----- Message from HALEY Paulson - CNP sent at 3/4/2024  9:31 AM EST -----  Call pt - urine cx reviewed and negative for UTI

## 2024-03-04 NOTE — PROGRESS NOTES
Patient's wife Celine instructed on the pre-operative, intra-operative, and post-operative process. Patient's wife instructed on pt's NPO status. Medication instructions and pre operative instruction sheet reviewed over the phone with wife. Instructed wife to have the pt stop multivitamins 7 days prior to surgery and to take imdur, gabapentin, wellbutrin, and metoprolol with a small sip of water prior to arriving to the hospital the day of surgery. Patient and wife were instructed to stop aspirin 7 days and xarelto 3 days prior to surgery per cardiologist.

## 2024-03-04 NOTE — PROGRESS NOTES
failure  Measurement of Exercise Tolerance before Surgery >4 No  According to the 2014 ACC/AHA pre-operative risk assessment guidelines Rubin Kenney is at intermediate risk for major cardiac complications during a low risk procedure and may continue as planned. Specific medication recommendations are listed below. Medications recommended to continue should be taken with a sip of water even when NPO.     Medical management to reduce perioperative risk:  Antiplatelet Agent: I would prefer that his aspirin 81 mg be continued throughout the perioperative period but if bleeding risk is to high, this can be stopped 5-7 days prior to the planned procedure but please restart this as soon as safely possible.    Anticoagulant Agent: I would suggest holding his Rivaroxaban (Xarelto) 2-3 days prior to the procedure.   Anticoagulation Bridging: I do not think that heparin/lovenox bridging would be necessary.  Additional Recommendations: I would also suggest that he continue his beta blocker and statin throughout the perioperative period.  Additional Testing List: I ordered a pro BNP level to better assess for the patients level of heart failure. I told them that they could get their lab work performed at the location of their choosing, unfortunately, if the lab work was not performed at a [facility] I could not guarantee my ability to follow up with them on their results.   He is getting an echo done today.    Lightheadedness/dizziness: Well controlled currently.  Nonpharmacologic counseling: Because of his condition, I reminded him to try and keep himself well-hydrated and to take extra time when moving from laying to sitting, sitting to standing and standing to walking. I also explained to him to help improve his symptoms he should include 3 g sodium diet, 1 or 2 L of sports drinks daily, knee-high compressions stockings.  Beta Blocker: Continue Metoprolol succinate (Toprol XL) 50 mg daily.     Atherosclerotic

## 2024-03-04 NOTE — TELEPHONE ENCOUNTER
----- Message from Isha Tello PA-C sent at 3/4/2024  2:39 PM EST -----  Please notify patient that their lab results are stable.   Please continue current treatment and follow up.

## 2024-03-05 ENCOUNTER — TELEPHONE (OUTPATIENT)
Dept: CARDIOLOGY | Age: 79
End: 2024-03-05

## 2024-03-05 NOTE — PROGRESS NOTES
[facility]   Preadmission Testing    Name: Rubin Kenney  : 1945  Patient Phone: [phone] (home)     Procedure: BILATERAL LUMBAR L3, L4, L5 MEDIAL BRANCH BLOCK - Bilateral   Date of Procedure: 2024    Surgeon: Geovany Kennedy DO    Ht:  182.9 cm (6')  Wt: 102.1 kg (225 lb)  Wt method: Stated    Allergies: No Known Allergies             There were no vitals filed for this visit.    No LMP for male patient.    Do you take blood thinners?   [x] Yes    [] No         Instructed to stop blood thinners prior to procedure?    [] Yes    [x] No      [] N/A    []Eliquis - 3 days  []Xarelto - 3 days  []Pradaxa - 5 days  []Coumadin - 5 days   []Plavix - 7 days  []ASA 325mg - 7 days  []Brillinta - 5 days  []Pletal - 2 days   Have you had a respiratory infection or sore throat in last 4 weeks before surgery?    [] Yes    [x] No     Patient instructed on: [x] NPO Status - if receiving sedation  [x] Meds to Take  [x] Ride Home - sedation/ epidurals  [x]No Jewelry/Contact Lenses/Nail Polish     DOS Patient Needs [] HCG   [x] Blood Sugar  [] PT/INR

## 2024-03-05 NOTE — TELEPHONE ENCOUNTER
----- Message from David Yoon MD sent at 3/4/2024 11:43 PM EST -----  Let Mr. Kenney know their test result was ok. Will discuss at next visit. Thanks.

## 2024-03-07 ENCOUNTER — APPOINTMENT (OUTPATIENT)
Dept: GENERAL RADIOLOGY | Age: 79
End: 2024-03-07
Attending: STUDENT IN AN ORGANIZED HEALTH CARE EDUCATION/TRAINING PROGRAM
Payer: MEDICARE

## 2024-03-07 ENCOUNTER — HOSPITAL ENCOUNTER (OUTPATIENT)
Age: 79
Setting detail: OUTPATIENT SURGERY
Discharge: HOME | End: 2024-03-07
Attending: STUDENT IN AN ORGANIZED HEALTH CARE EDUCATION/TRAINING PROGRAM | Admitting: STUDENT IN AN ORGANIZED HEALTH CARE EDUCATION/TRAINING PROGRAM
Payer: MEDICARE

## 2024-03-07 VITALS
RESPIRATION RATE: 19 BRPM | HEIGHT: 72 IN | BODY MASS INDEX: 30.48 KG/M2 | OXYGEN SATURATION: 93 % | HEART RATE: 72 BPM | WEIGHT: 225 LBS | TEMPERATURE: 97.5 F | SYSTOLIC BLOOD PRESSURE: 140 MMHG | DIASTOLIC BLOOD PRESSURE: 64 MMHG

## 2024-03-07 LAB — GLUCOSE BLD-MCNC: 190 MG/DL (ref 65–99)

## 2024-03-07 PROCEDURE — 2500000003 HC RX 250 WO HCPCS: Performed by: STUDENT IN AN ORGANIZED HEALTH CARE EDUCATION/TRAINING PROGRAM

## 2024-03-07 PROCEDURE — 64494 INJ PARAVERT F JNT L/S 2 LEV: CPT | Performed by: STUDENT IN AN ORGANIZED HEALTH CARE EDUCATION/TRAINING PROGRAM

## 2024-03-07 PROCEDURE — 7100000010 HC PHASE II RECOVERY - FIRST 15 MIN: Performed by: STUDENT IN AN ORGANIZED HEALTH CARE EDUCATION/TRAINING PROGRAM

## 2024-03-07 PROCEDURE — 64493 INJ PARAVERT F JNT L/S 1 LEV: CPT | Performed by: STUDENT IN AN ORGANIZED HEALTH CARE EDUCATION/TRAINING PROGRAM

## 2024-03-07 PROCEDURE — 7100000011 HC PHASE II RECOVERY - ADDTL 15 MIN: Performed by: STUDENT IN AN ORGANIZED HEALTH CARE EDUCATION/TRAINING PROGRAM

## 2024-03-07 PROCEDURE — 2709999900 HC NON-CHARGEABLE SUPPLY: Performed by: STUDENT IN AN ORGANIZED HEALTH CARE EDUCATION/TRAINING PROGRAM

## 2024-03-07 PROCEDURE — 82947 ASSAY GLUCOSE BLOOD QUANT: CPT

## 2024-03-07 PROCEDURE — 3600000058 HC PAIN LEVEL 5 BASE: Performed by: STUDENT IN AN ORGANIZED HEALTH CARE EDUCATION/TRAINING PROGRAM

## 2024-03-07 RX ORDER — LIDOCAINE HYDROCHLORIDE 20 MG/ML
INJECTION, SOLUTION EPIDURAL; INFILTRATION; INTRACAUDAL; PERINEURAL PRN
Status: DISCONTINUED | OUTPATIENT
Start: 2024-03-07 | End: 2024-03-07 | Stop reason: ALTCHOICE

## 2024-03-07 ASSESSMENT — PAIN - FUNCTIONAL ASSESSMENT: PAIN_FUNCTIONAL_ASSESSMENT: 0-10

## 2024-03-07 ASSESSMENT — PAIN DESCRIPTION - DESCRIPTORS: DESCRIPTORS: ACHING

## 2024-03-07 NOTE — INTERVAL H&P NOTE
Update History & Physical    The patient's History and Physical of February 14, 2024 was reviewed with the patient and I examined the patient. There was no change. The surgical site was confirmed by the patient and me.     Plan: The risks, benefits, expected outcome, and alternative to the recommended procedure have been discussed with the patient. Patient understands and wants to proceed with the procedure.     Electronically signed by Geovany Kennedy DO on 3/7/2024 at 3:09 PM

## 2024-03-12 ENCOUNTER — HOSPITAL ENCOUNTER (OUTPATIENT)
Age: 79
Setting detail: OUTPATIENT SURGERY
Discharge: HOME | End: 2024-03-12
Attending: UROLOGY | Admitting: UROLOGY
Payer: MEDICARE

## 2024-03-12 ENCOUNTER — ANESTHESIA (OUTPATIENT)
Dept: OPERATING ROOM | Age: 79
End: 2024-03-12
Payer: MEDICARE

## 2024-03-12 VITALS
TEMPERATURE: 97.1 F | HEIGHT: 72 IN | OXYGEN SATURATION: 93 % | BODY MASS INDEX: 30.48 KG/M2 | HEART RATE: 65 BPM | WEIGHT: 225 LBS | SYSTOLIC BLOOD PRESSURE: 112 MMHG | DIASTOLIC BLOOD PRESSURE: 62 MMHG | RESPIRATION RATE: 20 BRPM

## 2024-03-12 DIAGNOSIS — I35.0 SEVERE AORTIC STENOSIS: ICD-10-CM

## 2024-03-12 DIAGNOSIS — R42 LIGHTHEADED: Primary | ICD-10-CM

## 2024-03-12 DIAGNOSIS — I25.10 ASHD (ARTERIOSCLEROTIC HEART DISEASE): ICD-10-CM

## 2024-03-12 DIAGNOSIS — I10 ESSENTIAL HYPERTENSION: ICD-10-CM

## 2024-03-12 DIAGNOSIS — Z95.2 H/O AORTIC VALVE REPLACEMENT: ICD-10-CM

## 2024-03-12 DIAGNOSIS — I50.32 CHRONIC DIASTOLIC CONGESTIVE HEART FAILURE (HCC): ICD-10-CM

## 2024-03-12 DIAGNOSIS — Z95.1 S/P CABG X 3: ICD-10-CM

## 2024-03-12 DIAGNOSIS — R29.6 RECURRENT FALLS: ICD-10-CM

## 2024-03-12 PROCEDURE — 6370000000 HC RX 637 (ALT 250 FOR IP): Performed by: UROLOGY

## 2024-03-12 PROCEDURE — 7100000010 HC PHASE II RECOVERY - FIRST 15 MIN: Performed by: UROLOGY

## 2024-03-12 PROCEDURE — 2709999900 HC NON-CHARGEABLE SUPPLY: Performed by: UROLOGY

## 2024-03-12 PROCEDURE — 3600000014 HC SURGERY LEVEL 4 ADDTL 15MIN: Performed by: UROLOGY

## 2024-03-12 PROCEDURE — 6360000002 HC RX W HCPCS: Performed by: NURSE ANESTHETIST, CERTIFIED REGISTERED

## 2024-03-12 PROCEDURE — 3700000001 HC ADD 15 MINUTES (ANESTHESIA): Performed by: UROLOGY

## 2024-03-12 PROCEDURE — 2720000010 HC SURG SUPPLY STERILE: Performed by: UROLOGY

## 2024-03-12 PROCEDURE — 2500000003 HC RX 250 WO HCPCS: Performed by: NURSE ANESTHETIST, CERTIFIED REGISTERED

## 2024-03-12 PROCEDURE — 3600000004 HC SURGERY LEVEL 4 BASE: Performed by: UROLOGY

## 2024-03-12 PROCEDURE — 6370000000 HC RX 637 (ALT 250 FOR IP): Performed by: NURSE ANESTHETIST, CERTIFIED REGISTERED

## 2024-03-12 PROCEDURE — 3700000000 HC ANESTHESIA ATTENDED CARE: Performed by: UROLOGY

## 2024-03-12 PROCEDURE — 7100000000 HC PACU RECOVERY - FIRST 15 MIN: Performed by: UROLOGY

## 2024-03-12 PROCEDURE — 7100000011 HC PHASE II RECOVERY - ADDTL 15 MIN: Performed by: UROLOGY

## 2024-03-12 PROCEDURE — 2580000003 HC RX 258: Performed by: NURSE ANESTHETIST, CERTIFIED REGISTERED

## 2024-03-12 PROCEDURE — 7100000001 HC PACU RECOVERY - ADDTL 15 MIN: Performed by: UROLOGY

## 2024-03-12 PROCEDURE — 6360000002 HC RX W HCPCS: Performed by: UROLOGY

## 2024-03-12 RX ORDER — FENTANYL CITRATE 50 UG/ML
INJECTION, SOLUTION INTRAMUSCULAR; INTRAVENOUS PRN
Status: DISCONTINUED | OUTPATIENT
Start: 2024-03-12 | End: 2024-03-12 | Stop reason: SDUPTHER

## 2024-03-12 RX ORDER — SODIUM CHLORIDE, SODIUM LACTATE, POTASSIUM CHLORIDE, CALCIUM CHLORIDE 600; 310; 30; 20 MG/100ML; MG/100ML; MG/100ML; MG/100ML
INJECTION, SOLUTION INTRAVENOUS CONTINUOUS
Status: DISCONTINUED | OUTPATIENT
Start: 2024-03-12 | End: 2024-03-12 | Stop reason: HOSPADM

## 2024-03-12 RX ORDER — EPHEDRINE SULFATE/0.9% NACL/PF 25 MG/5 ML
SYRINGE (ML) INTRAVENOUS PRN
Status: DISCONTINUED | OUTPATIENT
Start: 2024-03-12 | End: 2024-03-12 | Stop reason: SDUPTHER

## 2024-03-12 RX ORDER — CIPROFLOXACIN 500 MG/1
500 TABLET, FILM COATED ORAL 2 TIMES DAILY
Qty: 14 TABLET | Refills: 0 | Status: SHIPPED | OUTPATIENT
Start: 2024-03-12 | End: 2024-03-19

## 2024-03-12 RX ORDER — LIDOCAINE HYDROCHLORIDE 20 MG/ML
INJECTION, SOLUTION EPIDURAL; INFILTRATION; INTRACAUDAL; PERINEURAL PRN
Status: DISCONTINUED | OUTPATIENT
Start: 2024-03-12 | End: 2024-03-12 | Stop reason: SDUPTHER

## 2024-03-12 RX ORDER — DEXAMETHASONE SODIUM PHOSPHATE 4 MG/ML
INJECTION, SOLUTION INTRA-ARTICULAR; INTRALESIONAL; INTRAMUSCULAR; INTRAVENOUS; SOFT TISSUE PRN
Status: DISCONTINUED | OUTPATIENT
Start: 2024-03-12 | End: 2024-03-12 | Stop reason: SDUPTHER

## 2024-03-12 RX ORDER — AMOXICILLIN 250 MG/1
250 CAPSULE ORAL 2 TIMES DAILY
COMMUNITY

## 2024-03-12 RX ORDER — PHENYLEPHRINE HYDROCHLORIDE 10 MG/ML
INJECTION INTRAVENOUS PRN
Status: DISCONTINUED | OUTPATIENT
Start: 2024-03-12 | End: 2024-03-12 | Stop reason: SDUPTHER

## 2024-03-12 RX ORDER — ONDANSETRON 2 MG/ML
INJECTION INTRAMUSCULAR; INTRAVENOUS PRN
Status: DISCONTINUED | OUTPATIENT
Start: 2024-03-12 | End: 2024-03-12 | Stop reason: SDUPTHER

## 2024-03-12 RX ORDER — ACETAMINOPHEN 325 MG/1
650 TABLET ORAL ONCE
Status: COMPLETED | OUTPATIENT
Start: 2024-03-12 | End: 2024-03-12

## 2024-03-12 RX ORDER — LIDOCAINE HYDROCHLORIDE 20 MG/ML
JELLY TOPICAL PRN
Status: DISCONTINUED | OUTPATIENT
Start: 2024-03-12 | End: 2024-03-12 | Stop reason: ALTCHOICE

## 2024-03-12 RX ORDER — CEFAZOLIN SODIUM IN 0.9 % NACL 2 G/100 ML
2000 PLASTIC BAG, INJECTION (ML) INTRAVENOUS
Status: COMPLETED | OUTPATIENT
Start: 2024-03-12 | End: 2024-03-12

## 2024-03-12 RX ADMIN — PHENYLEPHRINE HYDROCHLORIDE 100 MCG: 10 INJECTION INTRAVENOUS at 11:11

## 2024-03-12 RX ADMIN — PHENYLEPHRINE HYDROCHLORIDE 100 MCG: 10 INJECTION INTRAVENOUS at 11:18

## 2024-03-12 RX ADMIN — LIDOCAINE HYDROCHLORIDE 100 MG: 20 INJECTION, SOLUTION EPIDURAL; INFILTRATION; INTRACAUDAL; PERINEURAL at 11:03

## 2024-03-12 RX ADMIN — PROPOFOL 120 MG: 10 INJECTION, EMULSION INTRAVENOUS at 11:03

## 2024-03-12 RX ADMIN — PHENYLEPHRINE HYDROCHLORIDE 100 MCG: 10 INJECTION INTRAVENOUS at 11:15

## 2024-03-12 RX ADMIN — ACETAMINOPHEN 650 MG: 325 TABLET ORAL at 09:47

## 2024-03-12 RX ADMIN — EPHEDRINE SULFATE 5 MG: 5 INJECTION INTRAVENOUS at 11:18

## 2024-03-12 RX ADMIN — DEXAMETHASONE SODIUM PHOSPHATE 4 MG: 4 INJECTION INTRA-ARTICULAR; INTRALESIONAL; INTRAMUSCULAR; INTRAVENOUS; SOFT TISSUE at 11:09

## 2024-03-12 RX ADMIN — EPHEDRINE SULFATE 5 MG: 5 INJECTION INTRAVENOUS at 11:15

## 2024-03-12 RX ADMIN — ONDANSETRON 4 MG: 2 INJECTION INTRAMUSCULAR; INTRAVENOUS at 11:09

## 2024-03-12 RX ADMIN — EPHEDRINE SULFATE 10 MG: 5 INJECTION INTRAVENOUS at 11:14

## 2024-03-12 RX ADMIN — Medication 2000 MG: at 10:57

## 2024-03-12 RX ADMIN — FENTANYL CITRATE 25 MCG: 50 INJECTION, SOLUTION INTRAMUSCULAR; INTRAVENOUS at 11:03

## 2024-03-12 RX ADMIN — SODIUM CHLORIDE, POTASSIUM CHLORIDE, SODIUM LACTATE AND CALCIUM CHLORIDE: 600; 310; 30; 20 INJECTION, SOLUTION INTRAVENOUS at 10:41

## 2024-03-12 ASSESSMENT — PAIN - FUNCTIONAL ASSESSMENT
PAIN_FUNCTIONAL_ASSESSMENT: 0-10
PAIN_FUNCTIONAL_ASSESSMENT: 0-10

## 2024-03-12 ASSESSMENT — LIFESTYLE VARIABLES: SMOKING_STATUS: 0

## 2024-03-12 ASSESSMENT — PAIN SCALES - GENERAL: PAINLEVEL_OUTOF10: 0

## 2024-03-12 NOTE — DISCHARGE INSTRUCTIONS
drainage bag.    Check the area around the urethra for inflammation or signs of infection, such as irritated, swollen, red or tender skin at the insertion site or drainage around the catheter.    Keep the urinary drainage bag below the level of the bladder.    Make sure that the urinary drainage bag does not drag and pull on the catheter.    Do not let the drainage bag touch or lie on the floor.    Cleaning the catheter:    Always wash your hands before and after caring for your catheter.    Clean the area around the drainage tube twice each day.    Use soap and water to carefully wash around the drainage tube.    Rinse well and dry with a clean towel.    Do not tug or pull on the drainage tube.    Caring for the drainage bag:    Always wash your hands before and after emptying your drainage bag.    Avoid disconnecting the catheter from the tubing unless instructed to do so by your doctor.    Do not allow the ends of the tubings to touch anything.    Clean the ends with a new alcohol pad or as directed by your doctor before you reconnect them.    Empty the drainage bag when needed.  Empty the bag every 3-6 hours or when it is half to two-thirds full.    Call the doctor immediately if you experience any of the following:    Fever over 100 degrees.                           Prolonged soreness/pain.    Unusual bleeding/bruising.                                                   If urine is bloody or notice blood clots in urine.    You cannot pass urine 8 hours after the test.    Your urine has a foul odor or has a cloudy or milky appearance.    You have pain in your belly or your back just below your rib cage.  (This is called flank pain.)

## 2024-03-12 NOTE — ANESTHESIA PRE PROCEDURE
Department of Anesthesiology  Preprocedure Note       Name:  Rubin Kenney   Age:  79 y.o.  :  1945                                          MRN:  [id number]         Date:  3/12/2024      Surgeon: Surgeon(s):  Brigido Geiger MD    Procedure: Procedure(s):  CYSTOSCOPY TRANSURETHRAL RESECTION PROSTATE LASER-  PVP GREENLIGHT    Medications prior to admission:   Prior to Admission medications    Medication Sig Start Date End Date Taking? Authorizing Provider   amoxicillin (AMOXIL) 250 MG capsule Take 1 capsule by mouth 2 times daily   Yes Provider, MD Miguel   traMADol (ULTRAM) 50 MG tablet Take 1 tablet by mouth 3 times daily as needed for Pain for up to 30 days. Max Daily Amount: 150 mg 2/22/24 3/23/24  Geovany Kennedy DO   tamsulosin (FLOMAX) 0.4 MG capsule TAKE 1 CAPSULE BY MOUTH TWICE  DAILY 24   Jovanny Chase APRN - CNP   buPROPion (WELLBUTRIN XL) 150 MG extended release tablet TAKE 1 TABLET BY MOUTH IN THE  MORNING 12/15/23   Jovanny Chase APRN - CNP   gabapentin (NEURONTIN) 400 MG capsule Take 1 capsule by mouth 3 times daily for 90 days.  Patient taking differently: Take 1 capsule by mouth in the morning and at bedtime. 12/13/23 3/12/24  Geovany Kennedy DO   metoprolol succinate (TOPROL XL) 50 MG extended release tablet Take 1 tablet by mouth daily 23   David Yoon MD   isosorbide mononitrate (IMDUR) 30 MG extended release tablet Take 1 tablet by mouth daily 23   David Yoon MD   lisinopril (PRINIVIL;ZESTRIL) 5 MG tablet Take 1 tablet by mouth daily 23   David Yoon MD   rivaroxaban (XARELTO) 2.5 MG TABS tablet TAKE 1 TABLET BY MOUTH TWICE A DAY 23   David Yoon MD   atorvastatin (LIPITOR) 40 MG tablet Take 1 tablet by mouth nightly 23   David Yoon MD   famotidine (PEPCID) 40 MG tablet TAKE 1 TABLET BY MOUTH DAILY  Patient taking differently: nightly TAKE 1 TABLET BY MOUTH DAILY 23   Jovanny Chase APRN - CNP   ferrous sulfate (IRON

## 2024-03-12 NOTE — H&P
History and Physical    Patient:  Rubin Kenney  MRN: [id number]    CHIEF COMPLAINT:  BPH with LUTS    HISTORY OF PRESENT ILLNESS:   The patient is a 79 y.o. male who presents with BPH with LUTS. History  Referral from [facility] for frequent UTI.  There is some baseline confusion and he is hard of hearing.  His daughter reports he has been treated multiple times with antibiotics for suspected urinary tract infections.  Upon further review of the chart I only see one positive culture from 2/2020 that shows E. coli.  He did previously see a urologist in [place] before they moved to [place].  He has been on Flomax for the last 3 to 5 years.  He does complain of frequency, urgency, and urge incontinence.  He does not wear pads.  The urge incontinence does result in him needing to change his underwear approximately 4 times per week.  PVR is elevated in the office today, 223ml. He denies constipation.  He denies any dysuria or gross hematuria.  He denies history of stones.  Flomax increased to twice a day     3/2021 Cysto: Bilobar hyperplasia of the prostate with normal bladder anatomy     7/2022 Nocturia x2, urge incontinence.  He does wear briefs and changes once a day and once at night.                Offered medications vs procedure, but he declined     9/2023 cysto showed significant bilobar hyperplasia        Past Medical History:    Past Medical History:   Diagnosis Date    Chronic back pain     Coronary artery disease     s/p MI and CABG in 12/2017    H/O aortic valve replacement 12/16/2017    Bioprosthetic valve     H/O cardiac catheterization 12/06/2018    Severe three vessel disease involving the LAD, circumflex and right coronary arteries  2 of 3 bypass grafts patent Normal LVEDP.Consult to interventional cardiology for likely angioplasty and/or  stenting of the patients severe stenosis with likely stenting of the unrevascularized RCA stenosis with plans to intervene on the higher risk LIMA-LAD stenosis only

## 2024-03-12 NOTE — PROGRESS NOTES
Discharge instructions reviewed with pt and wife Celine. All questions answered. Pt verbalizes readiness to go home.

## 2024-03-12 NOTE — ANESTHESIA POSTPROCEDURE EVALUATION
Department of Anesthesiology  Postprocedure Note    Patient: Rubin Kenney  MRN: [id number]  YOB: 1945  Date of evaluation: 3/12/2024    Procedure Summary       Date: 03/12/24 Room / Location: [facility]    Anesthesia Start: 1059 Anesthesia Stop: 1157    Procedure: CYSTOSCOPY TRANSURETHRAL RESECTION PROSTATE LASER-  PVP GREENLIGHT Diagnosis:       BPH with obstruction/lower urinary tract symptoms      (BPH with obstruction/lower urinary tract symptoms [N40.1, N13.8])    Surgeons: Brigido Geiger MD Responsible Provider: Priscila Iglesias APRN - CRNA    Anesthesia Type: general ASA Status: 3            Anesthesia Type: No value filed.    Mary Anne Phase I: Mary Anne Score: 8    Mary Anne Phase II: Mary Anne Score: 10    Anesthesia Post Evaluation    Patient location during evaluation: PACU  Patient participation: complete - patient participated  Level of consciousness: awake  Airway patency: patent  Nausea & Vomiting: no vomiting and no nausea  Cardiovascular status: blood pressure returned to baseline and hemodynamically stable  Respiratory status: acceptable, spontaneous ventilation and room air  Hydration status: stable  Multimodal analgesia pain management approach  Pain management: satisfactory to patient        No notable events documented.

## 2024-03-12 NOTE — PROGRESS NOTES
SpO2 87%, placed pt back on 3 liters of oxygen per nasal cannula. SpO2 up to 92%.   No abnormal movements

## 2024-03-12 NOTE — PROGRESS NOTES
Noted that pts fingers were cold, SpO2 reading 90-92%. Moved pulse ox sensor to right ear, SpO2 98%. Pt placed on room air at this time, sensor left on right ear. Care ongoing.

## 2024-03-14 ENCOUNTER — OFFICE VISIT (OUTPATIENT)
Dept: UROLOGY | Age: 79
End: 2024-03-14

## 2024-03-14 ENCOUNTER — OFFICE VISIT (OUTPATIENT)
Dept: PULMONOLOGY | Age: 79
End: 2024-03-14
Payer: MEDICARE

## 2024-03-14 VITALS
HEART RATE: 76 BPM | TEMPERATURE: 97 F | WEIGHT: 223 LBS | RESPIRATION RATE: 16 BRPM | HEIGHT: 72 IN | OXYGEN SATURATION: 97 % | SYSTOLIC BLOOD PRESSURE: 102 MMHG | DIASTOLIC BLOOD PRESSURE: 59 MMHG | BODY MASS INDEX: 30.2 KG/M2

## 2024-03-14 VITALS
BODY MASS INDEX: 30.24 KG/M2 | DIASTOLIC BLOOD PRESSURE: 74 MMHG | SYSTOLIC BLOOD PRESSURE: 123 MMHG | HEART RATE: 78 BPM | TEMPERATURE: 98.4 F | WEIGHT: 223 LBS

## 2024-03-14 DIAGNOSIS — J98.6 ELEVATED DIAPHRAGM: ICD-10-CM

## 2024-03-14 DIAGNOSIS — R13.19 OTHER DYSPHAGIA: ICD-10-CM

## 2024-03-14 DIAGNOSIS — G47.33 OSA TREATED WITH BIPAP: Primary | ICD-10-CM

## 2024-03-14 DIAGNOSIS — J98.11 ATELECTASIS, LEFT: ICD-10-CM

## 2024-03-14 DIAGNOSIS — N40.1 BPH WITH OBSTRUCTION/LOWER URINARY TRACT SYMPTOMS: Primary | ICD-10-CM

## 2024-03-14 DIAGNOSIS — N13.8 BPH WITH OBSTRUCTION/LOWER URINARY TRACT SYMPTOMS: Primary | ICD-10-CM

## 2024-03-14 PROCEDURE — G8417 CALC BMI ABV UP PARAM F/U: HCPCS | Performed by: INTERNAL MEDICINE

## 2024-03-14 PROCEDURE — 1036F TOBACCO NON-USER: CPT | Performed by: INTERNAL MEDICINE

## 2024-03-14 PROCEDURE — G8484 FLU IMMUNIZE NO ADMIN: HCPCS | Performed by: INTERNAL MEDICINE

## 2024-03-14 PROCEDURE — 3074F SYST BP LT 130 MM HG: CPT | Performed by: INTERNAL MEDICINE

## 2024-03-14 PROCEDURE — 3078F DIAST BP <80 MM HG: CPT | Performed by: INTERNAL MEDICINE

## 2024-03-14 PROCEDURE — G8427 DOCREV CUR MEDS BY ELIG CLIN: HCPCS | Performed by: INTERNAL MEDICINE

## 2024-03-14 PROCEDURE — 99214 OFFICE O/P EST MOD 30 MIN: CPT | Performed by: INTERNAL MEDICINE

## 2024-03-14 PROCEDURE — 1123F ACP DISCUSS/DSCN MKR DOCD: CPT | Performed by: INTERNAL MEDICINE

## 2024-03-14 NOTE — PROGRESS NOTES
accuracy of this automated transcription, some errors in transcription may have occurred.    Yvon Dobbins MD, MD             3/14/2024, 1:45 PM

## 2024-03-14 NOTE — PROGRESS NOTES
Pt had meredith catheter placed on 3/12/24 for green light.    Balloon deflated on catheter and catheter removed. Patient tolerated procedure well.    Pt instructed to drink plenty of fluids, try to urinate q 2 hrs, call office in 6 hrs with update of amount voided, and if not voiding will need to return to office to have meredith replaced.   Also instructed to return to office or ER if goes >6 hrs without voiding or has strong urge to void/suprapubic discomfort and cannot urinate.  Pt verbalizes understanding of plan.  F/u 2 weeks.

## 2024-03-19 DIAGNOSIS — E11.9 TYPE 2 DIABETES MELLITUS WITHOUT COMPLICATION, WITHOUT LONG-TERM CURRENT USE OF INSULIN (HCC): Chronic | ICD-10-CM

## 2024-03-19 NOTE — TELEPHONE ENCOUNTER
Health Maintenance   Topic Date Due    DTaP/Tdap/Td vaccine (1 - Tdap) Never done    Respiratory Syncytial Virus (RSV) Pregnant or age 60 yrs+ (1 - 1-dose 60+ series) Never done    Lipids  10/10/2023    Annual Wellness Visit (Medicare Advantage)  01/01/2024    Depression Monitoring  11/01/2024    Flu vaccine  Completed    Shingles vaccine  Completed    Pneumococcal 65+ years Vaccine  Completed    COVID-19 Vaccine  Completed    Hepatitis A vaccine  Aged Out    Hepatitis B vaccine  Aged Out    Hib vaccine  Aged Out    Polio vaccine  Aged Out    Meningococcal (ACWY) vaccine  Aged Out    Depression Screen  Discontinued             (applicable per patient's age: Cancer Screenings, Depression Screening, Fall Risk Screening, Immunizations)    Hemoglobin A1C (%)   Date Value   11/01/2023 6.3   05/17/2023 7.0 (H)   04/27/2023 6.8     LDL Cholesterol (mg/dL)   Date Value   10/10/2022 49     AST (U/L)   Date Value   10/10/2022 17     ALT (U/L)   Date Value   10/10/2022 21     BUN (mg/dL)   Date Value   03/04/2024 16      (goal A1C is < 7)   (goal LDL is <100) need 30-50% reduction from baseline     BP Readings from Last 3 Encounters:   03/14/24 (!) 102/59   03/14/24 123/74   03/12/24 112/62    (goal /80)      All Future Testing planned in [facility]:  Lab Frequency Next Occurrence   CBC with Auto Differential Once 10/24/2023   ALT Once 10/27/2023   AST Once 10/27/2023   Basic Metabolic Panel Once 10/27/2023   Hemoglobin A1C Once 10/24/2023   Lipid Panel Once 10/24/2023   Microalbumin, Ur Once 10/24/2023   XR RADIUS ULNA LEFT (2 VIEWS) Once 08/09/2023   XR ELBOW LEFT (MIN 3 VIEWS) Once 08/09/2023   CBC with Auto Differential Once 04/29/2024   ALT Once 05/01/2024   AST Once 05/01/2024   Basic Metabolic Panel Once 05/01/2024   Hemoglobin A1C Once 04/29/2024   Lipid Panel Once 04/29/2024   Microalbumin, Ur Once 04/29/2024   FACET JOINT L/S Once 11/16/2023   FACET JOINT L/S 2ND LEVEL Once 11/16/2023       Next Visit

## 2024-03-21 ENCOUNTER — OFFICE VISIT (OUTPATIENT)
Age: 79
End: 2024-03-21
Payer: MEDICARE

## 2024-03-21 VITALS
RESPIRATION RATE: 18 BRPM | BODY MASS INDEX: 30.24 KG/M2 | HEART RATE: 73 BPM | WEIGHT: 223 LBS | DIASTOLIC BLOOD PRESSURE: 64 MMHG | SYSTOLIC BLOOD PRESSURE: 102 MMHG

## 2024-03-21 DIAGNOSIS — Z79.891 CHRONIC USE OF OPIATE FOR THERAPEUTIC PURPOSE: ICD-10-CM

## 2024-03-21 DIAGNOSIS — M47.817 LUMBOSACRAL SPONDYLOSIS WITHOUT MYELOPATHY: Primary | ICD-10-CM

## 2024-03-21 DIAGNOSIS — Z79.01 LONG TERM (CURRENT) USE OF ANTICOAGULANTS: ICD-10-CM

## 2024-03-21 DIAGNOSIS — M51.36 LUMBAR DEGENERATIVE DISC DISEASE: ICD-10-CM

## 2024-03-21 PROCEDURE — G8484 FLU IMMUNIZE NO ADMIN: HCPCS | Performed by: STUDENT IN AN ORGANIZED HEALTH CARE EDUCATION/TRAINING PROGRAM

## 2024-03-21 PROCEDURE — 1123F ACP DISCUSS/DSCN MKR DOCD: CPT | Performed by: STUDENT IN AN ORGANIZED HEALTH CARE EDUCATION/TRAINING PROGRAM

## 2024-03-21 PROCEDURE — 3074F SYST BP LT 130 MM HG: CPT | Performed by: STUDENT IN AN ORGANIZED HEALTH CARE EDUCATION/TRAINING PROGRAM

## 2024-03-21 PROCEDURE — G8417 CALC BMI ABV UP PARAM F/U: HCPCS | Performed by: STUDENT IN AN ORGANIZED HEALTH CARE EDUCATION/TRAINING PROGRAM

## 2024-03-21 PROCEDURE — 1036F TOBACCO NON-USER: CPT | Performed by: STUDENT IN AN ORGANIZED HEALTH CARE EDUCATION/TRAINING PROGRAM

## 2024-03-21 PROCEDURE — 99214 OFFICE O/P EST MOD 30 MIN: CPT | Performed by: STUDENT IN AN ORGANIZED HEALTH CARE EDUCATION/TRAINING PROGRAM

## 2024-03-21 PROCEDURE — 3078F DIAST BP <80 MM HG: CPT | Performed by: STUDENT IN AN ORGANIZED HEALTH CARE EDUCATION/TRAINING PROGRAM

## 2024-03-21 PROCEDURE — G8427 DOCREV CUR MEDS BY ELIG CLIN: HCPCS | Performed by: STUDENT IN AN ORGANIZED HEALTH CARE EDUCATION/TRAINING PROGRAM

## 2024-03-21 RX ORDER — TRAMADOL HYDROCHLORIDE 50 MG/1
50 TABLET ORAL 3 TIMES DAILY PRN
Qty: 75 TABLET | Refills: 0 | Status: SHIPPED | OUTPATIENT
Start: 2024-03-27 | End: 2024-04-26

## 2024-03-21 NOTE — PATIENT INSTRUCTIONS
Survey:    You may be receiving a survey from [facility] today.    Please complete the survey to enable us to provide the highest quality of care to you and your family.    If you cannot score us a very good on any question, please call the office to discuss how we could have made your experience a very good one.    Thank you.    [facility]  DO Kellie Mcdonald, CMA

## 2024-03-21 NOTE — H&P (VIEW-ONLY)
musculature with positive lumbar facet loading bilaterally.  The lumbar MRI on 6/2/2023 reveals multilevel degenerative changes with facet hypertrophy throughout the lumbar spine specifically at L4-5 and L5-S1 facet joints. The patient underwent bilateral lumbar L3, L4, L5 medial branch blocks on 1/18/2024 and 3/7/2024 with 100% improvement in pain and function.  The patient is now a candidate for the lumbar medial branch radiofrequency ablation procedure using fluoroscopy.  I discussed risk, benefits and expectations of the procedure, and the patient agreed to proceed.     Neurologically, it appears the patient has full strength and normal sensation. There is no evidence of radiculopathy or myelopathy on examination. There are no red flags in the patient's history. The patient has failed conservative measures including outpatient physical therapy, greater than 3 medications for pain relief, a self-directed therapy program, as well as activity modification all within the last 6 weeks over 3 months. The patient's pain is moderate to severe that causes functional deficit measured on pain and disability scale. The patient reports worsening quality of life.    The patient continues to take opioid medications to improve pain, function and quality of life.  The patient denies any side effects from the medications including constipation or respiratory depression.  The patient reports adequate analgesia with the medication.  There is no evidence of aberrant behavior.  At todays visit, I refilled his Tramadol.    PLAN:  Medications:    For nonopioid therapy, the following medications were prescribed:    -Continue gabapentin 400 mg 3 times daily    Opioid therapy:  -Continue Tramadol 50 mg 2-3 times daily as needed, refilled  -Pain Treatment agreement: 12/13/2023  -Urine Drug Screen:11/16/2023, reviewed and appropriate except for alcohol  -OARRS reviewed and appropriate    Interventions:  -Plan for left then right lumbar L3,

## 2024-03-21 NOTE — PROGRESS NOTES
L4, L5 medial branch radiofrequency ablation  -No need to hold Xarelto or aspirin    Imaging:  -No new imaging    Behavioral Therapies:  -Continue daily stretching and home exercise program    Referrals:  -None    Follow-up Plan:  -2 months    Patient was offered intervention where appropriate.     Multi-modal Pain Therapy:  The patient was explicitly considered for multimodal and interdisciplinary therapy. Non-opioid and non-pharmacological opportunities to enhance analgesia and quality of life have been and will continue to be pursued.    Opioid Therapy: Education provided to patient regarding short term and long term implications of opioid medication use. Repeat opioid risk stratification today, discussion regarding functional achievements, safe storage, and optimization of non-opioid interventional, behavioral, and pharmaceutical modalities. Will continue attempt to wean off medication as appropriate.    Geovany Kennedy, DO  Interventional Pain Management/PM&R   [facility]    Orders Placed This Encounter    FACET JOINT L/S SINGLE     Standing Status:   Future     Standing Expiration Date:   3/21/2025     Scheduling Instructions:      Left then right lumbar L3, L4, L5 RFA      No need to hold Xarelto or ASA    RADIOFREQUENCY L/S ADDITIONAL     Standing Status:   Future     Standing Expiration Date:   3/21/2025    traMADol (ULTRAM) 50 MG tablet     Sig: Take 1 tablet by mouth 3 times daily as needed for Pain for up to 30 days. Max Daily Amount: 150 mg     Dispense:  75 tablet     Refill:  0     Reduce doses taken as pain becomes manageable

## 2024-03-22 ENCOUNTER — TELEPHONE (OUTPATIENT)
Age: 79
End: 2024-03-22

## 2024-03-22 NOTE — TELEPHONE ENCOUNTER
Rubin Kenney     1945        male    [address]  [facility] [address]                    Legal Guardian    If yes, Name:       Skilled Facility      If yes, Name:                                             Home Phone: [phone]         Cell Phone:    Telephone Information:   Mobile [phone]                                           Surgeon: Brent   Surgery Date: 4/18/24                      Time:     Procedure: Left lumbar L3, L4, L5 radiofrequency ablation  Duration:    Diagnosis: M47.817 lumbosacral spondylosis without myelopathy   CPT Codes: 05917, 16525    Important Medical History:  In Epic    First Assistant - no   Special Inst/Equip/Implants: Regular    Nickel allergy:    No   Latex Allergy: No         Cardiac Device:  No  If yes, need most recent pacemaker interrogation from Cardiologist:  Type of pacemaker:    Anesthesia:    Local                       Admission Type:  Same Day                          Admit Prior to Day of Surgery: No    Case Location:  Ambulatory            Preadmission Testing:  Phone Call             PAT Date and Time:     Need Preop Cardiac Clearance: No  Need Pre-op/Medical Clearance: No     Does Patient have Cardiologist/physician?   Name of Physician:    Car  No need to hold Xarelto or ASA    Special Needs Communication:  Amna Lift - no           needed- no            Does patient sign for self- yes

## 2024-03-22 NOTE — TELEPHONE ENCOUNTER
Rubin Kenney     1945        male    [address]  [facility] [address]                    Legal Guardian    If yes, Name:       Skilled Facility     If yes, Name:                                             Home Phone: [phone]         Cell Phone:    Telephone Information:   Mobile [phone]                                           Surgeon: Brent   Surgery Date: 6/12/24                      Time:     Procedure: Right lumbar L3, L4, L5 radiofrequency ablation   Duration:    Diagnosis: M47.817 lumbosacral spondylosis without myelopathy    CPT Codes: 54580, 34712    Important Medical History:  In Epic    First Assistant - No   Special Inst/Equip/Implants: Regular    Nickel allergy :    No   Latex Allergy: No         Cardiac Device:  No  If yes, need most recent pacemaker interrogation from Cardiologist:  Type of pacemaker:    Anesthesia:    Local                        Admission Type:  Same Day                          Admit Prior to Day of Surgery: No    Case Location:  Ambulatory            Preadmission Testing:  Phone Call             PAT Date and Time:     Need Preop Cardiac Clearance: No  Need Pre-op/Medical Clearance: no     Does Patient have Cardiologist/physicianKellen Yoon  Name of Physician:    Car   No need to hold Xarelto or ASA    Special Needs Communication:  Amna Lift - no          needed- no             Does patient sign for self - yes

## 2024-03-26 ENCOUNTER — TELEPHONE (OUTPATIENT)
Dept: PRIMARY CARE CLINIC | Age: 79
End: 2024-03-26

## 2024-03-26 ENCOUNTER — HOSPITAL ENCOUNTER (OUTPATIENT)
Dept: SPEECH THERAPY | Age: 79
Setting detail: THERAPIES SERIES
Discharge: HOME | End: 2024-03-26
Payer: MEDICARE

## 2024-03-26 PROCEDURE — 92526 ORAL FUNCTION THERAPY: CPT

## 2024-03-26 PROCEDURE — 92610 EVALUATE SWALLOWING FUNCTION: CPT

## 2024-03-26 RX ORDER — DOCUSATE SODIUM 100 MG/1
100 CAPSULE, LIQUID FILLED ORAL 2 TIMES DAILY
Qty: 180 CAPSULE | Refills: 1 | Status: SHIPPED | OUTPATIENT
Start: 2024-03-26

## 2024-03-26 NOTE — TELEPHONE ENCOUNTER
Patient spouse contacted the office in regards to wanting a stool softener. Patient is taking other medications that are making him constipated and he is straining when using the bathroom.     Please advise.

## 2024-03-26 NOTE — PROGRESS NOTES
Phone: [phone]                        [facility]    Fax: [phone]                                 Outpatient Speech Therapy                               DAILY TREATMENT NOTE    Date: 3/26/2024  Patient’s Name:  Rubin Kenney  YOB: 1945 (79 y.o.)  Gender:  male  MRN:  [id number]  [facility] #: [id number]  Referring physician:Yvon Dobbins    Diagnosis: r13.12 oropharyngeal Dysphagia    Precautions:       INSURANCE  Visit Information  SLP Insurance Information: U[employer] Medicare  Total # of Visits Approved: 0  Total # of Visits to Date: 1  No Show: 0  Canceled Appointment: 0      Plan of Care/Recert ends    PAIN  [x]No     []Yes      Pain Rating (0-10 pain scale): 0  Location: 0 N/A  Pain Description: 0 NA    SUBJECTIVE  Patient presents to clinic with his wife, Celine    SHORT TERM GOALS/ TREATMENT SESSION:  Subjective report:          Patient seen for dysphaiga treatment following BSE.    Goal 1: Patient will complete MBSS to objectively assess pharyngeal phase of swallow and further develop plan of care.     ST will request MBSS order. []Met  [x]Partially met  []Not met   Goal 2: Patient will trial thin liquids via straw without overt s/sx of aspiration/penetration in 80% of opportunities.       DNT []Met  [x]Partially met  []Not met   Goal 3: Patient will complete oropharyngeal exercises 10-15x each given minimal verbal cues.       Patient performed the following exercises following a model from the clinician:    Effortful swallow x 5  Lingual pull backs x 5  Katie x 4  Mendelsohn x 3 with Max A (Patient simply holding breath and not swallowing)  Chin tuck against resistance x 3  Effortful Laryngeal adduction x 3 (poor ability to match \"ee\" sound d/t hearing loss) []Met  [x]Partially met  []Not met   Goal 4:  Goal 4: Patient will utilize compensatory swallowing strategies during a meal or snack with 80% accuracy. Educated on compensatory swallowing strategies. Wife reports patient takes large 
Today: [x] Evaluation           [x]Plans/ Goals discussed with pt/family/caregiver(s)                                         [x] Risks Benefits discussed with pt/family/caregiver(s)    Functional Outcome Measure       RECOMMENDATIONS:   _X_Patient to be seen by ST 1 times per [x]week                                                                     []Month                                              []other:  __ ST not warranted at this time.    __ A re-evaluation is recommended in ___ months.    The results of these tests and the recommendations were explained to the patient and his wife. They appeared to understand the information presented.    Thank you for this referral.  If you have any further questions, you can reach me at [phone].    Additional Comments:     TIME   Time Evaluation session was INITIATED 1200   Time Evaluation session was STOPPED 1220    20 MINUTES     Units Charged: 1  Electronically signed by:  Priscila DODD              Date:3/26/2024    Regulatory Requirements  I have reviewed this plan of care and certify a need for medically necessary rehabilitation services.    Physician Signature:_____________________________________    Date:_________________________________  Please sign and fax to [phone]            Electronically signed: Priscila DODD             3/26/2024

## 2024-03-28 ENCOUNTER — HOSPITAL ENCOUNTER (OUTPATIENT)
Age: 79
Setting detail: SPECIMEN
Discharge: HOME | End: 2024-03-28
Payer: MEDICARE

## 2024-03-28 ENCOUNTER — OFFICE VISIT (OUTPATIENT)
Dept: UROLOGY | Age: 79
End: 2024-03-28
Payer: MEDICARE

## 2024-03-28 VITALS
HEART RATE: 80 BPM | TEMPERATURE: 98.5 F | WEIGHT: 223 LBS | BODY MASS INDEX: 30.24 KG/M2 | SYSTOLIC BLOOD PRESSURE: 110 MMHG | DIASTOLIC BLOOD PRESSURE: 70 MMHG

## 2024-03-28 DIAGNOSIS — N39.41 URGE INCONTINENCE: ICD-10-CM

## 2024-03-28 DIAGNOSIS — N32.81 OAB (OVERACTIVE BLADDER): ICD-10-CM

## 2024-03-28 DIAGNOSIS — R33.9 INCOMPLETE BLADDER EMPTYING: ICD-10-CM

## 2024-03-28 DIAGNOSIS — N13.8 BPH WITH OBSTRUCTION/LOWER URINARY TRACT SYMPTOMS: ICD-10-CM

## 2024-03-28 DIAGNOSIS — N13.8 BPH WITH OBSTRUCTION/LOWER URINARY TRACT SYMPTOMS: Primary | ICD-10-CM

## 2024-03-28 DIAGNOSIS — Z98.890 POST-OPERATIVE STATE: ICD-10-CM

## 2024-03-28 DIAGNOSIS — N40.1 BPH WITH OBSTRUCTION/LOWER URINARY TRACT SYMPTOMS: Primary | ICD-10-CM

## 2024-03-28 DIAGNOSIS — N39.44 NOCTURNAL ENURESIS: ICD-10-CM

## 2024-03-28 DIAGNOSIS — N40.1 BPH WITH OBSTRUCTION/LOWER URINARY TRACT SYMPTOMS: ICD-10-CM

## 2024-03-28 PROCEDURE — 51798 US URINE CAPACITY MEASURE: CPT | Performed by: NURSE PRACTITIONER

## 2024-03-28 PROCEDURE — 99024 POSTOP FOLLOW-UP VISIT: CPT | Performed by: NURSE PRACTITIONER

## 2024-03-28 PROCEDURE — 87086 URINE CULTURE/COLONY COUNT: CPT

## 2024-03-28 NOTE — PROGRESS NOTES
History  Referral from TERESA DE LEON for frequent UTI.  There is some baseline confusion and he is hard of hearing.  His daughter reports he has been treated multiple times with antibiotics for suspected urinary tract infections.  Upon further review of the chart I only see one positive culture from 2/2020 that shows E. coli.  He did previously see a urologist in [place] before they moved to [place].  He has been on Flomax for the last 3 to 5 years.  He does complain of frequency, urgency, and urge incontinence.  He does not wear pads.  The urge incontinence does result in him needing to change his underwear approximately 4 times per week.  PVR is elevated in the office today, 223ml. He denies constipation.  He denies any dysuria or gross hematuria.  He denies history of stones.  Flomax increased to twice a day    3/2021 Cysto: Bilobar hyperplasia of the prostate with normal bladder anatomy    7/2022 Nocturia x2, urge incontinence.  He does wear briefs and changes once a day and once at night.     Offered medications vs procedure, but he declined    9/2023 cysto showed significant bilobar hyperplasia     2/2024 worsening urgency and incontinence.  She is having to change their bedding at least twice per week due to saturation.  He is using 2-3 depends per day.  They deny any gross hematuria or dysuria.  He does consume coffee, soda, and wine during the day.  He does have daily bowel movements.  He is on Flomax twice per day.  PVR is 275ml.  This is elevated compared to prior.     3/2024 PVP greenlight    Today  Here today s/p PVP greenlight on 3/12/2024.     urgency and incontinence.  She is having to change their bedding at least twice per week due to saturation.  He is using 2-3 depends per day.     PVR has improved    Plan  Urine culture    F/U in 4 weeks

## 2024-03-29 LAB
MICROORGANISM SPEC CULT: NORMAL
SPECIMEN DESCRIPTION: NORMAL

## 2024-04-01 ENCOUNTER — TELEPHONE (OUTPATIENT)
Dept: PULMONOLOGY | Age: 79
End: 2024-04-01

## 2024-04-01 ENCOUNTER — TELEPHONE (OUTPATIENT)
Dept: UROLOGY | Age: 79
End: 2024-04-01

## 2024-04-01 DIAGNOSIS — R13.10 DYSPHAGIA, UNSPECIFIED TYPE: Primary | ICD-10-CM

## 2024-04-01 DIAGNOSIS — F34.1 DYSTHYMIA: ICD-10-CM

## 2024-04-01 RX ORDER — BUPROPION HYDROCHLORIDE 150 MG/1
150 TABLET ORAL EVERY MORNING
Qty: 90 TABLET | Refills: 3 | Status: SHIPPED | OUTPATIENT
Start: 2024-04-01

## 2024-04-01 NOTE — TELEPHONE ENCOUNTER
We received  a call from Celine stating they are trying to set up a Barium swallow which was recommended by the speech pathologist.  They cannot schedule it without an order. Can you please place the order, in the pathologist's note under recommendations  it says recommend modified barium swallow.   Scheduling said it needs to be [id number].  Please advise.

## 2024-04-01 NOTE — TELEPHONE ENCOUNTER
----- Message from HALEY Paulson - CNP sent at 4/1/2024 10:32 AM EDT -----  Call pt - urine cx reviewed and negative for UTI

## 2024-04-03 NOTE — TELEPHONE ENCOUNTER
Called and spoke with the patients wife and informed her the new order for the new Modified Barium swallow.  She verbalized understanding and will schedule the test.

## 2024-04-09 ENCOUNTER — HOSPITAL ENCOUNTER (OUTPATIENT)
Dept: GENERAL RADIOLOGY | Age: 79
Discharge: HOME | End: 2024-04-11
Payer: MEDICARE

## 2024-04-09 ENCOUNTER — TELEPHONE (OUTPATIENT)
Dept: PRIMARY CARE CLINIC | Age: 79
End: 2024-04-09

## 2024-04-09 DIAGNOSIS — R13.12 DYSPHAGIA, OROPHARYNGEAL PHASE: Primary | ICD-10-CM

## 2024-04-09 DIAGNOSIS — R13.10 DYSPHAGIA, UNSPECIFIED TYPE: ICD-10-CM

## 2024-04-09 PROCEDURE — 92611 MOTION FLUOROSCOPY/SWALLOW: CPT

## 2024-04-09 PROCEDURE — 74230 X-RAY XM SWLNG FUNCJ C+: CPT

## 2024-04-09 PROCEDURE — 2500000003 HC RX 250 WO HCPCS: Performed by: NURSE PRACTITIONER

## 2024-04-09 RX ADMIN — BARIUM SULFATE 4.8 G: 0.6 CREAM ORAL at 14:11

## 2024-04-09 RX ADMIN — BARIUM SULFATE 140 ML: 960 POWDER, FOR SUSPENSION ORAL at 14:10

## 2024-04-09 NOTE — PROCEDURES
solids    Pharyngeal Phase  Swallowing Study Trial  Type of Study: Modified barium swallow  Radiologist: Alexandra  Patient Position: Seated, Upright  Consistencies Administered: All, Regular, Easy to chew, Pureed, Thin, Mildly Thick - cup, Mildly Thick - straw  How Presented: Self-fed/presented, SLP-fed/Presented  Bolus Acceptance: No impairment  Bolus Formation/Control: No impairment  Propulsion: No impairment  Oral Residue: 10-50% of bolus, Palatal  Initiation of Swallow: Triggered at base of tongue, Triggered at vallecula  Timing: Pooling 1-5 sec  Penetration: Flash/transient (Flash penetration seen on thin by cup)  Aspiration/Timing: No evidence of aspiration  Pharyngeal Clearance: Vallecular residue, Less than 10%  Attempted Modifications: Cup/sip, Double swallow, Small sips and bites, Straw, Other (comment) (Difficulty with interrupting pace; less success with straw than open cup)  Cues: Moderate-maximal  Findings  Oral Phase Severity: No impairment  Pharyngeal Phase Severity: Mild moderate    Upper Esophageal Phase   Upper Esophageal Screen  Esophageal Screen: WFL  Esophageal Screen: WFL       Impressions and Recommendations     Dysphagia Score: Level 4: Mild moderate dysphagia- Intermittent supervision/cueing. One - two diet consistencies restricted    Aspiration/Penetration Risk: Penetration-Aspiration Scale (PAS): 2 - Material enters the airway, remains above the vocal folds, and is ejected from the airway    Diet Recommendations:  Diet Solids Recommendation: Regular   Liquid Consistency Recommendation: Mildly Thick (Nectar), Other (comments) (NO STRAW)   Recommended Form of Meds: PO     Compensatory/Postural Swallow Strategies:   Eat/Feed slowly, Effortful swallow, No straws, Remain upright for 30-45 minutes after meals, Upright as possible for all oral intake, External pacing    Prognosis:  Prognosis for safe diet advancement: guarded  Barriers to reach goals: age, behavior     Therapy:  Requires SLP

## 2024-04-09 NOTE — TELEPHONE ENCOUNTER
----- Message from HALEY Quinteros CNP sent at 4/9/2024  4:49 PM EDT -----  Follow-up with speech pathologist as planned.  Thank you.

## 2024-04-11 ENCOUNTER — OFFICE VISIT (OUTPATIENT)
Dept: NEUROLOGY | Age: 79
End: 2024-04-11
Payer: MEDICARE

## 2024-04-11 VITALS
DIASTOLIC BLOOD PRESSURE: 62 MMHG | SYSTOLIC BLOOD PRESSURE: 92 MMHG | HEART RATE: 79 BPM | WEIGHT: 223 LBS | TEMPERATURE: 98 F | RESPIRATION RATE: 18 BRPM | BODY MASS INDEX: 30.2 KG/M2 | HEIGHT: 72 IN

## 2024-04-11 DIAGNOSIS — R27.0 ATAXIA: Primary | ICD-10-CM

## 2024-04-11 PROCEDURE — G8427 DOCREV CUR MEDS BY ELIG CLIN: HCPCS | Performed by: NEUROMUSCULOSKELETAL MEDICINE, SPORTS MEDICINE

## 2024-04-11 PROCEDURE — 3074F SYST BP LT 130 MM HG: CPT | Performed by: NEUROMUSCULOSKELETAL MEDICINE, SPORTS MEDICINE

## 2024-04-11 PROCEDURE — 1123F ACP DISCUSS/DSCN MKR DOCD: CPT | Performed by: NEUROMUSCULOSKELETAL MEDICINE, SPORTS MEDICINE

## 2024-04-11 PROCEDURE — 99214 OFFICE O/P EST MOD 30 MIN: CPT | Performed by: NEUROMUSCULOSKELETAL MEDICINE, SPORTS MEDICINE

## 2024-04-11 PROCEDURE — 3078F DIAST BP <80 MM HG: CPT | Performed by: NEUROMUSCULOSKELETAL MEDICINE, SPORTS MEDICINE

## 2024-04-11 PROCEDURE — 1036F TOBACCO NON-USER: CPT | Performed by: NEUROMUSCULOSKELETAL MEDICINE, SPORTS MEDICINE

## 2024-04-11 PROCEDURE — G8417 CALC BMI ABV UP PARAM F/U: HCPCS | Performed by: NEUROMUSCULOSKELETAL MEDICINE, SPORTS MEDICINE

## 2024-04-11 NOTE — PATIENT INSTRUCTIONS
SURVEY:    Thank you for allowing us to care for you today.    You may be receiving a survey from [facility] regarding your visit today- electronically or via mail.      Please help us by completing the survey as this will provide the needed feedback to ensure we are providing the very best care for you and your family.    If you cannot score us a very good on any question, please call the office to discuss how we could have made your experience a very good one.    Thank you.       STAFF:    Bambi Fuentes, Annel Tobin, Sharron Antonio      CLINICAL STAFF:    Luz Maria Hernandez LPN, Celine Marquis LPN, Juana Dumont LPN, Griselda Peacock CMA

## 2024-04-11 NOTE — PROGRESS NOTES
Unsteady gait.  Wheelchair.  Uses a walker    IMPRESSION:  79-year-old gentleman history of memory lapses, suggestive of age-related cognitive dysfunction and /or vascular dementia, additionally contributed by  severe hearing loss, and impaired balance and frequent falls.  No clinical evidence of Parkinson's disease.     PLAN:     1.  Physical therapy to focus on gait training and fall precautions.  2.  MRI of the brain to determine the etiology of worsening balance and cognitive function      NOTE: This neurology evaluation is part of outpatient coverage at [facility]  1-2 days per week.  Patients requiring frequent evaluations or uncomfortable with potential 3-4 day turnaround on questions or calls  may be better served by a neurologist in the area full time.  [facility] at [facility] is available for outpatient visits and procedures including EMG/NCS.  Non-[facility] neurologists also practice in [place] (Dr. Justice) and [place] (Dr's Danner, Benedikt).       Luis Miguel Jones MD   4/11/2024  6:26 PM

## 2024-04-12 ENCOUNTER — TELEPHONE (OUTPATIENT)
Dept: PRIMARY CARE CLINIC | Age: 79
End: 2024-04-12

## 2024-04-18 ENCOUNTER — APPOINTMENT (OUTPATIENT)
Dept: GENERAL RADIOLOGY | Age: 79
End: 2024-04-18
Attending: STUDENT IN AN ORGANIZED HEALTH CARE EDUCATION/TRAINING PROGRAM
Payer: MEDICARE

## 2024-04-18 ENCOUNTER — HOSPITAL ENCOUNTER (OUTPATIENT)
Age: 79
Setting detail: OUTPATIENT SURGERY
Discharge: HOME | End: 2024-04-18
Attending: STUDENT IN AN ORGANIZED HEALTH CARE EDUCATION/TRAINING PROGRAM | Admitting: STUDENT IN AN ORGANIZED HEALTH CARE EDUCATION/TRAINING PROGRAM
Payer: MEDICARE

## 2024-04-18 ENCOUNTER — TELEPHONE (OUTPATIENT)
Dept: NEUROLOGY | Age: 79
End: 2024-04-18

## 2024-04-18 VITALS
DIASTOLIC BLOOD PRESSURE: 59 MMHG | HEART RATE: 66 BPM | TEMPERATURE: 97.6 F | RESPIRATION RATE: 16 BRPM | OXYGEN SATURATION: 93 % | SYSTOLIC BLOOD PRESSURE: 139 MMHG

## 2024-04-18 PROCEDURE — 2500000003 HC RX 250 WO HCPCS: Performed by: STUDENT IN AN ORGANIZED HEALTH CARE EDUCATION/TRAINING PROGRAM

## 2024-04-18 PROCEDURE — 3600000002 HC SURGERY LEVEL 2 BASE: Performed by: STUDENT IN AN ORGANIZED HEALTH CARE EDUCATION/TRAINING PROGRAM

## 2024-04-18 PROCEDURE — 3600000012 HC SURGERY LEVEL 2 ADDTL 15MIN: Performed by: STUDENT IN AN ORGANIZED HEALTH CARE EDUCATION/TRAINING PROGRAM

## 2024-04-18 PROCEDURE — 64636 DESTROY L/S FACET JNT ADDL: CPT | Performed by: STUDENT IN AN ORGANIZED HEALTH CARE EDUCATION/TRAINING PROGRAM

## 2024-04-18 PROCEDURE — 2709999900 HC NON-CHARGEABLE SUPPLY: Performed by: STUDENT IN AN ORGANIZED HEALTH CARE EDUCATION/TRAINING PROGRAM

## 2024-04-18 PROCEDURE — 64635 DESTROY LUMB/SAC FACET JNT: CPT | Performed by: STUDENT IN AN ORGANIZED HEALTH CARE EDUCATION/TRAINING PROGRAM

## 2024-04-18 PROCEDURE — 6360000002 HC RX W HCPCS: Performed by: STUDENT IN AN ORGANIZED HEALTH CARE EDUCATION/TRAINING PROGRAM

## 2024-04-18 RX ORDER — LIDOCAINE HYDROCHLORIDE 20 MG/ML
INJECTION, SOLUTION EPIDURAL; INFILTRATION; INTRACAUDAL; PERINEURAL PRN
Status: DISCONTINUED | OUTPATIENT
Start: 2024-04-18 | End: 2024-04-18 | Stop reason: ALTCHOICE

## 2024-04-18 RX ORDER — LIDOCAINE HYDROCHLORIDE 10 MG/ML
INJECTION, SOLUTION EPIDURAL; INFILTRATION; INTRACAUDAL; PERINEURAL PRN
Status: DISCONTINUED | OUTPATIENT
Start: 2024-04-18 | End: 2024-04-18 | Stop reason: ALTCHOICE

## 2024-04-18 RX ORDER — TRIAMCINOLONE ACETONIDE 40 MG/ML
INJECTION, SUSPENSION INTRA-ARTICULAR; INTRAMUSCULAR PRN
Status: DISCONTINUED | OUTPATIENT
Start: 2024-04-18 | End: 2024-04-18 | Stop reason: ALTCHOICE

## 2024-04-18 ASSESSMENT — PAIN - FUNCTIONAL ASSESSMENT: PAIN_FUNCTIONAL_ASSESSMENT: 0-10

## 2024-04-18 ASSESSMENT — PAIN DESCRIPTION - ORIENTATION: ORIENTATION: LOWER

## 2024-04-18 ASSESSMENT — PAIN DESCRIPTION - LOCATION: LOCATION: BACK

## 2024-04-18 ASSESSMENT — PAIN SCALES - GENERAL: PAINLEVEL_OUTOF10: 2

## 2024-04-18 NOTE — TELEPHONE ENCOUNTER
Patient's spouse stopped into office today and stated patient was told he is unable to have MRI done due to having an xray in the past which showed \"traces of metal left behind from a past procedure.\" Please advise.

## 2024-04-18 NOTE — OP NOTE
PROCEDURE PERFORMED: Left Lumbar Medial Branch Radiofrequency Ablation using Fluoroscopy    PREOPERATIVE DIAGNOSIS: Lumbosacral spondylosis    INDICATIONS: Chronic low back pain    The patient's history and physical exam were reviewed.  The risk, benefits, and alternatives of the procedure were discussed and all questions were answered to the patient's satisfaction.  The patient agreed to proceed and written informed consent was obtained.    POSTOPERATIVE DIAGNOSIS: Lumbosacral spondylosis    PHYSICIAN:  Dr. Geovany Kennedy DO    ANESTHESIA:  LOCAL    ASSISTANT:  NONE    PATHOLOGY:  NONE    ESTIMATED BLOOD LOSS:  N/A    IMPLANTS:  NONE    PROCEDURE DESCRIPTION: Left lumbar medial branch radiofrequency ablation using fluoroscopy    The patient was placed on the operative bed in prone position.  The area was prepped with  Chlorhexidine.  The area was then draped in a sterile fashion.    Targeted levels: Left lumbar L3, L4, L5 medial branch radiofrequency ablation    An AP  fluoroscopy image was used to identify and monie Agustin's point at the L3, L4 levels on the targeted side.  Additionally, the junction of the SAP and sacral ala was also marked on the same side.  The skin and subcutaneous tissues were then anesthetized with 1% lidocaine. At each lumbar medial branch, a 20-gauge, 100 mm curved with 10 mm active tip probe was advanced under AP fluoroscopic projections until bone was contacted along the medial aspect of the transverse process.  Aspiration of each needle was negative for blood, CSF and paresthesia prior to injection.   Motor stimulation at 2 Hz and 1.2 V was negative.  Then, after negative aspiration, 1 mL lidocaine 2% was injected at each level prior to lesioning which was performed at 80°C for 90 seconds.  Once the lesion was complete, injectate solution containing Kenalog 40 mg and 2 mL lidocaine 1% was injected at each site with a total of 1 mL.  The probes were then removed.  The needle sites

## 2024-04-18 NOTE — PROGRESS NOTES
Discharge instructions given to wife and pt. Verbal understanding per wife. Pt pleasant, no pain, VS stable. Pt taken out of department in wheelchair.

## 2024-04-18 NOTE — INTERVAL H&P NOTE
Update History & Physical    The patient's History and Physical of March 21, 2024 was reviewed with the patient and I examined the patient. There was no change. The surgical site was confirmed by the patient and me.     Plan: The risks, benefits, expected outcome, and alternative to the recommended procedure have been discussed with the patient. Patient understands and wants to proceed with the procedure.     ASA CLASSIFICATION  3  MP   CLASSIFICATION  3    Electronically signed by Geovany Kennedy DO on 4/18/2024 at 9:26 AM

## 2024-04-25 ENCOUNTER — OFFICE VISIT (OUTPATIENT)
Dept: UROLOGY | Age: 79
End: 2024-04-25
Payer: MEDICARE

## 2024-04-25 ENCOUNTER — HOSPITAL ENCOUNTER (OUTPATIENT)
Age: 79
Setting detail: SPECIMEN
Discharge: HOME | End: 2024-04-25
Payer: MEDICARE

## 2024-04-25 VITALS
SYSTOLIC BLOOD PRESSURE: 140 MMHG | TEMPERATURE: 98.7 F | WEIGHT: 222 LBS | BODY MASS INDEX: 30.11 KG/M2 | DIASTOLIC BLOOD PRESSURE: 60 MMHG | HEART RATE: 70 BPM

## 2024-04-25 DIAGNOSIS — N39.44 NOCTURNAL ENURESIS: ICD-10-CM

## 2024-04-25 DIAGNOSIS — N13.8 BPH WITH OBSTRUCTION/LOWER URINARY TRACT SYMPTOMS: ICD-10-CM

## 2024-04-25 DIAGNOSIS — N39.41 URGE INCONTINENCE: Primary | ICD-10-CM

## 2024-04-25 DIAGNOSIS — N32.81 OAB (OVERACTIVE BLADDER): ICD-10-CM

## 2024-04-25 DIAGNOSIS — N39.41 URGE INCONTINENCE: ICD-10-CM

## 2024-04-25 DIAGNOSIS — Z98.890 POST-OPERATIVE STATE: ICD-10-CM

## 2024-04-25 DIAGNOSIS — N40.1 BPH WITH OBSTRUCTION/LOWER URINARY TRACT SYMPTOMS: ICD-10-CM

## 2024-04-25 PROCEDURE — 51798 US URINE CAPACITY MEASURE: CPT | Performed by: NURSE PRACTITIONER

## 2024-04-25 PROCEDURE — 99024 POSTOP FOLLOW-UP VISIT: CPT | Performed by: NURSE PRACTITIONER

## 2024-04-25 PROCEDURE — 87086 URINE CULTURE/COLONY COUNT: CPT

## 2024-04-25 RX ORDER — OXYBUTYNIN CHLORIDE 10 MG/1
10 TABLET, EXTENDED RELEASE ORAL DAILY
Qty: 30 TABLET | Refills: 3 | Status: SHIPPED | OUTPATIENT
Start: 2024-04-25

## 2024-04-25 RX ORDER — TAMSULOSIN HYDROCHLORIDE 0.4 MG/1
0.4 CAPSULE ORAL 2 TIMES DAILY
COMMUNITY
End: 2024-04-25 | Stop reason: ALTCHOICE

## 2024-04-25 NOTE — PROGRESS NOTES
History  Referral from [facility] for frequent UTI.  There is some baseline confusion and he is hard of hearing.  His daughter reports he has been treated multiple times with antibiotics for suspected urinary tract infections.  Upon further review of the chart I only see one positive culture from 2/2020 that shows E. coli.  He did previously see a urologist in [place] before they moved to [place].  He has been on Flomax for the last 3 to 5 years.  He does complain of frequency, urgency, and urge incontinence.  He does not wear pads.  The urge incontinence does result in him needing to change his underwear approximately 4 times per week.  PVR is elevated in the office today, 223ml. He denies constipation.  He denies any dysuria or gross hematuria.  He denies history of stones.  Flomax increased to twice a day    3/2021 Cysto: Bilobar hyperplasia of the prostate with normal bladder anatomy    7/2022 Nocturia x2, urge incontinence.  He does wear briefs and changes once a day and once at night.     Offered medications vs procedure, but he declined    9/2023 cysto showed significant bilobar hyperplasia     2/2024 worsening urgency and incontinence.  She is having to change their bedding at least twice per week due to saturation.  He is using 2-3 depends per day.  They deny any gross hematuria or dysuria.  He does consume coffee, soda, and wine during the day.  He does have daily bowel movements.  He is on Flomax twice per day.  PVR is 275ml.  This is elevated compared to prior.     3/2024 PVP greenlight    Today  Here today s/p PVP greenlight on 3/12/2024.     Frequency every 20 minutes - 1 hour during the day, urgency, incontinence.  Using 2 pull-ups during the day and 2 pull-ups at night and saturating through these    PVR has improved    Plan  Urine culture    Stop Flomax    Start oxybutynin    F/U in 6 weeks

## 2024-04-26 LAB
MICROORGANISM SPEC CULT: NO GROWTH
SPECIMEN DESCRIPTION: NORMAL

## 2024-04-29 ENCOUNTER — TELEPHONE (OUTPATIENT)
Dept: UROLOGY | Age: 79
End: 2024-04-29

## 2024-04-29 RX ORDER — TROSPIUM CHLORIDE ER 60 MG/1
60 CAPSULE ORAL DAILY
Qty: 30 CAPSULE | Refills: 3 | Status: SHIPPED | OUTPATIENT
Start: 2024-04-29

## 2024-04-29 NOTE — TELEPHONE ENCOUNTER
Patient's wife called this morning, she is concerned about the new medication the patient was put on last week. She said he stopped his tamsulosin and started taking Oxybutin. Mrs Kenney said his urinary symptoms have improved tremendously; but he's fallen 4 times since Friday. Mrs Kenney stated, the patient is having a difficult time walking or standing. She stated she is grateful for the urinary symptoms being handled but not at the expense of him walking. Mrs Kenney was wondering if the medication switch could have caused this?    Please advise

## 2024-04-29 NOTE — TELEPHONE ENCOUNTER
Stop oxybutynin      Starting next Monday May 6th, start trospium daily    Call us Wednesday with an update on how he is doing

## 2024-04-29 NOTE — TELEPHONE ENCOUNTER
----- Message from Rodrigo Iglesias PA-C sent at 4/29/2024 10:27 AM EDT -----  Please call pt - urine culture reviewed and does not show UTI

## 2024-05-03 NOTE — TELEPHONE ENCOUNTER
Mrs Kenney calling with update, states that he is ambulating better since her last call. She states that he is no longer taking the Oxybutynin.

## 2024-05-06 ENCOUNTER — HOSPITAL ENCOUNTER (OUTPATIENT)
Age: 79
Discharge: HOME | End: 2024-05-06
Payer: MEDICARE

## 2024-05-06 ENCOUNTER — OFFICE VISIT (OUTPATIENT)
Dept: NEUROLOGY | Age: 79
End: 2024-05-06

## 2024-05-06 ENCOUNTER — TELEPHONE (OUTPATIENT)
Dept: PRIMARY CARE CLINIC | Age: 79
End: 2024-05-06

## 2024-05-06 VITALS
RESPIRATION RATE: 16 BRPM | HEART RATE: 77 BPM | HEIGHT: 72 IN | WEIGHT: 222 LBS | SYSTOLIC BLOOD PRESSURE: 111 MMHG | TEMPERATURE: 96.5 F | DIASTOLIC BLOOD PRESSURE: 59 MMHG | BODY MASS INDEX: 30.07 KG/M2

## 2024-05-06 DIAGNOSIS — R27.0 ATAXIA: ICD-10-CM

## 2024-05-06 DIAGNOSIS — M47.817 LUMBOSACRAL SPONDYLOSIS WITHOUT MYELOPATHY: ICD-10-CM

## 2024-05-06 DIAGNOSIS — E11.9 TYPE 2 DIABETES MELLITUS WITHOUT COMPLICATION, WITHOUT LONG-TERM CURRENT USE OF INSULIN (HCC): Chronic | ICD-10-CM

## 2024-05-06 DIAGNOSIS — R26.89 POOR BALANCE: Primary | ICD-10-CM

## 2024-05-06 LAB
ALT SERPL-CCNC: 18 U/L (ref 5–41)
ANION GAP SERPL CALCULATED.3IONS-SCNC: 13 MMOL/L (ref 9–17)
AST SERPL-CCNC: 18 U/L
BASOPHILS # BLD: 0.06 K/UL (ref 0–0.2)
BASOPHILS NFR BLD: 1 % (ref 0–2)
BUN SERPL-MCNC: 15 MG/DL (ref 8–23)
BUN/CREAT SERPL: 19 (ref 9–20)
CALCIUM SERPL-MCNC: 9.9 MG/DL (ref 8.6–10.4)
CHLORIDE SERPL-SCNC: 96 MMOL/L (ref 98–107)
CHOLEST SERPL-MCNC: 111 MG/DL (ref 0–199)
CHOLESTEROL/HDL RATIO: 2
CO2 SERPL-SCNC: 25 MMOL/L (ref 20–31)
CREAT SERPL-MCNC: 0.8 MG/DL (ref 0.7–1.2)
CREAT UR-MCNC: 88.7 MG/DL (ref 39–259)
EOSINOPHIL # BLD: 0.24 K/UL (ref 0–0.44)
EOSINOPHILS RELATIVE PERCENT: 3 % (ref 1–4)
ERYTHROCYTE [DISTWIDTH] IN BLOOD BY AUTOMATED COUNT: 13.9 % (ref 11.8–14.4)
EST. AVERAGE GLUCOSE BLD GHB EST-MCNC: 143 MG/DL
GFR, ESTIMATED: 89 ML/MIN/1.73M2
GLUCOSE SERPL-MCNC: 123 MG/DL (ref 70–99)
HBA1C MFR BLD: 6.6 % (ref 4–6)
HCT VFR BLD AUTO: 41.4 % (ref 40.7–50.3)
HDLC SERPL-MCNC: 50 MG/DL
HGB BLD-MCNC: 14 G/DL (ref 13–17)
IMM GRANULOCYTES # BLD AUTO: <0.03 K/UL (ref 0–0.3)
IMM GRANULOCYTES NFR BLD: 0 %
LDLC SERPL CALC-MCNC: 51 MG/DL (ref 0–100)
LYMPHOCYTES NFR BLD: 2.88 K/UL (ref 1.1–3.7)
LYMPHOCYTES RELATIVE PERCENT: 40 % (ref 24–43)
MCH RBC QN AUTO: 34.2 PG (ref 25.2–33.5)
MCHC RBC AUTO-ENTMCNC: 33.8 G/DL (ref 28.4–34.8)
MCV RBC AUTO: 101.2 FL (ref 82.6–102.9)
MICROALBUMIN UR-MCNC: 86 MG/L (ref 0–20)
MICROALBUMIN/CREAT UR-RTO: 97 MCG/MG CREAT (ref 0–17)
MONOCYTES NFR BLD: 0.67 K/UL (ref 0.1–1.2)
MONOCYTES NFR BLD: 9 % (ref 3–12)
NEUTROPHILS NFR BLD: 47 % (ref 36–65)
NEUTS SEG NFR BLD: 3.37 K/UL (ref 1.5–8.1)
NRBC BLD-RTO: 0 PER 100 WBC
PLATELET # BLD AUTO: 229 K/UL (ref 138–453)
PMV BLD AUTO: 9.8 FL (ref 8.1–13.5)
POTASSIUM SERPL-SCNC: 4.8 MMOL/L (ref 3.7–5.3)
RBC # BLD AUTO: 4.09 M/UL (ref 4.21–5.77)
SODIUM SERPL-SCNC: 134 MMOL/L (ref 135–144)
TRIGL SERPL-MCNC: 51 MG/DL
VLDLC SERPL CALC-MCNC: 10 MG/DL
WBC OTHER # BLD: 7.2 K/UL (ref 3.5–11.3)

## 2024-05-06 PROCEDURE — 84460 ALANINE AMINO (ALT) (SGPT): CPT

## 2024-05-06 PROCEDURE — 85025 COMPLETE CBC W/AUTO DIFF WBC: CPT

## 2024-05-06 PROCEDURE — 80048 BASIC METABOLIC PNL TOTAL CA: CPT

## 2024-05-06 PROCEDURE — 80061 LIPID PANEL: CPT

## 2024-05-06 PROCEDURE — 83036 HEMOGLOBIN GLYCOSYLATED A1C: CPT

## 2024-05-06 PROCEDURE — 84450 TRANSFERASE (AST) (SGOT): CPT

## 2024-05-06 PROCEDURE — 82043 UR ALBUMIN QUANTITATIVE: CPT

## 2024-05-06 PROCEDURE — 82570 ASSAY OF URINE CREATININE: CPT

## 2024-05-06 PROCEDURE — 36415 COLL VENOUS BLD VENIPUNCTURE: CPT

## 2024-05-06 NOTE — PROGRESS NOTES
NEUROLOGY follow-up.    Patient Name:  Rubin Kenney  :   1945  Clinic Visit Date: 2024    I saw Mr. Rubin Kenney  in the neurology clinic today. 79-year-old right-handed gentleman with hypertension, hyperlipidemia, diabetes, valvular heart disease status post bioprosthetic valve replacement, atrial fibrillation, CAD, severe bilateral hearing loss, chronic lower back pain and frequent falls due to poor balance.  Scheduled to start physical therapy as recommended sometime this month.  No new neurological symptoms reported at this time    REVIEW OF SYSTEMS    Constitutional Weight changes: absent, change in appetite: absent Fatigue: present;Fevers : absent, Any recent hospitalizations:  absent   HEENT Ears: improved,  Visual disturbance: present   Respiratory Shortness of breath: absent, choking:  present, Cough: absent, Snoring : present   Cardiovascular Chest pain: absent, Leg swelling :absent, palpitations : absent, fainting : absent   GI Constipation: absent, Diarrhea: absent, Swallowing change: present    Urinary frequency: present, Urinary urgency: present, Urinary incontinence: present   Musculoskeletal Neck pain: absent, Back pain: present, Stiffness: present, Muscle pain: present, Joint pain: present, restless leg : absent   Dermatological Hair loss: absent, Skin changes: absent   Neurological Confusion: present, Trouble concentrating: present, Seizures: absent;  Memory loss: present, balance problem: present, Dizziness: absent, vertigo: absent, Weakness: present, Numbness absent, Tremor: present, Spasm: absent, involuntary movement: absent, Speech difficulty: present, Headache: present, Light sensitivity: absent   Psychiatric Anxiety: absent, Depression  present, drug abuse: absent, Hallucination: absent, mood disorder: absent, Suicidal ideations absent   Hematologic Abnormal bleeding: absent, Anemia: absent, Lymph gland changes: absent Clotting disorder: absent     Past Medical History:

## 2024-05-07 RX ORDER — TRAMADOL HYDROCHLORIDE 50 MG/1
50 TABLET ORAL
Qty: 75 TABLET | Refills: 0 | Status: SHIPPED | OUTPATIENT
Start: 2024-05-07 | End: 2024-06-06

## 2024-05-13 ENCOUNTER — OFFICE VISIT (OUTPATIENT)
Dept: PRIMARY CARE CLINIC | Age: 79
End: 2024-05-13
Payer: MEDICARE

## 2024-05-13 VITALS
HEART RATE: 67 BPM | DIASTOLIC BLOOD PRESSURE: 70 MMHG | TEMPERATURE: 97.3 F | WEIGHT: 223 LBS | BODY MASS INDEX: 30.24 KG/M2 | SYSTOLIC BLOOD PRESSURE: 118 MMHG | OXYGEN SATURATION: 95 %

## 2024-05-13 DIAGNOSIS — E11.9 TYPE 2 DIABETES MELLITUS WITHOUT COMPLICATION, WITHOUT LONG-TERM CURRENT USE OF INSULIN (HCC): Primary | Chronic | ICD-10-CM

## 2024-05-13 DIAGNOSIS — S93.401A MODERATE RIGHT ANKLE SPRAIN, INITIAL ENCOUNTER: ICD-10-CM

## 2024-05-13 DIAGNOSIS — Z78.9 IMPAIRED MOBILITY AND ADLS: ICD-10-CM

## 2024-05-13 DIAGNOSIS — Z74.09 IMPAIRED MOBILITY AND ADLS: ICD-10-CM

## 2024-05-13 PROCEDURE — G8417 CALC BMI ABV UP PARAM F/U: HCPCS | Performed by: NURSE PRACTITIONER

## 2024-05-13 PROCEDURE — 1036F TOBACCO NON-USER: CPT | Performed by: NURSE PRACTITIONER

## 2024-05-13 PROCEDURE — 3044F HG A1C LEVEL LT 7.0%: CPT | Performed by: NURSE PRACTITIONER

## 2024-05-13 PROCEDURE — 1123F ACP DISCUSS/DSCN MKR DOCD: CPT | Performed by: NURSE PRACTITIONER

## 2024-05-13 PROCEDURE — 3078F DIAST BP <80 MM HG: CPT | Performed by: NURSE PRACTITIONER

## 2024-05-13 PROCEDURE — G8427 DOCREV CUR MEDS BY ELIG CLIN: HCPCS | Performed by: NURSE PRACTITIONER

## 2024-05-13 PROCEDURE — 3074F SYST BP LT 130 MM HG: CPT | Performed by: NURSE PRACTITIONER

## 2024-05-13 PROCEDURE — 99214 OFFICE O/P EST MOD 30 MIN: CPT | Performed by: NURSE PRACTITIONER

## 2024-05-13 ASSESSMENT — PATIENT HEALTH QUESTIONNAIRE - PHQ9
10. IF YOU CHECKED OFF ANY PROBLEMS, HOW DIFFICULT HAVE THESE PROBLEMS MADE IT FOR YOU TO DO YOUR WORK, TAKE CARE OF THINGS AT HOME, OR GET ALONG WITH OTHER PEOPLE: NOT DIFFICULT AT ALL
SUM OF ALL RESPONSES TO PHQ QUESTIONS 1-9: 0
3. TROUBLE FALLING OR STAYING ASLEEP: NOT AT ALL
SUM OF ALL RESPONSES TO PHQ QUESTIONS 1-9: 0
9. THOUGHTS THAT YOU WOULD BE BETTER OFF DEAD, OR OF HURTING YOURSELF: NOT AT ALL
8. MOVING OR SPEAKING SO SLOWLY THAT OTHER PEOPLE COULD HAVE NOTICED. OR THE OPPOSITE, BEING SO FIGETY OR RESTLESS THAT YOU HAVE BEEN MOVING AROUND A LOT MORE THAN USUAL: NOT AT ALL
SUM OF ALL RESPONSES TO PHQ QUESTIONS 1-9: 0
2. FEELING DOWN, DEPRESSED OR HOPELESS: NOT AT ALL
7. TROUBLE CONCENTRATING ON THINGS, SUCH AS READING THE NEWSPAPER OR WATCHING TELEVISION: NOT AT ALL
6. FEELING BAD ABOUT YOURSELF - OR THAT YOU ARE A FAILURE OR HAVE LET YOURSELF OR YOUR FAMILY DOWN: NOT AT ALL
5. POOR APPETITE OR OVEREATING: NOT AT ALL
SUM OF ALL RESPONSES TO PHQ9 QUESTIONS 1 & 2: 0
1. LITTLE INTEREST OR PLEASURE IN DOING THINGS: NOT AT ALL
SUM OF ALL RESPONSES TO PHQ QUESTIONS 1-9: 0
4. FEELING TIRED OR HAVING LITTLE ENERGY: NOT AT ALL

## 2024-05-13 ASSESSMENT — ENCOUNTER SYMPTOMS
DIARRHEA: 0
BACK PAIN: 1
CONSTIPATION: 0
COUGH: 0
WHEEZING: 0
NAUSEA: 0
VOMITING: 0
ABDOMINAL PAIN: 0
SHORTNESS OF BREATH: 0
RHINORRHEA: 0
SORE THROAT: 0

## 2024-05-13 NOTE — PROGRESS NOTES
Annual Wellness Visit (Medicare Advantage)  01/01/2024       Past Surgical History:     Past Surgical History:   Procedure Laterality Date    CARDIAC CATHETERIZATION Left 12/06/2018    Dr Yoon/[facility]/left radial-Severe three vessel disease involving the LAD and circumflex coronary arteries with moderate to severe disease in the proximal right coronary arteries. 2 of 3 bypass grafts patent with a known to be occluded SVG-RCA and a 80-90% stenosis in the touchdown of the LIMA-LAD.    CARDIAC CATHETERIZATION  12/06/2018    cardiac catheterization on 12/6/2018 showed a 75% stenosis in RCA as well as a 70% stenosis in his Circumflex    COLONOSCOPY      CORONARY ANGIOPLASTY WITH STENT PLACEMENT  07/16/2021    CORONARY ARTERY BYPASS GRAFT  12/2017    EYE SURGERY Bilateral     cataracts    INTRACAPSULAR CATARACT EXTRACTION      KNEE ARTHROPLASTY      NERVE BLOCK Bilateral 3/7/2024    BILATERAL LUMBAR L3, L4, L5 MEDIAL BRANCH BLOCK performed by Geovany Kennedy DO at [facility] OR    PAIN MANAGEMENT PROCEDURE Bilateral 1/18/2024    LUMBAR MEDIAL BRANCH BLOCK-L3,L4,L5 performed by Geovany Kennedy DO at [facility] OR    PAIN MANAGEMENT PROCEDURE Left 4/18/2024    LUMBAR RADIO FREQUENCY ABLATION-L3 L4 L5 performed by Geovany Kennedy DO at [facility] OR    SHOULDER ARTHROPLASTY      TURP N/A 3/12/2024    CYSTOSCOPY TRANSURETHRAL RESECTION PROSTATE LASER-  PVP GREENLIGHT performed by Brigido Geiger MD at [facility] OR        Medications:       Prior to Admission medications    Medication Sig Start Date End Date Taking? Authorizing Provider   traMADol (ULTRAM) 50 MG tablet Take 1 tablet by mouth every 8-12 hours as needed for Pain for up to 30 days. Max Daily Amount: 150 mg 5/7/24 6/6/24 Yes Geovany Kennedy DO   buPROPion (WELLBUTRIN XL) 150 MG extended release tablet TAKE 1 TABLET BY MOUTH EVERY DAY IN THE MORNING 4/1/24  Yes Jovanny Chase APRN - CNP   metFORMIN (GLUCOPHAGE) 1000 MG tablet TAKE 1 TABLET BY MOUTH TWICE  DAILY WITH

## 2024-05-13 NOTE — PATIENT INSTRUCTIONS
SURVEY:     You may be receiving a survey from [facility] regarding your visit today.     Please complete the survey to enable us to provide the highest quality of care to you and your family.     If you cannot score us a very good on any question, please call the office to discuss how we could have made your experience a very good one.     Thank you,    Jovanny Chase, APRN-CNP  Cherie Schwartz, APRN-CNP  Roshni, LPN  Sarah, CMA  Howie, CMA  Madeleine, CMA  Jasmyn, PCA  Tanja, CMA  Miladis, PM

## 2024-08-09 ENCOUNTER — TELEPHONE (OUTPATIENT)
Age: 79
End: 2024-08-09

## 2024-08-09 NOTE — TELEPHONE ENCOUNTER
Patient's spouse contacted the office stating her spouse needs his procedure canceled due to his current health issues. He does not need to be rescheduled at this time.

## 2024-10-28 ENCOUNTER — TELEPHONE (OUTPATIENT)
Dept: PRIMARY CARE CLINIC | Age: 79
End: 2024-10-28

## 2024-12-16 ENCOUNTER — OFFICE VISIT (OUTPATIENT)
Dept: CARDIOLOGY | Age: 79
End: 2024-12-16
Payer: MEDICARE

## 2024-12-16 VITALS
BODY MASS INDEX: 26.14 KG/M2 | HEART RATE: 65 BPM | HEIGHT: 72 IN | DIASTOLIC BLOOD PRESSURE: 64 MMHG | WEIGHT: 193 LBS | SYSTOLIC BLOOD PRESSURE: 105 MMHG | RESPIRATION RATE: 18 BRPM

## 2024-12-16 DIAGNOSIS — I25.10 CORONARY ARTERY DISEASE INVOLVING NATIVE CORONARY ARTERY OF NATIVE HEART WITHOUT ANGINA PECTORIS: Chronic | ICD-10-CM

## 2024-12-16 DIAGNOSIS — E78.2 MIXED HYPERLIPIDEMIA: Chronic | ICD-10-CM

## 2024-12-16 DIAGNOSIS — Z95.1 HX OF CABG: Chronic | ICD-10-CM

## 2024-12-16 DIAGNOSIS — I50.32 CHRONIC DIASTOLIC CHF (CONGESTIVE HEART FAILURE), NYHA CLASS 3 (HCC): Primary | Chronic | ICD-10-CM

## 2024-12-16 DIAGNOSIS — I35.0 MILD AORTIC STENOSIS BY PRIOR ECHOCARDIOGRAM: ICD-10-CM

## 2024-12-16 DIAGNOSIS — I25.2 HISTORY OF MI (MYOCARDIAL INFARCTION): Chronic | ICD-10-CM

## 2024-12-16 DIAGNOSIS — I10 PRIMARY HYPERTENSION: Chronic | ICD-10-CM

## 2024-12-16 PROCEDURE — 3074F SYST BP LT 130 MM HG: CPT | Performed by: FAMILY MEDICINE

## 2024-12-16 PROCEDURE — 1159F MED LIST DOCD IN RCRD: CPT | Performed by: FAMILY MEDICINE

## 2024-12-16 PROCEDURE — 99214 OFFICE O/P EST MOD 30 MIN: CPT | Performed by: FAMILY MEDICINE

## 2024-12-16 PROCEDURE — 1123F ACP DISCUSS/DSCN MKR DOCD: CPT | Performed by: FAMILY MEDICINE

## 2024-12-16 PROCEDURE — G8484 FLU IMMUNIZE NO ADMIN: HCPCS | Performed by: FAMILY MEDICINE

## 2024-12-16 PROCEDURE — G8427 DOCREV CUR MEDS BY ELIG CLIN: HCPCS | Performed by: FAMILY MEDICINE

## 2024-12-16 PROCEDURE — 1036F TOBACCO NON-USER: CPT | Performed by: FAMILY MEDICINE

## 2024-12-16 PROCEDURE — 3078F DIAST BP <80 MM HG: CPT | Performed by: FAMILY MEDICINE

## 2024-12-16 PROCEDURE — G8417 CALC BMI ABV UP PARAM F/U: HCPCS | Performed by: FAMILY MEDICINE

## 2024-12-16 NOTE — PROGRESS NOTES
Rivaroxiban (Xarelto) 2.5 mg twice daily.   ACE Inibitor/ARB: Continue lisinopril 5 mg daily.  Beta Blocker: Continue metoprolol succinate (Toprol XL) 50 mg once daily.     Anti-anginal medications: Continue isosorbide mononitrate (Imdur) 30 mg once daily.  Anti-anginal medications: Continue nitroglycerin 0.4 mg tablets as needed for chest pain.  Cholesterol Reduction Therapy: Continue Atorvastatin (Lipitor) 40 mg daily.       Chronic Diastolic Heart Failure: EF of 50-55% on 12/4/2018 - 55% in 1/16/20. Currently well controlled.  Beta Blocker: Continue metoprolol succinate (Toprol XL) 50 mg  once daily.   Nonpharmacologic management of Heart Failure: I advised him to try and keep his legs up whenever possible and to limit salt in his diet.       History of Severe Aortic Stenosis: Asymptomatic-S/P: Bioprosthetic Aortic Valve replacement in 12/2017.   Beta Blocker: Continue metoprolol succinate (Toprol XL) 50 mg once daily.      Essential Hypertension: Controlled  Beta Blocker: Continue metoprolol succinate (Toprol XL) 50 mg once daily.     ACE Inibitor/ARB: Continue lisinopril 5 mg daily.    Chronic Stable Angina: Continue with guideline directed trial of maximal medical management.   Beta Blocker: DECREASE to Metoprolol succinate (Toprol XL) 50 mg daily.  Anti-anginal medications: Continue isosorbide mononitrate (Imdur) 30 mg once daily.    Anti-anginal medications: Continue nitroglycerin 0.4 mg tablets as needed for chest pain.    Finally, I recommended that he continue his current medications and follow up with you as previously scheduled.     FOLLOW UP:   I told Mr. Kenney to call my office if he had any problems, but otherwise I asked him to Return in about 1 year (around 12/16/2025). However, I would be happy to see him sooner should the need arise.    Sincerely,  David Yoon MD, MS, F.A.C.C.  [facility], [address] [address]  Phone: [phone], Fax:

## 2025-02-03 NOTE — OP NOTE
-  Coronary Angiography Brief Post Operative Note:    Severe three vessel coronary artery disease involving a 100% ostial LAD stenosis, 80% ostial Cx stenosis and proximal 60-70% stenosis in the right coronary artery. Normal left ventricular end diastolic pressure. 2 of 3 bypass grafts patent with a 100% previously known occluded SVG-right with a 80-90 stenosis in the touchdown of the LIMA-LAD. Consult to interventional cardiology for consideration of angioplasty and/or stenting of the patients severe stenosis.      Electronically signed by Mendez Mitchell MD on 7/15/2021 at 1:04 PM Patient is calling and stating pharmacy states no refills on file and also that he needs a prior authorization.  Please call pharmacy as patient needs to get his medication.         Disp Refills Start End MINDY   allopurinol (ZYLOPRIM) 300 MG tablet 180 tablet 3 10/24/2024 -- No   Sig - Route: Take 1 tablet (300 mg) by mouth 2 times daily. - Oral   Sent to pharmacy as: Allopurinol 300 MG Oral Tablet (ZYLOPRIM)   Class: E-Prescribe   Order: [id number]   E-Prescribing Status: Receipt confirmed by pharmacy (10/24/2024  9:45 AM CDT)   Prior authorization: Closed - Prior Authorization not required for patient/medication       Note  Retail Pharmacy Prior Authorization Team   Phone: [phone]     Prior Authorization Not Needed per Insurance     Medication: ALLOPURINOL 300 MG PO TABS  Insurance Company: [employer] [place] - Phone [phone] Fax [phone]  Expected CoPay: $    Pharmacy Filling the Rx: [id number] [place] [id number] AT [facility] OF [facility] [facility]  Pharmacy Notified: YES  Patient Notified: YES (faxed letter to pharmacy and the pharmacy will notify the patient when ready)

## (undated) DEVICE — LASER SURG W22XH58IN D36IN 475LB SLD STATE FREQ DOUBLED

## (undated) DEVICE — BANDAGE ADH W1XL3IN NAT FAB WVN FLX DURABLE N ADH PD SEAL

## (undated) DEVICE — SOLUTION IRRIG 3000ML 0.9% SOD CHL USP UROMATIC PLAS CONT

## (undated) DEVICE — NEEDLE SPNL 25GA L3.5IN BLU HUB S STL REG WALL FIT STYL W/

## (undated) DEVICE — STRAP,CATHETER,ELASTIC,HOOK&LOOP: Brand: MEDLINE

## (undated) DEVICE — PLUG,CATHETER,DRAINAGE PROTECTOR,TUBE: Brand: MEDLINE

## (undated) DEVICE — DRAINBAG,ANTI-REFLUX TOWER,L/F,2000ML,LL: Brand: MEDLINE

## (undated) DEVICE — PAIN TRAY: Brand: MEDLINE INDUSTRIES, INC.

## (undated) DEVICE — SHEET,DRAPE,53X77,STERILE: Brand: MEDLINE

## (undated) DEVICE — GLOVE SURG SZ 8 CRM LTX FREE POLYISOPRENE POLYMER BEAD ANTI

## (undated) DEVICE — NERVE BLOCK TRAY: Brand: MEDLINE INDUSTRIES, INC.

## (undated) DEVICE — APPLICATOR MEDICATED 3 CC SOLUTION HI LT ORNG CHLORAPREP 930415

## (undated) DEVICE — NEEDLE, QUINCKE, 22GX3.5": Brand: MEDLINE

## (undated) DEVICE — SYRINGE, LUER LOCK, 10ML: Brand: MEDLINE

## (undated) DEVICE — SET INFUS PMP 25ML L117IN CK VLV RLER CLMP 2 SMRTSITE NDL

## (undated) DEVICE — NEEDLE HYPO 18GA L1IN PNK POLYPR HUB S STL REG BVL STR W/O

## (undated) DEVICE — GLOVE SURG SZ 75 CRM LTX FREE POLYISOPRENE POLYMER BEAD ANTI

## (undated) DEVICE — PAD GRND FOR RF PAIN MGMT DISP

## (undated) DEVICE — CURVED SHARP RF CANNULA, RADIOPAQUE MARKER: Brand: RADIOPAQUE RADIOFREQUENCY CANNULA

## (undated) DEVICE — SYRINGE,PISTON,IRRIGATION,60ML,STERILE: Brand: MEDLINE

## (undated) DEVICE — SOLUTION IV 1000ML 0.9% SOD CHL FOR IRRIG PLAS CONT

## (undated) DEVICE — CATHETER,FOLEY,3WAY,SILI-ELAST,22FR,30ML: Brand: MEDLINE

## (undated) DEVICE — TOWEL,OR,DSP,ST,NATURAL,DLX,4/PK,20PK/CS: Brand: MEDLINE

## (undated) DEVICE — 4-PORT MANIFOLD: Brand: NEPTUNE 2

## (undated) DEVICE — GLOVE SURG SZ 75 L12IN FNGR THK79MIL GRN LTX FREE

## (undated) DEVICE — SYRINGE MEDICAL 3ML CLEAR PLASTIC STANDARD NON CONTROL LUERLOCK TIP DISPOSABLE

## (undated) DEVICE — MERCY TIFFIN CYSTO-LF: Brand: MEDLINE INDUSTRIES, INC.

## (undated) DEVICE — APPLICATOR MEDICATED 10.5 CC SOLUTION HI LT ORNG CHLORAPREP